# Patient Record
Sex: FEMALE | Race: WHITE | Employment: OTHER | URBAN - METROPOLITAN AREA
[De-identification: names, ages, dates, MRNs, and addresses within clinical notes are randomized per-mention and may not be internally consistent; named-entity substitution may affect disease eponyms.]

---

## 2017-10-02 ENCOUNTER — HOSPITAL ENCOUNTER (INPATIENT)
Age: 82
LOS: 4 days | Discharge: HOME | DRG: 194 | End: 2017-10-06
Attending: FAMILY MEDICINE | Admitting: INTERNAL MEDICINE
Payer: MEDICARE

## 2017-10-02 ENCOUNTER — APPOINTMENT (OUTPATIENT)
Dept: GENERAL RADIOLOGY | Age: 82
DRG: 194 | End: 2017-10-02
Attending: INTERNAL MEDICINE
Payer: MEDICARE

## 2017-10-02 PROBLEM — J44.1 COPD EXACERBATION (HCC): Status: ACTIVE | Noted: 2017-10-02

## 2017-10-02 PROBLEM — A41.9 SEPSIS (HCC): Status: ACTIVE | Noted: 2017-10-02

## 2017-10-02 LAB
ALBUMIN SERPL-MCNC: 2.6 G/DL (ref 3.5–5)
ALBUMIN/GLOB SERPL: 0.6 {RATIO} (ref 1.1–2.2)
ALP SERPL-CCNC: 54 U/L (ref 45–117)
ALT SERPL-CCNC: 25 U/L (ref 12–78)
ANION GAP SERPL CALC-SCNC: 8 MMOL/L (ref 5–15)
ARTERIAL PATENCY WRIST A: YES
AST SERPL-CCNC: 27 U/L (ref 15–37)
BASE DEFICIT BLDA-SCNC: 1.2 MMOL/L
BASOPHILS # BLD: 0 K/UL
BASOPHILS NFR BLD: 0 %
BDY SITE: ABNORMAL
BILIRUB SERPL-MCNC: 0.6 MG/DL (ref 0.2–1)
BREATHS.SPONTANEOUS ON VENT: 24
BUN SERPL-MCNC: 18 MG/DL (ref 6–20)
BUN/CREAT SERPL: 18 (ref 12–20)
CALCIUM SERPL-MCNC: 8 MG/DL (ref 8.5–10.1)
CHLORIDE SERPL-SCNC: 106 MMOL/L (ref 97–108)
CO2 SERPL-SCNC: 23 MMOL/L (ref 21–32)
CREAT SERPL-MCNC: 1.01 MG/DL (ref 0.55–1.02)
DIFFERENTIAL METHOD BLD: ABNORMAL
EOSINOPHIL # BLD: 0 K/UL
EOSINOPHIL NFR BLD: 0 %
ERYTHROCYTE [DISTWIDTH] IN BLOOD BY AUTOMATED COUNT: 13.1 % (ref 11.5–14.5)
GAS FLOW.O2 O2 DELIVERY SYS: 6 L/MIN
GLOBULIN SER CALC-MCNC: 4.4 G/DL (ref 2–4)
GLUCOSE SERPL-MCNC: 213 MG/DL (ref 65–100)
GRAM STN SPEC: NORMAL
HCO3 BLDA-SCNC: 24 MMOL/L (ref 22–26)
HCT VFR BLD AUTO: 35.5 % (ref 35–47)
HGB BLD-MCNC: 11.7 G/DL (ref 11.5–16)
LYMPHOCYTES # BLD: 1.2 K/UL
LYMPHOCYTES NFR BLD: 5 %
MCH RBC QN AUTO: 29.8 PG (ref 26–34)
MCHC RBC AUTO-ENTMCNC: 33 G/DL (ref 30–36.5)
MCV RBC AUTO: 90.3 FL (ref 80–99)
MONOCYTES # BLD: 0.5 K/UL
MONOCYTES NFR BLD: 2 %
NEUTS BAND NFR BLD MANUAL: 7 %
NEUTS SEG # BLD: 23.2 K/UL
NEUTS SEG NFR BLD: 86 %
PCO2 BLDA: 40 MMHG (ref 35–45)
PH BLDA: 7.39 [PH] (ref 7.35–7.45)
PLATELET # BLD AUTO: 232 K/UL (ref 150–400)
PO2 BLDA: 68 MMHG (ref 80–100)
POTASSIUM SERPL-SCNC: 3.9 MMOL/L (ref 3.5–5.1)
PROT SERPL-MCNC: 7 G/DL (ref 6.4–8.2)
RBC # BLD AUTO: 3.93 M/UL (ref 3.8–5.2)
RBC MORPH BLD: ABNORMAL
SAO2 % BLD: 94 % (ref 92–97)
SAO2% DEVICE SAO2% SENSOR NAME: ABNORMAL
SERVICE CMNT-IMP: NORMAL
SODIUM SERPL-SCNC: 137 MMOL/L (ref 136–145)
SPECIMEN SITE: ABNORMAL
WBC # BLD AUTO: 24.9 K/UL (ref 3.6–11)

## 2017-10-02 PROCEDURE — 97116 GAIT TRAINING THERAPY: CPT | Performed by: PHYSICAL THERAPIST

## 2017-10-02 PROCEDURE — 87205 SMEAR GRAM STAIN: CPT | Performed by: GENERAL ACUTE CARE HOSPITAL

## 2017-10-02 PROCEDURE — 97530 THERAPEUTIC ACTIVITIES: CPT | Performed by: PHYSICAL THERAPIST

## 2017-10-02 PROCEDURE — 97165 OT EVAL LOW COMPLEX 30 MIN: CPT | Performed by: OCCUPATIONAL THERAPIST

## 2017-10-02 PROCEDURE — 36415 COLL VENOUS BLD VENIPUNCTURE: CPT | Performed by: INTERNAL MEDICINE

## 2017-10-02 PROCEDURE — 65660000000 HC RM CCU STEPDOWN

## 2017-10-02 PROCEDURE — 74011250636 HC RX REV CODE- 250/636: Performed by: INTERNAL MEDICINE

## 2017-10-02 PROCEDURE — 94640 AIRWAY INHALATION TREATMENT: CPT

## 2017-10-02 PROCEDURE — 93005 ELECTROCARDIOGRAM TRACING: CPT

## 2017-10-02 PROCEDURE — 74011000250 HC RX REV CODE- 250: Performed by: INTERNAL MEDICINE

## 2017-10-02 PROCEDURE — 77030029684 HC NEB SM VOL KT MONA -A

## 2017-10-02 PROCEDURE — G8988 SELF CARE GOAL STATUS: HCPCS | Performed by: OCCUPATIONAL THERAPIST

## 2017-10-02 PROCEDURE — G8987 SELF CARE CURRENT STATUS: HCPCS | Performed by: OCCUPATIONAL THERAPIST

## 2017-10-02 PROCEDURE — 74011000258 HC RX REV CODE- 258: Performed by: GENERAL ACUTE CARE HOSPITAL

## 2017-10-02 PROCEDURE — 74011250637 HC RX REV CODE- 250/637: Performed by: GENERAL ACUTE CARE HOSPITAL

## 2017-10-02 PROCEDURE — 36600 WITHDRAWAL OF ARTERIAL BLOOD: CPT | Performed by: INTERNAL MEDICINE

## 2017-10-02 PROCEDURE — 71010 XR CHEST PORT: CPT

## 2017-10-02 PROCEDURE — 85025 COMPLETE CBC W/AUTO DIFF WBC: CPT | Performed by: INTERNAL MEDICINE

## 2017-10-02 PROCEDURE — 94664 DEMO&/EVAL PT USE INHALER: CPT

## 2017-10-02 PROCEDURE — 74011250636 HC RX REV CODE- 250/636: Performed by: GENERAL ACUTE CARE HOSPITAL

## 2017-10-02 PROCEDURE — 80053 COMPREHEN METABOLIC PANEL: CPT | Performed by: INTERNAL MEDICINE

## 2017-10-02 PROCEDURE — 74011250637 HC RX REV CODE- 250/637: Performed by: INTERNAL MEDICINE

## 2017-10-02 PROCEDURE — G8989 SELF CARE D/C STATUS: HCPCS | Performed by: OCCUPATIONAL THERAPIST

## 2017-10-02 PROCEDURE — 97161 PT EVAL LOW COMPLEX 20 MIN: CPT | Performed by: PHYSICAL THERAPIST

## 2017-10-02 PROCEDURE — 82803 BLOOD GASES ANY COMBINATION: CPT | Performed by: INTERNAL MEDICINE

## 2017-10-02 RX ORDER — ONDANSETRON 2 MG/ML
4 INJECTION INTRAMUSCULAR; INTRAVENOUS
Status: DISCONTINUED | OUTPATIENT
Start: 2017-10-02 | End: 2017-10-06 | Stop reason: HOSPADM

## 2017-10-02 RX ORDER — MAGNESIUM SULFATE 1 G/100ML
1 INJECTION INTRAVENOUS ONCE
Status: COMPLETED | OUTPATIENT
Start: 2017-10-02 | End: 2017-10-02

## 2017-10-02 RX ORDER — DILTIAZEM HYDROCHLORIDE 5 MG/ML
10 INJECTION INTRAVENOUS ONCE
Status: COMPLETED | OUTPATIENT
Start: 2017-10-02 | End: 2017-10-02

## 2017-10-02 RX ORDER — IPRATROPIUM BROMIDE AND ALBUTEROL SULFATE 2.5; .5 MG/3ML; MG/3ML
3 SOLUTION RESPIRATORY (INHALATION)
Status: DISCONTINUED | OUTPATIENT
Start: 2017-10-02 | End: 2017-10-06

## 2017-10-02 RX ORDER — SODIUM CHLORIDE 0.9 % (FLUSH) 0.9 %
5-10 SYRINGE (ML) INJECTION AS NEEDED
Status: DISCONTINUED | OUTPATIENT
Start: 2017-10-02 | End: 2017-10-06 | Stop reason: HOSPADM

## 2017-10-02 RX ORDER — ASPIRIN 325 MG
325 TABLET ORAL DAILY
Status: DISCONTINUED | OUTPATIENT
Start: 2017-10-02 | End: 2017-10-06 | Stop reason: HOSPADM

## 2017-10-02 RX ORDER — BUDESONIDE AND FORMOTEROL FUMARATE DIHYDRATE 160; 4.5 UG/1; UG/1
2 AEROSOL RESPIRATORY (INHALATION) 2 TIMES DAILY
Status: DISCONTINUED | OUTPATIENT
Start: 2017-10-02 | End: 2017-10-06 | Stop reason: HOSPADM

## 2017-10-02 RX ORDER — FLUTICASONE PROPIONATE 50 MCG
2 SPRAY, SUSPENSION (ML) NASAL DAILY
Status: DISCONTINUED | OUTPATIENT
Start: 2017-10-02 | End: 2017-10-06 | Stop reason: HOSPADM

## 2017-10-02 RX ORDER — LEVOFLOXACIN 5 MG/ML
750 INJECTION, SOLUTION INTRAVENOUS
Status: DISCONTINUED | OUTPATIENT
Start: 2017-10-03 | End: 2017-10-05

## 2017-10-02 RX ORDER — FLUTICASONE FUROATE AND VILANTEROL 200; 25 UG/1; UG/1
1 POWDER RESPIRATORY (INHALATION) DAILY
Status: DISCONTINUED | OUTPATIENT
Start: 2017-10-02 | End: 2017-10-02

## 2017-10-02 RX ORDER — ENOXAPARIN SODIUM 100 MG/ML
40 INJECTION SUBCUTANEOUS DAILY
Status: DISCONTINUED | OUTPATIENT
Start: 2017-10-02 | End: 2017-10-06 | Stop reason: HOSPADM

## 2017-10-02 RX ORDER — SODIUM CHLORIDE 0.9 % (FLUSH) 0.9 %
5-10 SYRINGE (ML) INJECTION EVERY 8 HOURS
Status: DISCONTINUED | OUTPATIENT
Start: 2017-10-02 | End: 2017-10-06 | Stop reason: HOSPADM

## 2017-10-02 RX ORDER — BENZONATATE 100 MG/1
200 CAPSULE ORAL
Status: DISCONTINUED | OUTPATIENT
Start: 2017-10-02 | End: 2017-10-06 | Stop reason: HOSPADM

## 2017-10-02 RX ORDER — BUDESONIDE AND FORMOTEROL FUMARATE DIHYDRATE 160; 4.5 UG/1; UG/1
2 AEROSOL RESPIRATORY (INHALATION) 2 TIMES DAILY
COMMUNITY
End: 2018-12-17

## 2017-10-02 RX ORDER — FUROSEMIDE 40 MG/1
40 TABLET ORAL DAILY
Status: DISCONTINUED | OUTPATIENT
Start: 2017-10-03 | End: 2017-10-06 | Stop reason: HOSPADM

## 2017-10-02 RX ORDER — GUAIFENESIN 600 MG/1
600 TABLET, EXTENDED RELEASE ORAL EVERY 12 HOURS
Status: DISCONTINUED | OUTPATIENT
Start: 2017-10-02 | End: 2017-10-06 | Stop reason: HOSPADM

## 2017-10-02 RX ORDER — ACETAMINOPHEN 325 MG/1
650 TABLET ORAL
Status: DISCONTINUED | OUTPATIENT
Start: 2017-10-02 | End: 2017-10-06 | Stop reason: HOSPADM

## 2017-10-02 RX ORDER — DIPHENHYDRAMINE HCL 25 MG
25 CAPSULE ORAL
Status: DISCONTINUED | OUTPATIENT
Start: 2017-10-02 | End: 2017-10-06 | Stop reason: HOSPADM

## 2017-10-02 RX ORDER — LEVOFLOXACIN 5 MG/ML
750 INJECTION, SOLUTION INTRAVENOUS EVERY 24 HOURS
Status: DISCONTINUED | OUTPATIENT
Start: 2017-10-02 | End: 2017-10-02

## 2017-10-02 RX ADMIN — IPRATROPIUM BROMIDE AND ALBUTEROL SULFATE 3 ML: .5; 3 SOLUTION RESPIRATORY (INHALATION) at 15:46

## 2017-10-02 RX ADMIN — UMECLIDINIUM 1 PUFF: 62.5 AEROSOL, POWDER ORAL at 12:01

## 2017-10-02 RX ADMIN — ENOXAPARIN SODIUM 40 MG: 40 INJECTION SUBCUTANEOUS at 09:56

## 2017-10-02 RX ADMIN — BUDESONIDE AND FORMOTEROL FUMARATE DIHYDRATE 2 PUFF: 160; 4.5 AEROSOL RESPIRATORY (INHALATION) at 20:36

## 2017-10-02 RX ADMIN — Medication 10 ML: at 05:53

## 2017-10-02 RX ADMIN — IPRATROPIUM BROMIDE AND ALBUTEROL SULFATE 3 ML: .5; 3 SOLUTION RESPIRATORY (INHALATION) at 19:42

## 2017-10-02 RX ADMIN — GUAIFENESIN 600 MG: 600 TABLET, EXTENDED RELEASE ORAL at 09:57

## 2017-10-02 RX ADMIN — Medication 10 ML: at 16:23

## 2017-10-02 RX ADMIN — Medication 10 ML: at 20:35

## 2017-10-02 RX ADMIN — IPRATROPIUM BROMIDE AND ALBUTEROL SULFATE 3 ML: .5; 3 SOLUTION RESPIRATORY (INHALATION) at 02:40

## 2017-10-02 RX ADMIN — IPRATROPIUM BROMIDE AND ALBUTEROL SULFATE 3 ML: .5; 3 SOLUTION RESPIRATORY (INHALATION) at 07:46

## 2017-10-02 RX ADMIN — GUAIFENESIN 600 MG: 600 TABLET, EXTENDED RELEASE ORAL at 20:35

## 2017-10-02 RX ADMIN — FLUTICASONE PROPIONATE 2 SPRAY: 50 SPRAY, METERED NASAL at 12:00

## 2017-10-02 RX ADMIN — METHYLPREDNISOLONE SODIUM SUCCINATE 60 MG: 125 INJECTION, POWDER, FOR SOLUTION INTRAMUSCULAR; INTRAVENOUS at 12:04

## 2017-10-02 RX ADMIN — METHYLPREDNISOLONE SODIUM SUCCINATE 60 MG: 125 INJECTION, POWDER, FOR SOLUTION INTRAMUSCULAR; INTRAVENOUS at 05:52

## 2017-10-02 RX ADMIN — DILTIAZEM HYDROCHLORIDE 10 MG: 5 INJECTION INTRAVENOUS at 23:48

## 2017-10-02 RX ADMIN — MAGNESIUM SULFATE HEPTAHYDRATE 1 G: 1 INJECTION, SOLUTION INTRAVENOUS at 22:07

## 2017-10-02 RX ADMIN — IPRATROPIUM BROMIDE AND ALBUTEROL SULFATE 3 ML: .5; 3 SOLUTION RESPIRATORY (INHALATION) at 11:06

## 2017-10-02 RX ADMIN — METHYLPREDNISOLONE SODIUM SUCCINATE 60 MG: 125 INJECTION, POWDER, FOR SOLUTION INTRAMUSCULAR; INTRAVENOUS at 17:57

## 2017-10-02 RX ADMIN — ASPIRIN 325 MG ORAL TABLET 325 MG: 325 PILL ORAL at 09:56

## 2017-10-02 RX ADMIN — CEFTRIAXONE 1 G: 1 INJECTION, POWDER, FOR SOLUTION INTRAMUSCULAR; INTRAVENOUS at 16:22

## 2017-10-02 NOTE — PROGRESS NOTES
Cm acknowledged consult for discharge planning. Patient admitted 10/2/2107 for Acute on chronic respiratory failure, COPD exacerbation, chronic diastolic heart failure. Patient is sitting  Up in chair on 4LNCO2.  is present. Pt confirmed name and  as pt idenitifers. Cm proceeded to review demographics with patient -   \"You may as well ask my  because he does everything for me\". Demographics confirmed. 's cell number [phone] Tato Hassan  ADL's - patient is independent but limited by increased shortness of breath. Patient informed CM \"even taking a shower is becoming harder\". DME - O2 - 3.5 - 4L NC baseline. Provided by Mary Garcia. Shower chair (doesn't use), nebulizer (doesn't use)  Cane available (doesn't use). Preferred [facility]    HH/SNF - no previous experience \"and I won't go to a rehab facility\". CM discussed possibility of HH being recommended at time of discharge. Patient refused. Care Management Interventions  PCP Verified by CM: Yes  Mode of Transport at Discharge:  Other (see comment) (family)  Transition of Care Consult (CM Consult): Discharge Planning  Physical Therapy Consult: Yes  Occupational Therapy Consult: Yes  Current Support Network: Lives with Spouse  Confirm Follow Up Transport: Family  Plan discussed with Pt/Family/Caregiver: Yes  Discharge Location  Discharge Placement: Home with family assistance    Petrona Wallace RN CM  Ext [phone]

## 2017-10-02 NOTE — CDMP QUERY
Patient is noted to have a BMI of 35. 12. Please clarify if this patient is: The [address] [place] [address] has issued a statement indicating that, \"Individuals who are overweight, obese, or morbidly obese are at an increased risk for certain medical conditions when compared to persons of normal weight. Therefore, these conditions are always clinically significant and reportable when documented by the provider. \"    =>Obesity (BMI of 30-39.9)  => Morbid Obesity  (BMI 40 or greater)  =>Overweight (BMI 25-29.9)  => Other weight status (specify  status)  => Unable to determine        Presentation: ht 5' 2\" Wt 192lbs BMI 35.12          Please clarify and document your clinical opinion in the progress notes and discharge summary, including the definitive and or presumptive diagnosis, (suspected or probable), related to the above clinical findings. Please include clinical findings supporting your diagnosis.    Thank [address] [place] [address], [address] [place] [address] Rd     [phone]

## 2017-10-02 NOTE — PROGRESS NOTES
Patient arrived to unit via stretcher and transport by EMS; assisted to bathroom upon arrival; patient is dyspneic at rest and this is further exacerbated with minimal activity or exertion; patient is currently in bed on 6L NC, sats 100%, dyspneic with use of abdominal and accessory muscles, tachypneic; Dr. Seb Barr is aware that patient is on unit    Primary Nurse Aimee Gill RN and Umu Gilliland RN performed a dual skin assessment on this patient No impairment noted  Niraj score is 7700 Robert Blake RN  10/02/17  1:59 AM

## 2017-10-02 NOTE — PROGRESS NOTES
Respiratory notified of STAT blood gas ordered by Dr. Flor Patrick.      Satish Borjas RN  10/02/17  2:21 AM

## 2017-10-02 NOTE — PROGRESS NOTES
Occupational Therapy EVALUATION/discharge  Patient: Tiffnai Jimenez (89 y.o. female)  Date: 10/2/2017  Primary Diagnosis: SEPSIS AND PNEUMONIA  Sepsis ([facility] [address]        Precautions:  Fall, Other (comment) (uses 4 LO2 baseline)    ASSESSMENT:   Based on the objective data described below, the patient presents at her baseline limited activity tolerance for ADLs and functional mobility, becoming easily SOB and requiring 4 L NC to maintain oxygen saturation in the 90s. Patient would benefit from OT for energy conservation teaching as well as from out patient Cardio-pulmonary rehab after discharge to maximize her activity tolerance. Patient unfortunately was unreceptive to energy conservation teaching and adamantly refused the recommendation for Out patient cardiopulmonary rehab. At this point urther skilled acute occupational therapy is not indicated at this time 2/2 the above. [facility] Discharge Recommendations:Patient would benefit from outpatient cardio-pulmonary rehab, but she strongly verbalized not being interested. Further Equipment Recommendations for Discharge: continue home and portable oxygen      SUBJECTIVE:   Patient stated No I don't want any kind of rehab.     OBJECTIVE DATA SUMMARY:   HISTORY:   Past Medical History:   Diagnosis Date    COPD     Hypertension      Past Surgical History:   Procedure Laterality Date    HX GYN      overy removed/ 2 c-sections       Prior Level of Function/Home Situation: Patient reports being independent at baseline with ADLs and functional mobility. Per patient she becomes easily SOB with any activity and is on 3.5 to 4 L NC at all times.    Expanded or extensive additional review of patient history:     Home Situation  Home Environment: Private residence  # Steps to Enter: 5  Rails to Enter: Yes  One/Two Story Residence: One story  Living Alone: No  Support Systems: Family member(s), Spouse/Significant Other/Partner  Patient Expects to be Discharged to[employer] residence  Current DME Used/Available at Home: Nebulizer, Oxygen, portable  [x]  Right hand dominant   []  Left hand dominant    EXAMINATION OF PERFORMANCE DEFICITS:  Cognitive/Behavioral Status:  Neurologic State: Alert  Orientation Level: Oriented X4  Cognition: Follows commands; Impaired decision making  Safety/Judgement: Decreased insight into deficits  Hearing: Auditory  Auditory Impairment: None    Vision/Perceptual:    Acuity: Within Defined Limits         Range of Motion:  AROM: Generally decreased, functional        Strength:  Strength:  Within functional limits     Coordination:   WFL          Tone & Sensation:  Intact           Balance:  Sitting: Intact  Standing: Intact  Standing - Static: Constant support;Good  Standing - Dynamic : Fair    Functional Mobility and Transfers for ADLs:  Bed Mobility:  Supine to Sit: Supervision  Sit to Supine: Supervision  Scooting: Independent    Transfers:  Sit to Stand: Independent  Stand to Sit: Independent  Bed to Chair: Independent  Toilet Transfer : Independent    ADL Assessment:  Feeding: Independent    Oral Facial Hygiene/Grooming: Independent    Bathing: Independent    Upper Body Dressing: Independent    Lower Body Dressing: Independent    Toileting: Independent                ADL Intervention and task modifications:  Provide some energy conservation/pacing technique training, as it relates to the performance of ADLs, but patient declined further training as well as declined energy conservation handouts offered by this OT   Functional Measure:  Barthel Index:    Bathin  Bladder: 10  Bowels: 10  Groomin  Dressing: 10  Feeding: 10  Mobility: 0  Stairs: 0  Toilet Use: 10  Transfer (Bed to Chair and Back): 15  Total: 75       Barthel and G-code impairment scale:  Percentage of impairment CH  0% CI  1-19% CJ  20-39% CK  40-59% CL  60-79% CM  80-99% CN  100%   Barthel Score 0-100 100 99-80 79-60 59-40 20-39 1-19   0   Barthel Score 0-20 20 17-19 13-16 9-12 5-8 1-4 0      The Barthel ADL Index: Guidelines  1. The index should be used as a record of what a patient does, not as a record of what a patient could do. 2. The main aim is to establish degree of independence from any help, physical or verbal, however minor and for whatever reason. 3. The need for supervision renders the patient not independent. 4. A patient's performance should be established using the best available evidence. Asking the patient, friends/relatives and nurses are the usual sources, but direct observation and common sense are also important. However direct testing is not needed. 5. Usually the patient's performance over the preceding 24-48 hours is important, but occasionally longer periods will be relevant. 6. Middle categories imply that the patient supplies over 50 per cent of the effort. 7. Use of aids to be independent is allowed. Darryle Chant., Barthel, [place]. (3522). Functional evaluation: the Barthel Index. [facility] (14)2. Isaura Ovalles minda SHOBHA Coyle, Aditya Drew., Javier Gamez., [place], [address] [place] [address] Ave (1999). Measuring the change indisability after inpatient rehabilitation; comparison of the responsiveness of the Barthel Index and Functional [place] Measure. Journal of Neurology, Neurosurgery, and Psychiatry, 66(4), 884-549. Shay Salas, N.J.A, JOSUE Dawkins, & Mira Rosen MForestA. (2004.) Assessment of post-stroke quality of life in cost-effectiveness studies: The usefulness of the Barthel Index and the EuroQoL-5D. Quality of Life Research, 13, 271-46       G codes: In compliance with CMSs Claims Based Outcome Reporting, the following G-code set was chosen for this patient based on their primary functional limitation being treated: The outcome measure chosen to determine the severity of the functional limitation was the Barthel index with a score of 75/100 which was correlated with the impairment scale. ?  Self Care:     - CURRENT STATUS: CJ - 20%-39% impaired, limited or restricted    - GOAL STATUS: CJ - 20%-39% impaired, limited or restricted    - D/C STATUS:  CJ - 20%-39% impaired, limited or restricted       Pain:Pain Scale 1: Numeric (0 - 10)  Pain Intensity 1: 0              Activity Tolerance:   VSS on 4L NC with activity. .  After treatment:   [x]  Patient left in no apparent distress sitting up in chair  []  Patient left in no apparent distress in bed  [x]  Call bell left within reach  [x]  Nursing notified  []  Caregiver present  []  Bed alarm activated    COMMUNICATION/EDUCATION:   Communication/Collaboration:  [x]      Home safety education was provided and the patient/caregiver indicated understanding. [x]      Patient/family have participated as able and agree with findings and recommendations. []      Patient is unable to participate in plan of care at this time.   Findings and recommendations were discussed with: Physical Therapist and Registered Nurse    Kal Townsend OTR/L  Time Calculation: 18 mins

## 2017-10-02 NOTE — PROGRESS NOTES
Bedside and Verbal shift change report given to 1601 Candy Hoang RN (oncoming nurse) by Hola Call RN (offgoing nurse). Report included the following information SBAR, Kardex, Intake/Output, MAR, Accordion and Recent Results.     Hola Call RN  10/02/17  7:29 AM

## 2017-10-02 NOTE — PROGRESS NOTES
Pharmacy Automatic Renal Dosing Protocol - Antimicrobials    Indication for Antimicrobials: CAP  Current Regimen of Each Antimicrobial (Start Day & Day of Therapy):  Levaquin 750 mg iv q24h  Significant cultures: Urine culture 10/1,  Blood culture 10/1    CAPD, Intermittent Hemodialysis or Renal Replacement Therapy: no  Paralysis, amputations, malnutrition: no  Recent Labs      10/01/17   1734   CREA  1.09*   BUN  20*   WBC  22.2*     Temp (24hrs), Av.5 °F (36.9 °C), Min:98.5 °F (36.9 °C), Max:98.5 °F (36.9 °C)    Creatinine Clearance (ml/min): 33      Impression/Plan: Levaquin dosing adjusted to 750 mg iv q48h as per protocol. ANDRE Perez            Pharmacy will follow daily and adjust doses of monitored medications as appropriate for renal function and/or serum levels. Thank you,  Som Huitron PHARMD           Loading dose entered? Yes   Daily BMP ordered? Yes   Maintenance dose entered? Yes   Previous order discontinued? Yes   Progress note entered? Yes   Serum Level(s) ordered?  Not applicable   Vancomycin note as daily (not prn) as a reminder for the RN to give the Vancomycin after HD        Renal Dosing Tables on the Pharmacy Web Site

## 2017-10-02 NOTE — CDMP QUERY
Please give the clinical significance of the following lab data. urine culture-  Special Requests: NO SPECIAL REQUESTS     Preliminary     [place] Count >100,000     Preliminary    Culture result: GRAM NEGATIVE RODS (A)    Preliminary     Tx: iv levaquin, iv rocephin    Please clarify and document your clinical opinion in the progress notes and discharge summary including the definitive and/or presumptive diagnosis, (suspected or probable), related to the above clinical findings. Please include clinical findings supporting your diagnosis.     Thank [address] [place] [address], [address] [place] [address] Rd     [phone]

## 2017-10-02 NOTE — H&P
Hospitalist Admission Note    NAME: Cecy Jaffe   :  1935   MRN:  [id number]     Date/Time:  10/2/2017 2:21 AM    Patient PCP: Jayden Fortune MD  ________________________________________________________________________    My assessment of this patient's clinical condition and my plan of care is as follows. Assessment / Plan:  Sepsis POA  Acute on chronic respiratory failure POA with baseline 2-4L O2  COPD exacerbation  Baseline chronic diastolic heart failure due to severe Pulmonary HTN  Pt transferred from [facility] ED for admission  She has received multiple fluid boluses at [facility] as per sepsis protocol  She may need diuresis  Will repeat CXR, if pulmonary edema then given IV lasix but if negative then will start lasix from tomorrow (she claims to be on HCTZ at home and off lasix)  Check ABgs  O2 support, PRN BiPAP  Check Sputum cultures  IV steroids, Duonebs, Mucolytics, IV levaquin, Antitussives  She has received 1st dose of levaquin at [facility]  F/u blood cultures  CTA chest with LLL PNA at [facility]    Code Status: Full but doesn't want to be on prolong life support  Surrogate Decision Maker:     DVT Prophylaxis: Lovenox    Baseline: functional        Subjective:   CHIEF COMPLAINT: transferred from [facility]:     Cecy Jaffe is a 80 y.o.  female who presents from [facility] ED because of respiratory distress and higher level of care. As per patient, she has chronic cough with hernandez to brownish sputum. She has severe COPD at baseline with 2-4L O2. Pt claims she started to feel more short of breath since yesterday and her  increased her O2 to 6L. Pt doesn't like to come to the hospital and didn't want to come but her  brought her to hospital. Pt denies any fever, chills, nausea, vomiting, diarrhea, chest pain, problems urination. Pt noted to have Sepsis and CTA chest showed LL PNA.      We were asked to admit for work up and evaluation of the above problems. Past Medical History:   Diagnosis Date    COPD     Hypertension         Past Surgical History:   Procedure Laterality Date    HX GYN      overy removed/ 2 c-sections       Social History   Substance Use Topics    Smoking status: Current Every Day Smoker     Packs/day: 0.25    Smokeless tobacco: Never Used    Alcohol use No        Family History   Problem Relation Age of Onset    Cancer Brother      lung     Cancer Brother      lung     Heart Disease Mother      Allergies   Allergen Reactions    Keflex [Cephalexin] Itching        Prior to Admission medications    Medication Sig Start Date End Date Taking? Authorizing Provider   budesonide-formoterol (SYMBICORT) 160-4.5 mcg/actuation HFAA Take 2 Puffs by inhalation two (2) times a day. Yes Historical Provider   hydroCHLOROthiazide (HYDRODIURIL) 25 mg tablet Take 25 mg by mouth daily. Yes Mckayla Thomas MD   umeclidinium (INCRUSE ELLIPTA) 62.5 mcg/actuation inhaler Take 1 Puff by inhalation daily. Yes Mckayla Thomas MD   Oxygen 3 Each continuous. Yes Mckayla Thomas MD   amLODIPine (NORVASC) 5 mg tablet Take 1 Tab by mouth daily. 4/9/13  Yes Lexi Green MD   cholecalciferol (VITAMIN D3) 1,000 unit tablet Take 1 Tab by mouth daily. 4/9/13  Yes Lexi Green MD   furosemide (LASIX) 40 mg tablet Take 40 mg by mouth nightly. Yes Historical Provider   albuterol (PROVENTIL, VENTOLIN) 90 mcg/Actuation inhaler Take 2 Puffs by inhalation every six (6) hours as needed. Yes Mckayla Thomas MD   dextromethorphan-guaiFENesin (MUCINEX DM)  mg per tablet Take 1 Tab by mouth two (2) times a day. Yes Mckayla Thomas MD   oxyCODONE-acetaminophen (PERCOCET) 5-325 mg per tablet Take 1 Tab by mouth every four (4) hours as needed for Pain. 10/13/13   TEOFILO Bravo   calcium carbonate (TUMS) 200 mg calcium (500 mg) Chew Take 1 Tab by mouth daily.  4/9/13   Lexi Green MD   predniSONE (DELTASONE) 20 mg tablet 2 tabs po q daily *2 days, then 1 tab po q daily *3 days, then 1/2 tab po daily * 3 days 4/9/13   Claire Castellanos MD   acetaminophen (TYLENOL) 325 mg tablet Take 2 Tabs by mouth every four (4) hours as needed. 11/21/11   Neptali Black III, DO   fluticasone (FLONASE) 50 mcg/Actuation nasal spray 2 Sprays by Both Nostrils route. Use daily 11/21/11   Wallace Pimentel III, DO   tiotropium (SPIRIVA WITH HANDIHALER) 18 mcg inhalation capsule Take 1 Cap by inhalation daily. Mckayla Thomas MD   albuterol (PROVENTIL VENTOLIN) 2.5 mg /3 mL (0.083 %) nebulizer solution by Nebulization route every six (6) hours as needed. Mckayla Thomas MD   aspirin (ASPIRIN) 325 mg tablet Take 325 mg by mouth daily. Mckayla Thomas MD       REVIEW OF SYSTEMS:     I am not able to complete the review of systems because:    The patient is intubated and sedated    The patient has altered mental status due to his acute medical problems    The patient has baseline aphasia from prior stroke(s)    The patient has baseline dementia and is not reliable historian    The patient is in acute medical distress and unable to provide information           Total of 12 systems reviewed as follows:       POSITIVE= underlined text  Negative = text not underlined  General:  fever, chills, sweats, generalized weakness, weight loss/gain,      loss of appetite   Eyes:    blurred vision, eye pain, loss of vision, double vision  ENT:    rhinorrhea, pharyngitis   Respiratory:   cough, sputum production, SOB, LUDWIG, wheezing, pleuritic pain   Cardiology:   chest pain, palpitations, orthopnea, PND, edema, syncope   Gastrointestinal:  abdominal pain , N/V, diarrhea, dysphagia, constipation, bleeding   Genitourinary:  frequency, urgency, dysuria, hematuria, incontinence   Muskuloskeletal :  arthralgia, myalgia, back pain  Hematology:  easy bruising, nose or gum bleeding, lymphadenopathy   Dermatological: rash, ulceration, pruritis, color change / jaundice  Endocrine:   hot flashes or polydipsia Neurological:  headache, dizziness, confusion, focal weakness, paresthesia,     Speech difficulties, memory loss, gait difficulty  Psychological: Feelings of anxiety, depression, agitation    Objective:   VITALS:    Visit Vitals    /86    Pulse 81    Temp 98.5 °F (36.9 °C)    Resp (!) 32    SpO2 100%       PHYSICAL EXAM:    General:    Alert, cooperative, no distress, appears stated age. HEENT: Atraumatic, anicteric sclerae, pink conjunctivae     No oral ulcers, mucosa moist, throat clear, dentition fair  Neck:  Supple, symmetrical,  thyroid: non tender  Lungs:   Decreased breath sounds  Chest wall:  No tenderness  Accessory muscle use. Heart:   Regular  rhythm,  No  murmur   + edema  Abdomen:   Soft, non-tender. Not distended. Bowel sounds normal  Extremities: No cyanosis. No clubbing,      Skin turgor normal, Capillary refill normal, Radial dial pulse 2+  Skin:     Not pale. Not Jaundiced  No rashes   Psych:  Fair insight. Not depressed. Not anxious or agitated. Neurologic: EOMs intact. No facial asymmetry. No aphasia or slurred speech. Symmetrical strength, Sensation grossly intact.  Alert and oriented X 4.     _______________________________________________________________________  Care Plan discussed with:    Comments   Patient y    Family      RN y    Care Manager                    Consultant:      _______________________________________________________________________  Expected  Disposition:   Home with Family    HH/PT/OT/RN    SNF/LTC    MAR    ________________________________________________________________________  TOTAL TIME:  61 Minutes    Critical Care Provided     Minutes non procedure based      Comments    y Reviewed previous records   >50% of visit spent in counseling and coordination of care y Discussion with patient and questions answered       ________________________________________________________________________  Signed: Cecilia Montoya MD    Procedures: see electronic medical records for all procedures/Xrays and details which were not copied into this note but were reviewed prior to creation of Plan. LAB DATA REVIEWED:    Recent Results (from the past 24 hour(s))   EKG, 12 LEAD, INITIAL    Collection Time: 10/01/17  5:00 PM   Result Value Ref Range    Ventricular Rate 67 BPM    Atrial Rate 67 BPM    P-R Interval 124 ms    QRS Duration 140 ms    Q-T Interval 506 ms    QTC Calculation (Bezet) 534 ms    Calculated P Axis 87 degrees    Calculated R Axis -173 degrees    Calculated T Axis 99 degrees    Diagnosis       Sinus rhythm with sinus arrhythmia with occasional premature ventricular   complexes  Right bundle branch block  Abnormal ECG  No previous ECGs available     POC G3 - PUL    Collection Time: 10/01/17  5:09 PM   Result Value Ref Range    FIO2 (POC) 36 %    pH (POC) 7.449 7.35 - 7.45      pCO2 (POC) 38.2 35.0 - 45.0 MMHG    pO2 (POC) 53 (L) 80 - 100 MMHG    HCO3 (POC) 26.5 (H) 22 - 26 MMOL/L    sO2 (POC) 89 (L) 92 - 97 %    Base excess (POC) 2 mmol/L    Site LEFT RADIAL      Device: NASAL CANNULA      Flow rate (POC) 4 L/M    Allens test (POC) YES      Specimen type (POC) ARTERIAL     CBC WITH AUTOMATED DIFF    Collection Time: 10/01/17  5:34 PM   Result Value Ref Range    WBC 22.2 (H) 3.6 - 11.0 K/uL    RBC 4.33 3.80 - 5.20 M/uL    HGB 12.9 11.5 - 16.0 g/dL    HCT 40.0 35.0 - 47.0 %    MCV 92.4 80.0 - 99.0 FL    MCH 29.8 26.0 - 34.0 PG    MCHC 32.3 30.0 - 36.5 g/dL    RDW 13.0 11.5 - 14.5 %    PLATELET 659 242 - 746 K/uL    NEUTROPHILS 90 (H) 32 - 75 %    LYMPHOCYTES 4 (L) 12 - 49 %    MONOCYTES 6 5 - 13 %    EOSINOPHILS 0 0 - 7 %    BASOPHILS 0 0 - 1 %    ABS. NEUTROPHILS 20.0 (H) 1.8 - 8.0 K/UL    ABS. LYMPHOCYTES 0.9 0.8 - 3.5 K/UL    ABS. MONOCYTES 1.3 (H) 0.0 - 1.0 K/UL    ABS. EOSINOPHILS 0.0 0.0 - 0.4 K/UL    ABS.  BASOPHILS 0.0 0.0 - 0.1 K/UL    DF MANUAL      PLATELET COMMENTS ADEQUATE PLATELETS      RBC COMMENTS NORMOCYTIC, NORMOCHROMIC     CK W/ CKMB & INDEX Collection Time: 10/01/17  5:34 PM   Result Value Ref Range     26 - 192 U/L    CK - MB 1.6 <3.6 NG/ML    CK-MB Index 1.3 0 - 2.5     METABOLIC PANEL, COMPREHENSIVE    Collection Time: 10/01/17  5:34 PM   Result Value Ref Range    Sodium 141 136 - 145 mmol/L    Potassium 3.6 3.5 - 5.1 mmol/L    Chloride 103 97 - 108 mmol/L    CO2 29 21 - 32 mmol/L    Anion gap 9 5 - 15 mmol/L    Glucose 172 (H) 65 - 100 mg/dL    BUN 20 (H) 7.0 - 18.0 MG/DL    Creatinine 1.09 (H) 0.55 - 1.02 MG/DL    BUN/Creatinine ratio 18 12 - 20      GFR est AA 59 (L) >60 ml/min/1.73m2    GFR est non-AA 48 (L) >60 ml/min/1.73m2    Calcium 9.5 8.5 - 10.1 MG/DL    Bilirubin, total 0.6 0.2 - 1.0 MG/DL    ALT (SGPT) 23 14 - 63 U/L    AST (SGOT) 20 15 - 37 U/L    Alk.  phosphatase 70 45 - 117 U/L    Protein, total 7.6 6.4 - 8.2 g/dL    Albumin 3.2 (L) 3.5 - 5.0 g/dL    Globulin 4.4 (H) 2.0 - 4.0 g/dL    A-G Ratio 0.7 (L) 1.1 - 2.2     TROPONIN I    Collection Time: 10/01/17  5:34 PM   Result Value Ref Range    Troponin-I, Qt. 0.04 <0.08 ng/mL   LACTIC ACID    Collection Time: 10/01/17  5:34 PM   Result Value Ref Range    Lactic acid 1.6 0.4 - 2.0 MMOL/L   D DIMER    Collection Time: 10/01/17  5:34 PM   Result Value Ref Range    D-dimer 1330 (H) 0 - 400 ng/mL (D-DU)   URINALYSIS W/MICROSCOPIC    Collection Time: 10/01/17  6:01 PM   Result Value Ref Range    Color YELLOW      Appearance HAZY (A) CLEAR      Specific gravity 1.025 1.003 - 1.030      pH (UA) 5.5 5.0 - 8.0      Protein TRACE (A) NEG mg/dL    Glucose NEGATIVE  NEG mg/dL    Ketone TRACE (A) NEG mg/dL    Blood TRACE (A) NEG      Urobilinogen 1.0 0.2 - 1.0 EU/dL    Nitrites POSITIVE (A) NEG      Leukocyte Esterase NEGATIVE  NEG      WBC 0-4 0 - 4 /hpf    RBC 0-5 0 - 5 /hpf    Epithelial cells MANY (A) FEW /lpf    Bacteria NEGATIVE  NEG /hpf    UA:UC IF INDICATED CULTURE NOT INDICATED BY UA RESULT     BILIRUBIN, CONFIRM    Collection Time: 10/01/17  6:01 PM   Result Value Ref Range Bilirubin UA, confirm NEGATIVE  NEG     PROTHROMBIN TIME + INR    Collection Time: 10/01/17  6:47 PM   Result Value Ref Range    INR 1.2 (H) 0.9 - 1.1      Prothrombin time 13.9 (H) 10.0 - 13.0 sec   PTT    Collection Time: 10/01/17  6:47 PM   Result Value Ref Range    aPTT 50.3 (H) 23.0 - 33.5 sec    aPTT, therapeutic range     51.4 - 76.9 SECS

## 2017-10-02 NOTE — IP AVS SNAPSHOT
[address] [place] [address] 
[phone] Patient: Michele Abarca MRN: [id number] :1935 You are allergic to the following Allergen Reactions Keflex (Cephalexin) Itching Immunizations Administered for This Admission Name Date Influenza Vaccine (Quad) PF  Deferred () Recent Documentation OB Status Smoking Status Postmenopausal Current Every Day Smoker Unresulted Labs Order Current Status CULTURE, BLOOD Preliminary result Emergency Contacts Name Discharge Info Relation Home Work Mobile Tato Hassan DISCHARGE CAREGIVER [3] Spouse [3] [phone] About your hospitalization You were admitted on:  2017 You last received care in the:  [facility] 3 NEUROSCIENCE TELEMETRY You were discharged on:  2017 Unit phone number:  [phone] Why you were hospitalized Your primary diagnosis was:  Not on File Your diagnoses also included:  Sepsis (Hcc), Copd Exacerbation (Hcc), Acute On Chronic Respiratory Failure (Hcc), Pulmonary Hypertension, Moderate To Severe Providers Seen During Your Hospitalizations Provider Role Specialty Primary office phone Chelle Sanchez MD Attending Provider [facility] [phone] Niranjan De La Cruz MD Attending Provider Internal Medicine [phone] Blanco Meneses MD Attending Provider Internal Medicine [phone] Your Primary Care Physician (PCP) Primary Care Physician Office Phone Office Fax Frankie Blevins [facility] [phone] Follow-up Information Follow up With Details Comments Contact Info Keisha Guerrero MD Go on 10/9/2017 PCP - follow up at 2:20 PM 40 [facility] [address] [place] [address] 
[phone] Renita Ferrari MD Go on 10/9/2017 Pulmonary -follow up at 9:00 AM  1808 [facility] [address] 
[phone] Mike Dan MD Go on 11/1/2017 Cardiology follow up at 9:30 AM  [address] [id number] Lexi Lawton 83. 
[phone] Current Discharge Medication List  
  
START taking these medications Dose & Instructions Dispensing Information Comments Morning Noon Evening Bedtime  
 dilTIAZem  mg ER capsule Commonly known as:  CARDIZEM CD Your last dose was: Your next dose is:    
   
   
 Dose:  180 mg Take 1 Cap by mouth daily. Quantity:  30 Cap Refills:  0  
     
   
   
   
  
 levoFLOXacin 750 mg tablet Commonly known as:  Trevor Todd Your last dose was: Your next dose is:    
   
   
 Dose:  750 mg Take 1 Tab by mouth every fourty-eight (48) hours for 3 doses. Take on 10/7,9,11 Quantity:  3 Tab Refills:  0  
     
   
   
   
  
 predniSONE 10 mg tablet Commonly known as:  Cinda Shepherd Your last dose was: Your next dose is: Take 2 Tab daily x 3 days then 1 Tab daily x 3 days Quantity:  9 Tab Refills:  0 CONTINUE these medications which have NOT CHANGED Dose & Instructions Dispensing Information Comments Morning Noon Evening Bedtime  
 acetaminophen 325 mg tablet Commonly known as:  TYLENOL Your last dose was: Your next dose is:    
   
   
 Dose:  650 mg Take 2 Tabs by mouth every four (4) hours as needed. Quantity:  30 Tab Refills:  0  
     
   
   
   
  
 albuterol 2.5 mg /3 mL (0.083 %) nebulizer solution Commonly known as:  PROVENTIL VENTOLIN Your last dose was: Your next dose is:    
   
   
 by Nebulization route every six (6) hours as needed. Refills:  0  
     
   
   
   
  
 albuterol 90 mcg/actuation inhaler Commonly known as:  Tita Roca Your last dose was: Your next dose is:    
   
   
 Dose:  2 Puff Take 2 Puffs by inhalation every six (6) hours as needed. Refills:  0  
     
   
   
   
  
 aspirin 325 mg tablet Commonly known as:  ASPIRIN Your last dose was: Your next dose is:    
   
   
 Dose:  325 mg Take 325 mg by mouth daily. Refills:  0  
     
   
   
   
  
 calcium carbonate 200 mg calcium (500 mg) Chew Commonly known as:  TUMS Your last dose was: Your next dose is:    
   
   
 Dose:  200 mg Take 1 Tab by mouth daily. Quantity:  30 Tab Refills:  0  
     
   
   
   
  
 cholecalciferol 1,000 unit tablet Commonly known as:  VITAMIN D3 Your last dose was: Your next dose is:    
   
   
 Dose:  1000 Units Take 1 Tab by mouth daily. Quantity:  30 Tab Refills:  1  
     
   
   
   
  
 fluticasone 50 mcg/actuation nasal spray Commonly known as:  [place] Cholo Your last dose was: Your next dose is:    
   
   
 Dose:  2 Spray 2 Sprays by Both Nostrils route. Use daily Quantity:  1 Bottle Refills:  3  
     
   
   
   
  
 hydroCHLOROthiazide 25 mg tablet Commonly known as:  HYDRODIURIL Your last dose was: Your next dose is:    
   
   
 Dose:  25 mg Take 25 mg by mouth daily. Refills:  0 INCRUSE ELLIPTA 62.5 mcg/actuation inhaler Generic drug:  umeclidinium Your last dose was: Your next dose is:    
   
   
 Dose:  1 Puff Take 1 Puff by inhalation daily. Refills:  0 MUCINEX -30 mg per tablet Generic drug:  guaiFENesin-dextromethorphan SR Your last dose was: Your next dose is:    
   
   
 Dose:  1 Tab Take 1 Tab by mouth two (2) times a day. Refills:  0 Oxygen Your last dose was: Your next dose is:    
   
   
 Dose:  3 Each  
3 Each continuous. Refills:  0 SPIRIVA WITH HANDIHALER 18 mcg inhalation capsule Generic drug:  tiotropium Your last dose was: Your next dose is:    
   
   
 Dose:  1 Cap Take 1 Cap by inhalation daily. Refills:  0  
     
   
   
   
  
 SYMBICORT 160-4.5 mcg/actuation Hfaa Generic drug:  budesonide-formoterol Your last dose was: Your next dose is:    
   
   
 Dose:  2 Puff Take 2 Puffs by inhalation two (2) times a day. Refills:  0 STOP taking these medications LASIX 40 mg tablet Generic drug:  furosemide Where to Get Your Medications Information on where to get these meds will be given to you by the nurse or doctor. ! Ask your nurse or doctor about these medications  
  dilTIAZem  mg ER capsule  
 levoFLOXacin 750 mg tablet  
 predniSONE 10 mg tablet Discharge Instructions Patient Discharge Instructions Anne Judy / [id number] : 1935 Admitted 10/2/2017 Discharged: 10/6/2017 DISCHARGE DIAGNOSIS:  
 
Pneumonia COPD exacerbation Atrial fibrillation Take Home Medications You are being discharge on antibiotic Levaquin for treatment of pneumonia What you should know about antibiotics: Antibiotics are medicines that help people fight infections caused by bacteria. They work by killing bacteria that are in the body. Antibiotics can cause side effects, such as nausea, vomiting. Nausea is a common side effect of many antibiotics. It is not an allergic reaction. If you are a woman and you get a yeast infection after taking an antibiotic, that does not mean you are allergic to it. Yeast infections are a common side effect of antibiotics. One of the most important side effect to watch while taking antibiotic or after you just finished taking it is diarrhea. This type of diarrhea may be caused by an infection with bacteria called C. difficile. C. difficile normally lives in the intestines.  When people are on antibiotics, the C. difficile in their intestines can overgrow and cause infection. If you develop any side effect especially diarrhea while taking antibiotics it is very important to contact your doctor. We recommend taking probiotics while taking antibiotics. Probiotics can be bought over the counter. Allergies to antibiotics are common. You can develop an allergy to an antibiotic, even if you have not had a problem with it before. Symptoms of antibiotic allergy can be mild and include a flat rash and itching. They can also be more serious and include:  
   -----  Hives - are raised, red patches of skin that are usually very itchy  
   -----  Lip or tongue swelling 
   ------ Trouble swallowing or breathing Serious allergy symptoms can start right after you start taking an antibiotic if you are very sensitive. Less serious symptoms, on the other hand, often start a day or more later. If you think you are allergic to antibiotics tell your doctor. General drug facts If you have a very bad allergy, wear an allergy ID at all times. It is important that you take the medication exactly as they are prescribed. Keep your medication in the bottles provided by the pharmacist. 
Keep a list of all your drugs (prescription, natural products, vitamins, OTC) with you. Give this list to your doctor. Do not take other medications without consulting your doctor. Do not share your drugs with others and do not take anyone else's drugs. Keep all drugs out of the reach of children and pets. Most drugs may be thrown away in household trash after mixing with coffee grounds or melina litter and sealing in a plastic bag. Keep a list Call your doctor for help with any side effects. If in the [place] you may also call the FDA at 5-934-FDA-8218 Talk with the doctor before starting any new drug, including OTC, natural products, or vitamins. What to do at [facility] 1. Recommended diet:cardiac 2. Recommended activity: Activity as tolerated 3. If you experience any of the following symptoms then please call your primary care physician or return to the emergency room if you cannot get hold of your doctor:fever/chills/diarrhea 4. Lab work: follow with PCP to repeat chemistry - follow potassium 5. Continue to wear oxygen as previously 6. Bring these papers with you to your follow up appointments. The papers will help your doctors be sure to continue the care plan from the hospital. 
 
 
Follow-up with:  
PCP: Nilda Foster MD 
Follow-up Information Follow up With Details Comments Contact Info Nilda Foster MD Go on 10/9/2017 PCP - follow up at 2:20 PM 40 [facility] [address] 
[phone] Annie Rangel MD  Pulmonary - please follow up with your physician as directed. 1808 [facility] [address] 
[phone] Luz Currie MD In 1 month cardiology  [address] [facility] 
[phone] Please call for your own appointment Information obtained by : 
I understand that if any problems occur once I am at home I am to contact my physician. I understand and acknowledge receipt of the instructions indicated above. Physician's or R.N.'s Signature                                                                  Date/Time Patient or Representative Signature                                                          Date/Time Discharge Instructions Attachments/References COPD: BREATHING TECHNIQUES (ENGLISH) Discharge Orders None [facility] Announcement We are excited to announce that we are making your provider's discharge notes available to you in abusix. You will see these notes when they are completed and signed by the physician that discharged you from your recent hospital stay. If you have any questions or concerns about any information you see in abusix, please call the Health Information Department where you were seen or reach out to your Primary Care Provider for more information about your plan of care. Introducing [facility]! [employer] introduces abusix patient portal. Now you can access parts of your medical record, email your doctor's office, and request medication refills online. 1. In your internet browser, go to [email or web address] 2. Click on the First Time User? Click Here link in the Sign In box. You will see the New Member Sign Up page. 3. Enter your abusix Access Code exactly as it appears below. You will not need to use this code after youve completed the sign-up process. If you do not sign up before the expiration date, you must request a new code. · abusix Access Code: [email or web address] Expires: 12/30/2017  7:11 PM 
 
4. Enter the last four digits of your Social Security Number (xxxx) and Date of Birth (mm/dd/yyyy) as indicated and click Submit. You will be taken to the next sign-up page. 5. Create a abusix ID. This will be your abusix login ID and cannot be changed, so think of one that is secure and easy to remember. 6. Create a abusix password. You can change your password at any time. 7. Enter your Password Reset Question and Answer. This can be used at a later time if you forget your password. 8. Enter your e-mail address. You will receive e-mail notification when new information is available in [address] 9. Click Sign Up. You can now view and download portions of your medical record.  
10. Click the Download Summary menu link to download a portable copy of your medical information. If you have questions, please visit the Frequently Asked Questions section of the [employer] website. Remember, [employer] is NOT to be used for urgent needs. For medical emergencies, dial 911. Now available from your iPhone and Android! General Information Please provide this summary of care documentation to your next provider. Patient Signature:  ____________________________________________________________ Date:  ____________________________________________________________  
  
Martín Almonte Provider Signature:  ____________________________________________________________ Date:  ____________________________________________________________ More Information Breathing Techniques for COPD: Care Instructions Your Care Instructions Breathing is hard when you have chronic obstructive pulmonary disease (COPD). You may take quick, short breaths. Breathing this way makes it harder to get air into your lungs. Learning new ways to control your breathing may help. You may feel better and be able to do more. You can try three basic ways to control your breathing. They are pursed-lip breathing, diaphragmatic breathing, and breathing while bending. Use these methods when you are more short of breath than normal. Practice them often so you can do them well. Follow-up care is a key part of your treatment and safety. Be sure to make and go to all appointments, and call your doctor if you are having problems. It's also a good idea to know your test results and keep a list of the medicines you take. How can you care for yourself at home? · Pursed-lip breathing helps you breathe more air out so that your next breath can be deeper. For this type of breathing, you breathe in through your nose and out through your mouth while almost closing your lips. Breathe in for about 2 seconds, and breathe out for 4 to 6 seconds. Pursed-lip breathing decreases shortness of breath and improves your ability to exercise. · Diaphragmatic breathing helps your lungs expand so that they take in more air. ¨ Lie on your back, or prop yourself up on several pillows. ¨ Put one hand on your belly and the other on your chest. When you breathe in, push your belly out as far as possible. You should feel the hand on your belly move out, while the hand on your chest does not move. ¨ When you breathe out, you should feel the hand on your belly move in. When you can do this type of breathing well while lying down, learn to do it while sitting or standing. Many people with COPD find this breathing method helpful. ¨ Practice diaphragmatic breathing for 20 minutes, 2 or 3 times a day. · Breathing while bending forward at the waist may make breathing easier. It can reduce shortness of breath while you exercise or rest. It helps the diaphragm move more easily. The diaphragm is a large muscle that separates your lungs from your belly. It helps draw air into your lungs as you breathe. · Do not smoke. Smoking makes COPD worse. If you need help quitting, talk to your doctor about stop-smoking programs and medicines. These can increase your chances of quitting for good. When should you call for help? Call your doctor now or seek immediate medical care if: 
· Your breathing methods do not help. · Your shortness of breath gets worse. · You cough up blood. · You have swelling in your belly and legs. · You have severe chest pain. Watch closely for changes in your health, and be sure to contact your doctor if you have any problems. Where can you learn more? Go to http://kath-liberty.info/. Enter D528 in the search box to learn more about \"Breathing Techniques for COPD: Care Instructions. \" Current as of: March 25, 2017 Content Version: 11.3 © 6345-2163 [employer].  Care instructions adapted under license by [address] (which disclaims liability or warranty for this information). If you have questions about a medical condition or this instruction, always ask your healthcare professional. [address] any warranty or liability for your use of this information.

## 2017-10-02 NOTE — PROGRESS NOTES
TRANSFER - IN REPORT:    Verbal report received from Aparna Costello RN (name) on Evan Paci  being received from [facility] ER(unit) for routine progression of care      Report consisted of patients Situation, Background, Assessment and   Recommendations(SBAR). Information from the following report(s) SBAR, Kardex, ED Summary, Intake/Output, MAR and Accordion was reviewed with the receiving nurse. Opportunity for questions and clarification was provided. Assessment completed upon patients arrival to unit and care assumed.      Gideon Nino RN  10/01/17  11:55 PM

## 2017-10-02 NOTE — PROGRESS NOTES
Problem: Mobility Impaired (Adult and Pediatric)  Goal: *Acute Goals and Plan of Care (Insert Text)  Physical Therapy Goals  Initiated 10/2/2017  1. Patient will move from supine to sit and sit to supine in bed with modified independence within 7 day(s). 2. Patient will transfer from bed to chair and chair to bed with modified independence using the least restrictive device within 7 day(s). 3. Patient will perform sit to stand with modified indpendence within 7 day(s). 4. Patient will ambulate with modified independence for 100 feet with the least restrictive device within 7 day(s). 5. Patient will ascend/descend 5 stairs with 1 handrail(s) with modified independence within 7 day(s). PHYSICAL THERAPY EVALUATION  Patient: Tran Astudillo (89 y.o. female)  Date: 10/2/2017  Primary Diagnosis: SEPSIS AND PNEUMONIA  Sepsis ([facility] [address]        Precautions:   Fall, Other (comment) (uses 4 LO2 baseline)      ASSESSMENT :  Based on the objective data described below, the patient presents with decreased functional mobility from baseline level of functon. Prior to admit patient was living with  in a 1 level home with approx 5 SPENCER. Uses 4 LO2 at baseline and reports she was independent with mobility but limited by SOB. Patient currently reluctant to participate with PT but is agreeable with encouragement. Supine to sit with Linda and additional time to complete task. Sit to stand with CGA and amb approx 10 feet into restroom with CGA on 4 L O2. Patient HR elevated to 130's after amb into restroom and needs cues for PLB. Patient left up in chair with needs in reach with HR 95 and SPO2 ~98% on 4 L O2. Patient is generally unstable but reluctant to use any assistive device for mobility. States numerous times that she \"loves PT but certainly doesn't need any\". Will continue to follow for mobility progression. Recommend HH PT vs no services based on progress/patient wishes.   Patient will benefit from skilled intervention to address the above impairments. Patients rehabilitation potential is considered to be Good  Factors which may influence rehabilitation potential include:   [ ]         None noted  [ ]         Mental ability/status  [X]         Medical condition  [ ]         Home/family situation and support systems  [ ]         Safety awareness  [ ]         Pain tolerance/management  [ ]         Other:        PLAN :  Recommendations and Planned Interventions:  [X]           Bed Mobility Training             [ ]    Neuromuscular Re-Education  [X]           Transfer Training                   [ ]    Orthotic/Prosthetic Training  [X]           Gait Training                         [ ]    Modalities  [X]           Therapeutic Exercises           [ ]    Edema Management/Control  [X]           Therapeutic Activities            [X]    Patient and Family Training/Education  [ ]           Other (comment):     Frequency/Duration: Patient will be followed by physical therapy  3 times a week to address goals. Discharge Recommendations: Home Health VS None  Further Equipment Recommendations for Discharge: TBD       SUBJECTIVE:   Patient stated I think PT is great but I don't need any.       OBJECTIVE DATA SUMMARY:   HISTORY:    Past Medical History:   Diagnosis Date    COPD      Hypertension       Past Surgical History:   Procedure Laterality Date    HX GYN         overy removed/ 2 c-sections     Prior Level of Function/Home Situation: Independent with mobility at baseline per patient  Personal factors and/or comorbidities impacting plan of care:      Home Situation  Home Environment: Private residence  # Steps to Enter: 5  Rails to Enter: Yes  One/Two Story Residence: One story  Living Alone: No  Support Systems: Family member(s), Spouse/Significant Other/Partner  Patient Expects to be Discharged to[de-identified] Private residence  Current DME Used/Available at Home: Nebulizer, Oxygen, portable     EXAMINATION/PRESENTATION/DECISION MAKING:   Critical Behavior:  Neurologic State: Alert  Orientation Level: Oriented X4        Hearing: Auditory  Auditory Impairment: None     Range Of Motion:  AROM: Generally decreased, functional                       Strength:    Strength: Generally decreased, functional        Functional Mobility:  Bed Mobility:     Supine to Sit: Supervision  Sit to Supine: Supervision  Scooting: Supervision  Transfers:  Sit to Stand: Contact guard assistance  Stand to Sit: Contact guard assistance                       Balance:   Sitting: Intact  Standing: Impaired  Standing - Static: Constant support;Good  Standing - Dynamic : Fair  Ambulation/Gait Training:  Distance (ft): 12 Feet (ft)  Assistive Device: Gait belt  Ambulation - Level of Assistance: Contact guard assistance        Gait Abnormalities: Decreased step clearance;Shuffling gait        Base of Support: Widened     Speed/Kimberlyn: Pace decreased (<100 feet/min); Shuffled; Slow  Step Length: Left shortened;Right shortened        Functional Measure:  Barthel Index:      Bathin  Bladder: 5  Bowels: 5  Groomin  Dressin  Feeding: 10  Mobility: 0  Stairs: 0  Toilet Use: 5  Transfer (Bed to Chair and Back): 10  Total: 45         Barthel and G-code impairment scale:  Percentage of impairment CH  0% CI  1-19% CJ  20-39% CK  40-59% CL  60-79% CM  80-99% CN  100%   Barthel Score 0-100 100 99-80 79-60 59-40 20-39 1-19    0   Barthel Score 0-20 20 17-19 13-16 9-12 5-8 1-4 0      The Barthel ADL Index: Guidelines  1. The index should be used as a record of what a patient does, not as a record of what a patient could do. 2. The main aim is to establish degree of independence from any help, physical or verbal, however minor and for whatever reason. 3. The need for supervision renders the patient not independent. 4. A patient's performance should be established using the best available evidence.  Asking the patient, friends/relatives and nurses are the usual sources, but direct observation and common sense are also important. However direct testing is not needed. 5. Usually the patient's performance over the preceding 24-48 hours is important, but occasionally longer periods will be relevant. 6. Middle categories imply that the patient supplies over 50 per cent of the effort. 7. Use of aids to be independent is allowed. Daisy Fernandez., Barthel, D.W. (1015). Functional evaluation: the Barthel Index. [facility] (14)2. Maryjo Chacko minda SHOBHA Coyle, Conner Mason., Miladis Yip., [facility] Nascimento, [address] (1999). Measuring the change indisability after inpatient rehabilitation; comparison of the responsiveness of the Barthel Index and Functional [place] Measure. Journal of Neurology, Neurosurgery, and Psychiatry, 66(4), 275-311. PHUC Obando, JOSUE Dawkins, & Iveth Banks MForestA. (2004.) Assessment of post-stroke quality of life in cost-effectiveness studies: The usefulness of the Barthel Index and the EuroQoL-5D. Quality of Life Research, 13, 238-17            G codes: In compliance with CMSs Claims Based Outcome Reporting, the following G-code set was chosen for this patient based on their primary functional limitation being treated: The outcome measure chosen to determine the severity of the functional limitation was the Barthel with a score of 45/100 which was correlated with the impairment scale. · Mobility - Walking and Moving Around:               - CURRENT STATUS:    CK - 40%-59% impaired, limited or restricted               - GOAL STATUS:           CJ - 20%-39% impaired, limited or restricted               - D/C STATUS:                       ---------------To be determined---------------         Pain:  Pain Scale 1: Numeric (0 - 10)  Pain Intensity 1: 0              Activity Tolerance:   VSS  Please refer to the flowsheet for vital signs taken during this treatment.   After treatment:   [X]         Patient left in no apparent distress sitting up in chair  [ ]         Patient left in no apparent distress in bed  [X]         Call bell left within reach  [X]         Nursing notified  [X]         Caregiver present  [ ]         Bed alarm activated      COMMUNICATION/EDUCATION:   The patients plan of care was discussed with: Physical Therapist, Occupational Therapist and Registered Nurse.  [X]         Fall prevention education was provided and the patient/caregiver indicated understanding. [X]         Patient/family have participated as able in goal setting and plan of care. [X]         Patient/family agree to work toward stated goals and plan of care. [ ]         Patient understands intent and goals of therapy, but is neutral about his/her participation. [ ]         Patient is unable to participate in goal setting and plan of care.      Thank you for this referral.  Farzaneh Shane, PT, DPT   Time Calculation: 23 mins

## 2017-10-03 LAB
ALBUMIN SERPL-MCNC: 2.8 G/DL (ref 3.5–5)
ALBUMIN/GLOB SERPL: 0.6 {RATIO} (ref 1.1–2.2)
ALP SERPL-CCNC: 58 U/L (ref 45–117)
ALT SERPL-CCNC: 30 U/L (ref 12–78)
ANION GAP SERPL CALC-SCNC: 7 MMOL/L (ref 5–15)
AST SERPL-CCNC: 34 U/L (ref 15–37)
BASOPHILS # BLD: 0 K/UL
BASOPHILS NFR BLD: 0 %
BILIRUB SERPL-MCNC: 0.3 MG/DL (ref 0.2–1)
BUN SERPL-MCNC: 31 MG/DL (ref 6–20)
BUN/CREAT SERPL: 31 (ref 12–20)
CALCIUM SERPL-MCNC: 8.6 MG/DL (ref 8.5–10.1)
CHLORIDE SERPL-SCNC: 105 MMOL/L (ref 97–108)
CO2 SERPL-SCNC: 26 MMOL/L (ref 21–32)
CREAT SERPL-MCNC: 1.01 MG/DL (ref 0.55–1.02)
DIFFERENTIAL METHOD BLD: ABNORMAL
EOSINOPHIL # BLD: 0 K/UL
EOSINOPHIL NFR BLD: 0 %
ERYTHROCYTE [DISTWIDTH] IN BLOOD BY AUTOMATED COUNT: 13.5 % (ref 11.5–14.5)
GLOBULIN SER CALC-MCNC: 4.4 G/DL (ref 2–4)
GLUCOSE SERPL-MCNC: 207 MG/DL (ref 65–100)
HCT VFR BLD AUTO: 34.4 % (ref 35–47)
HGB BLD-MCNC: 11.3 G/DL (ref 11.5–16)
LYMPHOCYTES # BLD: 0.9 K/UL
LYMPHOCYTES NFR BLD: 5 %
MCH RBC QN AUTO: 29.4 PG (ref 26–34)
MCHC RBC AUTO-ENTMCNC: 32.8 G/DL (ref 30–36.5)
MCV RBC AUTO: 89.4 FL (ref 80–99)
MONOCYTES # BLD: 1.6 K/UL
MONOCYTES NFR BLD: 9 %
NEUTS SEG # BLD: 15.6 K/UL
NEUTS SEG NFR BLD: 86 %
PLATELET # BLD AUTO: 261 K/UL (ref 150–400)
POTASSIUM SERPL-SCNC: 3.7 MMOL/L (ref 3.5–5.1)
PROT SERPL-MCNC: 7.2 G/DL (ref 6.4–8.2)
RBC # BLD AUTO: 3.85 M/UL (ref 3.8–5.2)
RBC MORPH BLD: ABNORMAL
SODIUM SERPL-SCNC: 138 MMOL/L (ref 136–145)
WBC # BLD AUTO: 18.1 K/UL (ref 3.6–11)

## 2017-10-03 PROCEDURE — 74011250637 HC RX REV CODE- 250/637: Performed by: INTERNAL MEDICINE

## 2017-10-03 PROCEDURE — 74011250636 HC RX REV CODE- 250/636: Performed by: INTERNAL MEDICINE

## 2017-10-03 PROCEDURE — 97116 GAIT TRAINING THERAPY: CPT | Performed by: PHYSICAL THERAPIST

## 2017-10-03 PROCEDURE — 65660000000 HC RM CCU STEPDOWN

## 2017-10-03 PROCEDURE — 74011250636 HC RX REV CODE- 250/636: Performed by: GENERAL ACUTE CARE HOSPITAL

## 2017-10-03 PROCEDURE — 94640 AIRWAY INHALATION TREATMENT: CPT

## 2017-10-03 PROCEDURE — 74011000258 HC RX REV CODE- 258: Performed by: GENERAL ACUTE CARE HOSPITAL

## 2017-10-03 PROCEDURE — 74011000258 HC RX REV CODE- 258: Performed by: INTERNAL MEDICINE

## 2017-10-03 PROCEDURE — 80053 COMPREHEN METABOLIC PANEL: CPT | Performed by: INTERNAL MEDICINE

## 2017-10-03 PROCEDURE — 85025 COMPLETE CBC W/AUTO DIFF WBC: CPT | Performed by: INTERNAL MEDICINE

## 2017-10-03 PROCEDURE — 36415 COLL VENOUS BLD VENIPUNCTURE: CPT | Performed by: INTERNAL MEDICINE

## 2017-10-03 PROCEDURE — 74011000250 HC RX REV CODE- 250: Performed by: INTERNAL MEDICINE

## 2017-10-03 PROCEDURE — 93306 TTE W/DOPPLER COMPLETE: CPT

## 2017-10-03 RX ORDER — HYDRALAZINE HYDROCHLORIDE 20 MG/ML
10 INJECTION INTRAMUSCULAR; INTRAVENOUS
Status: DISCONTINUED | OUTPATIENT
Start: 2017-10-03 | End: 2017-10-06 | Stop reason: HOSPADM

## 2017-10-03 RX ORDER — DILTIAZEM HYDROCHLORIDE 180 MG/1
180 CAPSULE, COATED, EXTENDED RELEASE ORAL DAILY
Status: DISCONTINUED | OUTPATIENT
Start: 2017-10-03 | End: 2017-10-06 | Stop reason: HOSPADM

## 2017-10-03 RX ADMIN — SODIUM CHLORIDE 5 MG/HR: 900 INJECTION, SOLUTION INTRAVENOUS at 00:00

## 2017-10-03 RX ADMIN — IPRATROPIUM BROMIDE AND ALBUTEROL SULFATE 3 ML: .5; 3 SOLUTION RESPIRATORY (INHALATION) at 04:07

## 2017-10-03 RX ADMIN — IPRATROPIUM BROMIDE AND ALBUTEROL SULFATE 3 ML: .5; 3 SOLUTION RESPIRATORY (INHALATION) at 19:39

## 2017-10-03 RX ADMIN — METHYLPREDNISOLONE SODIUM SUCCINATE 60 MG: 125 INJECTION, POWDER, FOR SOLUTION INTRAMUSCULAR; INTRAVENOUS at 05:58

## 2017-10-03 RX ADMIN — FUROSEMIDE 40 MG: 40 TABLET ORAL at 08:41

## 2017-10-03 RX ADMIN — METHYLPREDNISOLONE SODIUM SUCCINATE 60 MG: 125 INJECTION, POWDER, FOR SOLUTION INTRAMUSCULAR; INTRAVENOUS at 18:29

## 2017-10-03 RX ADMIN — BUDESONIDE AND FORMOTEROL FUMARATE DIHYDRATE 2 PUFF: 160; 4.5 AEROSOL RESPIRATORY (INHALATION) at 21:58

## 2017-10-03 RX ADMIN — UMECLIDINIUM 1 PUFF: 62.5 AEROSOL, POWDER ORAL at 08:42

## 2017-10-03 RX ADMIN — FLUTICASONE PROPIONATE 2 SPRAY: 50 SPRAY, METERED NASAL at 08:42

## 2017-10-03 RX ADMIN — Medication 10 ML: at 16:16

## 2017-10-03 RX ADMIN — IPRATROPIUM BROMIDE AND ALBUTEROL SULFATE 3 ML: .5; 3 SOLUTION RESPIRATORY (INHALATION) at 15:37

## 2017-10-03 RX ADMIN — GUAIFENESIN 600 MG: 600 TABLET, EXTENDED RELEASE ORAL at 08:41

## 2017-10-03 RX ADMIN — Medication 10 ML: at 21:58

## 2017-10-03 RX ADMIN — ENOXAPARIN SODIUM 40 MG: 40 INJECTION SUBCUTANEOUS at 08:41

## 2017-10-03 RX ADMIN — IPRATROPIUM BROMIDE AND ALBUTEROL SULFATE 3 ML: .5; 3 SOLUTION RESPIRATORY (INHALATION) at 23:00

## 2017-10-03 RX ADMIN — IPRATROPIUM BROMIDE AND ALBUTEROL SULFATE 3 ML: .5; 3 SOLUTION RESPIRATORY (INHALATION) at 07:48

## 2017-10-03 RX ADMIN — METHYLPREDNISOLONE SODIUM SUCCINATE 60 MG: 125 INJECTION, POWDER, FOR SOLUTION INTRAMUSCULAR; INTRAVENOUS at 00:04

## 2017-10-03 RX ADMIN — METHYLPREDNISOLONE SODIUM SUCCINATE 60 MG: 125 INJECTION, POWDER, FOR SOLUTION INTRAMUSCULAR; INTRAVENOUS at 11:33

## 2017-10-03 RX ADMIN — DILTIAZEM HYDROCHLORIDE 180 MG: 180 CAPSULE, EXTENDED RELEASE ORAL at 11:33

## 2017-10-03 RX ADMIN — Medication 10 ML: at 05:58

## 2017-10-03 RX ADMIN — BUDESONIDE AND FORMOTEROL FUMARATE DIHYDRATE 2 PUFF: 160; 4.5 AEROSOL RESPIRATORY (INHALATION) at 08:43

## 2017-10-03 RX ADMIN — HYDRALAZINE HYDROCHLORIDE 10 MG: 20 INJECTION INTRAMUSCULAR; INTRAVENOUS at 20:56

## 2017-10-03 RX ADMIN — CEFTRIAXONE 1 G: 1 INJECTION, POWDER, FOR SOLUTION INTRAMUSCULAR; INTRAVENOUS at 16:09

## 2017-10-03 RX ADMIN — GUAIFENESIN 600 MG: 600 TABLET, EXTENDED RELEASE ORAL at 21:57

## 2017-10-03 RX ADMIN — ASPIRIN 325 MG ORAL TABLET 325 MG: 325 PILL ORAL at 08:41

## 2017-10-03 RX ADMIN — LEVOFLOXACIN 750 MG: 5 INJECTION, SOLUTION INTRAVENOUS at 18:28

## 2017-10-03 NOTE — PROGRESS NOTES
Problem: Mobility Impaired (Adult and Pediatric)  Goal: *Acute Goals and Plan of Care (Insert Text)  Physical Therapy Goals  Initiated 10/2/2017  1. Patient will move from supine to sit and sit to supine in bed with modified independence within 7 day(s). 2. Patient will transfer from bed to chair and chair to bed with modified independence using the least restrictive device within 7 day(s). 3. Patient will perform sit to stand with modified indpendence within 7 day(s). 4. Patient will ambulate with modified independence for 100 feet with the least restrictive device within 7 day(s). 5. Patient will ascend/descend 5 stairs with 1 handrail(s) with modified independence within 7 day(s). PHYSICAL THERAPY TREATMENT  Patient: Cecy Jaffe (83 y.o. female)  Date: 10/3/2017  Diagnosis: SEPSIS AND PNEUMONIA  Sepsis ([facility] [address] <principal problem not specified>       Precautions: Fall, Other (comment) (uses 4 LO2 baseline)  Chart, physical therapy assessment, plan of care and goals were reviewed. ASSESSMENT:  Patient has absolutely no interest in PT and tries to talk her way out of it. She is getting up to the bathroom. I was able to get her to ambulate 20 feet with no AD in the room in 24 seconds and then she was ready to sit down. She is resistant to all equipment and outpatient rehab. Will continue to follow to encourage gait. Progression toward goals:  [ ]      Improving appropriately and progressing toward goals  [X]      Improving slowly and progressing toward goals  [ ]      Not making progress toward goals and plan of care will be adjusted       PLAN:  Patient continues to benefit from skilled intervention to address the above impairments. Continue treatment per established plan of care. Discharge Recommendations:  None  Further Equipment Recommendations for Discharge:  none       SUBJECTIVE:   Patient stated I just went to the bathroom.       OBJECTIVE DATA SUMMARY:   Critical Behavior:  Neurologic State: Alert  Orientation Level: Oriented X4  Cognition: Appropriate decision making, Appropriate for age attention/concentration, Appropriate safety awareness, Follows commands  Safety/Judgement: Decreased insight into deficits  Functional Mobility Training:  Bed Mobility:         Patient is up in the bedside chair and returned to the bedside chair. Transfers:  Sit to Stand: Independent                                Balance:  Sitting: Intact  Standing: Intact  Standing - Static: Good  Standing - Dynamic : Fair  Ambulation/Gait Training:  Distance (ft): 20 Feet (ft)  Assistive Device: Other (comment) (none)  Ambulation - Level of Assistance: Supervision        Gait Abnormalities: Decreased step clearance        Base of Support: Widened     Speed/Kimberlyn: Pace decreased (<100 feet/min)  Step Length: Left shortened;Right shortened                                      Pain:  Pain Scale 1: Numeric (0 - 10)  Pain Intensity 1: 0              Activity Tolerance:   Low endurance. Please refer to the flowsheet for vital signs taken during this treatment.   After treatment:   [X] Patient left in no apparent distress sitting up in chair  [ ] Patient left in no apparent distress in bed  [X] Call bell left within reach  [X] Nursing notified  [X] Caregiver present  [ ] Bed alarm activated      COMMUNICATION/COLLABORATION:   The patients plan of care was discussed with: Registered Nurse     Gustavo Farooq, PT   Time Calculation: 13 mins

## 2017-10-03 NOTE — PROGRESS NOTES
Visited by Liam Escobar Partner 10/3/17.     Floresita Cobos, MPS, [address] [facility], [address] [place] [address] Paging Service  [phone]

## 2017-10-03 NOTE — PROGRESS NOTES
PCU SHIFT NURSING NOTE      Bedside and Verbal shift change report given to Sanjana Ann RN (oncoming nurse) by Michele Alan (offgoing nurse). Report included the following information SBAR, Kardex, Intake/Output, MAR, Recent Results, Med Rec Status, Cardiac Rhythm NSR and Alarm Parameters . Shift Summary: 2965 Bedside report received at this time. Patient denies pain or discomfort at this time. Patient observed sitting up in bed. Spouse at bedside. 20g IV in right wrist and right arm. Patient shows no s/sx of resp distress at this time. Patient voiced concern about not taking BP pill (HTCZ 25mg) at night. 2100 patient converted into A Fib with RVR at this time. Stat EKG ran. EKG showed right bundle branch block. Hospitalist called. N.O for Magnesium 1gm IV X 1 and cardiologist consult. Magnesium started and consult called in    Dr Army Collier called back with order for    Cardizem 10mg bolus then Cardizem drip starting at  5mg/hr titraite as needed. Maintain SBP >100 and HR <100. New IV started at 2358 in left arm. Cardizem pushed and drip started per MD orders. Patient tolerated meds without difficulty. Old IV sites removed from right arm and right wrist, due to infiltration. Patient converted to Sinus tach then normal sinus rhythm after about 2hrs. Cardizem at 5mg/hr. Patient tolerated blood draw. Admission Date 10/2/2017   Admission Diagnosis SEPSIS AND PNEUMONIA  Sepsis ([facility] [address]   Consults IP CONSULT TO CARDIOLOGY        Consults   []PT   []OT   []Speech   []Case Management      [] Palliative      Cardiac Monitoring Order   []Yes   []No     IV drips   []Yes    Drip:                            Dose:  Drip:                            Dose:  Drip:                            Dose:   []No     GI Prophylaxis   []Yes   []No         DVT Prophylaxis   SCDs:             David stockings:         [] Medication   []Contraindicated   []None      Activity Level Activity Level: Other (comment)     Activity Assistance: Partial (two people)   Purposeful Rounding every 1-2 hour? []Yes   Gonzalez Score  Total Score: 3   Bed Alarm (If score 3 or >)   []Yes   [] Refused (See signed refusal form in chart)   Niraj Score  Niraj Score: 19   Niraj Score (if score 14 or less)   []PMT consult   []Wound Care consult      []Specialty bed   [] Nutrition consult          Needs prior to discharge:   Home O2 required:    []Yes   []No    If yes, how much O2 required? Other:    Last Bowel Movement: Last Bowel Movement Date: 10/02/17      Influenza Vaccine Received Flu Vaccine for Current Season (usually Sept-March): No    Patient/Guardian Refused (Notify MD): No   Pneumonia Vaccine           Diet Active Orders   Diet    DIET CARDIAC Mechanical Soft      LDAs               Peripheral IV 10/02/17 Left Forearm (Active)   Site Assessment Clean, dry, & intact 10/3/2017  3:00 AM   Phlebitis Assessment 0 10/3/2017  3:00 AM   Infiltration Assessment 0 10/3/2017  3:00 AM   Dressing Status Clean, dry, & intact 10/3/2017  3:00 AM   Dressing Type Tape;Transparent 10/3/2017  3:00 AM   Hub Color/Line Status Pink; Infusing 10/3/2017  3:00 AM   Alcohol Cap Used Yes 10/3/2017  3:00 AM                      Urinary Catheter      Intake & Output   Date 10/02/17 0700 - 10/03/17 0659 10/03/17 0700 - 10/04/17 2504   Shift 5345-3968 4523-0003 24 Hour Total 1644-3147 6335-6901 24 Hour Total   I  N  T  A  K  E   Shift Total         O  U  T  P  U  T   Urine            Urine Occurrence(s) 1 x 3 x 4 x       Shift Total         NET         Weight (kg)               Readmission Risk Assessment Tool Score Low Risk            11       Total Score        2 . Living with Significant Other. Assisted Living. LTAC. SNF. or   Rehab    5 Pt.  Coverage (Medicare=5 , Medicaid, or Self-Pay=4)    4 Charlson Comorbidity Score (Age + Comorbid Conditions)        Criteria that do not apply:    Has Seen PCP in Last 6 Months (Yes=3, No=0)    Patient Length of Stay (>5 days = 3)    IP Visits Last 12 Months (1-3=4, 4=9, >4=11)       Expected Length of Stay 4d 21h   Actual Length of Stay 1

## 2017-10-03 NOTE — CONSULTS
Cardiology Consult Note    CC: COPD; dyspnea    Yeimi Collins MD  Reason for consult:  Pafib; ASx. Requesting MD:  Dr. Wagner Arvizu. Admit Date: 10/2/2017   Today's Date: 10/3/2017   Pulm: Dr Nice Homer. Cardiac Assessment/Plan:   Afib: Paroxysmal; Brief duration (few hrs): started on dilt gtt: NSR now; Pt reports no Sx even with RVR. CHADS-Vasc: 4 (should be on anticoag if recurrent/persistent afib). Rec: d/c dilt gtt; change home norvasc (5) to dilt ; If recurrent afib, likely change dilt to metoprolol and add multaq; will see. HTN: med change as above. Cor pulmonale/pulm HTN: significant when last checked in 2011; Recheck echo while here. Rec: Consider chronic anticoag for pulm HTN. Volume status: looks euvolemic; on home dose PO lasix (40). Renal function: stable    RBBB: chronic. PPM not indicated. ID (PNA?), COPD/cor pulmonale, Resp failure, Dispo: all per primary team.     Hospital Problem List:  Active Problems:    Acute on chronic respiratory failure ([address] (11/17/2011)      Pulmonary hypertension, moderate to severe (11/19/2011)      Sepsis ([address] (10/2/2017)      COPD exacerbation ([address] (10/2/2017)       ____________________________________________________________________  Casimiro Mays is a 80 y.o. female presents with SEPSIS AND PNEUMONIA  Sepsis ([address] As noted in H&P: \"81 y.o.  female who presents from [facility] ED because of respiratory distress and higher level of care. As per patient, she has chronic cough with hernandez to brownish sputum. She has severe COPD at baseline with 2-4L O2. Pt claims she started to feel more short of breath since yesterday and her  increased her O2 to 6L. Pt doesn't like to come to the hospital and didn't want to come but her  brought her to hospital. Pt denies any fever, chills, nausea, vomiting, diarrhea, chest pain, problems urination.  Pt noted to have Sepsis and CTA chest showed LL PNA.\"    ______________________________________________________________________    The patient reports no chest pain/pressure; + increased HR with exertion, not at rest.  No PND, orthopnea, pre-syncope, syncope, peripheral edema, or decrease in exercise tolerance. No current complaints. ASx with afib last night: dilt gtt started; Now NSR.    ECG: yesterday: afib; ; NS STT wave changes. Tele: PAfib; now NSR. CXR: \"CM; no PNA/CHF\"  Chest CT: \"The right thyroid lobe is enlarged. There is no evidence for pulmonary emboli. There is no pericardial pleural effusion the heart size is normal. There is no  shift or pneumothorax. Calcification and mild ectasia of the thoracic aorta. There is a left lower lobe infiltrate. Stable nodules\"    Notable labs: WBC 22 to 18; Nl CMP; Neg CK/trop x1;   _____________________________________________  Notable prior cardiac history:  Pulmonary HTN   Echo 11/2011: EF 60% (TDS); RVE/decreased RV Fxn; DERRELL. Mild MR; Moderate TR; PAp 78.     CTA then neg for PEs.  ______________________________________________________________________  Patient Active Problem List    Diagnosis Date Noted    Sepsis ([facility] 10/02/2017    COPD exacerbation ([facility] 10/02/2017    Hyperglycemia 11/19/2011    Tobacco abuse 11/19/2011    Pulmonary hypertension, moderate to severe 11/19/2011    Acute on chronic respiratory failure ([facility] 11/17/2011    COPD (chronic obstructive pulmonary disease) with acute bronchitis ([address] 11/17/2011    Lung nodule 11/17/2011    Abnormal ECG 11/17/2011        Past Medical History:   Diagnosis Date    COPD     Hypertension       Past Surgical History:   Procedure Laterality Date    HX GYN      overy removed/ 2 c-sections     Allergies   Allergen Reactions    Keflex [Cephalexin] Itching      Family History   Problem Relation Age of Onset    Cancer Brother      lung     Cancer Brother      lung     Heart Disease Mother       Social History     Social History    Marital status:      Spouse name: N/A    Number of children: N/A    Years of education: N/A     Occupational History    Not on file.      Social History Main Topics    Smoking status: Current Every Day Smoker     Packs/day: 0.25    Smokeless tobacco: Never Used    Alcohol use No    Drug use: No    Sexual activity: Not on file     Other Topics Concern    Not on file     Social History Narrative     Current Facility-Administered Medications   Medication Dose Route Frequency    methylPREDNISolone (PF) (SOLU-MEDROL) injection 60 mg  60 mg IntraVENous Q6H    albuterol-ipratropium (DUO-NEB) 2.5 MG-0.5 MG/3 ML  3 mL Nebulization Q4H RT    guaiFENesin ER (MUCINEX) tablet 600 mg  600 mg Oral Q12H    benzonatate (TESSALON) capsule 200 mg  200 mg Oral TID PRN    umeclidinium (INCRUSE ELLIPTA) 62.5 mcg/actuation  1 Puff Inhalation DAILY    aspirin (ASPIRIN) tablet 325 mg  325 mg Oral DAILY    fluticasone (FLONASE) 50 mcg/actuation nasal spray 2 Spray  2 Spray Both Nostrils DAILY    furosemide (LASIX) tablet 40 mg  40 mg Oral DAILY    levoFLOXacin (LEVAQUIN) 750 mg in D5W IVPB  750 mg IntraVENous Q48H    sodium chloride (NS) flush 5-10 mL  5-10 mL IntraVENous Q8H    sodium chloride (NS) flush 5-10 mL  5-10 mL IntraVENous PRN    acetaminophen (TYLENOL) tablet 650 mg  650 mg Oral Q6H PRN    ondansetron (ZOFRAN) injection 4 mg  4 mg IntraVENous Q4H PRN    enoxaparin (LOVENOX) injection 40 mg  40 mg SubCUTAneous DAILY    influenza vaccine 2017-18 (3 yrs+)(PF) (FLUZONE QUAD/FLUARIX QUAD) injection 0.5 mL  0.5 mL IntraMUSCular PRIOR TO DISCHARGE    budesonide-formoterol (SYMBICORT) 160-4.5 mcg/actuation HFA inhaler 2 Puff  2 Puff Inhalation BID    cefTRIAXone (ROCEPHIN) 1 g in 0.9% sodium chloride (MBP/ADV) 50 mL  1 g IntraVENous Q24H    diphenhydrAMINE (BENADRYL) capsule 25 mg  25 mg Oral BID PRN    dilTIAZem (CARDIZEM) 100 mg in 0.9% sodium chloride (MBP/ADV) 100 mL infusion  0-15 mg/hr IntraVENous TITRATE        Prior to Admission Medications:  Prior to Admission medications    Medication Sig Start Date End Date Taking? Authorizing Provider   budesonide-formoterol (SYMBICORT) 160-4.5 mcg/actuation HFAA Take 2 Puffs by inhalation two (2) times a day. Yes Historical Provider   hydroCHLOROthiazide (HYDRODIURIL) 25 mg tablet Take 25 mg by mouth daily. Yes Mckayla Thomas MD   umeclidinium (INCRUSE ELLIPTA) 62.5 mcg/actuation inhaler Take 1 Puff by inhalation daily. Yes Mckayla Thomas MD   Oxygen 3 Each continuous. Yes Mckayla Thomas MD   amLODIPine (NORVASC) 5 mg tablet Take 1 Tab by mouth daily. 4/9/13  Yes Scotty Zaidi MD   cholecalciferol (VITAMIN D3) 1,000 unit tablet Take 1 Tab by mouth daily. 4/9/13  Yes Scotty Zaidi MD   furosemide (LASIX) 40 mg tablet Take 40 mg by mouth nightly. Yes Historical Provider   albuterol (PROVENTIL, VENTOLIN) 90 mcg/Actuation inhaler Take 2 Puffs by inhalation every six (6) hours as needed. Yes Mckayla Thomas MD   dextromethorphan-guaiFENesin (MUCINEX DM)  mg per tablet Take 1 Tab by mouth two (2) times a day. Yes Mckayla Thomas MD   oxyCODONE-acetaminophen (PERCOCET) 5-325 mg per tablet Take 1 Tab by mouth every four (4) hours as needed for Pain. 10/13/13   TEOFILO Dawkins   calcium carbonate (TUMS) 200 mg calcium (500 mg) Chew Take 1 Tab by mouth daily. 4/9/13   Scotty Zaidi MD   predniSONE (DELTASONE) 20 mg tablet 2 tabs po q daily *2 days, then 1 tab po q daily *3 days, then 1/2 tab po daily * 3 days 4/9/13   Scotty Zaidi MD   acetaminophen (TYLENOL) 325 mg tablet Take 2 Tabs by mouth every four (4) hours as needed. 11/21/11   Neptali Black III, DO   fluticasone (FLONASE) 50 mcg/Actuation nasal spray 2 Sprays by Both Nostrils route. Use daily 11/21/11   Andrae Dumont III, DO   tiotropium (SPIRIVA WITH HANDIHALER) 18 mcg inhalation capsule Take 1 Cap by inhalation daily.     Phys Other, MD   albuterol (PROVENTIL VENTOLIN) 2.5 mg /3 mL (0.083 %) nebulizer solution by Nebulization route every six (6) hours as needed. Mckayla Thomas MD   aspirin (ASPIRIN) 325 mg tablet Take 325 mg by mouth daily. Mckayla Thomas MD        Review of Symptoms: As noted in H&P:  \"Total of 12 systems reviewed as follows:                                                                      POSITIVE= underlined text  Negative = text not underlined  General:                                         fever, chills, sweats, generalized weakness, weight loss/gain,                                                                     loss of appetite   Eyes:                                                         blurred vision, eye pain, loss of vision, double vision  ENT:                                                          rhinorrhea, pharyngitis   Respiratory:                                   cough, sputum production, SOB, LUDWIG, wheezing, pleuritic pain   Cardiology:                                    chest pain, palpitations, orthopnea, PND, edema, syncope   Gastrointestinal:                 abdominal pain , N/V, diarrhea, dysphagia, constipation, bleeding   Genitourinary:                     frequency, urgency, dysuria, hematuria, incontinence   Muskuloskeletal :                arthralgia, myalgia, back pain  Hematology:                                  easy bruising, nose or gum bleeding, lymphadenopathy   Dermatological:                   rash, ulceration, pruritis, color change / jaundice  Endocrine:                                     hot flashes or polydipsia   Neurological:                       headache, dizziness, confusion, focal weakness, paresthesia,                                                                    Speech difficulties, memory loss, gait difficulty  Psychological:                    Feelings of anxiety, depression, agitation\".      24 hr VS reviewed, overall VSSAF  Temp (24hrs), Av.1 °F (36.7 °C), Min:97.6 °F (36.4 °C), Max:98.6 °F (37 °C)    Patient Vitals for the past 8 hrs:   Pulse   10/03/17 0345 76   10/03/17 0300 75   10/03/17 0245 85   10/03/17 0230 79   10/03/17 0145 86    Patient Vitals for the past 8 hrs:   Resp   10/03/17 0300 22    Patient Vitals for the past 8 hrs:   BP   10/03/17 0345 132/68   10/03/17 0300 136/54   10/03/17 0245 140/72   10/03/17 0230 136/60   10/03/17 0145 142/67        No intake or output data in the 24 hours ending 10/03/17 0910      Physical Exam (complete single organ system exam)    Cons: The patient is no distress. Appears stated age. HEENT: Normal conjunctivae and palate. No xanthelasma. Neck: Flat JVP without appreciable HJR. Resp: Normal respiratory effort with distant BS bilaterally but clear. CV: Regular rate and rhythm. PMI not palpated. Normal S1,S2  No gallop or rubs appreciated. 1/6 holosystolic murmur appreciated. Intact carotid upstroke bilaterally without appreciated bruits. Abdominal aorta not palpated; no abdominal bruit noted. Intact pedal pulses. No peripheral edema. GI: No abd mass noted, soft; no organomegaly noted. Bowel sounds present. Muscular:  No significant kyphosis. Strength WNL for age. Ext: No cyanosis, clubbing, or stigmata of peripheral embolization. Derm: No ulcers or stasis dermatitis of lower extremities. Neuro: Alert and oriented x 3;  Grossly non-focal. Normal mood and affect. Labs:   Recent Results (from the past 24 hour(s))   GRAM STAIN    Collection Time: 10/02/17 11:43 AM   Result Value Ref Range    Special Requests: NO SPECIAL REQUESTS      GRAM STAIN 2+ EPITHELIAL CELLS SEEN      GRAM STAIN 1+ WBCS SEEN      GRAM STAIN 2+ GRAM POSITIVE COCCI      GRAM STAIN 1+ GRAM POSITIVE RODS (CORYNEFORM)      GRAM STAIN 1+ GRAM VARIABLE RODS      GRAM STAIN        ACCORDING TO THE GRAM STAIN RESULTS, THIS SPUTUM WAS FOUND TO BE UNACCEPTABLE (>10 SQUAMOUS EPITHELIALS PER LOW POWERED FIELD (40X)). PLEASE RECOLLECT.    EKG, 12 LEAD, SUBSEQUENT    Collection Time: 10/02/17 9:41 PM   Result Value Ref Range    Ventricular Rate 132 BPM    Atrial Rate 108 BPM    QRS Duration 132 ms    Q-T Interval 298 ms    QTC Calculation (Bezet) 441 ms    Calculated R Axis 45 degrees    Calculated T Axis -13 degrees    Diagnosis       Atrial fibrillation with rapid ventricular response with premature   ventricular or aberrantly conducted complexes  Right bundle branch block  When compared with ECG of 04-APR-2013 15:22,  Atrial fibrillation has replaced Sinus rhythm  Vent. rate has increased BY  48 BPM  Left posterior fascicular block is no longer present  T wave inversion less evident in Inferior leads  Nonspecific T wave abnormality, improved in Lateral leads     METABOLIC PANEL, COMPREHENSIVE    Collection Time: 10/03/17  4:03 AM   Result Value Ref Range    Sodium 138 136 - 145 mmol/L    Potassium 3.7 3.5 - 5.1 mmol/L    Chloride 105 97 - 108 mmol/L    CO2 26 21 - 32 mmol/L    Anion gap 7 5 - 15 mmol/L    Glucose 207 (H) 65 - 100 mg/dL    BUN 31 (H) 6 - 20 MG/DL    Creatinine 1.01 0.55 - 1.02 MG/DL    BUN/Creatinine ratio 31 (H) 12 - 20      GFR est AA >60 >60 ml/min/1.73m2    GFR est non-AA 53 (L) >60 ml/min/1.73m2    Calcium 8.6 8.5 - 10.1 MG/DL    Bilirubin, total 0.3 0.2 - 1.0 MG/DL    ALT (SGPT) 30 12 - 78 U/L    AST (SGOT) 34 15 - 37 U/L    Alk. phosphatase 58 45 - 117 U/L    Protein, total 7.2 6.4 - 8.2 g/dL    Albumin 2.8 (L) 3.5 - 5.0 g/dL    Globulin 4.4 (H) 2.0 - 4.0 g/dL    A-G Ratio 0.6 (L) 1.1 - 2.2     CBC WITH AUTOMATED DIFF    Collection Time: 10/03/17  4:03 AM   Result Value Ref Range    WBC 18.1 (H) 3.6 - 11.0 K/uL    RBC 3.85 3.80 - 5.20 M/uL    HGB 11.3 (L) 11.5 - 16.0 g/dL    HCT 34.4 (L) 35.0 - 47.0 %    MCV 89.4 80.0 - 99.0 FL    MCH 29.4 26.0 - 34.0 PG    MCHC 32.8 30.0 - 36.5 g/dL    RDW 13.5 11.5 - 14.5 %    PLATELET 162 417 - 168 K/uL    NEUTROPHILS 86 %    LYMPHOCYTES 5 %    MONOCYTES 9 %    EOSINOPHILS 0 %    BASOPHILS 0 %    ABS. NEUTROPHILS 15.6 K/UL    ABS.  LYMPHOCYTES 0.9 K/UL    ABS. MONOCYTES 1.6 K/UL    ABS. EOSINOPHILS 0.0 K/UL    ABS.  BASOPHILS 0.0 K/UL    DF MANUAL      RBC COMMENTS NORMOCYTIC, NORMOCHROMIC       Recent Labs      10/01/17   1734   CPK  125   CKMB  1.6   CKNDX  1.3   TROIQ  0.04       Joni Merritt MD

## 2017-10-03 NOTE — PROGRESS NOTES
Bedside shift change report given to FLACA Lou(oncoming nurse) by Josiephine Kayser (offgoing nurse). Report included the following information SBAR, Kardex, Intake/Output, MAR, Recent Results, Med Rec Status and Alarm Parameters . All lines and tubes intact. Full care relinquished.

## 2017-10-03 NOTE — PROGRESS NOTES
Hospitalist Progress Note    NAME: Gwendolyn Lund   :  1935   MRN:  [id number]       Assessment / Plan:  Sepsis POA  Acute on chronic respiratory failure POA with baseline 2-4L O2  COPD exacerbation  Baseline chronic diastolic heart failure due to severe Pulmonary HTN  Feeling better   O2 to keep steve >90%, wean as tolerated, assess for home O2 on DC  Cardiology help appreciated   IV steroids, Duonebs, Mucolytics, IV levaquin, Antitussives  F/u blood cultures  [facility] chest with [facility] P[facility] at [facility]    PAfib / HTN   S/p dilt gtt --> NSR now  CHADS-Vasc 4 --> need to be on a/coag   Cardiology help appreciated: start dilt PO   D/w pt/family in details need for a/coagulation: coumadin vs eliquis vs asa --> she will think about it        Code Status: Full but doesn't want to be on prolong life support  Surrogate Decision Maker:   DVT Prophylaxis: Lovenox     Baseline: functional, ; home O2 3.5-4 L   Recommended Disposition: Home w/Family     Subjective:     Chief Complaint / Reason for Physician Visit: following COPD/sepsis   \" I am much better\"  LUDWIG at baseline per pt     Discussed with RN events overnight. Review of Systems:  Symptom Y/N Comments  Symptom Y/N Comments   Fever/Chills n   Chest Pain n    Poor Appetite    Edema     Cough    Abdominal Pain     Sputum    Joint Pain     SOB/LUDWIG y Back to baseline   Pruritis/Rash     Nausea/vomit    Tolerating PT/OT y    Diarrhea    Tolerating Diet y    Constipation    Other       Could NOT obtain due to:      Objective:     VITALS:   Last 24hrs VS reviewed since prior progress note.  Most recent are:  Patient Vitals for the past 24 hrs:   Temp Pulse Resp BP SpO2   10/03/17 1537 - - - - 95 %   10/03/17 1015 - 81 - 135/43 99 %   10/03/17 0945 - 86 - 145/62 100 %   10/03/17 0915 - 87 - 149/52 99 %   10/03/17 0748 - - - - 100 %   10/03/17 0700 97.8 °F (36.6 °C) 88 25 149/61 98 %   10/03/17 0407 - - - - 96 %   10/03/17 0345 - 76 - 132/68 100 %   10/03/17 0300 97.6 °F (36.4 °C) 75 22 136/54 100 %   10/03/17 0245 - 85 - 140/72 97 %   10/03/17 0230 - 79 - 136/60 100 %   10/03/17 0145 - 86 - 142/67 100 %   10/03/17 0100 - 96 - 139/62 99 %   10/03/17 0045 - 98 - 132/56 99 %   10/03/17 0030 - (!) 105 - 133/60 100 %   10/03/17 0016 - (!) 128 - 99/53 99 %   10/02/17 2351 - (!) 118 - 123/67 99 %   10/02/17 2348 - (!) 131 - 123/67 -   10/02/17 2216 - (!) 123 - 134/63 96 %   10/02/17 2149 - (!) 131 - 136/53 97 %   10/02/17 1942 98 °F (36.7 °C) 98 24 177/70 100 %   10/02/17 1940 - - - - 99 %     No intake or output data in the 24 hours ending 10/03/17 1852     PHYSICAL EXAM:  General: WD, WN. Alert, cooperative, no acute distress    EENT:  EOMI. Anicteric sclerae. MMM  Resp:  diminished BS bilaterally, no wheezing or rales. No accessory muscle use  CV:  Regular  rhythm,  No edema  GI:  Soft, Non distended, Non tender.  +Bowel sounds  Neurologic:  Alert and oriented X 3, normal speech,   Psych:   Good insight. Not anxious nor agitated  Skin:  No rashes. No jaundice    Reviewed most current lab test results and cultures  YES  Reviewed most current radiology test results   YES  Review and summation of old records today    NO  Reviewed patient's current orders and MAR    YES  PMH/SH reviewed - no change compared to H&P  ________________________________________________________________________  Care Plan discussed with:    Comments   Patient y    Family  y Family at bedside    RN y    Care Manager     Consultant                        Multidiciplinary team rounds were held today with , nursing, pharmacist and clinical coordinator. Patient's plan of care was discussed; medications were reviewed and discharge planning was addressed.      ________________________________________________________________________  Total NON critical care TIME:  35   Minutes    Total CRITICAL CARE TIME Spent:   Minutes non procedure based      Comments   >50% of visit spent in counseling and coordination of care     ________________________________________________________________________  Joycelyn Dias MD     Procedures: see electronic medical records for all procedures/Xrays and details which were not copied into this note but were reviewed prior to creation of Plan. LABS:  I reviewed today's most current labs and imaging studies.   Pertinent labs include:  Recent Labs      10/03/17   0403  10/02/17   0230  10/01/17   1734   WBC  18.1*  24.9*  22.2*   HGB  11.3*  11.7  12.9   HCT  34.4*  35.5  40.0   PLT  261  232  267     Recent Labs      10/03/17   0403  10/02/17   0230  10/01/17   1847  10/01/17   1734   NA  138  137   --   141   K  3.7  3.9   --   3.6   CL  105  106   --   103   CO2  26  23   --   29   GLU  207*  213*   --   172*   BUN  31*  18   --   20*   CREA  1.01  1.01   --   1.09*   CA  8.6  8.0*   --   9.5   ALB  2.8*  2.6*   --   3.2*   TBILI  0.3  0.6   --   0.6   SGOT  34  27   --   20   ALT  30  25   --   23   INR   --    --   1.2*   --        Signed: Joycelyn Dias MD

## 2017-10-04 LAB
ANION GAP SERPL CALC-SCNC: 8 MMOL/L (ref 5–15)
ATRIAL RATE: 108 BPM
BUN SERPL-MCNC: 37 MG/DL (ref 6–20)
BUN/CREAT SERPL: 37 (ref 12–20)
CALCIUM SERPL-MCNC: 8.8 MG/DL (ref 8.5–10.1)
CALCULATED R AXIS, ECG10: 45 DEGREES
CALCULATED T AXIS, ECG11: -13 DEGREES
CHLORIDE SERPL-SCNC: 102 MMOL/L (ref 97–108)
CO2 SERPL-SCNC: 27 MMOL/L (ref 21–32)
CREAT SERPL-MCNC: 1 MG/DL (ref 0.55–1.02)
DIAGNOSIS, 93000: NORMAL
ERYTHROCYTE [DISTWIDTH] IN BLOOD BY AUTOMATED COUNT: 13.4 % (ref 11.5–14.5)
GLUCOSE SERPL-MCNC: 201 MG/DL (ref 65–100)
HCT VFR BLD AUTO: 33.6 % (ref 35–47)
HGB BLD-MCNC: 11.4 G/DL (ref 11.5–16)
MCH RBC QN AUTO: 30.1 PG (ref 26–34)
MCHC RBC AUTO-ENTMCNC: 33.9 G/DL (ref 30–36.5)
MCV RBC AUTO: 88.7 FL (ref 80–99)
PLATELET # BLD AUTO: 241 K/UL (ref 150–400)
POTASSIUM SERPL-SCNC: 3 MMOL/L (ref 3.5–5.1)
Q-T INTERVAL, ECG07: 298 MS
QRS DURATION, ECG06: 132 MS
QTC CALCULATION (BEZET), ECG08: 441 MS
RBC # BLD AUTO: 3.79 M/UL (ref 3.8–5.2)
SODIUM SERPL-SCNC: 137 MMOL/L (ref 136–145)
VENTRICULAR RATE, ECG03: 132 BPM
WBC # BLD AUTO: 13.5 K/UL (ref 3.6–11)

## 2017-10-04 PROCEDURE — 74011250636 HC RX REV CODE- 250/636: Performed by: GENERAL ACUTE CARE HOSPITAL

## 2017-10-04 PROCEDURE — 87040 BLOOD CULTURE FOR BACTERIA: CPT | Performed by: HOSPITALIST

## 2017-10-04 PROCEDURE — 36415 COLL VENOUS BLD VENIPUNCTURE: CPT | Performed by: HOSPITALIST

## 2017-10-04 PROCEDURE — 65660000000 HC RM CCU STEPDOWN

## 2017-10-04 PROCEDURE — 74011250637 HC RX REV CODE- 250/637: Performed by: INTERNAL MEDICINE

## 2017-10-04 PROCEDURE — 77010033678 HC OXYGEN DAILY

## 2017-10-04 PROCEDURE — 74011250636 HC RX REV CODE- 250/636: Performed by: INTERNAL MEDICINE

## 2017-10-04 PROCEDURE — 74011000258 HC RX REV CODE- 258: Performed by: GENERAL ACUTE CARE HOSPITAL

## 2017-10-04 PROCEDURE — 74011000250 HC RX REV CODE- 250: Performed by: INTERNAL MEDICINE

## 2017-10-04 PROCEDURE — 85027 COMPLETE CBC AUTOMATED: CPT | Performed by: HOSPITALIST

## 2017-10-04 PROCEDURE — 74011250637 HC RX REV CODE- 250/637: Performed by: HOSPITALIST

## 2017-10-04 PROCEDURE — 80048 BASIC METABOLIC PNL TOTAL CA: CPT | Performed by: HOSPITALIST

## 2017-10-04 PROCEDURE — 94640 AIRWAY INHALATION TREATMENT: CPT

## 2017-10-04 RX ORDER — POTASSIUM CHLORIDE 750 MG/1
40 TABLET, FILM COATED, EXTENDED RELEASE ORAL
Status: COMPLETED | OUTPATIENT
Start: 2017-10-04 | End: 2017-10-04

## 2017-10-04 RX ADMIN — IPRATROPIUM BROMIDE AND ALBUTEROL SULFATE 3 ML: .5; 3 SOLUTION RESPIRATORY (INHALATION) at 19:24

## 2017-10-04 RX ADMIN — GUAIFENESIN 600 MG: 600 TABLET, EXTENDED RELEASE ORAL at 10:03

## 2017-10-04 RX ADMIN — FUROSEMIDE 40 MG: 40 TABLET ORAL at 10:03

## 2017-10-04 RX ADMIN — POTASSIUM CHLORIDE 40 MEQ: 750 TABLET, FILM COATED, EXTENDED RELEASE ORAL at 17:12

## 2017-10-04 RX ADMIN — Medication 10 ML: at 06:27

## 2017-10-04 RX ADMIN — Medication 10 ML: at 17:16

## 2017-10-04 RX ADMIN — DILTIAZEM HYDROCHLORIDE 180 MG: 180 CAPSULE, EXTENDED RELEASE ORAL at 10:02

## 2017-10-04 RX ADMIN — Medication 10 ML: at 13:13

## 2017-10-04 RX ADMIN — BUDESONIDE AND FORMOTEROL FUMARATE DIHYDRATE 2 PUFF: 160; 4.5 AEROSOL RESPIRATORY (INHALATION) at 21:50

## 2017-10-04 RX ADMIN — CEFTRIAXONE 1 G: 1 INJECTION, POWDER, FOR SOLUTION INTRAMUSCULAR; INTRAVENOUS at 17:15

## 2017-10-04 RX ADMIN — METHYLPREDNISOLONE SODIUM SUCCINATE 60 MG: 125 INJECTION, POWDER, FOR SOLUTION INTRAMUSCULAR; INTRAVENOUS at 01:42

## 2017-10-04 RX ADMIN — UMECLIDINIUM 1 PUFF: 62.5 AEROSOL, POWDER ORAL at 08:00

## 2017-10-04 RX ADMIN — METHYLPREDNISOLONE SODIUM SUCCINATE 60 MG: 125 INJECTION, POWDER, FOR SOLUTION INTRAMUSCULAR; INTRAVENOUS at 06:27

## 2017-10-04 RX ADMIN — IPRATROPIUM BROMIDE AND ALBUTEROL SULFATE 3 ML: .5; 3 SOLUTION RESPIRATORY (INHALATION) at 03:46

## 2017-10-04 RX ADMIN — IPRATROPIUM BROMIDE AND ALBUTEROL SULFATE 3 ML: .5; 3 SOLUTION RESPIRATORY (INHALATION) at 12:11

## 2017-10-04 RX ADMIN — FLUTICASONE PROPIONATE 2 SPRAY: 50 SPRAY, METERED NASAL at 08:00

## 2017-10-04 RX ADMIN — IPRATROPIUM BROMIDE AND ALBUTEROL SULFATE 3 ML: .5; 3 SOLUTION RESPIRATORY (INHALATION) at 23:11

## 2017-10-04 RX ADMIN — IPRATROPIUM BROMIDE AND ALBUTEROL SULFATE 3 ML: .5; 3 SOLUTION RESPIRATORY (INHALATION) at 07:31

## 2017-10-04 RX ADMIN — BUDESONIDE AND FORMOTEROL FUMARATE DIHYDRATE 2 PUFF: 160; 4.5 AEROSOL RESPIRATORY (INHALATION) at 07:30

## 2017-10-04 RX ADMIN — METHYLPREDNISOLONE SODIUM SUCCINATE 60 MG: 125 INJECTION, POWDER, FOR SOLUTION INTRAMUSCULAR; INTRAVENOUS at 13:09

## 2017-10-04 RX ADMIN — GUAIFENESIN 600 MG: 600 TABLET, EXTENDED RELEASE ORAL at 21:47

## 2017-10-04 RX ADMIN — METHYLPREDNISOLONE SODIUM SUCCINATE 60 MG: 125 INJECTION, POWDER, FOR SOLUTION INTRAMUSCULAR; INTRAVENOUS at 17:59

## 2017-10-04 RX ADMIN — IPRATROPIUM BROMIDE AND ALBUTEROL SULFATE 3 ML: .5; 3 SOLUTION RESPIRATORY (INHALATION) at 16:11

## 2017-10-04 RX ADMIN — Medication 10 ML: at 21:48

## 2017-10-04 RX ADMIN — ENOXAPARIN SODIUM 40 MG: 40 INJECTION SUBCUTANEOUS at 10:02

## 2017-10-04 RX ADMIN — ASPIRIN 325 MG ORAL TABLET 325 MG: 325 PILL ORAL at 10:03

## 2017-10-04 NOTE — PROGRESS NOTES
PCU SHIFT NURSING NOTE      Bedside and Verbal shift change report given to Jonna (oncoming nurse) by Rubio Barnes (offgoing nurse). Report included the following information SBAR, Kardex, Intake/Output, MAR, Recent Results, Med Rec Status, Cardiac Rhythm NSR and Alarm Parameters . Shift Summary: 9976 Bedside report received. Patient denies pain and/or discomfort. Patient in bed resting with HOB elevated. Patient continues on 4lpm O2 via nasal cannula. /84 on call MD called. New order received for Hydralazine IV q hrs PRN from Dr Lavell Huffman. Admission Date 10/2/2017   Admission Diagnosis SEPSIS AND PNEUMONIA  Sepsis ([facility]   Consults IP CONSULT TO CARDIOLOGY        Consults   []PT   []OT   []Speech   []Case Management      [] Palliative      Cardiac Monitoring Order   []Yes   []No     IV drips   []Yes    Drip:                            Dose:  Drip:                            Dose:  Drip:                            Dose:   []No     GI Prophylaxis   []Yes   []No         DVT Prophylaxis   SCDs:             David stockings:         [] Medication   []Contraindicated   []None      Activity Level Activity Level: Up with Assistance     Activity Assistance: Partial (one person)   Purposeful Rounding every 1-2 hour? []Yes   Gonzalez Score  Total Score: 3   Bed Alarm (If score 3 or >)   []Yes   [] Refused (See signed refusal form in chart)   Niraj Score  Niraj Score: 19   Niraj Score (if score 14 or less)   []PMT consult   []Wound Care consult      []Specialty bed   [] Nutrition consult          Needs prior to discharge:   Home O2 required:    []Yes   []No    If yes, how much O2 required?     Other:    Last Bowel Movement: Last Bowel Movement Date: 10/03/17      Influenza Vaccine Received Flu Vaccine for Current Season (usually Sept-March): No    Patient/Guardian Refused (Notify MD): No   Pneumonia Vaccine           Diet Active Orders   Diet    DIET CARDIAC      LDAs               Peripheral IV 10/02/17 Left Forearm (Active)   Site Assessment Clean, dry, & intact 10/3/2017  7:10 AM   Phlebitis Assessment 0 10/3/2017  7:10 AM   Infiltration Assessment 0 10/3/2017  7:10 AM   Dressing Status Clean, dry, & intact 10/3/2017  7:10 AM   Dressing Type Tape;Transparent 10/3/2017  7:10 AM   Hub Color/Line Status Pink; Infusing 10/3/2017  7:10 AM   Alcohol Cap Used Yes 10/3/2017  7:10 AM                      Urinary Catheter      Intake & Output   Date 10/03/17 0700 - 10/04/17 0659 10/04/17 0700 - 10/05/17 0659   Shift 9921-8207 5445-8390 24 Hour Total 3656-8193 2303-7264 24 Hour Total   I  N  T  A  K  E   Shift Total         O  U  T  P  U  T   Urine            Urine Occurrence(s) 1 x 1 x 2 x       Shift Total         NET         Weight (kg)               Readmission Risk Assessment Tool Score Low Risk            11       Total Score        2 . Living with Significant Other. Assisted Living. LTAC. SNF. or   Rehab    5 Pt.  Coverage (Medicare=5 , Medicaid, or Self-Pay=4)    4 Charlson Comorbidity Score (Age + Comorbid Conditions)        Criteria that do not apply:    Has Seen PCP in Last 6 Months (Yes=3, No=0)    Patient Length of Stay (>5 days = 3)    IP Visits Last 12 Months (1-3=4, 4=9, >4=11)       Expected Length of Stay 4d 21h   Actual Length of Stay 2

## 2017-10-04 NOTE — PROGRESS NOTES
Hospitalist Progress Note    NAME: Anne Kim   :  1935   MRN:  [id number]       Assessment / Plan:  Sepsis POA  Acute on chronic respiratory failure POA with baseline 2-4L O2  COPD exacerbation  Baseline chronic diastolic heart failure due to severe Pulmonary HTN  Leukocytosis improving  BC + is likely contamination, will repeat   O2 to keep steve >90%, wean as tolerated, assess for home O2 on DC  Cardiology help appreciated   IV steroids, Duonebs, Mucolytics, IV levaquin, Antitussives  [facility] chest with [facility] P[facility] at [facility]    PAfib / HTN   S/p dilt gtt --> NSR now  CHADS-Vasc 4 --> need to be on a/coag ( pt declined), start ASA   Cardiology help appreciated: start dilt PO   D/w pt/family in details need for a/coagulation: coumadin vs eliquis vs asa --> she will think about it        Code Status: Full but doesn't want to be on prolong life support  Surrogate Decision Maker:   DVT Prophylaxis: Lovenox     Baseline: functional, ; home O2 3.5-4 L   Recommended Disposition: Home w/Family     Subjective:     Chief Complaint / Reason for Physician Visit: following COPD/sepsis   Severe LUDWIG, desaturated on 4 L with exertion and now on 6L     Discussed with RN events overnight. Review of Systems:  Symptom Y/N Comments  Symptom Y/N Comments   Fever/Chills n   Chest Pain n    Poor Appetite    Edema     Cough    Abdominal Pain     Sputum    Joint Pain     SOB/LUDWIG y Back to baseline   Pruritis/Rash     Nausea/vomit    Tolerating PT/OT y    Diarrhea    Tolerating Diet y    Constipation    Other       Could NOT obtain due to:      Objective:     VITALS:   Last 24hrs VS reviewed since prior progress note.  Most recent are:  Patient Vitals for the past 24 hrs:   Temp Pulse Resp BP SpO2   10/04/17 1611 - - - - 97 %   10/04/17 1318 - - - 144/61 -   10/04/17 1211 - - - - 97 %   10/04/17 1156 - 83 - - 95 %   10/04/17 1154 97.7 °F (36.5 °C) 79 24 174/61 95 %   10/04/17 0745 97.6 °F (36.4 °C) 81 24 160/68 96 % 10/04/17 0732 - - - - 97 %   10/04/17 0300 98 °F (36.7 °C) 81 24 167/67 98 %   10/03/17 2316 97.9 °F (36.6 °C) - - - -   10/03/17 2300 - 80 - 146/63 98 %   10/03/17 2100 - 80 - 161/56 98 %   10/03/17 2051 - 85 - 173/56 97 %   10/03/17 1943 98.2 °F (36.8 °C) 84 23 (!) 192/92 99 %   10/03/17 1742 97.8 °F (36.6 °C) 78 - 147/76 94 %       Intake/Output Summary (Last 24 hours) at 10/04/17 1631  Last data filed at 10/04/17 1320   Gross per 24 hour   Intake              100 ml   Output                0 ml   Net              100 ml        PHYSICAL EXAM:  General: WD, WN. Alert, cooperative, no acute distress    EENT:  EOMI. Anicteric sclerae. MMM  Resp:  diminished BS bilaterally, no wheezing or rales. No accessory muscle use  CV:  Regular  rhythm,  No edema  GI:  Soft, Non distended, Non tender.  +Bowel sounds  Neurologic:  Alert and oriented X 3, normal speech,   Psych:   Good insight. Not anxious nor agitated  Skin:  No rashes. No jaundice    Reviewed most current lab test results and cultures  YES  Reviewed most current radiology test results   YES  Review and summation of old records today    NO  Reviewed patient's current orders and MAR    YES  PMH/SH reviewed - no change compared to H&P  ________________________________________________________________________  Care Plan discussed with:    Comments   Patient y    Family  y Family at bedside    RN y    Care Manager y    Consultant  y Cardiology                      Multidiciplinary team rounds were held today with , nursing, pharmacist and clinical coordinator. Patient's plan of care was discussed; medications were reviewed and discharge planning was addressed.      ________________________________________________________________________  Total NON critical care TIME:  35   Minutes    Total CRITICAL CARE TIME Spent:   Minutes non procedure based      Comments   >50% of visit spent in counseling and coordination of care ________________________________________________________________________  Ariel Womack MD     Procedures: see electronic medical records for all procedures/Xrays and details which were not copied into this note but were reviewed prior to creation of Plan. LABS:  I reviewed today's most current labs and imaging studies.   Pertinent labs include:  Recent Labs      10/04/17   0153  10/03/17   0403  10/02/17   0230   WBC  13.5*  18.1*  24.9*   HGB  11.4*  11.3*  11.7   HCT  33.6*  34.4*  35.5   PLT  241  261  232     Recent Labs      10/04/17   0153  10/03/17   0403  10/02/17   0230  10/01/17   1847  10/01/17   1734   NA  137  138  137   --   141   K  3.0*  3.7  3.9   --   3.6   CL  102  105  106   --   103   CO2  27  26  23   --   29   GLU  201*  207*  213*   --   172*   BUN  37*  31*  18   --   20*   CREA  1.00  1.01  1.01   --   1.09*   CA  8.8  8.6  8.0*   --   9.5   ALB   --   2.8*  2.6*   --   3.2*   TBILI   --   0.3  0.6   --   0.6   SGOT   --   34  27   --   20   ALT   --   30  25   --   23   INR   --    --    --   1.2*   --        Signed: Ariel Womack MD

## 2017-10-04 NOTE — PROGRESS NOTES
PCU SHIFT NURSING NOTE      Bedside and Verbal shift change report given to Jonna (oncoming nurse) by Rigo Angel (offgoing nurse). Report included the following information SBAR, Kardex, Intake/Output, MAR, Recent Results, Med Rec Status, Cardiac Rhythm NSR and Alarm Parameters . Shift Summary:   3355 Bedside report received. Patient voiced no c/o pain and/or discomfort. Patient tolerated medications without difficulty. BP- 180/84 on call MD paged. Dr Minor Hashimoto gave new order for Hydralazine IV PRN q 6hrs. IV medication given. Patient tolerated medication. Patient observed resting in bed at this time HOB elevated. Patient slept on & off throughout shift. Patient in NSR on monitor all shift. Patient voiced concern of taking HTCZ 25mg at bedtime prior to admission. Report given to [facility]       Admission Date 10/2/2017   Admission Diagnosis SEPSIS AND PNEUMONIA  Sepsis ([facility] [address]   Consults IP CONSULT TO CARDIOLOGY        Consults   []PT   []OT   []Speech   []Case Management      [] Palliative      Cardiac Monitoring Order   []Yes   []No     IV drips   []Yes    Drip:                            Dose:  Drip:                            Dose:  Drip:                            Dose:   []No     GI Prophylaxis   []Yes   []No         DVT Prophylaxis   SCDs:             David stockings:         [] Medication   []Contraindicated   []None      Activity Level Activity Level: Up with Assistance     Activity Assistance: Partial (one person)   Purposeful Rounding every 1-2 hour? []Yes   Gonzalez Score  Total Score: 3   Bed Alarm (If score 3 or >)   []Yes   [] Refused (See signed refusal form in chart)   Niraj Score  Niraj Score: 19   Niraj Score (if score 14 or less)   []PMT consult   []Wound Care consult      []Specialty bed   [] Nutrition consult          Needs prior to discharge:   Home O2 required:    []Yes   []No    If yes, how much O2 required?     Other:    Last Bowel Movement: Last Bowel Movement Date: 10/03/17 Influenza Vaccine Received Flu Vaccine for Current Season (usually Sept-March): No    Patient/Guardian Refused (Notify MD): No   Pneumonia Vaccine           Diet Active Orders   Diet    DIET CARDIAC      LDAs               Peripheral IV 10/02/17 Left Forearm (Active)   Site Assessment Clean, dry, & intact 10/3/2017  7:10 AM   Phlebitis Assessment 0 10/3/2017  7:10 AM   Infiltration Assessment 0 10/3/2017  7:10 AM   Dressing Status Clean, dry, & intact 10/3/2017  7:10 AM   Dressing Type Tape;Transparent 10/3/2017  7:10 AM   Hub Color/Line Status Pink; Infusing 10/3/2017  7:10 AM   Alcohol Cap Used Yes 10/3/2017  7:10 AM                      Urinary Catheter      Intake & Output   Date 10/03/17 0700 - 10/04/17 0659 10/04/17 0700 - 10/05/17 0659   Shift 9256-4455 5947-9918 24 Hour Total 9670-8795 0561-0355 24 Hour Total   I  N  T  A  K  E   Shift Total         O  U  T  P  U  T   Urine            Urine Occurrence(s) 1 x 1 x 2 x       Shift Total         NET         Weight (kg)               Readmission Risk Assessment Tool Score Low Risk            11       Total Score        2 . Living with Significant Other. Assisted Living. LTAC. SNF. or   Rehab    5 Pt.  Coverage (Medicare=5 , Medicaid, or Self-Pay=4)    4 Charlson Comorbidity Score (Age + Comorbid Conditions)        Criteria that do not apply:    Has Seen PCP in Last 6 Months (Yes=3, No=0)    Patient Length of Stay (>5 days = 3)    IP Visits Last 12 Months (1-3=4, 4=9, >4=11)       Expected Length of Stay 4d 21h   Actual Length of Stay 2

## 2017-10-04 NOTE — PROGRESS NOTES
Deferred visit: Patient has absolutely no interest in therapy, gets up and does what she needs to do and is thrilled that I suggested we discontinue PT. Will complete the order.

## 2017-10-04 NOTE — PROGRESS NOTES
2nd IM letter - copy to patient and on chart. Patient sitting in chair on 4LNC. Son is visiting. CM informed patient and family at the time of discharge family will need to provide portable O2 for transport home. Patient and Son verbalized understanding.     Melyssa Dorman RN CM  Ext [phone]

## 2017-10-04 NOTE — PROGRESS NOTES
Cardiology Progress Note  10/4/2017     Admit Date: 10/2/2017  Admit Diagnosis: SEPSIS AND PNEUMONIA  Sepsis ([facility] [address]  CC: none currently  Pulm: Dr Radhika Falk. Cardiac Assessment/Plan:   Afib: Paroxysmal; Brief duration (few hrs): started on dilt gtt: NSR now; Pt reports no Sx even with RVR. CHADS-Vasc: 4 (should be on anticoag if recurrent/persistent afib).     Rec: off dilt gtt; changed home norvasc (5) to dilt ; If recurrent afib, likely change dilt to metoprolol and add multaq; neg so far.     HTN: med change as above.     Cor pulmonale/pulm HTN: significant when last checked in 2011;   Echo 10/3: EF55%. RVE. Mild AS (mean 8). Severe TR. PAp 78.     Rec: Consider chronic anticoag for pulm HTN.    Volume status: looks euvolemic; on home dose PO lasix (40). Renal function: stable     RBBB: chronic. PPM not indicated.     ID (PNA?), COPD/cor pulmonale, HypoKalemia, Resp failure, Dispo: all per primary team.      10/4: No new recs. NSR. Hospital Problem List:  Active Problems:    Acute on chronic respiratory failure ([facility] [address] (11/17/2011)       Pulmonary hypertension, moderate to severe (11/19/2011)       Sepsis ([facility] [address] (10/2/2017)       COPD exacerbation ([facility] [address] (10/2/2017)       ____________________________________________________________________  Morgan Holt is a 80 y.o. female presents with SEPSIS AND PNEUMONIA  Sepsis ([facility] [address]    As noted in H&P: \"81 y.o.   female who presents from [facility] ED because of respiratory distress and higher level of care. As per patient, she has chronic cough with hernandez to brownish sputum. She has severe COPD at baseline with 2-4L O2. Pt claims she started to feel more short of breath since yesterday and her  increased her O2 to 6L. Pt doesn't like to come to the hospital and didn't want to come but her  brought her to hospital. Pt denies any fever, chills, nausea, vomiting, diarrhea, chest pain, problems urination.  Pt noted to have Sepsis and CTA chest showed LL PNA. \"     ______________________________________________________________________     The patient reports no chest pain/pressure; + increased HR with exertion, not at rest.  No PND, orthopnea, pre-syncope, syncope, peripheral edema, or decrease in exercise tolerance. No current complaints.     ASx with afib last night: dilt gtt started; Now NSR.     ECG: yesterday: afib; ; NS STT wave changes. Tele: PAfib; now NSR. CXR: \"CM; no PNA/CHF\"  Chest CT: \"The right thyroid lobe is enlarged. There is no evidence for pulmonary emboli. There is no pericardial pleural effusion the heart size is normal. There is no  shift or pneumothorax. Calcification and mild ectasia of the thoracic aorta. There is a left lower lobe infiltrate. Stable nodules\"     Notable labs: WBC 22 to 18; Nl CMP; Neg CK/trop x1;   _____________________________________________  Notable prior cardiac history:  Pulmonary HTN   Echo 11/2011: EF 60% (TDS); RVE/decreased RV Fxn; DERRELL. Mild MR; Moderate TR; PAp 78.     CTA then neg for PEs. For other plans, see orders.   Hospital problem list   Active Hospital Problems    Diagnosis Date Noted    Sepsis ([facility] [address] 10/02/2017    COPD exacerbation ([facility] [address] 10/02/2017    Pulmonary hypertension, moderate to severe 11/19/2011    Acute on chronic respiratory failure ([facility] [address] 11/17/2011        Subjective: Manjinder Glassing Sonya reports   Chest pain X none  consistent with:  Non-cardiac CP         Atypical CP     None now  On going  Anginal CP     Dyspnea X none  at rest  with exertion         improved  unchanged  worse              PND X none  overnight       Orthopnea X none  improved  unchanged  worse   Presyncope X none  improved  unchanged  worse     Ambulated in hallway without symptoms   Yes   Ambulated in room without symptoms  Yes   Objective:    Physical Exam:  Overall VSSAF;    Visit Vitals    /67 (BP 1 Location: Right arm, BP Patient Position: At rest)    Pulse 81    Temp 98 °F (36.7 °C)    Resp 24    SpO2 97%     Temp (24hrs), Av °F (36.7 °C), Min:97.8 °F (36.6 °C), Max:98.2 °F (36.8 °C)    Patient Vitals for the past 8 hrs:   Pulse   10/04/17 0300 81    Patient Vitals for the past 8 hrs:   Resp   10/04/17 0300 24    Patient Vitals for the past 8 hrs:   BP   10/04/17 0300 167/67      No intake or output data in the 24 hours ending 10/04/17 08  General Appearance: Well developed, well nourished, no acute distress. Ears/Nose/Mouth/Throat:   Normal MM; anicteric. JVP: WNL   Resp:   clear to auscultation bilaterally. lncreased resp effort. Cardiovascular:  RRR, S1, S2 normal, no new murmur. No gallop or rub. Abdomen:   Soft, non-tender, bowel sounds are present. Extremities: No edema bilaterally. Skin:  Neuro: Warm and dry. A/O x3, grossly nonfocal   cath site intact w/o hematoma or new bruit; distal pulse unchanged  Yes   Data Review:     Telemetry independently reviewed x sinus  chronic afib  parox afib  NSVT     ECG independently reviewed  NSR  afib  no significant changes  NSST-Tw chgs   x no new ECG provided for review   Lab results reviewed as noted below. Current medications reviewed as noted below.     Recent Labs      10/02/17   0230   PH  7.39   PCO2  40   PO2  68*     Recent Labs      10/01/17   1734   CPK  125   CKMB  1.6   CKNDX  1.3   TROIQ  0.04     Recent Labs      10/04/17   0153  10/03/17   0403  10/02/17   0230  10/01/17   1734   NA  137  138  137  141   K  3.0*  3.7  3.9  3.6   CL  102  105  106  103   CO2  27  26  23  29   BUN  37*  31*  18  20*   CREA  1.00  1.01  1.01  1.09*   GLU  201*  207*  213*  172*   CA  8.8  8.6  8.0*  9.5   ALB   --   2.8*  2.6*  3.2*   WBC  13.5*  18.1*  24.9*  22.2*   HGB  11.4*  11.3*  11.7  12.9   HCT  33.6*  34.4*  35.5  40.0   PLT  241  261  232  267     No results found for: CHOL, CHOLX, CHLST, CHOLV, HDL, LDL, LDLC, DLDLP, Kim Wade, HD, [address]  [facility]  Recent Labs      10/03/17   0403  10/02/17   9323 10/01/17   1734   SGOT  34  27  20   AP  58  54  70   TP  7.2  7.0  7.6   ALB  2.8*  2.6*  3.2*   GLOB  4.4*  4.4*  4.4*     Recent Labs      10/01/17   1847   INR  1.2*   PTP  13.9*   APTT  50.3*      No components found for: GLPOC    Current Facility-Administered Medications   Medication Dose Route Frequency    dilTIAZem CD (CARDIZEM CD) capsule 180 mg  180 mg Oral DAILY    influenza vaccine 2017-18 (3 yrs+)(PF) (FLUZONE QUAD/FLUARIX QUAD) injection 0.5 mL  0.5 mL IntraMUSCular PRIOR TO DISCHARGE    hydrALAZINE (APRESOLINE) 20 mg/mL injection 10 mg  10 mg IntraVENous Q6H PRN    methylPREDNISolone (PF) (SOLU-MEDROL) injection 60 mg  60 mg IntraVENous Q6H    albuterol-ipratropium (DUO-NEB) 2.5 MG-0.5 MG/3 ML  3 mL Nebulization Q4H RT    guaiFENesin ER (MUCINEX) tablet 600 mg  600 mg Oral Q12H    benzonatate (TESSALON) capsule 200 mg  200 mg Oral TID PRN    umeclidinium (INCRUSE ELLIPTA) 62.5 mcg/actuation  1 Puff Inhalation DAILY    aspirin (ASPIRIN) tablet 325 mg  325 mg Oral DAILY    fluticasone (FLONASE) 50 mcg/actuation nasal spray 2 Spray  2 Spray Both Nostrils DAILY    furosemide (LASIX) tablet 40 mg  40 mg Oral DAILY    levoFLOXacin (LEVAQUIN) 750 mg in D5W IVPB  750 mg IntraVENous Q48H    sodium chloride (NS) flush 5-10 mL  5-10 mL IntraVENous Q8H    sodium chloride (NS) flush 5-10 mL  5-10 mL IntraVENous PRN    acetaminophen (TYLENOL) tablet 650 mg  650 mg Oral Q6H PRN    ondansetron (ZOFRAN) injection 4 mg  4 mg IntraVENous Q4H PRN    enoxaparin (LOVENOX) injection 40 mg  40 mg SubCUTAneous DAILY    budesonide-formoterol (SYMBICORT) 160-4.5 mcg/actuation HFA inhaler 2 Puff  2 Puff Inhalation BID    cefTRIAXone (ROCEPHIN) 1 g in 0.9% sodium chloride (MBP/ADV) 50 mL  1 g IntraVENous Q24H    diphenhydrAMINE (BENADRYL) capsule 25 mg  25 mg Oral BID PRN        Mike Dan MD

## 2017-10-05 PROCEDURE — 74011000250 HC RX REV CODE- 250: Performed by: INTERNAL MEDICINE

## 2017-10-05 PROCEDURE — 94640 AIRWAY INHALATION TREATMENT: CPT

## 2017-10-05 PROCEDURE — 77010033678 HC OXYGEN DAILY

## 2017-10-05 PROCEDURE — 65660000000 HC RM CCU STEPDOWN

## 2017-10-05 PROCEDURE — 74011250636 HC RX REV CODE- 250/636: Performed by: INTERNAL MEDICINE

## 2017-10-05 PROCEDURE — 74011250637 HC RX REV CODE- 250/637: Performed by: HOSPITALIST

## 2017-10-05 PROCEDURE — 74011250637 HC RX REV CODE- 250/637: Performed by: INTERNAL MEDICINE

## 2017-10-05 RX ORDER — LEVOFLOXACIN 750 MG/1
750 TABLET ORAL
Status: DISCONTINUED | OUTPATIENT
Start: 2017-10-05 | End: 2017-10-06 | Stop reason: HOSPADM

## 2017-10-05 RX ADMIN — METHYLPREDNISOLONE SODIUM SUCCINATE 60 MG: 125 INJECTION, POWDER, FOR SOLUTION INTRAMUSCULAR; INTRAVENOUS at 00:14

## 2017-10-05 RX ADMIN — IPRATROPIUM BROMIDE AND ALBUTEROL SULFATE 3 ML: .5; 3 SOLUTION RESPIRATORY (INHALATION) at 04:13

## 2017-10-05 RX ADMIN — BUDESONIDE AND FORMOTEROL FUMARATE DIHYDRATE 2 PUFF: 160; 4.5 AEROSOL RESPIRATORY (INHALATION) at 20:48

## 2017-10-05 RX ADMIN — Medication 10 ML: at 12:12

## 2017-10-05 RX ADMIN — LEVOFLOXACIN 750 MG: 750 TABLET, FILM COATED ORAL at 17:28

## 2017-10-05 RX ADMIN — GUAIFENESIN 600 MG: 600 TABLET, EXTENDED RELEASE ORAL at 20:48

## 2017-10-05 RX ADMIN — HYDRALAZINE HYDROCHLORIDE 10 MG: 20 INJECTION INTRAMUSCULAR; INTRAVENOUS at 17:28

## 2017-10-05 RX ADMIN — FUROSEMIDE 40 MG: 40 TABLET ORAL at 09:18

## 2017-10-05 RX ADMIN — FLUTICASONE PROPIONATE 2 SPRAY: 50 SPRAY, METERED NASAL at 09:22

## 2017-10-05 RX ADMIN — DILTIAZEM HYDROCHLORIDE 180 MG: 180 CAPSULE, EXTENDED RELEASE ORAL at 09:18

## 2017-10-05 RX ADMIN — Medication 10 ML: at 06:51

## 2017-10-05 RX ADMIN — METHYLPREDNISOLONE SODIUM SUCCINATE 60 MG: 125 INJECTION, POWDER, FOR SOLUTION INTRAMUSCULAR; INTRAVENOUS at 06:50

## 2017-10-05 RX ADMIN — Medication 10 ML: at 16:10

## 2017-10-05 RX ADMIN — IPRATROPIUM BROMIDE AND ALBUTEROL SULFATE 3 ML: .5; 3 SOLUTION RESPIRATORY (INHALATION) at 19:43

## 2017-10-05 RX ADMIN — IPRATROPIUM BROMIDE AND ALBUTEROL SULFATE 3 ML: .5; 3 SOLUTION RESPIRATORY (INHALATION) at 11:20

## 2017-10-05 RX ADMIN — ASPIRIN 325 MG ORAL TABLET 325 MG: 325 PILL ORAL at 09:18

## 2017-10-05 RX ADMIN — GUAIFENESIN 600 MG: 600 TABLET, EXTENDED RELEASE ORAL at 09:18

## 2017-10-05 RX ADMIN — Medication 10 ML: at 20:50

## 2017-10-05 RX ADMIN — UMECLIDINIUM 1 PUFF: 62.5 AEROSOL, POWDER ORAL at 09:22

## 2017-10-05 RX ADMIN — ENOXAPARIN SODIUM 40 MG: 40 INJECTION SUBCUTANEOUS at 09:17

## 2017-10-05 RX ADMIN — METHYLPREDNISOLONE SODIUM SUCCINATE 60 MG: 125 INJECTION, POWDER, FOR SOLUTION INTRAMUSCULAR; INTRAVENOUS at 12:10

## 2017-10-05 RX ADMIN — BUDESONIDE AND FORMOTEROL FUMARATE DIHYDRATE 2 PUFF: 160; 4.5 AEROSOL RESPIRATORY (INHALATION) at 09:22

## 2017-10-05 RX ADMIN — IPRATROPIUM BROMIDE AND ALBUTEROL SULFATE 3 ML: .5; 3 SOLUTION RESPIRATORY (INHALATION) at 15:55

## 2017-10-05 RX ADMIN — IPRATROPIUM BROMIDE AND ALBUTEROL SULFATE 3 ML: .5; 3 SOLUTION RESPIRATORY (INHALATION) at 07:28

## 2017-10-05 NOTE — PROGRESS NOTES
Primary Nurse Marian Crow, RN and Sofiya Beltrán , RN performed a dual skin assessment on this patient No impairment noted  Niraj score is 21    Patient has scattered bruising

## 2017-10-05 NOTE — PROGRESS NOTES
* No surgery found *  * No surgery found *  Bedside and Verbal shift change report given to [address] Taylor Andrade (oncoming nurse) by Edmundo Carmen  (offgoing nurse). Report included the following information SBAR, Kardex, Intake/Output and MAR. Zone Phone:   [phone]      Significant changes during shift:  New admit. hydralazine given        Patient Information    Tiffani Jimenez  80 y.o.  10/2/2017  1:51 AM by Phong Patton MD. Tiffani Jimenez was admitted from Home    Problem List    Patient Active Problem List    Diagnosis Date Noted    Sepsis ([facility] [address] 10/02/2017    COPD exacerbation ([address] 10/02/2017    Hyperglycemia 11/19/2011    Tobacco abuse 11/19/2011    Pulmonary hypertension, moderate to severe 11/19/2011    Acute on chronic respiratory failure ([facility] [address] 11/17/2011    COPD (chronic obstructive pulmonary disease) with acute bronchitis ([facility] [address] 11/17/2011    Lung nodule 11/17/2011    Abnormal ECG 11/17/2011     Past Medical History:   Diagnosis Date    COPD     Hypertension          Core Measures:    CVA: No No  CHF:No No  PNA:No No      Activity Status:    OOB to Chair Yes  Ambulated this shift Yes   Bed Rest No    Supplemental O2: (If Applicable)    NC Yes  NRB No  Venti-mask No  On 4 Liters/min      LINES AND DRAINS:    PIV only    DVT prophylaxis:    DVT prophylaxis Med- Yes, lovenox  DVT prophylaxis SCD or MAGALI- No     Wounds: (If Applicable)    Wounds- No    Location scattered brusing    Patient Safety:    Falls Score Total Score: 2  Safety Level_______  Bed Alarm On? No  Sitter? No    Plan for upcoming shift: monitor blood cultures.         Discharge Plan: Yes possibly tomorrow      Active Consults:  IP CONSULT TO CARDIOLOGY

## 2017-10-05 NOTE — PROGRESS NOTES
TRANSFER - IN REPORT:    Verbal report received from Feliciano Cuevas (name) on Alejos Mohs  being received from [address] (unit) for routine progression of care      Report consisted of patients Situation, Background, Assessment and   Recommendations(SBAR). Information from the following report(s) SBAR, Kardex, Intake/Output and MAR was reviewed with the receiving nurse. Opportunity for questions and clarification was provided. Assessment completed upon patients arrival to unit and care assumed.

## 2017-10-05 NOTE — PROGRESS NOTES
Cardiology Progress Note  10/5/2017     Admit Date: 10/2/2017  Admit Diagnosis: SEPSIS AND PNEUMONIA  Sepsis ([facility] [address]  CC: none currently  Pulm: Dr Elva Contreras. Cardiac Assessment/Plan:   Afib: Paroxysmal; Brief duration (few hrs): started on dilt gtt: NSR now; Pt reports no Sx even with RVR. CHADS-Vasc: 4 (should be on anticoag if recurrent/persistent afib).     Rec: off dilt gtt; changed home norvasc (5) to dilt ; If recurrent afib, likely change dilt to metoprolol and add multaq; neg so far.     HTN: med change as above.     Cor pulmonale/pulm HTN: significant when last checked in 2011;   Echo 10/3: EF55%. RVE. Mild AS (mean 8). Severe TR. PAp 78.     Rec: Consider chronic anticoag for pulm HTN.    Volume status: looks euvolemic; on home dose PO lasix (40). Renal function: stable     RBBB: chronic. PPM not indicated.     ID (PNA?), COPD/cor pulmonale, HypoKalemia, Resp failure, Dispo: all per primary team.      10/4: No new recs. NSR.    10/5: NSR; Improved pulm status. NSR. No active cardiac issues; will sign-off; Please call if questions/issues. F/U with PCP after d/c. Thanks. Hospital Problem List:  Active Problems:    Acute on chronic respiratory failure ([facility] [address] (11/17/2011)       Pulmonary hypertension, moderate to severe (11/19/2011)       Sepsis ([facility] [address] (10/2/2017)       COPD exacerbation ([facility] [address] (10/2/2017)       ____________________________________________________________________  Anne Kim is a 80 y.o. female presents with SEPSIS AND PNEUMONIA  Sepsis ([facility] [address]    As noted in H&P: \"81 y.o.   female who presents from [facility] ED because of respiratory distress and higher level of care. As per patient, she has chronic cough with hernandez to brownish sputum. She has severe COPD at baseline with 2-4L O2. Pt claims she started to feel more short of breath since yesterday and her  increased her O2 to 6L.  Pt doesn't like to come to the hospital and didn't want to come but her  brought her to hospital. Pt denies any fever, chills, nausea, vomiting, diarrhea, chest pain, problems urination. Pt noted to have Sepsis and CTA chest showed LL PNA. \"     ______________________________________________________________________     The patient reports no chest pain/pressure; + increased HR with exertion, not at rest.  No PND, orthopnea, pre-syncope, syncope, peripheral edema, or decrease in exercise tolerance. No current complaints.     ASx with afib last night: dilt gtt started; Now NSR.     ECG: yesterday: afib; ; NS STT wave changes. Tele: PAfib; now NSR. CXR: \"CM; no PNA/CHF\"  Chest CT: \"The right thyroid lobe is enlarged. There is no evidence for pulmonary emboli. There is no pericardial pleural effusion the heart size is normal. There is no  shift or pneumothorax. Calcification and mild ectasia of the thoracic aorta. There is a left lower lobe infiltrate. Stable nodules\"     Notable labs: WBC 22 to 18; Nl CMP; Neg CK/trop x1;   _____________________________________________  Notable prior cardiac history:  Pulmonary HTN   Echo 11/2011: EF 60% (TDS); RVE/decreased RV Fxn; DERRELL. Mild MR; Moderate TR; PAp 78.     CTA then neg for PEs. For other plans, see orders.   Hospital problem list   Active Hospital Problems    Diagnosis Date Noted    Sepsis ([facility] [address] 10/02/2017    COPD exacerbation ([facility] [address] 10/02/2017    Pulmonary hypertension, moderate to severe 11/19/2011    Acute on chronic respiratory failure ([facility] [address] 11/17/2011        Subjective: Ileana Hassan reports   Chest pain X none  consistent with:  Non-cardiac CP         Atypical CP     None now  On going  Anginal CP     Dyspnea X none  at rest  with exertion         improved  unchanged  worse              PND X none  overnight       Orthopnea X none  improved  unchanged  worse   Presyncope X none  improved  unchanged  worse     Ambulated in hallway without symptoms   Yes   Ambulated in room without symptoms  Yes   Objective: Physical Exam:  Overall VSSAF;    Visit Vitals    /66    Pulse 76    Temp 98.4 °F (36.9 °C)    Resp 22    SpO2 95%     Temp (24hrs), Av.8 °F (36.6 °C), Min:97.6 °F (36.4 °C), Max:98.4 °F (36.9 °C)    Patient Vitals for the past 8 hrs:   Pulse   10/05/17 0729 76   10/05/17 0726 84   10/05/17 0310 85    Patient Vitals for the past 8 hrs:   Resp   10/05/17 0726 22   10/05/17 0310 22    Patient Vitals for the past 8 hrs:   BP   10/05/17 0726 160/66   10/05/17 0310 173/70          Intake/Output Summary (Last 24 hours) at 10/05/17 1001  Last data filed at 10/04/17 1830   Gross per 24 hour   Intake              450 ml   Output                0 ml   Net              450 ml     General Appearance: Well developed, well nourished, no acute distress. Ears/Nose/Mouth/Throat:   Normal MM; anicteric. JVP: WNL   Resp:   clear to auscultation bilaterally. Nl resp effort. Cardiovascular:  RRR, S1, S2 normal, no new murmur. No gallop or rub. Abdomen:   Soft, non-tender, bowel sounds are present. Extremities: No edema bilaterally. Skin:  Neuro: Warm and dry. A/O x3, grossly nonfocal   cath site intact w/o hematoma or new bruit; distal pulse unchanged  Yes   Data Review:     Telemetry independently reviewed x sinus  chronic afib  parox afib  NSVT     ECG independently reviewed  NSR  afib  no significant changes  NSST-Tw chgs   x no new ECG provided for review   Lab results reviewed as noted below. Current medications reviewed as noted below. No results for input(s): PH, PCO2, PO2 in the last 72 hours. No results for input(s): CPK, CKMB, CKNDX, TROIQ in the last 72 hours.   Recent Labs      10/04/17   0153  10/03/17   0403   NA  137  138   K  3.0*  3.7   CL  102  105   CO2  27  26   BUN  37*  31*   CREA  1.00  1.01   GLU  201*  207*   CA  8.8  8.6   ALB   --   2.8*   WBC  13.5*  18.1*   HGB  11.4*  11.3*   HCT  33.6*  34.4*   PLT  241  261     No results found for: CHOL, CHOLX, CHLST, CHOLV, HDL, LDL, LDLC, DLDLP, TGLX, TRIGL, TRIGP, CHHD, CHHDX  Recent Labs      10/03/17   0403   SGOT  34   AP  58   TP  7.2   ALB  2.8*   GLOB  4.4*     No results for input(s): INR, PTP, APTT in the last 72 hours.     No lab exists for component: INREXT, INREXT   No components found for: [place]    Current Facility-Administered Medications   Medication Dose Route Frequency    dilTIAZem CD (CARDIZEM CD) capsule 180 mg  180 mg Oral DAILY    influenza vaccine 2017-18 (3 yrs+)(PF) (FLUZONE QUAD/FLUARIX QUAD) injection 0.5 mL  0.5 mL IntraMUSCular PRIOR TO DISCHARGE    hydrALAZINE (APRESOLINE) 20 mg/mL injection 10 mg  10 mg IntraVENous Q6H PRN    methylPREDNISolone (PF) (SOLU-MEDROL) injection 60 mg  60 mg IntraVENous Q6H    albuterol-ipratropium (DUO-NEB) 2.5 MG-0.5 MG/3 ML  3 mL Nebulization Q4H RT    guaiFENesin ER (MUCINEX) tablet 600 mg  600 mg Oral Q12H    benzonatate (TESSALON) capsule 200 mg  200 mg Oral TID PRN    umeclidinium (INCRUSE ELLIPTA) 62.5 mcg/actuation  1 Puff Inhalation DAILY    aspirin (ASPIRIN) tablet 325 mg  325 mg Oral DAILY    fluticasone (FLONASE) 50 mcg/actuation nasal spray 2 Spray  2 Spray Both Nostrils DAILY    furosemide (LASIX) tablet 40 mg  40 mg Oral DAILY    levoFLOXacin (LEVAQUIN) 750 mg in D5W IVPB  750 mg IntraVENous Q48H    sodium chloride (NS) flush 5-10 mL  5-10 mL IntraVENous Q8H    sodium chloride (NS) flush 5-10 mL  5-10 mL IntraVENous PRN    acetaminophen (TYLENOL) tablet 650 mg  650 mg Oral Q6H PRN    ondansetron (ZOFRAN) injection 4 mg  4 mg IntraVENous Q4H PRN    enoxaparin (LOVENOX) injection 40 mg  40 mg SubCUTAneous DAILY    budesonide-formoterol (SYMBICORT) 160-4.5 mcg/actuation HFA inhaler 2 Puff  2 Puff Inhalation BID    cefTRIAXone (ROCEPHIN) 1 g in 0.9% sodium chloride (MBP/ADV) 50 mL  1 g IntraVENous Q24H    diphenhydrAMINE (BENADRYL) capsule 25 mg  25 mg Oral BID PRN        Bernice Perez MD

## 2017-10-05 NOTE — ROUTINE PROCESS
follow up appointments scheduled and placed on AVS. Gigi Ramos, [address] [place] [address].  [phone]

## 2017-10-05 NOTE — PROGRESS NOTES
PCU SHIFT NURSING NOTE      Bedside and Verbal shift change report given to FLACA Muniz (oncoming nurse) by Darian Miranda (offgoing nurse). Report included the following information SBAR, Kardex, Intake/Output, MAR, Med Rec Status, Cardiac Rhythm NSR and Alarm Parameters . Shift Summary:   1928 Bedside report received. Patient voiced no c/o pain or discomfort. Patient tolerated medications without difficulty. No s/sx of respiratory distress noted. Family at bedside. Patient slept throughout shift. IV in right upper arm flushing properly. Patient observed in bed HOB elevated. Patient turns self.     0715 Bedside report given to [facility], RN. Admission Date 10/2/2017   Admission Diagnosis SEPSIS AND PNEUMONIA  Sepsis ([facility] [address]   Consults IP CONSULT TO CARDIOLOGY        Consults   []PT   []OT   []Speech   []Case Management      [] Palliative      Cardiac Monitoring Order   []Yes   []No     IV drips   []Yes    Drip:                            Dose:  Drip:                            Dose:  Drip:                            Dose:   []No     GI Prophylaxis   []Yes   []No         DVT Prophylaxis   SCDs:             David stockings:         [] Medication   []Contraindicated   []None      Activity Level Activity Level: Up with Assistance     Activity Assistance: Partial (one person)   Purposeful Rounding every 1-2 hour? []Yes   Gonzalez Score  Total Score: 2   Bed Alarm (If score 3 or >)   []Yes   [] Refused (See signed refusal form in chart)   Niraj Score  Niraj Score: 20   Niraj Score (if score 14 or less)   []PMT consult   []Wound Care consult      []Specialty bed   [] Nutrition consult          Needs prior to discharge:   Home O2 required:    []Yes   []No    If yes, how much O2 required?     Other:    Last Bowel Movement: Last Bowel Movement Date: 10/03/17      Influenza Vaccine Received Flu Vaccine for Current Season (usually Sept-March): No    Patient/Guardian Refused (Notify MD): No   Pneumonia Vaccine           Diet Active Orders   Diet    DIET CARDIAC      LDAs               Peripheral IV 10/04/17 Right Arm (Active)   Site Assessment Clean, dry, & intact 10/5/2017  3:10 AM   Phlebitis Assessment 0 10/5/2017  3:10 AM   Infiltration Assessment 0 10/5/2017  3:10 AM   Dressing Status Clean, dry, & intact 10/5/2017  3:10 AM   Dressing Type Tape;Transparent 10/5/2017  3:10 AM   Hub Color/Line Status Pink;Capped 10/5/2017  3:10 AM                      Urinary Catheter      Intake & Output   Date 10/04/17 0700 - 10/05/17 0659 10/05/17 0700 - 10/06/17 0659   Shift 5026-7873 6487-9051 24 Hour Total 7540-0565 4801-1241 24 Hour Total   I  N  T  A  K  E   P.O. 300  300         P. O. 300  300       I.V. 150  150         Volume (cefTRIAXone (ROCEPHIN) 1 g in 0.9% sodium chloride (MBP/ADV) 50 mL) 150  150       Shift Total 450  450      O  U  T  P  U  T   Urine            Urine Occurrence(s) 4 x  4 x       Shift Total           450      Weight (kg)               Readmission Risk Assessment Tool Score Low Risk            11       Total Score        2 . Living with Significant Other. Assisted Living. LTAC. SNF. or   Rehab    5 Pt.  Coverage (Medicare=5 , Medicaid, or Self-Pay=4)    4 Charlson Comorbidity Score (Age + Comorbid Conditions)        Criteria that do not apply:    Has Seen PCP in Last 6 Months (Yes=3, No=0)    Patient Length of Stay (>5 days = 3)    IP Visits Last 12 Months (1-3=4, 4=9, >4=11)       Expected Length of Stay 4d 21h   Actual Length of Stay 3

## 2017-10-05 NOTE — PROGRESS NOTES
Hospitalist Progress Note    NAME: Patricia Lowery   :  1935   MRN:  [id number]       Assessment / Plan:  Sepsis POA resolved   Acute on chronic respiratory failure POA with baseline 2-4L O2  CAP   COPD exacerbation  Baseline chronic diastolic heart failure due to severe Pulmonary HTN  Clinically improving   Leukocytosis improving   O2: stable on 4 L at rest ( her baseline), desaturates at 80s with exertion   IV steroids --> changing to PO  Duonebs, Mucolytics  IV levaquin--> PO   CTA chest with [facility] P[facility] at [facility]    Bacteremia  / bottles + for bacillus - is likely contamination  Follow repeated BC     UTI POA  UC: E.coli - pan sensitive   LVQ will cover     PAfib / HTN   S/p dilt gtt --> NSR now  CHADS-Vasc 4 --> need to be on a/coag ( pt declined), --> cont ASA   Cardiology help appreciated:dilt PO   D/w pt/family in details need for a/coagulation: coumadin vs eliquis vs asa --> pt declined coumadin/noac, she wants to be on ASA alone. Obesity. BMI 35.12         Code Status: Full but doesn't want to be on prolong life support  Surrogate Decision Maker:   DVT Prophylaxis: Lovenox     Baseline: functional, ; home O2 3.5-4 L   Recommended Disposition: Home w/Family in am      Subjective:     Chief Complaint / Reason for Physician Visit: following COPD/sepsis  Pt reports feeling well. Pt reports that her LUDWIG at baseline.  at bedside confirmed that pt is always significantly SOB with exertion. She just cam back from the bathroom and was dyspneic with O2 down to 80s. Per  \" this is how she always is\"       Discussed with RN events overnight.      Review of Systems:  Symptom Y/N Comments  Symptom Y/N Comments   Fever/Chills n   Chest Pain n    Poor Appetite    Edema     Cough    Abdominal Pain     Sputum    Joint Pain     SOB/LUDWIG y   Pruritis/Rash     Nausea/vomit    Tolerating PT/OT y    Diarrhea    Tolerating Diet y    Constipation    Other       Could NOT obtain due to: Objective:     VITALS:   Last 24hrs VS reviewed since prior progress note. Most recent are:  Patient Vitals for the past 24 hrs:   Temp Pulse Resp BP SpO2   10/05/17 1141 97.7 °F (36.5 °C) 81 22 154/53 93 %   10/05/17 1120 - - - - 93 %   10/05/17 0800 - - - - 95 %   10/05/17 0729 - 76 - - 95 %   10/05/17 0728 - - - - 93 %   10/05/17 0726 98.4 °F (36.9 °C) 84 22 160/66 93 %   10/05/17 0310 97.7 °F (36.5 °C) 85 22 173/70 96 %   10/04/17 2253 97.7 °F (36.5 °C) 82 20 158/69 93 %   10/04/17 1924 97.8 °F (36.6 °C) 89 22 159/74 97 %   10/04/17 1709 97.6 °F (36.4 °C) 79 - 144/60 96 %   10/04/17 1611 - - - - 97 %       Intake/Output Summary (Last 24 hours) at 10/05/17 1429  Last data filed at 10/05/17 1141   Gross per 24 hour   Intake              350 ml   Output              300 ml   Net               50 ml        PHYSICAL EXAM:  General: WD, WN. Alert, cooperative, no acute distress    EENT:  EOMI. Anicteric sclerae. MMM  Resp:  diminished BS bilaterally, no wheezing or rales. No accessory muscle use  CV:  Regular  rhythm,  No edema  GI:  Soft, Non distended, Non tender.  +Bowel sounds  Neurologic:  Alert and oriented X 3, normal speech,   Psych:   Good insight. Not anxious nor agitated  Skin:  No rashes. No jaundice    Reviewed most current lab test results and cultures  YES  Reviewed most current radiology test results   YES  Review and summation of old records today    NO  Reviewed patient's current orders and MAR    YES  PMH/SH reviewed - no change compared to H&P  ________________________________________________________________________  Care Plan discussed with:    Comments   Patient y    Family  y Family at bedside    RN y    Care Manager     Consultant                        Multidiciplinary team rounds were held today with , nursing, pharmacist and clinical coordinator. Patient's plan of care was discussed; medications were reviewed and discharge planning was addressed. ________________________________________________________________________  Total NON critical care TIME:  25 Minutes    Total CRITICAL CARE TIME Spent:   Minutes non procedure based      Comments   >50% of visit spent in counseling and coordination of care     ________________________________________________________________________  Joycelyn Dias MD     Procedures: see electronic medical records for all procedures/Xrays and details which were not copied into this note but were reviewed prior to creation of Plan. LABS:  I reviewed today's most current labs and imaging studies.   Pertinent labs include:  Recent Labs      10/04/17   0153  10/03/17   0403   WBC  13.5*  18.1*   HGB  11.4*  11.3*   HCT  33.6*  34.4*   PLT  241  261     Recent Labs      10/04/17   0153  10/03/17   0403   NA  137  138   K  3.0*  3.7   CL  102  105   CO2  27  26   GLU  201*  207*   BUN  37*  31*   CREA  1.00  1.01   CA  8.8  8.6   ALB   --   2.8*   TBILI   --   0.3   SGOT   --   34   ALT   --   30       Signed: Joycelyn Dias MD

## 2017-10-06 VITALS
SYSTOLIC BLOOD PRESSURE: 158 MMHG | RESPIRATION RATE: 22 BRPM | HEART RATE: 75 BPM | OXYGEN SATURATION: 93 % | DIASTOLIC BLOOD PRESSURE: 54 MMHG | TEMPERATURE: 97.5 F

## 2017-10-06 LAB
ANION GAP SERPL CALC-SCNC: 10 MMOL/L (ref 5–15)
BUN SERPL-MCNC: 34 MG/DL (ref 6–20)
BUN/CREAT SERPL: 35 (ref 12–20)
CALCIUM SERPL-MCNC: 8.9 MG/DL (ref 8.5–10.1)
CHLORIDE SERPL-SCNC: 100 MMOL/L (ref 97–108)
CO2 SERPL-SCNC: 30 MMOL/L (ref 21–32)
CREAT SERPL-MCNC: 0.98 MG/DL (ref 0.55–1.02)
ERYTHROCYTE [DISTWIDTH] IN BLOOD BY AUTOMATED COUNT: 13.4 % (ref 11.5–14.5)
GLUCOSE SERPL-MCNC: 213 MG/DL (ref 65–100)
HCT VFR BLD AUTO: 36.7 % (ref 35–47)
HGB BLD-MCNC: 12.2 G/DL (ref 11.5–16)
MCH RBC QN AUTO: 30.2 PG (ref 26–34)
MCHC RBC AUTO-ENTMCNC: 33.2 G/DL (ref 30–36.5)
MCV RBC AUTO: 90.8 FL (ref 80–99)
PLATELET # BLD AUTO: 246 K/UL (ref 150–400)
POTASSIUM SERPL-SCNC: 3 MMOL/L (ref 3.5–5.1)
RBC # BLD AUTO: 4.04 M/UL (ref 3.8–5.2)
SODIUM SERPL-SCNC: 140 MMOL/L (ref 136–145)
WBC # BLD AUTO: 10.7 K/UL (ref 3.6–11)

## 2017-10-06 PROCEDURE — 74011250636 HC RX REV CODE- 250/636: Performed by: INTERNAL MEDICINE

## 2017-10-06 PROCEDURE — 74011000250 HC RX REV CODE- 250: Performed by: INTERNAL MEDICINE

## 2017-10-06 PROCEDURE — 85027 COMPLETE CBC AUTOMATED: CPT | Performed by: HOSPITALIST

## 2017-10-06 PROCEDURE — 74011250637 HC RX REV CODE- 250/637: Performed by: INTERNAL MEDICINE

## 2017-10-06 PROCEDURE — 94640 AIRWAY INHALATION TREATMENT: CPT

## 2017-10-06 PROCEDURE — 74011636637 HC RX REV CODE- 636/637: Performed by: HOSPITALIST

## 2017-10-06 PROCEDURE — 80048 BASIC METABOLIC PNL TOTAL CA: CPT | Performed by: HOSPITALIST

## 2017-10-06 PROCEDURE — 74011000250 HC RX REV CODE- 250: Performed by: HOSPITALIST

## 2017-10-06 PROCEDURE — 74011250637 HC RX REV CODE- 250/637: Performed by: HOSPITALIST

## 2017-10-06 PROCEDURE — 77010033678 HC OXYGEN DAILY

## 2017-10-06 PROCEDURE — 36415 COLL VENOUS BLD VENIPUNCTURE: CPT | Performed by: HOSPITALIST

## 2017-10-06 RX ORDER — DILTIAZEM HYDROCHLORIDE 180 MG/1
180 CAPSULE, COATED, EXTENDED RELEASE ORAL DAILY
Qty: 30 CAP | Refills: 0 | Status: SHIPPED | OUTPATIENT
Start: 2017-10-06 | End: 2018-01-08

## 2017-10-06 RX ORDER — POTASSIUM CHLORIDE 750 MG/1
40 TABLET, FILM COATED, EXTENDED RELEASE ORAL
Status: COMPLETED | OUTPATIENT
Start: 2017-10-06 | End: 2017-10-06

## 2017-10-06 RX ORDER — IPRATROPIUM BROMIDE AND ALBUTEROL SULFATE 2.5; .5 MG/3ML; MG/3ML
3 SOLUTION RESPIRATORY (INHALATION)
Status: DISCONTINUED | OUTPATIENT
Start: 2017-10-06 | End: 2017-10-06 | Stop reason: HOSPADM

## 2017-10-06 RX ORDER — LEVOFLOXACIN 750 MG/1
750 TABLET ORAL
Qty: 3 TAB | Refills: 0 | Status: SHIPPED | OUTPATIENT
Start: 2017-10-06 | End: 2017-10-11

## 2017-10-06 RX ORDER — FUROSEMIDE 40 MG/1
TABLET ORAL
Qty: 30 TAB | Refills: 0 | Status: SHIPPED
Start: 2017-10-06 | End: 2017-10-06

## 2017-10-06 RX ORDER — PREDNISONE 10 MG/1
TABLET ORAL
Qty: 9 TAB | Refills: 0 | Status: SHIPPED | OUTPATIENT
Start: 2017-10-06 | End: 2018-01-08

## 2017-10-06 RX ADMIN — BUDESONIDE AND FORMOTEROL FUMARATE DIHYDRATE 2 PUFF: 160; 4.5 AEROSOL RESPIRATORY (INHALATION) at 09:19

## 2017-10-06 RX ADMIN — DILTIAZEM HYDROCHLORIDE 180 MG: 180 CAPSULE, EXTENDED RELEASE ORAL at 09:12

## 2017-10-06 RX ADMIN — IPRATROPIUM BROMIDE AND ALBUTEROL SULFATE 3 ML: .5; 3 SOLUTION RESPIRATORY (INHALATION) at 07:52

## 2017-10-06 RX ADMIN — ENOXAPARIN SODIUM 40 MG: 40 INJECTION SUBCUTANEOUS at 09:11

## 2017-10-06 RX ADMIN — Medication 10 ML: at 05:03

## 2017-10-06 RX ADMIN — IPRATROPIUM BROMIDE AND ALBUTEROL SULFATE 3 ML: .5; 3 SOLUTION RESPIRATORY (INHALATION) at 00:44

## 2017-10-06 RX ADMIN — UMECLIDINIUM 1 PUFF: 62.5 AEROSOL, POWDER ORAL at 09:19

## 2017-10-06 RX ADMIN — POTASSIUM CHLORIDE 40 MEQ: 750 TABLET, FILM COATED, EXTENDED RELEASE ORAL at 13:27

## 2017-10-06 RX ADMIN — PREDNISONE 30 MG: 20 TABLET ORAL at 09:12

## 2017-10-06 RX ADMIN — GUAIFENESIN 600 MG: 600 TABLET, EXTENDED RELEASE ORAL at 09:12

## 2017-10-06 RX ADMIN — FLUTICASONE PROPIONATE 2 SPRAY: 50 SPRAY, METERED NASAL at 09:19

## 2017-10-06 RX ADMIN — ASPIRIN 325 MG ORAL TABLET 325 MG: 325 PILL ORAL at 09:12

## 2017-10-06 RX ADMIN — FUROSEMIDE 40 MG: 40 TABLET ORAL at 09:12

## 2017-10-06 NOTE — PROGRESS NOTES
ADULT PROTOCOL: JET AEROSOL ASSESSMENT    Patient  Tran Astudillo     80 y.o.   female     10/6/2017  12:54AM    Breath Sounds Pre Procedure: Right Breath Sounds: Diminished                               Left Breath Sounds: Diminished    Breath Sounds Post Procedure: Right Breath Sounds: Diminished                                 Left Breath Sounds: Diminished    Breathing pattern: Pre procedure Breathing Pattern: Regular          Post procedure Breathing Pattern: Regular    Heart Rate: Pre procedure Pulse: 64           Post procedure Pulse: 66    Resp Rate: Pre procedure Respirations: 18           Post procedure Respirations: 18    Peak Flow: Pre bronchodilator    no        Post bronchodilator   no    FVC/FEV1: n/a    Incentive Spirometry:   n/a          Cough: Pre procedure Cough: Non-productive               Post procedure Cough: Non-productive    Suctioned: NO    Sputum: Pre procedure  none                 Post procedure  none    Oxygen: O2 Device: Nasal cannula   Flow rate (L/min) 4     Changed: NO    SpO2: Pre procedure SpO2: 94 %   with oxygen              Post procedure SpO2: 94 %  with oxygen    Nebulizer Therapy: Current medications Aerosolized Medications: DuoNeb      Changed: YES    Smoking History: tobacco abuse    Problem List:   Patient Active Problem List   Diagnosis Code    Acute on chronic respiratory failure (HCC) J96.20    COPD (chronic obstructive pulmonary disease) with acute bronchitis (HCC) J44.0, J20.9    Lung nodule R91.1    Abnormal ECG R94.31    Hyperglycemia R73.9    Tobacco abuse Z72.0    Pulmonary hypertension, moderate to severe I27.20    Sepsis ([address] A41.9    COPD exacerbation ([address] J44.1       Respiratory Therapist: Masha Harris RT

## 2017-10-06 NOTE — DISCHARGE SUMMARY
Hospitalist Discharge Summary     Patient ID:  Reji Varela  [id number]  28 y.o.  1935    PCP on record: Vinicio Painter MD    Admit date: 10/2/2017  Discharge date and time: 10/6/2017      DISCHARGE DIAGNOSIS:    Sepsis POA resolved   Acute on chronic respiratory failure POA with baseline 2-4L O2  CAP   COPD exacerbation  Baseline chronic diastolic heart failure due to severe Pulmonary HTN  Hypokalemia  Bacteremia - likely contamination   UTI POA  PAfib   HTN   Obesity. BMI 35.12   Code Status: Full       CONSULTATIONS:  IP CONSULT TO CARDIOLOGY    Excerpted HPI from H&P of Alexa Salguero MD:    HISTORY OF PRESENT ILLNESS:     Reji Varela is a 80 y.o.  female who presents from [facility] because of respiratory distress and higher level of care. As per patient, she has chronic cough with hernandez to brownish sputum. She has severe COPD at baseline with 2-4L O2. Pt claims she started to feel more short of breath since yesterday and her  increased her O2 to 6L. Pt doesn't like to come to the hospital and didn't want to come but her  brought her to hospital. Pt denies any fever, chills, nausea, vomiting, diarrhea, chest pain, problems urination. Pt noted to have Sepsis and CTA chest showed LL PNA.      We were asked to admit for work up and evaluation of the above problems. ______________________________________________________________________  DISCHARGE SUMMARY/HOSPITAL COURSE:  for full details see H&P, daily progress notes, labs, consult notes. Sepsis POA resolved   Acute on chronic respiratory failure POA with baseline 2-4L O2  CAP   COPD exacerbation  Baseline chronic diastolic heart failure due to severe Pulmonary HTN  Clinically back to baseline.  Leukocytosis resolved   O2: stable on home 4 L   Dc on tapering PO prednisone   Duonebs, Mucolytics  IV levaquin--> PO ( total 10 days )   [facility] chest with L[facility] PNA at [facility]     Hypokalemia  Supplemented   Follow BMP OP with PCP to decide on daily supplement      Bacteremia  1/4 bottles + for bacillus - is likely contamination  Repeated BC NTD      UTI POA  UC: E.coli - pan sensitive   LVQ will cover      PAfib / HTN   S/p dilt gtt --> remain in NSR   CHADS-Vasc 4 --> need to be on a/coag ( pt declined), --> cont ASA   Cardiology help appreciated:dilt PO   Cont home hctz ( pt prefer hctz to lasix )   ECHO: EF 55%, mild AS, severe pulmonary HTN    D/w pt/family in details need for a/coagulation: coumadin vs eliquis vs asa --> pt declined coumadin/noac, she wants to be on ASA alone.       Obesity. BMI 35.12            Code Status: Full but doesn't want to be on prolong life support  Surrogate Decision Maker:   DVT Prophylaxis: Lovenox      Baseline: functional, ; home O2 3.5-4 L   Recommended Disposition: Home w/Family today   Seen by PT: no services         _______________________________________________________________________  Patient seen and examined by me on discharge day. PHYSICAL EXAM:  General:                    WD, WN. Alert, cooperative, no acute distress    EENT:                                  EOMI. Anicteric sclerae. MMM  Resp:                                   CTA bilaterally, no wheezing or rales. No accessory muscle use  CV:                                      Regular  rhythm,  No edema  GI:                                       Soft, Non distended, Non tender.  +Bowel sounds  Neurologic:                Alert and oriented X 3, normal speech,   Psych:                       Good insight. Not anxious nor agitated  Skin:                                    No rashes. No jaundice  _______________________________________________________________________  DISCHARGE MEDICATIONS:   Current Discharge Medication List      START taking these medications    Details   dilTIAZem CD (CARDIZEM CD) 180 mg ER capsule Take 1 Cap by mouth daily.   Qty: 30 Cap, Refills: 0      levoFLOXacin (LEVAQUIN) 750 mg tablet Take 1 Tab by mouth every fourty-eight (48) hours for 3 doses. Take on 10/7,9,11  Qty: 3 Tab, Refills: 0      predniSONE (DELTASONE) 10 mg tablet Take 2 Tab daily x 3 days then 1 Tab daily x 3 days  Qty: 9 Tab, Refills: 0         CONTINUE these medications which have NOT CHANGED    Details   budesonide-formoterol (SYMBICORT) 160-4.5 mcg/actuation HFAA Take 2 Puffs by inhalation two (2) times a day. hydroCHLOROthiazide (HYDRODIURIL) 25 mg tablet Take 25 mg by mouth daily. umeclidinium (INCRUSE ELLIPTA) 62.5 mcg/actuation inhaler Take 1 Puff by inhalation daily. Oxygen 3 Each continuous. cholecalciferol (VITAMIN D3) 1,000 unit tablet Take 1 Tab by mouth daily. Qty: 30 Tab, Refills: 1      albuterol (PROVENTIL, VENTOLIN) 90 mcg/Actuation inhaler Take 2 Puffs by inhalation every six (6) hours as needed. dextromethorphan-guaiFENesin (MUCINEX DM)  mg per tablet Take 1 Tab by mouth two (2) times a day. calcium carbonate (TUMS) 200 mg calcium (500 mg) Chew Take 1 Tab by mouth daily. Qty: 30 Tab, Refills: 0      acetaminophen (TYLENOL) 325 mg tablet Take 2 Tabs by mouth every four (4) hours as needed. Qty: 30 Tab, Refills: 0      fluticasone (FLONASE) 50 mcg/Actuation nasal spray 2 Sprays by Both Nostrils route. Use daily  Qty: 1 Bottle, Refills: 3      tiotropium (SPIRIVA WITH HANDIHALER) 18 mcg inhalation capsule Take 1 Cap by inhalation daily. albuterol (PROVENTIL VENTOLIN) 2.5 mg /3 mL (0.083 %) nebulizer solution by Nebulization route every six (6) hours as needed. aspirin (ASPIRIN) 325 mg tablet Take 325 mg by mouth daily.          STOP taking these medications       furosemide (LASIX) 40 mg tablet Comments:   Reason for Stopping:               My Recommended Diet, Activity, Wound Care, and follow-up labs are listed in the patient's Discharge Insturctions which I have personally completed and reviewed. _______________________________________________________________________  DISPOSITION:    Home with Family: y   Home with HH/PT/OT/RN:    SNF/LTC:    MAR:    OTHER:        Condition at Discharge:  Stable  _______________________________________________________________________  Follow up with:   PCP : Frankie Mota MD  Follow-up Information     Follow up With Details [id number] BATISTA MD Go on 10/9/2017 PCP - follow up at 2:20 PM 40 [address]      Coleridge Sicard, MD  Pulmonary - please follow up with your physician as directed.   [address]      Sebastián Monzon MD In 1 month cardiology  [address]  [id number]  [address] [phone]                Total time in minutes spent coordinating this discharge (includes going over instructions, follow-up, prescriptions, and preparing report for sign off to her PCP) :  > 30 minutes    Signed:  June Begum MD

## 2017-10-06 NOTE — PROGRESS NOTES
Pt to discharge home today via private vehicle by family. PT/OT notes indicated no needs. All follow up appts on pt AVS.    Care Management Interventions  PCP Verified by CM: Yes  Mode of Transport at Discharge:  Other (see comment) (family)  Transition of Care Consult (CM Consult): Discharge Planning  Physical Therapy Consult: Yes  Occupational Therapy Consult: Yes  Current Support Network: Lives with Spouse  Confirm Follow Up Transport: Family  Plan discussed with Pt/Family/Caregiver: Yes  Discharge Location  Discharge Placement: Home with family assistance    YVES Mistry  Ext [phone]

## 2017-10-06 NOTE — DISCHARGE INSTRUCTIONS
Patient Discharge Instructions    Cecy Jaffe / [id number] : 1935    Admitted 10/2/2017 Discharged: 10/6/2017         DISCHARGE DIAGNOSIS:     Pneumonia   COPD exacerbation   Atrial fibrillation            Take Home Medications     You are being discharge on antibiotic Levaquin for treatment of pneumonia      What you should know about antibiotics:     Antibiotics are medicines that help people fight infections caused by bacteria. They work by killing bacteria that are in the body. Antibiotics can cause side effects, such as nausea, vomiting. Nausea is a common side effect of many antibiotics. It is not an allergic reaction. If you are a woman and you get a yeast infection after taking an antibiotic, that does not mean you are allergic to it. Yeast infections are a common side effect of antibiotics. One of the most important side effect to watch while taking antibiotic or after you just finished taking it is diarrhea. This type of diarrhea may be caused by an infection with bacteria called C. difficile. C. difficile normally lives in the intestines. When people are on antibiotics, the C. difficile in their intestines can overgrow and cause infection. If you develop any side effect especially diarrhea while taking antibiotics it is very important to contact your doctor. We recommend taking probiotics while taking antibiotics. Probiotics can be bought over the counter. Allergies to antibiotics are common. You can develop an allergy to an antibiotic, even if you have not had a problem with it before. Symptoms of antibiotic allergy can be mild and include a flat rash and itching. They can also be more serious and include:      -----  Hives - are raised, red patches of skin that are usually very itchy      -----  Lip or tongue swelling     ------ Trouble swallowing or breathing  Serious allergy symptoms can start right after you start taking an antibiotic if you are very sensitive.  Less serious symptoms, on the other hand, often start a day or more later. If you think you are allergic to antibiotics tell your doctor. General drug facts     If you have a very bad allergy, wear an allergy ID at all times. It is important that you take the medication exactly as they are prescribed. Keep your medication in the bottles provided by the pharmacist.  Keep a list of all your drugs (prescription, natural products, vitamins, OTC) with you. Give this list to your doctor. Do not take other medications without consulting your doctor. Do not share your drugs with others and do not take anyone else's drugs. Keep all drugs out of the reach of children and pets. Most drugs may be thrown away in household trash after mixing with coffee grounds or melina litter and sealing in a plastic bag. Keep a list Call your doctor for help with any side effects. If in the [place] you may also call the FDA at 1-768-FDA-6692    Talk with the doctor before starting any new drug, including OTC, natural products, or vitamins. What to do at Home    1. Recommended diet:cardiac     2. Recommended activity: Activity as tolerated    3. If you experience any of the following symptoms then please call your primary care physician or return to the emergency room if you cannot get hold of your doctor:fever/chills/diarrhea     4. Lab work: follow with PCP to repeat chemistry - follow potassium     5. Continue to wear oxygen as previously     6. Bring these papers with you to your follow up appointments.  The papers will help your doctors be sure to continue the care plan from the hospital.      Follow-up with:   PCP: Sophie Santana MD  Follow-up Information     Follow up With Details Comments [id number] Vincent MD Go on 10/9/2017 PCP - follow up at 2:20 PM 40 [facility]  [facility]      Edvin Wing MD  Pulmonary - please follow up with your physician as directed. [facility] [place]      Tala Khan MD In 1 month cardiology  [address]  [id number]  [place] [phone]             Please call for your own appointment        Information obtained by :  I understand that if any problems occur once I am at home I am to contact my physician. I understand and acknowledge receipt of the instructions indicated above.                                                                                                                                            Physician's or R.N.'s Signature                                                                  Date/Time                                                                                                                                              Patient or Representative Signature                                                          Date/Time

## 2017-10-06 NOTE — PROGRESS NOTES
* No surgery found *  * No surgery found *  Bedside and Verbal shift change report given to Derrick James RN (oncoming nurse) by Christina Real RN  (offgoing nurse). Report included the following information SBAR, Kardex, Intake/Output and MAR. Zone Phone:   [phone]      Significant changes during shift:  none        Patient Information    Katelynn Garcia  80 y.o.  10/2/2017  1:51 AM by Lee Everett MD. Katelynn Garcia was admitted from Home    Problem List    Patient Active Problem List    Diagnosis Date Noted    Sepsis ([facility] 10/02/2017    COPD exacerbation ([facility] 10/02/2017    Hyperglycemia 11/19/2011    Tobacco abuse 11/19/2011    Pulmonary hypertension, moderate to severe 11/19/2011    Acute on chronic respiratory failure ([facility] 11/17/2011    COPD (chronic obstructive pulmonary disease) with acute bronchitis ([facility] 11/17/2011    Lung nodule 11/17/2011    Abnormal ECG 11/17/2011     Past Medical History:   Diagnosis Date    COPD     Hypertension          Core Measures:    CVA: No No  CHF:No No  PNA:No No      Activity Status:    OOB to Chair Yes  Ambulated this shift Yes   Bed Rest No    Supplemental O2: (If Applicable)    NC Yes  NRB No  Venti-mask No  On 4 Liters/min      LINES AND DRAINS:    PIV only    DVT prophylaxis:    DVT prophylaxis Med- Yes, lovenox  DVT prophylaxis SCD or MAGALI- No     Wounds: (If Applicable)    Wounds- No    Location scattered brusing    Patient Safety:    Falls Score Total Score: 2  Safety Level_______  Bed Alarm On? No  Sitter?  No    Plan for upcoming shift: monitor blood cultures        Discharge Plan: Yes possibly today       Active Consults:  IP CONSULT TO CARDIOLOGY

## 2017-10-06 NOTE — PROGRESS NOTES
Patient discharged to home with family. DC instructions reviewed including medication and follow up instructions. All lines removed. Patient indicated and understanding.

## 2017-10-06 NOTE — ROUTINE PROCESS
follow up appointments scheduled and placed on AVS.   [facility], [address] [place] [address].  [phone]

## 2017-10-06 NOTE — PROGRESS NOTES
Hospitalist Progress Note    NAME: Michele Abarca   :  1935   MRN:  [id number]       Assessment / Plan:  Sepsis POA resolved   Acute on chronic respiratory failure POA with baseline 2-4L O2  CAP   COPD exacerbation  Baseline chronic diastolic heart failure due to severe Pulmonary HTN  Clinically back to baseline. Leukocytosis resolved   O2: stable on home 4 L   Dc on tapering PO prednisone   Duonebs, Mucolytics  IV levaquin--> PO ( total 10 days )   CTA chest with LLL PNA at [facility]    Hypokalemia  Supplemented     Bacteremia  / bottles + for bacillus - is likely contamination  Repeated BC NTD     UTI POA  UC: E.coli - pan sensitive   LVQ will cover     PAfib / HTN   S/p dilt gtt --> remain in NSR   CHADS-Vasc 4 --> need to be on a/coag ( pt declined), --> cont ASA   Cardiology help appreciated:dilt PO   ECHO: EF 55%, mild AS, severe pulmonary HTN    D/w pt/family in details need for a/coagulation: coumadin vs eliquis vs asa --> pt declined coumadin/noac, she wants to be on ASA alone. Obesity. BMI 35.12         Code Status: Full but doesn't want to be on prolong life support  Surrogate Decision Maker:   DVT Prophylaxis: Lovenox     Baseline: functional, ; home O2 3.5-4 L   Recommended Disposition: Home w/Family today   Seen by PT: no services      Subjective:     Chief Complaint / Reason for Physician Visit: following COPD/sepsis  Dyspnea is back to baseline         Discussed with RN events overnight. Review of Systems:  Symptom Y/N Comments  Symptom Y/N Comments   Fever/Chills n   Chest Pain n    Poor Appetite    Edema     Cough    Abdominal Pain     Sputum    Joint Pain     SOB/LUDWIG y Baseline   Pruritis/Rash     Nausea/vomit    Tolerating PT/OT y    Diarrhea    Tolerating Diet y    Constipation    Other       Could NOT obtain due to:      Objective:     VITALS:   Last 24hrs VS reviewed since prior progress note.  Most recent are:  Patient Vitals for the past 24 hrs:   Temp Pulse Resp BP SpO2   10/06/17 1152 97.5 °F (36.4 °C) 75 22 158/54 93 %   10/06/17 1011 - - - 164/53 -   10/06/17 0752 - - - - 93 %   10/06/17 0745 97.4 °F (36.3 °C) 86 22 174/80 95 %   10/06/17 0420 97.5 °F (36.4 °C) 84 22 156/71 96 %   10/06/17 0046 - - - - 94 %   10/05/17 2308 97.7 °F (36.5 °C) 86 20 158/67 96 %   10/05/17 1943 - - - - 93 %   10/05/17 1923 97.7 °F (36.5 °C) 89 24 163/57 96 %   10/05/17 1655 97.8 °F (36.6 °C) 80 - 168/61 94 %   10/05/17 1555 - - - - 94 %   10/05/17 1459 97.7 °F (36.5 °C) 75 20 156/66 97 %       Intake/Output Summary (Last 24 hours) at 10/06/17 1329  Last data filed at 10/05/17 1508   Gross per 24 hour   Intake              180 ml   Output              420 ml   Net             -240 ml        PHYSICAL EXAM:  General: WD, WN. Alert, cooperative, no acute distress    EENT:  EOMI. Anicteric sclerae. MMM  Resp:  CTA bilaterally, no wheezing or rales. No accessory muscle use  CV:  Regular  rhythm,  No edema  GI:  Soft, Non distended, Non tender.  +Bowel sounds  Neurologic:  Alert and oriented X 3, normal speech,   Psych:   Good insight. Not anxious nor agitated  Skin:  No rashes. No jaundice    Reviewed most current lab test results and cultures  YES  Reviewed most current radiology test results   YES  Review and summation of old records today    NO  Reviewed patient's current orders and MAR    YES  PMH/SH reviewed - no change compared to H&P  ________________________________________________________________________  Care Plan discussed with:    Comments   Patient y    Family  y  at bedside    RN y    Care Manager     Consultant                        Multidiciplinary team rounds were held today with , nursing, pharmacist and clinical coordinator. Patient's plan of care was discussed; medications were reviewed and discharge planning was addressed.      ________________________________________________________________________  Total NON critical care TIME:  35 Minutes    Total CRITICAL CARE TIME Spent:   Minutes non procedure based      Comments   >50% of visit spent in counseling and coordination of care     ________________________________________________________________________  Irene Mandel MD     Procedures: see electronic medical records for all procedures/Xrays and details which were not copied into this note but were reviewed prior to creation of Plan. LABS:  I reviewed today's most current labs and imaging studies.   Pertinent labs include:  Recent Labs      10/06/17   0427  10/04/17   0153   WBC  10.7  13.5*   HGB  12.2  11.4*   HCT  36.7  33.6*   PLT  246  241     Recent Labs      10/06/17   0427  10/04/17   0153   NA  140  137   K  3.0*  3.0*   CL  100  102   CO2  30  27   GLU  213*  201*   BUN  34*  37*   CREA  0.98  1.00   CA  8.9  8.8       Signed: Irene Mandel MD

## 2017-10-10 LAB
BACTERIA SPEC CULT: NORMAL
SERVICE CMNT-IMP: NORMAL

## 2018-01-02 ENCOUNTER — HOSPITAL ENCOUNTER (INPATIENT)
Age: 83
LOS: 6 days | Discharge: HOME | DRG: 871 | End: 2018-01-08
Attending: EMERGENCY MEDICINE | Admitting: INTERNAL MEDICINE
Payer: MEDICARE

## 2018-01-02 ENCOUNTER — APPOINTMENT (OUTPATIENT)
Dept: GENERAL RADIOLOGY | Age: 83
DRG: 871 | End: 2018-01-02
Attending: EMERGENCY MEDICINE
Payer: MEDICARE

## 2018-01-02 DIAGNOSIS — J96.01 ACUTE RESPIRATORY FAILURE WITH HYPOXIA (HCC): Primary | ICD-10-CM

## 2018-01-02 DIAGNOSIS — J18.9 HCAP (HEALTHCARE-ASSOCIATED PNEUMONIA): ICD-10-CM

## 2018-01-02 DIAGNOSIS — A41.9 SEPSIS, DUE TO UNSPECIFIED ORGANISM: ICD-10-CM

## 2018-01-02 PROBLEM — J96.90 RESPIRATORY FAILURE (HCC): Status: ACTIVE | Noted: 2018-01-02

## 2018-01-02 LAB
ALBUMIN SERPL-MCNC: 3 G/DL (ref 3.5–5)
ALBUMIN/GLOB SERPL: 0.6 {RATIO} (ref 1.1–2.2)
ALP SERPL-CCNC: 73 U/L (ref 45–117)
ALT SERPL-CCNC: 19 U/L (ref 12–78)
ANION GAP SERPL CALC-SCNC: 8 MMOL/L (ref 5–15)
APPEARANCE UR: CLEAR
APTT PPP: 42.5 SEC (ref 22.1–32.5)
ARTERIAL PATENCY WRIST A: YES
AST SERPL-CCNC: 19 U/L (ref 15–37)
BACTERIA URNS QL MICRO: NEGATIVE /HPF
BASE EXCESS BLDA CALC-SCNC: 2.1 MMOL/L
BASOPHILS # BLD: 0 K/UL (ref 0–0.1)
BASOPHILS NFR BLD: 0 % (ref 0–1)
BDY SITE: ABNORMAL
BILIRUB SERPL-MCNC: 1.1 MG/DL (ref 0.2–1)
BILIRUB UR QL CFM: NEGATIVE
BNP SERPL-MCNC: 520 PG/ML (ref 0–450)
BUN SERPL-MCNC: 29 MG/DL (ref 6–20)
BUN/CREAT SERPL: 27 (ref 12–20)
CALCIUM SERPL-MCNC: 9.6 MG/DL (ref 8.5–10.1)
CHLORIDE SERPL-SCNC: 102 MMOL/L (ref 97–108)
CK SERPL-CCNC: 187 U/L (ref 26–192)
CO2 SERPL-SCNC: 27 MMOL/L (ref 21–32)
COLOR UR: ABNORMAL
CREAT SERPL-MCNC: 1.07 MG/DL (ref 0.55–1.02)
DIFFERENTIAL METHOD BLD: ABNORMAL
EOSINOPHIL # BLD: 0 K/UL (ref 0–0.4)
EOSINOPHIL NFR BLD: 0 % (ref 0–7)
EPAP/CPAP/PEEP, PAPEEP: 6
EPITH CASTS URNS QL MICRO: ABNORMAL /LPF
ERYTHROCYTE [DISTWIDTH] IN BLOOD BY AUTOMATED COUNT: 14.5 % (ref 11.5–14.5)
FIO2 ON VENT: 40 %
FLUAV AG NPH QL IA: NEGATIVE
FLUBV AG NOSE QL IA: NEGATIVE
GAS FLOW.O2 SETTING OXYMISER: 4 L/MIN
GLOBULIN SER CALC-MCNC: 4.7 G/DL (ref 2–4)
GLUCOSE SERPL-MCNC: 171 MG/DL (ref 65–100)
GLUCOSE UR STRIP.AUTO-MCNC: NEGATIVE MG/DL
HCO3 BLDA-SCNC: 25 MMOL/L (ref 22–26)
HCT VFR BLD AUTO: 36.8 % (ref 35–47)
HGB BLD-MCNC: 12.1 G/DL (ref 11.5–16)
HGB UR QL STRIP: NEGATIVE
INR PPP: 1.3 (ref 0.9–1.1)
IPAP/PIP, IPAPIP: 14
KETONES UR QL STRIP.AUTO: NEGATIVE MG/DL
LACTATE SERPL-SCNC: 1.7 MMOL/L (ref 0.4–2)
LEUKOCYTE ESTERASE UR QL STRIP.AUTO: ABNORMAL
LIPASE SERPL-CCNC: 63 U/L (ref 73–393)
LYMPHOCYTES # BLD: 0.5 K/UL (ref 0.8–3.5)
LYMPHOCYTES NFR BLD: 2 % (ref 12–49)
MAGNESIUM SERPL-MCNC: 1.7 MG/DL (ref 1.6–2.4)
MCH RBC QN AUTO: 28.3 PG (ref 26–34)
MCHC RBC AUTO-ENTMCNC: 32.9 G/DL (ref 30–36.5)
MCV RBC AUTO: 86.2 FL (ref 80–99)
MONOCYTES # BLD: 1.8 K/UL (ref 0–1)
MONOCYTES NFR BLD: 7 % (ref 5–13)
MUCOUS THREADS URNS QL MICRO: ABNORMAL /LPF
NEUTS SEG # BLD: 23.9 K/UL (ref 1.8–8)
NEUTS SEG NFR BLD: 91 % (ref 32–75)
NITRITE UR QL STRIP.AUTO: NEGATIVE
PCO2 BLDA: 35 MMHG (ref 35–45)
PH BLDA: 7.48 [PH] (ref 7.35–7.45)
PH UR STRIP: 5 [PH] (ref 5–8)
PLATELET # BLD AUTO: 299 K/UL (ref 150–400)
PO2 BLDA: 76 MMHG (ref 80–100)
POTASSIUM SERPL-SCNC: 3.8 MMOL/L (ref 3.5–5.1)
PROT SERPL-MCNC: 7.7 G/DL (ref 6.4–8.2)
PROT UR STRIP-MCNC: ABNORMAL MG/DL
PROTHROMBIN TIME: 13.1 SEC (ref 9–11.1)
RBC # BLD AUTO: 4.27 M/UL (ref 3.8–5.2)
RBC #/AREA URNS HPF: ABNORMAL /HPF (ref 0–5)
RBC MORPH BLD: ABNORMAL
SAO2 % BLD: 96 % (ref 92–97)
SAO2% DEVICE SAO2% SENSOR NAME: ABNORMAL
SODIUM SERPL-SCNC: 137 MMOL/L (ref 136–145)
SP GR UR REFRACTOMETRY: 1.02 (ref 1–1.03)
SPECIMEN SITE: ABNORMAL
THERAPEUTIC RANGE,PTTT: ABNORMAL SECS (ref 58–77)
TROPONIN I SERPL-MCNC: <0.04 NG/ML
UA: UC IF INDICATED,UAUC: ABNORMAL
UROBILINOGEN UR QL STRIP.AUTO: 1 EU/DL (ref 0.2–1)
VENTILATION MODE VENT: ABNORMAL
WBC # BLD AUTO: 26.2 K/UL (ref 3.6–11)
WBC URNS QL MICRO: ABNORMAL /HPF (ref 0–4)

## 2018-01-02 PROCEDURE — 71045 X-RAY EXAM CHEST 1 VIEW: CPT

## 2018-01-02 PROCEDURE — 74011000250 HC RX REV CODE- 250: Performed by: EMERGENCY MEDICINE

## 2018-01-02 PROCEDURE — 83735 ASSAY OF MAGNESIUM: CPT | Performed by: EMERGENCY MEDICINE

## 2018-01-02 PROCEDURE — 83690 ASSAY OF LIPASE: CPT | Performed by: EMERGENCY MEDICINE

## 2018-01-02 PROCEDURE — 96365 THER/PROPH/DIAG IV INF INIT: CPT

## 2018-01-02 PROCEDURE — 77030013033 HC MSK BPAP/CPAP MMKA -B

## 2018-01-02 PROCEDURE — 82550 ASSAY OF CK (CPK): CPT | Performed by: EMERGENCY MEDICINE

## 2018-01-02 PROCEDURE — 87040 BLOOD CULTURE FOR BACTERIA: CPT | Performed by: EMERGENCY MEDICINE

## 2018-01-02 PROCEDURE — 94640 AIRWAY INHALATION TREATMENT: CPT

## 2018-01-02 PROCEDURE — 77030029684 HC NEB SM VOL KT MONA -A

## 2018-01-02 PROCEDURE — 80053 COMPREHEN METABOLIC PANEL: CPT | Performed by: EMERGENCY MEDICINE

## 2018-01-02 PROCEDURE — 85025 COMPLETE CBC W/AUTO DIFF WBC: CPT | Performed by: EMERGENCY MEDICINE

## 2018-01-02 PROCEDURE — 93005 ELECTROCARDIOGRAM TRACING: CPT

## 2018-01-02 PROCEDURE — 83605 ASSAY OF LACTIC ACID: CPT | Performed by: EMERGENCY MEDICINE

## 2018-01-02 PROCEDURE — 94660 CPAP INITIATION&MGMT: CPT

## 2018-01-02 PROCEDURE — 96375 TX/PRO/DX INJ NEW DRUG ADDON: CPT

## 2018-01-02 PROCEDURE — 87804 INFLUENZA ASSAY W/OPTIC: CPT | Performed by: EMERGENCY MEDICINE

## 2018-01-02 PROCEDURE — 99285 EMERGENCY DEPT VISIT HI MDM: CPT

## 2018-01-02 PROCEDURE — 36415 COLL VENOUS BLD VENIPUNCTURE: CPT | Performed by: EMERGENCY MEDICINE

## 2018-01-02 PROCEDURE — 82803 BLOOD GASES ANY COMBINATION: CPT | Performed by: EMERGENCY MEDICINE

## 2018-01-02 PROCEDURE — 85730 THROMBOPLASTIN TIME PARTIAL: CPT | Performed by: EMERGENCY MEDICINE

## 2018-01-02 PROCEDURE — 36600 WITHDRAWAL OF ARTERIAL BLOOD: CPT | Performed by: EMERGENCY MEDICINE

## 2018-01-02 PROCEDURE — 74011000258 HC RX REV CODE- 258: Performed by: EMERGENCY MEDICINE

## 2018-01-02 PROCEDURE — 85610 PROTHROMBIN TIME: CPT | Performed by: EMERGENCY MEDICINE

## 2018-01-02 PROCEDURE — 74011000250 HC RX REV CODE- 250: Performed by: INTERNAL MEDICINE

## 2018-01-02 PROCEDURE — 83880 ASSAY OF NATRIURETIC PEPTIDE: CPT | Performed by: EMERGENCY MEDICINE

## 2018-01-02 PROCEDURE — 65660000000 HC RM CCU STEPDOWN

## 2018-01-02 PROCEDURE — 96361 HYDRATE IV INFUSION ADD-ON: CPT

## 2018-01-02 PROCEDURE — 84484 ASSAY OF TROPONIN QUANT: CPT | Performed by: EMERGENCY MEDICINE

## 2018-01-02 PROCEDURE — 81001 URINALYSIS AUTO W/SCOPE: CPT | Performed by: EMERGENCY MEDICINE

## 2018-01-02 PROCEDURE — 74011250636 HC RX REV CODE- 250/636: Performed by: EMERGENCY MEDICINE

## 2018-01-02 RX ORDER — AZTREONAM 1 G/1
1 INJECTION, POWDER, LYOPHILIZED, FOR SOLUTION INTRAMUSCULAR; INTRAVENOUS EVERY 8 HOURS
Status: DISCONTINUED | OUTPATIENT
Start: 2018-01-02 | End: 2018-01-02

## 2018-01-02 RX ORDER — LEVOFLOXACIN 5 MG/ML
750 INJECTION, SOLUTION INTRAVENOUS EVERY 24 HOURS
Status: DISCONTINUED | OUTPATIENT
Start: 2018-01-04 | End: 2018-01-03

## 2018-01-02 RX ORDER — HYDROCHLOROTHIAZIDE 25 MG/1
25 TABLET ORAL DAILY
Status: DISCONTINUED | OUTPATIENT
Start: 2018-01-03 | End: 2018-01-08 | Stop reason: HOSPADM

## 2018-01-02 RX ORDER — LEVOFLOXACIN 5 MG/ML
750 INJECTION, SOLUTION INTRAVENOUS
Status: COMPLETED | OUTPATIENT
Start: 2018-01-02 | End: 2018-01-02

## 2018-01-02 RX ORDER — FLUTICASONE FUROATE AND VILANTEROL 100; 25 UG/1; UG/1
1 POWDER RESPIRATORY (INHALATION) DAILY
Status: DISCONTINUED | OUTPATIENT
Start: 2018-01-03 | End: 2018-01-08 | Stop reason: HOSPADM

## 2018-01-02 RX ORDER — ACETAMINOPHEN 325 MG/1
650 TABLET ORAL
Status: DISCONTINUED | OUTPATIENT
Start: 2018-01-02 | End: 2018-01-08 | Stop reason: HOSPADM

## 2018-01-02 RX ORDER — ONDANSETRON 2 MG/ML
4 INJECTION INTRAMUSCULAR; INTRAVENOUS
Status: DISCONTINUED | OUTPATIENT
Start: 2018-01-02 | End: 2018-01-05

## 2018-01-02 RX ORDER — HYDRALAZINE HYDROCHLORIDE 20 MG/ML
10 INJECTION INTRAMUSCULAR; INTRAVENOUS
Status: DISCONTINUED | OUTPATIENT
Start: 2018-01-02 | End: 2018-01-08 | Stop reason: HOSPADM

## 2018-01-02 RX ORDER — SODIUM CHLORIDE 0.9 % (FLUSH) 0.9 %
5-10 SYRINGE (ML) INJECTION AS NEEDED
Status: DISCONTINUED | OUTPATIENT
Start: 2018-01-02 | End: 2018-01-08 | Stop reason: HOSPADM

## 2018-01-02 RX ORDER — ASPIRIN 325 MG
325 TABLET ORAL DAILY
Status: DISCONTINUED | OUTPATIENT
Start: 2018-01-03 | End: 2018-01-08 | Stop reason: HOSPADM

## 2018-01-02 RX ORDER — SODIUM CHLORIDE 0.9 % (FLUSH) 0.9 %
5-10 SYRINGE (ML) INJECTION EVERY 8 HOURS
Status: DISCONTINUED | OUTPATIENT
Start: 2018-01-02 | End: 2018-01-08 | Stop reason: HOSPADM

## 2018-01-02 RX ORDER — ENOXAPARIN SODIUM 100 MG/ML
40 INJECTION SUBCUTANEOUS EVERY 24 HOURS
Status: DISCONTINUED | OUTPATIENT
Start: 2018-01-03 | End: 2018-01-08 | Stop reason: HOSPADM

## 2018-01-02 RX ORDER — IPRATROPIUM BROMIDE AND ALBUTEROL SULFATE 2.5; .5 MG/3ML; MG/3ML
3 SOLUTION RESPIRATORY (INHALATION) ONCE
Status: COMPLETED | OUTPATIENT
Start: 2018-01-02 | End: 2018-01-02

## 2018-01-02 RX ORDER — IPRATROPIUM BROMIDE AND ALBUTEROL SULFATE 2.5; .5 MG/3ML; MG/3ML
3 SOLUTION RESPIRATORY (INHALATION)
Status: DISCONTINUED | OUTPATIENT
Start: 2018-01-02 | End: 2018-01-08 | Stop reason: HOSPADM

## 2018-01-02 RX ORDER — DILTIAZEM HYDROCHLORIDE 180 MG/1
180 CAPSULE, COATED, EXTENDED RELEASE ORAL DAILY
Status: DISCONTINUED | OUTPATIENT
Start: 2018-01-03 | End: 2018-01-05

## 2018-01-02 RX ORDER — FACIAL-BODY WIPES
10 EACH TOPICAL DAILY PRN
Status: DISCONTINUED | OUTPATIENT
Start: 2018-01-02 | End: 2018-01-08 | Stop reason: HOSPADM

## 2018-01-02 RX ORDER — IPRATROPIUM BROMIDE AND ALBUTEROL SULFATE 2.5; .5 MG/3ML; MG/3ML
3 SOLUTION RESPIRATORY (INHALATION)
Status: DISCONTINUED | OUTPATIENT
Start: 2018-01-03 | End: 2018-01-04

## 2018-01-02 RX ADMIN — IPRATROPIUM BROMIDE AND ALBUTEROL SULFATE 3 ML: .5; 3 SOLUTION RESPIRATORY (INHALATION) at 20:06

## 2018-01-02 RX ADMIN — SODIUM CHLORIDE 500 ML: 900 INJECTION, SOLUTION INTRAVENOUS at 21:00

## 2018-01-02 RX ADMIN — METHYLPREDNISOLONE SODIUM SUCCINATE 125 MG: 125 INJECTION, POWDER, FOR SOLUTION INTRAMUSCULAR; INTRAVENOUS at 20:06

## 2018-01-02 RX ADMIN — LEVOFLOXACIN 750 MG: 5 INJECTION, SOLUTION INTRAVENOUS at 20:45

## 2018-01-02 RX ADMIN — AZTREONAM: 1 INJECTION, POWDER, LYOPHILIZED, FOR SOLUTION INTRAMUSCULAR; INTRAVENOUS at 23:10

## 2018-01-02 RX ADMIN — IPRATROPIUM BROMIDE AND ALBUTEROL SULFATE 3 ML: .5; 3 SOLUTION RESPIRATORY (INHALATION) at 23:17

## 2018-01-02 NOTE — IP AVS SNAPSHOT
[address] [facility] 
[phone] Patient: [place] Tristan MRN: [id number] :1935 About your hospitalization You were admitted on:  2018 You last received care in the:  hospitals 2 PROGRESSIVE CARE You were discharged on:  2018 Why you were hospitalized Your primary diagnosis was:  Not on File Your diagnoses also included:  Respiratory Failure (Hcc) Follow-up Information Follow up With Details Comments Contact Info Adrienne Bullard MD On 2018 2;40pm  hospital follow-up   Please note that this appointment is 43 [facility] 102 One [facility] 
[phone] Gabby Dopp On 2018 10;15am  Pulmonary follow-up 1808 [facility] [address] [place] Maren Young MD  Cardiology - follow up this week   The office will call the patient with a hospital follow-up appointment Sue SCHWARTZ [address] [id number] [phone] Discharge Orders None A check fede indicates which time of day the medication should be taken. My Medications START taking these medications Instructions Each Dose to Equal  
 Morning Noon Evening Bedtime  
 amLODIPine 5 mg tablet Commonly known as:  Alma Antonio Start taking on:  2018 Your last dose was: Your next dose is: Take 1 Tab by mouth daily. 5 mg  
    
   
   
   
  
 dronedarone Tab tablet Commonly known as:  Ree Yu Your last dose was: Your next dose is: Take 1 Tab by mouth two (2) times daily (with meals). 400 mg  
    
   
   
   
  
 metoprolol succinate 50 mg XL tablet Commonly known as:  TOPROL-XL Start taking on:  2018 Your last dose was: Your next dose is: Take 1 Tab by mouth daily. 50 mg CHANGE how you take these medications Instructions Each Dose to Equal  
 Morning Noon Evening Bedtime  
 predniSONE 20 mg tablet Commonly known as:  Laura Sr What changed:   
- medication strength 
- how much to take 
- how to take this - when to take this 
- additional instructions Your last dose was: Your next dose is: Take 1 Tab by mouth two (2) times daily (with meals) for 5 days. 20 mg CONTINUE taking these medications Instructions Each Dose to Equal  
 Morning Noon Evening Bedtime  
 acetaminophen 325 mg tablet Commonly known as:  TYLENOL Your last dose was: Your next dose is: Take 2 Tabs by mouth every four (4) hours as needed. 650 mg  
    
   
   
   
  
 albuterol 2.5 mg /3 mL (0.083 %) nebulizer solution Commonly known as:  PROVENTIL VENTOLIN Your last dose was: Your next dose is:    
   
   
 by Nebulization route every six (6) hours as needed. albuterol 90 mcg/actuation inhaler Commonly known as:  Juárez Lard Your last dose was: Your next dose is: Take 2 Puffs by inhalation every six (6) hours as needed. 2 Puff  
    
   
   
   
  
 aspirin 325 mg tablet Commonly known as:  ASPIRIN Your last dose was: Your next dose is: Take 325 mg by mouth daily. 325 mg  
    
   
   
   
  
 calcium carbonate 200 mg calcium (500 mg) Chew Commonly known as:  TUMS Your last dose was: Your next dose is: Take 1 Tab by mouth daily. 200 mg  
    
   
   
   
  
 cholecalciferol 1,000 unit tablet Commonly known as:  VITAMIN D3 Your last dose was: Your next dose is: Take 1 Tab by mouth daily. 1000 Units  
    
   
   
   
  
 fluticasone 50 mcg/actuation nasal spray Commonly known as:  Nancy Nichols Your last dose was: Your next dose is: 2 Sprays by Both Nostrils route. Use daily 2 Spray  
    
   
   
   
  
 hydroCHLOROthiazide 25 mg tablet Commonly known as:  HYDRODIURIL Your last dose was: Your next dose is: Take 25 mg by mouth daily. 25 mg INCRUSE ELLIPTA 62.5 mcg/actuation inhaler Generic drug:  umeclidinium Your last dose was: Your next dose is: Take 1 Puff by inhalation daily. 1 Puff MUCINEX -30 mg per tablet Generic drug:  guaiFENesin-dextromethorphan SR Your last dose was: Your next dose is: Take 1 Tab by mouth two (2) times a day. 1 Tab Oxygen Your last dose was: Your next dose is:    
   
   
 3 Each continuous. 3 Each SPIRIVA WITH HANDIHALER 18 mcg inhalation capsule Generic drug:  tiotropium Your last dose was: Your next dose is: Take 1 Cap by inhalation daily. 1 Cap SYMBICORT 160-4.5 mcg/actuation Hfaa Generic drug:  budesonide-formoterol Your last dose was: Your next dose is: Take 2 Puffs by inhalation two (2) times a day. 2 Puff STOP taking these medications   
 dilTIAZem  mg ER capsule Commonly known as:  CARDIZEM CD Where to Get Your Medications Information on where to get these meds will be given to you by the nurse or doctor. ! Ask your nurse or doctor about these medications  
  amLODIPine 5 mg tablet  
 dronedarone Tab tablet  
 metoprolol succinate 50 mg XL tablet  
 predniSONE 20 mg tablet Discharge Instructions None [facility] Announcement We are excited to announce that we are making your provider's discharge notes available to you in [facility].   You will see these notes when they are completed and signed by the physician that discharged you from your recent hospital stay. If you have any questions or concerns about any information you see in "[employer] please call the Health Information Department where you were seen or reach out to your Primary Care Provider for more information about your plan of care. Introducing [facility]! [facility] introduces "[employer] patient portal. Now you can access parts of your medical record, email your doctor's office, and request medication refills online. 1. In your internet browser, go to [email or web address] 2. Click on the First Time User? Click Here link in the Sign In box. You will see the New Member Sign Up page. 3. Enter your "[employer] Access Code exactly as it appears below. You will not need to use this code after youve completed the sign-up process. If you do not sign up before the expiration date, you must request a new code. · "[employer] Access Code: [email or web address] Expires: 4/8/2018  9:59 AM 
 
4. Enter the last four digits of your Social Security Number (xxxx) and Date of Birth (mm/dd/yyyy) as indicated and click Submit. You will be taken to the next sign-up page. 5. Create a "[employer] ID. This will be your "[employer] login ID and cannot be changed, so think of one that is secure and easy to remember. 6. Create a "[employer] password. You can change your password at any time. 7. Enter your Password Reset Question and Answer. This can be used at a later time if you forget your password. 8. Enter your e-mail address. You will receive e-mail notification when new information is available in [address] 9. Click Sign Up. You can now view and download portions of your medical record. 10. Click the Download Summary menu link to download a portable copy of your medical information.  
 
If you have questions, please visit the Frequently Asked Questions section of the [employer]. Remember, MyChart is NOT to be used for urgent needs. For medical emergencies, dial 911. Now available from your iPhone and Android! Providers Seen During Your Hospitalization Provider Specialty Primary office phone Choco Chen MD Emergency Medicine [phone] Paco Blanco MD Hospitalist [phone] Tk Shah MD Internal Medicine [phone] Your Primary Care Physician (PCP) Primary Care Physician Office Phone Office Fax Garcia Strickland [phone] You are allergic to the following Allergen Reactions Keflex (Cephalexin) Itching Recent Documentation Height Weight BMI OB Status Smoking Status 1.6 m 83.5 kg 32.59 kg/m2 Postmenopausal Current Every Day Smoker Emergency Contacts Name Discharge Info Relation Home Work Mobile Tato Hassan DISCHARGE CAREGIVER [3] Spouse [3] [phone] Patient Belongings The following personal items are in your possession at time of discharge: 
  Dental Appliances: None  Visual Aid: Glasses, With patient      Home Medications: Kept at bedside   Jewelry: None  Clothing: None    Other Valuables: None Please provide this summary of care documentation to your next provider. Signatures-by signing, you are acknowledging that this After Visit Summary has been reviewed with you and you have received a copy. Patient Signature:  ____________________________________________________________ Date:  ____________________________________________________________  
  
[place] Provider Signature:  ____________________________________________________________ Date:  ____________________________________________________________

## 2018-01-02 NOTE — IP AVS SNAPSHOT
Summary of Care Report The Summary of Care report has been created to help improve care coordination. Users with access to [employer] or [employer] (Web-based application) may access additional patient information including the Discharge Summary. If you are not currently a [facility] user and need more information, please call the number listed below in the [address] section and ask to be connected with Medical Records. Facility Information Name Address Phone [address] [address] [phone] Patient Information Patient Name Sex  Pricilla Nichols ([id number]) Female 1935 Discharge Information Admitting Provider Service Area Unit Fortino Reese MD / [facility] / [phone] Discharge Provider Discharge Date/Time Discharge Disposition Destination (none) 2018 (Pending) AHR (none) Patient Language Language ENGLISH [13] Hospital Problems as of 2018  Reviewed: 2013  3:58 PM by Karri Gunter Noted - Resolved Last Modified POA Active Problems Respiratory failure ([address]  2018 - Present 2018 by Fortino Reese MD Unknown Entered by Fortino Reese MD  
  
Non-Hospital Problems as of 2018  Reviewed: 2013  3:58 PM by Karri Gunter Noted - Resolved Last Modified Active Problems Acute on chronic respiratory failure ([address]  2011 - Present 10/2/2017 by Aisha Guardado MD  
  Entered by Maxine Choi MD  
  COPD (chronic obstructive pulmonary disease) with acute bronchitis ([address]  2011 - Present 2013 Entered by Maxine Choi MD  
  Lung nodule  2011 - Present 2013 Entered by Maxine Choi MD  
  Abnormal ECG  2011 - Present 2011 Entered by Fidel De La Torre MD  
  Hyperglycemia  11/19/2011 - Present 11/21/2011 Entered by Saul Bello DO Tobacco abuse (Chronic)  11/19/2011 - Present 4/9/2013 Entered by Teresa Lr III, DO  
  Pulmonary hypertension, moderate to severe (Chronic)  11/19/2011 - Present 10/2/2017 by Lucy Gates MD  
  Entered by Saul Bello DO Sepsis ([facility]  10/2/2017 - Present 10/2/2017 by Lucy Gates MD  
  Entered by Lucy Gates MD  
  COPD exacerbation ([facility]  10/2/2017 - Present 10/2/2017 by Lucy Gates MD  
  Entered by Lucy Gates MD  
  
You are allergic to the following Allergen Reactions Keflex (Cephalexin) Itching Current Discharge Medication List  
  
START taking these medications Dose & Instructions Dispensing Information Comments  
 amLODIPine 5 mg tablet Commonly known as:  Senora Pan Start taking on:  1/9/2018 Dose:  5 mg Take 1 Tab by mouth daily. Quantity:  10 Tab Refills:  0  
   
 dronedarone Tab tablet Commonly known as:  Danyell Kelp Dose:  400 mg Take 1 Tab by mouth two (2) times daily (with meals). Quantity:  30 Tab Refills:  0  
   
 metoprolol succinate 50 mg XL tablet Commonly known as:  TOPROL-XL Start taking on:  1/9/2018 Dose:  50 mg Take 1 Tab by mouth daily. Quantity:  14 Tab Refills:  0 CONTINUE these medications which have CHANGED Dose & Instructions Dispensing Information Comments  
 predniSONE 20 mg tablet Commonly known as:  Ranjit Darling What changed:   
- medication strength 
- how much to take 
- how to take this - when to take this 
- additional instructions Dose:  20 mg Take 1 Tab by mouth two (2) times daily (with meals) for 5 days. Quantity:  5 Tab Refills:  0 CONTINUE these medications which have NOT CHANGED Dose & Instructions Dispensing Information Comments  
 acetaminophen 325 mg tablet Commonly known as:  TYLENOL  
 Dose:  650 mg Take 2 Tabs by mouth every four (4) hours as needed. Quantity:  30 Tab Refills:  0  
   
 albuterol 2.5 mg /3 mL (0.083 %) nebulizer solution Commonly known as:  PROVENTIL VENTOLIN  
 by Nebulization route every six (6) hours as needed. Refills:  0  
   
 albuterol 90 mcg/actuation inhaler Commonly known as:  Daphne Dial Dose:  2 Puff Take 2 Puffs by inhalation every six (6) hours as needed. Refills:  0  
   
 aspirin 325 mg tablet Commonly known as:  ASPIRIN Dose:  325 mg Take 325 mg by mouth daily. Refills:  0  
   
 calcium carbonate 200 mg calcium (500 mg) Chew Commonly known as:  TUMS Dose:  200 mg Take 1 Tab by mouth daily. Quantity:  30 Tab Refills:  0  
   
 cholecalciferol 1,000 unit tablet Commonly known as:  VITAMIN D3 Dose:  1000 Units Take 1 Tab by mouth daily. Quantity:  30 Tab Refills:  1  
   
 fluticasone 50 mcg/actuation nasal spray Commonly known as:  Chelsey Medin Dose:  2 Spray 2 Sprays by Both Nostrils route. Use daily Quantity:  1 Bottle Refills:  3  
   
 hydroCHLOROthiazide 25 mg tablet Commonly known as:  HYDRODIURIL Dose:  25 mg Take 25 mg by mouth daily. Refills:  0 INCRUSE ELLIPTA 62.5 mcg/actuation inhaler Generic drug:  umeclidinium Dose:  1 Puff Take 1 Puff by inhalation daily. Refills:  0 MUCINEX -30 mg per tablet Generic drug:  guaiFENesin-dextromethorphan SR Dose:  1 Tab Take 1 Tab by mouth two (2) times a day. Refills:  0 Oxygen Dose:  3 Each  
3 Each continuous. Refills:  0 SPIRIVA WITH HANDIHALER 18 mcg inhalation capsule Generic drug:  tiotropium Dose:  1 Cap Take 1 Cap by inhalation daily. Refills:  0  
   
 SYMBICORT 160-4.5 mcg/actuation Hfaa Generic drug:  budesonide-formoterol Dose:  2 Puff Take 2 Puffs by inhalation two (2) times a day. Refills:  0 STOP taking these medications Comments  
 dilTIAZem  mg ER capsule Commonly known as:  CARDIZEM CD Current Immunizations Name Date Influenza Vaccine Split 10/4/2011 ZZZ-RETIRED (DO NOT USE) Pneumococcal Vaccine (Unspecified Type) 1/1/2005 Follow-up Information Follow up With Details Comments Contact Info Jyoti Mcwilliams MD On 1/9/2018 2;40pm  hospital follow-up   Please note that this appointment is 43 [facility] [address] 
[phone] Larry Santana On 1/17/2018 10;15am  Pulmonary follow-up 1808 [facility] [address] [facility], [address] [facility] Dion Stock MD  Cardiology - follow up this week   The office will call the patient with a hospital follow-up appointment Bernardino SCHWARTZ [facility] [address] [phone] Discharge Instructions None Chart Review Routing History Recipient Method Report Sent By Devora Mcwilliams MD [[id number]] 2/1/2012  7:22 PM 02/01/2012 Jyoti Mcwilliams MD  
Fax: [phone] Phone: [phone] Fax Jair Houston MD NOTES AUTO ROUTING REPORT Kunal Caban MD [[id number]] 10/2/2017  2:34 AM 10/02/2017 Jyoti Mcwilliams MD  
Fax: [phone] Phone: [phone] Fax Jair Houston MD NOTES AUTO ROUTING REPORT Kunal Caban MD [[id number]] 10/2/2017  5:29 AM 10/02/2017 Jyoti Mcwilliams MD  
Fax: [phone] Phone: [phone] Fax [address] [place] [address] 11 IP MD NOTES AUTO ROUTING REPORT Mihir Deshpande MD [[id number]] 10/6/2017  1:56 PM 10/06/2017 Jyoti Mcwilliams MD  
Fax: [phone] Phone: [phone] Fax Jair Houston MD NOTES AUTO ROUTING REPORT Christopher Sandy MD [[id number]] 1/2/2018 10:48 PM 01/02/2018 Jyoti Mcwilliams MD  
Fax: [phone] Phone: [phone] Fax [facility] IP MD NOTES AUTO ROUTING REPORT Christopher Sandy MD [[id number]] 1/2/2018 11:09 PM 01/02/2018 Jyoti Mcwilliams MD  
Fax: [phone] Phone: [phone] Fax Bautista Winslow MD NOTES AUTO ROUTING REPORT Robb Burton MD [[id number]] 1/8/2018  1:12 PM 01/08/2018 Phyllis Aragon MD  
Fax: [phone] Phone: [phone] Fax [facility] MD NOTES AUTO ROUTING REPORT Robb Burton MD [[id number]] 1/8/2018  1:15 PM 01/08/2018

## 2018-01-02 NOTE — IP AVS SNAPSHOT
[address] [facility] 
[phone] Patient: Jacob Cox MRN: [id number] :1935 About your hospitalization You were admitted on:  2018 You last received care in the:  [facility] 2 PROGRESSIVE CARE You were discharged on:  2018 Why you were hospitalized Your primary diagnosis was:  Not on File Your diagnoses also included:  Respiratory Failure (Hcc) Follow-up Information Follow up With Details Comments Contact Info Ilsa Arias MD On 2018 2;40pm  hospital follow-up   Please note that this appointment is [address] [facility] [address] 
[phone] Neptali Sanchezas On 2018 10;15am  Pulmonary follow-up 1808 [facility] [address] [place], [place] Mita Carballo MD  Cardiology - follow up this week   The office will call the patient with a hospital follow-up appointment Royer SCHWARTZ [place] [address] [phone] Discharge Orders None A check fede indicates which time of day the medication should be taken. My Medications START taking these medications Instructions Each Dose to Equal  
 Morning Noon Evening Bedtime  
 amLODIPine 5 mg tablet Commonly known as:  Alexis Chilel Start taking on:  2018 Your last dose was: Your next dose is: Take 1 Tab by mouth daily. 5 mg  
    
   
   
   
  
 dronedarone Tab tablet Commonly known as:  Reford [place] Your last dose was: Your next dose is: Take 1 Tab by mouth two (2) times daily (with meals). 400 mg  
    
   
   
   
  
 metoprolol succinate 50 mg XL tablet Commonly known as:  TOPROL-XL Start taking on:  2018 Your last dose was: Your next dose is: Take 1 Tab by mouth daily. 50 mg CHANGE how you take these medications Instructions Each Dose to Equal  
 Morning Noon Evening Bedtime  
 predniSONE 20 mg tablet Commonly known as:  [place] What changed:   
- medication strength 
- how much to take 
- how to take this - when to take this 
- additional instructions Your last dose was: Your next dose is: Take 1 Tab by mouth two (2) times daily (with meals) for 5 days. 20 mg CONTINUE taking these medications Instructions Each Dose to Equal  
 Morning Noon Evening Bedtime  
 acetaminophen 325 mg tablet Commonly known as:  TYLENOL Your last dose was: Your next dose is: Take 2 Tabs by mouth every four (4) hours as needed. 650 mg  
    
   
   
   
  
 albuterol 2.5 mg /3 mL (0.083 %) nebulizer solution Commonly known as:  PROVENTIL VENTOLIN Your last dose was: Your next dose is:    
   
   
 by Nebulization route every six (6) hours as needed. albuterol 90 mcg/actuation inhaler Commonly known as:  Jeniffer [place] Your last dose was: Your next dose is: Take 2 Puffs by inhalation every six (6) hours as needed. 2 Puff  
    
   
   
   
  
 aspirin 325 mg tablet Commonly known as:  ASPIRIN Your last dose was: Your next dose is: Take 325 mg by mouth daily. 325 mg  
    
   
   
   
  
 calcium carbonate 200 mg calcium (500 mg) Chew Commonly known as:  TUMS Your last dose was: Your next dose is: Take 1 Tab by mouth daily. 200 mg  
    
   
   
   
  
 cholecalciferol 1,000 unit tablet Commonly known as:  VITAMIN D3 Your last dose was: Your next dose is: Take 1 Tab by mouth daily. 1000 Units  
    
   
   
   
  
 fluticasone 50 mcg/actuation nasal spray Commonly known as:  Quique Moralez Your last dose was: Your next dose is: 2 Sprays by Both Nostrils route. Use daily 2 Spray  
    
   
   
   
  
 hydroCHLOROthiazide 25 mg tablet Commonly known as:  HYDRODIURIL Your last dose was: Your next dose is: Take 25 mg by mouth daily. 25 mg INCRUSE ELLIPTA 62.5 mcg/actuation inhaler Generic drug:  umeclidinium Your last dose was: Your next dose is: Take 1 Puff by inhalation daily. 1 Puff MUCINEX -30 mg per tablet Generic drug:  guaiFENesin-dextromethorphan SR Your last dose was: Your next dose is: Take 1 Tab by mouth two (2) times a day. 1 Tab Oxygen Your last dose was: Your next dose is:    
   
   
 3 Each continuous. 3 Each SPIRIVA WITH HANDIHALER 18 mcg inhalation capsule Generic drug:  tiotropium Your last dose was: Your next dose is: Take 1 Cap by inhalation daily. 1 Cap SYMBICORT 160-4.5 mcg/actuation Hfaa Generic drug:  budesonide-formoterol Your last dose was: Your next dose is: Take 2 Puffs by inhalation two (2) times a day. 2 Puff STOP taking these medications   
 dilTIAZem  mg ER capsule Commonly known as:  CARDIZEM CD Where to Get Your Medications Information on where to get these meds will be given to you by the nurse or doctor. ! Ask your nurse or doctor about these medications  
  amLODIPine 5 mg tablet  
 dronedarone Tab tablet  
 metoprolol succinate 50 mg XL tablet  
 predniSONE 20 mg tablet Discharge Instructions None [facility] Announcement We are excited to announce that we are making your provider's discharge notes available to you in [facility].   You will see these notes when they are completed and signed by the physician that discharged you from your recent hospital stay. If you have any questions or concerns about any information you see in [facility], please call the Health Information Department where you were seen or reach out to your Primary Care Provider for more information about your plan of care. Introducing [facility]! Nathalia Rodriguez introduces [facility] patient portal. Now you can access parts of your medical record, email your doctor's office, and request medication refills online. 1. In your internet browser, go to [email or web address] 2. Click on the First Time User? Click Here link in the Sign In box. You will see the New Member Sign Up page. 3. Enter your [facility] Access Code exactly as it appears below. You will not need to use this code after youve completed the sign-up process. If you do not sign up before the expiration date, you must request a new code. · [facility] Access Code: [email or web address] Expires: 4/8/2018  9:59 AM 
 
4. Enter the last four digits of your Social Security Number (xxxx) and Date of Birth (mm/dd/yyyy) as indicated and click Submit. You will be taken to the next sign-up page. 5. Create a [facility] ID. This will be your [facility] login ID and cannot be changed, so think of one that is secure and easy to remember. 6. Create a [employer] password. You can change your password at any time. 7. Enter your Password Reset Question and Answer. This can be used at a later time if you forget your password. 8. Enter your e-mail address. You will receive e-mail notification when new information is available in [address] 9. Click Sign Up. You can now view and download portions of your medical record. 10. Click the Download Summary menu link to download a portable copy of your medical information.  
 
If you have questions, please visit the Frequently Asked Questions section of the [employer]. Remember, MyChart is NOT to be used for urgent needs. For medical emergencies, dial 911. Now available from your iPhone and Android! Providers Seen During Your Hospitalization Provider Specialty Primary office phone Shannan Aceves MD Emergency Medicine [phone] Kee Meng MD Hospitalist [phone] Ish Solorio MD Internal Medicine [phone] Your Primary Care Physician (PCP) Primary Care Physician Office Phone Office Fax Garcia Palacios [phone] You are allergic to the following Allergen Reactions Keflex (Cephalexin) Itching Recent Documentation Height Weight BMI OB Status Smoking Status 1.6 m 83.5 kg 32.59 kg/m2 Postmenopausal Current Every Day Smoker Emergency Contacts Name Discharge Info Relation Home Work Mobile Tato Hassan DISCHARGE CAREGIVER [3] Spouse [3] [phone] Patient Belongings The following personal items are in your possession at time of discharge: 
  Dental Appliances: None  Visual Aid: Glasses, With patient      Home Medications: Kept at bedside   Jewelry: None  Clothing: None    Other Valuables: None Please provide this summary of care documentation to your next provider. Signatures-by signing, you are acknowledging that this After Visit Summary has been reviewed with you and you have received a copy. Patient Signature:  ____________________________________________________________ Date:  ____________________________________________________________  
  
Zeus Alfonso Provider Signature:  ____________________________________________________________ Date:  ____________________________________________________________

## 2018-01-02 NOTE — ED PROVIDER NOTES
EMERGENCY DEPARTMENT HISTORY AND PHYSICAL EXAM      Date: 1/2/2018  Patient Name: Lee Alcantar    History of Presenting Illness     Chief Complaint   Patient presents with    Shortness of Breath     Progressively more respiratory distress and increased confusionover the last few days. History Provided By: Patient and EMS    HPI: Lee Alcantar, [de-identified] y.o. female with PMHx significant for COPD on home oxygen, HTN, presents via EMS to the ED with cc of a few days of intermittent dry cough and increasing confusion, rated moderate in severity. Patient reports little relief with increase in her home oxygen requirement. She also c/o increased fatigue and is febrile to 101.7 deg F. Pt denies any GI sx or CP. PCP: Wild Banks MD    There are no other complaints, changes, or physical findings at this time.     Current Facility-Administered Medications   Medication Dose Route Frequency Provider Last Rate Last Dose    sodium chloride (NS) flush 5-10 mL  5-10 mL IntraVENous PRN Darron Ivan MD        aztreonam 1 g in 0.9% sodium chloride 100 mL IVPB   IntraVENous Gus Falcon MD        [START ON 1/3/2018] levoFLOXacin (LEVAQUIN) 750 mg in D5W IVPB  750 mg IntraVENous Q24H Sergei Sandy MD        [START ON 1/3/2018] albuterol-ipratropium (DUO-NEB) 2.5 MG-0.5 MG/3 ML  3 mL Nebulization Q4H RT Sergei Sandy MD        albuterol-ipratropium (DUO-NEB) 2.5 MG-0.5 MG/3 ML  3 mL Nebulization Q4H PRN Sergei Sandy MD        methylPREDNISolone (PF) (SOLU-MEDROL) injection 40 mg  40 mg IntraVENous Q12H Sergei Sandy MD        sodium chloride (NS) flush 5-10 mL  5-10 mL IntraVENous Q8H Sergei Sandy MD        sodium chloride (NS) flush 5-10 mL  5-10 mL IntraVENous PRN Sergei Sandy MD        acetaminophen (TYLENOL) tablet 650 mg  650 mg Oral Q4H PRN Sergei Sandy MD        [facility] 4 mg  4 mg IntraVENous Q4H PRN Sergei Sandy MD        bisacodyl (DULCOLAX) suppository 10 mg  10 mg Rectal DAILY PRN Jaime Flowers MD        enoxaparin (LOVENOX) injection 40 mg  40 mg SubCUTAneous Q24H Jaime Flowers MD        [START ON 1/3/2018] aspirin (ASPIRIN) tablet 325 mg  325 mg Oral DAILY Jaime Flowers MD        [START ON 1/3/2018] fluticasone-vilanterol (BREO ELLIPTA) 100mcg-25mcg/puff  1 Puff Inhalation DAILY Jaime Flowers MD        [START ON 1/3/2018] dilTIAZem CD (CARDIZEM CD) capsule 180 mg  180 mg Oral DAILY Jaime Flowers MD        [START ON 1/3/2018] hydroCHLOROthiazide (HYDRODIURIL) tablet 25 mg  25 mg Oral DAILY Jaime Flowers MD        hydrALAZINE (APRESOLINE) 20 mg/mL injection 10 mg  10 mg IntraVENous Q6H PRN Jaime Flowers MD         Current Outpatient Prescriptions   Medication Sig Dispense Refill    dilTIAZem CD (CARDIZEM CD) 180 mg ER capsule Take 1 Cap by mouth daily. 30 Cap 0    predniSONE (DELTASONE) 10 mg tablet Take 2 Tab daily x 3 days then 1 Tab daily x 3 days 9 Tab 0    budesonide-formoterol (SYMBICORT) 160-4.5 mcg/actuation HFAA Take 2 Puffs by inhalation two (2) times a day.  hydroCHLOROthiazide (HYDRODIURIL) 25 mg tablet Take 25 mg by mouth daily.  umeclidinium (INCRUSE ELLIPTA) 62.5 mcg/actuation inhaler Take 1 Puff by inhalation daily.  Oxygen 3 Each continuous.  calcium carbonate (TUMS) 200 mg calcium (500 mg) Chew Take 1 Tab by mouth daily. 30 Tab 0    cholecalciferol (VITAMIN D3) 1,000 unit tablet Take 1 Tab by mouth daily. 30 Tab 1    acetaminophen (TYLENOL) 325 mg tablet Take 2 Tabs by mouth every four (4) hours as needed. 30 Tab 0    fluticasone (FLONASE) 50 mcg/Actuation nasal spray 2 Sprays by Both Nostrils route. Use daily 1 Bottle 3    tiotropium (SPIRIVA WITH HANDIHALER) 18 mcg inhalation capsule Take 1 Cap by inhalation daily.  albuterol (PROVENTIL, VENTOLIN) 90 mcg/Actuation inhaler Take 2 Puffs by inhalation every six (6) hours as needed.       albuterol (PROVENTIL VENTOLIN) 2.5 mg /3 mL (0.083 %) nebulizer solution by Nebulization route every six (6) hours as needed.  dextromethorphan-guaiFENesin (MUCINEX DM)  mg per tablet Take 1 Tab by mouth two (2) times a day.  aspirin (ASPIRIN) 325 mg tablet Take 325 mg by mouth daily. Past History     Past Medical History:  Past Medical History:   Diagnosis Date    COPD     Hypertension        Past Surgical History:  Past Surgical History:   Procedure Laterality Date    HX GYN      overy removed/ 2 c-sections       Family History:  Family History   Problem Relation Age of Onset    Cancer Brother      lung     Cancer Brother      lung     Heart Disease Mother        Social History:  Social History   Substance Use Topics    Smoking status: Current Every Day Smoker     Packs/day: 0.25    Smokeless tobacco: Never Used    Alcohol use No       Allergies: Allergies   Allergen Reactions    Keflex [Cephalexin] Itching         Review of Systems   Review of Systems   Constitutional: Positive for fatigue and fever. Negative for chills. HENT: Negative. Negative for congestion and rhinorrhea. Respiratory: Positive for cough and shortness of breath. Negative for chest tightness and wheezing. Cardiovascular: Negative. Negative for chest pain and palpitations. Gastrointestinal: Negative. Negative for abdominal pain, constipation, nausea and vomiting. Endocrine: Negative. Genitourinary: Negative. Negative for decreased urine volume, flank pain, hematuria and pelvic pain. Musculoskeletal: Negative. Negative for back pain and neck pain. Skin: Negative. Negative for color change, pallor and rash. Neurological: Negative. Negative for dizziness, seizures, weakness, numbness and headaches. Hematological: Negative. Negative for adenopathy. Psychiatric/Behavioral: Positive for confusion. All other systems reviewed and are negative. Physical Exam   Physical Exam   Constitutional: She appears well-developed and well-nourished. She appears distressed.    HENT:   Head: Normocephalic and atraumatic. Mouth/Throat: No oropharyngeal exudate. Eyes: Conjunctivae are normal. Pupils are equal, round, and reactive to light. Right eye exhibits no discharge. Left eye exhibits no discharge. No scleral icterus. Neck: Normal range of motion. Neck supple. No JVD present. Cardiovascular: Normal rate, regular rhythm, normal heart sounds and intact distal pulses. Exam reveals no gallop and no friction rub. No murmur heard. Pulmonary/Chest: Accessory muscle usage present. No stridor. Tachypnea noted. She is in respiratory distress. She has no wheezes. She has rhonchi in the left middle field and the left lower field. She has no rales. She exhibits no tenderness. Abdominal: Soft. Normal aorta and bowel sounds are normal. She exhibits no distension, no abdominal bruit, no pulsatile midline mass and no mass. There is no tenderness. There is no rebound and no guarding. Neurological: She is alert. She displays normal reflexes. No cranial nerve deficit. GCS eye subscore is 4. GCS verbal subscore is 4. GCS motor subscore is 6. A and O to person only    No focal weakness   Skin: Skin is warm. No ecchymosis, no petechiae, no purpura and no rash noted. Rash is not papular and not nodular. She is not diaphoretic. There is cyanosis. No pallor. Vitals reviewed.         Diagnostic Study Results     Labs -     Recent Results (from the past 12 hour(s))   LACTIC ACID    Collection Time: 01/02/18  6:48 PM   Result Value Ref Range    Lactic acid 1.7 0.4 - 2.0 MMOL/L   PROTHROMBIN TIME + INR    Collection Time: 01/02/18  6:48 PM   Result Value Ref Range    INR 1.3 (H) 0.9 - 1.1      Prothrombin time 13.1 (H) 9.0 - 11.1 sec   PTT    Collection Time: 01/02/18  6:48 PM   Result Value Ref Range    aPTT 42.5 (H) 22.1 - 32.5 sec    aPTT, therapeutic range     58.0 - 77.0 SECS   CBC WITH AUTOMATED DIFF    Collection Time: 01/02/18  6:54 PM   Result Value Ref Range    WBC 26.2 (H) 3.6 - 11.0 K/uL    RBC 4.27 3.80 - 5.20 M/uL HGB 12.1 11.5 - 16.0 g/dL    HCT 36.8 35.0 - 47.0 %    MCV 86.2 80.0 - 99.0 FL    MCH 28.3 26.0 - 34.0 PG    MCHC 32.9 30.0 - 36.5 g/dL    RDW 14.5 11.5 - 14.5 %    PLATELET 369 626 - 322 K/uL    NEUTROPHILS 91 (H) 32 - 75 %    LYMPHOCYTES 2 (L) 12 - 49 %    MONOCYTES 7 5 - 13 %    EOSINOPHILS 0 0 - 7 %    BASOPHILS 0 0 - 1 %    ABS. NEUTROPHILS 23.9 (H) 1.8 - 8.0 K/UL    ABS. LYMPHOCYTES 0.5 (L) 0.8 - 3.5 K/UL    ABS. MONOCYTES 1.8 (H) 0.0 - 1.0 K/UL    ABS. EOSINOPHILS 0.0 0.0 - 0.4 K/UL    ABS. BASOPHILS 0.0 0.0 - 0.1 K/UL    DF SMEAR SCANNED      RBC COMMENTS NORMOCYTIC, NORMOCHROMIC     METABOLIC PANEL, COMPREHENSIVE    Collection Time: 01/02/18  6:54 PM   Result Value Ref Range    Sodium 137 136 - 145 mmol/L    Potassium 3.8 3.5 - 5.1 mmol/L    Chloride 102 97 - 108 mmol/L    CO2 27 21 - 32 mmol/L    Anion gap 8 5 - 15 mmol/L    Glucose 171 (H) 65 - 100 mg/dL    BUN 29 (H) 6 - 20 MG/DL    Creatinine 1.07 (H) 0.55 - 1.02 MG/DL    BUN/Creatinine ratio 27 (H) 12 - 20      GFR est AA 60 (L) >60 ml/min/1.73m2    GFR est non-AA 49 (L) >60 ml/min/1.73m2    Calcium 9.6 8.5 - 10.1 MG/DL    Bilirubin, total 1.1 (H) 0.2 - 1.0 MG/DL    ALT (SGPT) 19 12 - 78 U/L    AST (SGOT) 19 15 - 37 U/L    Alk.  phosphatase 73 45 - 117 U/L    Protein, total 7.7 6.4 - 8.2 g/dL    Albumin 3.0 (L) 3.5 - 5.0 g/dL    Globulin 4.7 (H) 2.0 - 4.0 g/dL    A-G Ratio 0.6 (L) 1.1 - 2.2     LIPASE    Collection Time: 01/02/18  6:54 PM   Result Value Ref Range    Lipase 63 (L) 73 - 393 U/L   NT-PRO BNP    Collection Time: 01/02/18  6:54 PM   Result Value Ref Range    NT pro- (H) 0 - 450 PG/ML   MAGNESIUM    Collection Time: 01/02/18  6:54 PM   Result Value Ref Range    Magnesium 1.7 1.6 - 2.4 mg/dL   CK W/ REFLX CKMB    Collection Time: 01/02/18  6:54 PM   Result Value Ref Range     26 - 192 U/L   TROPONIN I    Collection Time: 01/02/18  6:54 PM   Result Value Ref Range    Troponin-I, Qt. <0.04 <0.05 ng/mL   EKG, 12 LEAD, INITIAL Collection Time: 01/02/18  6:59 PM   Result Value Ref Range    Ventricular Rate 55 BPM    Atrial Rate 55 BPM    P-R Interval 120 ms    QRS Duration 132 ms    Q-T Interval 538 ms    QTC Calculation (Bezet) 514 ms    Calculated P Axis 58 degrees    Calculated R Axis 50 degrees    Calculated T Axis 45 degrees    Diagnosis       ** Poor data quality, interpretation may be adversely affected  Sinus bradycardia  Right bundle branch block  When compared with ECG of 02-OCT-2017 21:41,  Sinus rhythm has replaced Atrial fibrillation  Vent.  rate has decreased BY  77 BPM  Nonspecific T wave abnormality, improved in Inferior leads  Nonspecific T wave abnormality no longer evident in Lateral leads     BLOOD GAS, ARTERIAL    Collection Time: 01/02/18  7:00 PM   Result Value Ref Range    pH 7.48 (H) 7.35 - 7.45      PCO2 35 35.0 - 45.0 mmHg    PO2 76 (L) 80 - 100 mmHg    O2 SAT 96 92 - 97 %    BICARBONATE 25 22 - 26 mmol/L    BASE EXCESS 2.1 mmol/L    O2 METHOD BIPAP      FIO2 40 %    MODE BIPAP      SET RATE 4      IPAP/PIP 14.0      EPAP/CPAP/PEEP 6.0      Sample source ARTERIAL      SITE RIGHT RADIAL      EDER'S TEST YES     URINALYSIS W/ REFLEX CULTURE    Collection Time: 01/02/18  7:15 PM   Result Value Ref Range    Color DARK YELLOW      Appearance CLEAR CLEAR      Specific gravity 1.025 1.003 - 1.030      pH (UA) 5.0 5.0 - 8.0      Protein TRACE (A) NEG mg/dL    Glucose NEGATIVE  NEG mg/dL    Ketone NEGATIVE  NEG mg/dL    Blood NEGATIVE  NEG      Urobilinogen 1.0 0.2 - 1.0 EU/dL    Nitrites NEGATIVE  NEG      Leukocyte Esterase TRACE (A) NEG      WBC 0-4 0 - 4 /hpf    RBC 0-5 0 - 5 /hpf    Epithelial cells FEW FEW /lpf    Bacteria NEGATIVE  NEG /hpf    UA:UC IF INDICATED CULTURE NOT INDICATED BY UA RESULT CNI      Mucus 1+ (A) NEG /lpf   BILIRUBIN, CONFIRM    Collection Time: 01/02/18  7:15 PM   Result Value Ref Range    Bilirubin UA, confirm NEGATIVE  NEG     INFLUENZA A & B AG (RAPID TEST)    Collection Time: 01/02/18 8:44 PM   Result Value Ref Range    Influenza A Antigen NEGATIVE  NEG      Influenza B Antigen NEGATIVE  NEG         Radiologic Studies -   XR CHEST PORT    (Results Pending)     CT Results  (Last 48 hours)    None        CXR Results  (Last 48 hours)    None            Medical Decision Making   I am the first provider for this patient. I reviewed the vital signs, available nursing notes, past medical history, past surgical history, family history and social history. Vital Signs-Reviewed the patient's vital signs. Patient Vitals for the past 12 hrs:   Temp Pulse Resp BP SpO2   01/02/18 2215 - (!) 53 14 (!) 134/105 97 %   01/02/18 2200 - (!) 53 22 - 95 %   01/02/18 2145 - (!) 49 24 119/52 96 %   01/02/18 2130 - (!) 50 22 - 97 %   01/02/18 2115 - (!) 53 29 133/41 95 %   01/02/18 2100 - (!) 52 27 109/71 94 %   01/02/18 2030 - 61 28 117/83 95 %   01/02/18 2015 - (!) 54 22 161/51 95 %   01/02/18 2000 - (!) 52 27 (!) 107/91 94 %   01/02/18 1945 - (!) 57 28 149/46 93 %   01/02/18 1930 - (!) 54 30 149/42 93 %   01/02/18 1903 - - - - 95 %   01/02/18 1845 - 71 24 179/70 91 %   01/02/18 1837 (!) 101.7 °F (38.7 °C) (!) 59 (!) 34 172/53 (!) 88 %   01/02/18 1835 - - - - (!) 88 %   01/02/18 1831 - 63 22 172/53 (!) 88 %       Pulse Oximetry Analysis - 88% on 6L O2 NC    Cardiac Monitor:   Rate: 60 bpm  Rhythm: Normal Sinus Rhythm      EKG interpretation: (Preliminary) 1859  Rhythm: SB; and regular . Rate (approx.): 55; Axis: normal; NJ interval: normal; QRS interval: normal ; ST/T wave: normal; Other findings: RBBB.  No change compared to prior ekg dated 4/2013  Written by OLIVIA Burks, as dictated by Nathan Verma MD.    Records Reviewed: Nursing Notes, Old Medical Records, Previous electrocardiograms and Ambulance Run Sheet    Provider Notes (Medical Decision Making):   DDx: CHF, PE, COPD exacerbation, PNA, UTI, ACS, sepsis  Imp/plan: h/o COPD oxygen dependent with recent admission for PNA and sepsis to the ER with confusion and worsening SOB. In moderate respiratory distress on arrival with hypoxia, placed on BiPAP. CXR concerning for PNA. Will initiate abx for HCAP and admit to hospitalist.    ED Course:   Initial assessment performed. The patients presenting problems have been discussed, and they are in agreement with the care plan formulated and outlined with them. I have encouraged them to ask questions as they arise throughout their visit. 8:30 PM  Discussed results and critical nature of pts illness with pts family.  and son who is POA both would not want her intubated, or resuscitated at end of life. They do not wish to have her placed on ventilator if she fails on BiPAP and they understand that she is critically ill. Written by OLIVIA Arango, as dictated by Ivette Wilson MD.       Consult Note:   8:30 PM  Ivette Wilson MD spoke with Julisa Acosta MD   Specialty: Hospitalist  Discussed pt's hx, disposition, and available diagnostic and imaging results. Reviewed care plans. Consultant will evaluate pt for admission. Written by OLIVIA Arango, as dictated by Ivette Wilson MD.     8:33 PM  Pt has no signs of hypoperfusion, will not give 30 cc/kg bolus of IVF  Written by OLIVIA Arango, as dictated by Ivette Wilson MD.       9:32 PM  Pt appears improved but still on BiPAP.   Written by OLIVIA Arango, as dictated by Ivette Wilson MD.       Critical Care Time:  CRITICAL CARE NOTE:  8:30 PM  IMPENDING DETERIORATION -Respiratory, Cardiovascular, CNS, Metabolic and Renal  ASSOCIATED RISK FACTORS - Hypotension, Hypoxia, Metabolic changes and Vascular Compromise  MANAGEMENT- Bedside Assessment and Supervision of Care  INTERPRETATION -  Xrays, Blood Gases and ECG  INTERVENTIONS - Metobolic interventions  CASE REVIEW - Hospitalist and Nursing  TREATMENT RESPONSE -Stable  PERFORMED BY - Self    NOTES:   I have spent 45 minutes of critical care time involved in lab review, consultations with specialist, family decision- making, bedside attention and documentation. During this entire length of time I was immediately available to the patient. Nathan Cheng MD     Disposition:    1. Admit to hospitalist.    Admit Note:  8:30 PM  Pt is being admitted by Dr. Lev Dang, hospitalist. The results of their tests and reason(s) for their admission have been discussed with pt and/or available family. They convey agreement and understanding for the need to be admitted and for admission diagnosis. PLAN:  1. Current Discharge Medication List        2. Follow-up Information     None        Return to ED if worse     Diagnosis     Clinical Impression:   1. Acute respiratory failure with hypoxia ([address]    2. HCAP (healthcare-associated pneumonia)    3. Sepsis, due to unspecified organism [facility])        Attestations: This note is prepared by Patience Day, acting as Scribe for Nathan Cheng MD.       The scribe's documentation has been prepared under my direction and personally reviewed by me in its entirety. I confirm that the note above accurately reflects all work, treatment, procedures, and medical decision making performed by me.

## 2018-01-02 NOTE — IP AVS SNAPSHOT
[address] [place] [address] 
[phone] Patient: Mallory Delacruz MRN: [id number] :1935 A check fede indicates which time of day the medication should be taken. My Medications START taking these medications Instructions Each Dose to Equal  
 Morning Noon Evening Bedtime  
 amLODIPine 5 mg tablet Commonly known as:  Teodoro Epstein Start taking on:  2018 Your last dose was: Your next dose is: Take 1 Tab by mouth daily. 5 mg  
    
   
   
   
  
 dronedarone Tab tablet Commonly known as:  Orie Favorite Your last dose was: Your next dose is: Take 1 Tab by mouth two (2) times daily (with meals). 400 mg  
    
   
   
   
  
 metoprolol succinate 50 mg XL tablet Commonly known as:  TOPROL-XL Start taking on:  2018 Your last dose was: Your next dose is: Take 1 Tab by mouth daily. 50 mg CHANGE how you take these medications Instructions Each Dose to Equal  
 Morning Noon Evening Bedtime  
 predniSONE 20 mg tablet Commonly known as:  Abby Trivedi What changed:   
- medication strength 
- how much to take 
- how to take this - when to take this 
- additional instructions Your last dose was: Your next dose is: Take 1 Tab by mouth two (2) times daily (with meals) for 5 days. 20 mg CONTINUE taking these medications Instructions Each Dose to Equal  
 Morning Noon Evening Bedtime  
 acetaminophen 325 mg tablet Commonly known as:  TYLENOL Your last dose was: Your next dose is: Take 2 Tabs by mouth every four (4) hours as needed. 650 mg  
    
   
   
   
  
 albuterol 2.5 mg /3 mL (0.083 %) nebulizer solution Commonly known as:  PROVENTIL VENTOLIN Your last dose was: Your next dose is: by Nebulization route every six (6) hours as needed. albuterol 90 mcg/actuation inhaler Commonly known as:  Christen Lucas Your last dose was: Your next dose is: Take 2 Puffs by inhalation every six (6) hours as needed. 2 Puff  
    
   
   
   
  
 aspirin 325 mg tablet Commonly known as:  ASPIRIN Your last dose was: Your next dose is: Take 325 mg by mouth daily. 325 mg  
    
   
   
   
  
 calcium carbonate 200 mg calcium (500 mg) Chew Commonly known as:  TUMS Your last dose was: Your next dose is: Take 1 Tab by mouth daily. 200 mg  
    
   
   
   
  
 cholecalciferol 1,000 unit tablet Commonly known as:  VITAMIN D3 Your last dose was: Your next dose is: Take 1 Tab by mouth daily. 1000 Units  
    
   
   
   
  
 fluticasone 50 mcg/actuation nasal spray Commonly known as:  Blaine Amaral Your last dose was: Your next dose is: 2 Sprays by Both Nostrils route. Use daily 2 Spray  
    
   
   
   
  
 hydroCHLOROthiazide 25 mg tablet Commonly known as:  HYDRODIURIL Your last dose was: Your next dose is: Take 25 mg by mouth daily. 25 mg INCRUSE ELLIPTA 62.5 mcg/actuation inhaler Generic drug:  umeclidinium Your last dose was: Your next dose is: Take 1 Puff by inhalation daily. 1 Puff MUCINEX -30 mg per tablet Generic drug:  guaiFENesin-dextromethorphan SR Your last dose was: Your next dose is: Take 1 Tab by mouth two (2) times a day. 1 Tab Oxygen Your last dose was: Your next dose is:    
   
   
 3 Each continuous. 3 Each SPIRIVA WITH HANDIHALER 18 mcg inhalation capsule Generic drug:  tiotropium Your last dose was: Your next dose is: Take 1 Cap by inhalation daily. 1 Cap SYMBICORT 160-4.5 mcg/actuation [facility] Generic drug:  budesonide-formoterol Your last dose was: Your next dose is: Take 2 Puffs by inhalation two (2) times a day. 2 Puff STOP taking these medications   
 dilTIAZem  mg ER capsule Commonly known as:  CARDIZEM CD Where to Get Your Medications Information on where to get these meds will be given to you by the nurse or doctor. ! Ask your nurse or doctor about these medications  
  amLODIPine 5 mg tablet  
 dronedarone Tab tablet  
 metoprolol succinate 50 mg XL tablet  
 predniSONE 20 mg tablet

## 2018-01-03 LAB
ANION GAP SERPL CALC-SCNC: 6 MMOL/L (ref 5–15)
ATRIAL RATE: 55 BPM
BASOPHILS # BLD: 0 K/UL
BASOPHILS NFR BLD: 0 %
BUN SERPL-MCNC: 33 MG/DL (ref 6–20)
BUN/CREAT SERPL: 31 (ref 12–20)
CALCIUM SERPL-MCNC: 9.3 MG/DL (ref 8.5–10.1)
CALCULATED P AXIS, ECG09: 58 DEGREES
CALCULATED R AXIS, ECG10: 50 DEGREES
CALCULATED T AXIS, ECG11: 45 DEGREES
CHLORIDE SERPL-SCNC: 101 MMOL/L (ref 97–108)
CO2 SERPL-SCNC: 28 MMOL/L (ref 21–32)
CREAT SERPL-MCNC: 1.08 MG/DL (ref 0.55–1.02)
DIAGNOSIS, 93000: NORMAL
DIFFERENTIAL METHOD BLD: ABNORMAL
EOSINOPHIL # BLD: 0 K/UL
EOSINOPHIL NFR BLD: 0 %
ERYTHROCYTE [DISTWIDTH] IN BLOOD BY AUTOMATED COUNT: 14.3 % (ref 11.5–14.5)
GLUCOSE SERPL-MCNC: 220 MG/DL (ref 65–100)
HCT VFR BLD AUTO: 33.6 % (ref 35–47)
HGB BLD-MCNC: 11 G/DL (ref 11.5–16)
LYMPHOCYTES # BLD: 0.3 K/UL
LYMPHOCYTES NFR BLD: 1 %
MCH RBC QN AUTO: 28.1 PG (ref 26–34)
MCHC RBC AUTO-ENTMCNC: 32.7 G/DL (ref 30–36.5)
MCV RBC AUTO: 85.9 FL (ref 80–99)
MONOCYTES # BLD: 0.5 K/UL
MONOCYTES NFR BLD: 2 %
NEUTS SEG # BLD: 24.1 K/UL
NEUTS SEG NFR BLD: 97 %
P-R INTERVAL, ECG05: 120 MS
PLATELET # BLD AUTO: 237 K/UL (ref 150–400)
POTASSIUM SERPL-SCNC: 3.8 MMOL/L (ref 3.5–5.1)
Q-T INTERVAL, ECG07: 538 MS
QRS DURATION, ECG06: 132 MS
QTC CALCULATION (BEZET), ECG08: 514 MS
RBC # BLD AUTO: 3.91 M/UL (ref 3.8–5.2)
RBC MORPH BLD: ABNORMAL
SODIUM SERPL-SCNC: 135 MMOL/L (ref 136–145)
VENTRICULAR RATE, ECG03: 55 BPM
WBC # BLD AUTO: 24.9 K/UL (ref 3.6–11)

## 2018-01-03 PROCEDURE — G8988 SELF CARE GOAL STATUS: HCPCS | Performed by: OCCUPATIONAL THERAPIST

## 2018-01-03 PROCEDURE — 85025 COMPLETE CBC W/AUTO DIFF WBC: CPT | Performed by: INTERNAL MEDICINE

## 2018-01-03 PROCEDURE — 97165 OT EVAL LOW COMPLEX 30 MIN: CPT | Performed by: OCCUPATIONAL THERAPIST

## 2018-01-03 PROCEDURE — 65660000000 HC RM CCU STEPDOWN

## 2018-01-03 PROCEDURE — 74011250637 HC RX REV CODE- 250/637: Performed by: INTERNAL MEDICINE

## 2018-01-03 PROCEDURE — 77010033678 HC OXYGEN DAILY

## 2018-01-03 PROCEDURE — 36415 COLL VENOUS BLD VENIPUNCTURE: CPT | Performed by: INTERNAL MEDICINE

## 2018-01-03 PROCEDURE — 97162 PT EVAL MOD COMPLEX 30 MIN: CPT

## 2018-01-03 PROCEDURE — 97116 GAIT TRAINING THERAPY: CPT

## 2018-01-03 PROCEDURE — 74011000258 HC RX REV CODE- 258: Performed by: EMERGENCY MEDICINE

## 2018-01-03 PROCEDURE — 94640 AIRWAY INHALATION TREATMENT: CPT

## 2018-01-03 PROCEDURE — G8979 MOBILITY GOAL STATUS: HCPCS

## 2018-01-03 PROCEDURE — G8987 SELF CARE CURRENT STATUS: HCPCS | Performed by: OCCUPATIONAL THERAPIST

## 2018-01-03 PROCEDURE — 80048 BASIC METABOLIC PNL TOTAL CA: CPT | Performed by: INTERNAL MEDICINE

## 2018-01-03 PROCEDURE — 94660 CPAP INITIATION&MGMT: CPT

## 2018-01-03 PROCEDURE — 97530 THERAPEUTIC ACTIVITIES: CPT

## 2018-01-03 PROCEDURE — 74011000250 HC RX REV CODE- 250: Performed by: INTERNAL MEDICINE

## 2018-01-03 PROCEDURE — 74011250636 HC RX REV CODE- 250/636: Performed by: INTERNAL MEDICINE

## 2018-01-03 PROCEDURE — G8978 MOBILITY CURRENT STATUS: HCPCS

## 2018-01-03 PROCEDURE — 74011000250 HC RX REV CODE- 250: Performed by: EMERGENCY MEDICINE

## 2018-01-03 PROCEDURE — 97530 THERAPEUTIC ACTIVITIES: CPT | Performed by: OCCUPATIONAL THERAPIST

## 2018-01-03 RX ORDER — LEVOFLOXACIN 5 MG/ML
750 INJECTION, SOLUTION INTRAVENOUS
Status: DISCONTINUED | OUTPATIENT
Start: 2018-01-04 | End: 2018-01-04

## 2018-01-03 RX ORDER — VANCOMYCIN 2 GRAM/500 ML IN 0.9 % SODIUM CHLORIDE INTRAVENOUS
2000 ONCE
Status: COMPLETED | OUTPATIENT
Start: 2018-01-03 | End: 2018-01-03

## 2018-01-03 RX ADMIN — IPRATROPIUM BROMIDE AND ALBUTEROL SULFATE 3 ML: .5; 3 SOLUTION RESPIRATORY (INHALATION) at 14:44

## 2018-01-03 RX ADMIN — Medication 10 ML: at 17:04

## 2018-01-03 RX ADMIN — IPRATROPIUM BROMIDE AND ALBUTEROL SULFATE 3 ML: .5; 3 SOLUTION RESPIRATORY (INHALATION) at 19:30

## 2018-01-03 RX ADMIN — DILTIAZEM HYDROCHLORIDE 180 MG: 180 CAPSULE, COATED, EXTENDED RELEASE ORAL at 17:03

## 2018-01-03 RX ADMIN — IPRATROPIUM BROMIDE AND ALBUTEROL SULFATE 3 ML: .5; 3 SOLUTION RESPIRATORY (INHALATION) at 23:27

## 2018-01-03 RX ADMIN — IPRATROPIUM BROMIDE AND ALBUTEROL SULFATE 3 ML: .5; 3 SOLUTION RESPIRATORY (INHALATION) at 15:40

## 2018-01-03 RX ADMIN — HYDROCHLOROTHIAZIDE 25 MG: 25 TABLET ORAL at 10:38

## 2018-01-03 RX ADMIN — AZTREONAM: 1 INJECTION, POWDER, LYOPHILIZED, FOR SOLUTION INTRAMUSCULAR; INTRAVENOUS at 16:09

## 2018-01-03 RX ADMIN — AZTREONAM: 1 INJECTION, POWDER, LYOPHILIZED, FOR SOLUTION INTRAMUSCULAR; INTRAVENOUS at 06:25

## 2018-01-03 RX ADMIN — METHYLPREDNISOLONE SODIUM SUCCINATE 40 MG: 40 INJECTION, POWDER, FOR SOLUTION INTRAMUSCULAR; INTRAVENOUS at 21:39

## 2018-01-03 RX ADMIN — IPRATROPIUM BROMIDE AND ALBUTEROL SULFATE 3 ML: .5; 3 SOLUTION RESPIRATORY (INHALATION) at 10:40

## 2018-01-03 RX ADMIN — VANCOMYCIN HYDROCHLORIDE 2000 MG: 10 INJECTION, POWDER, LYOPHILIZED, FOR SOLUTION INTRAVENOUS at 17:39

## 2018-01-03 RX ADMIN — METHYLPREDNISOLONE SODIUM SUCCINATE 40 MG: 40 INJECTION, POWDER, FOR SOLUTION INTRAMUSCULAR; INTRAVENOUS at 10:40

## 2018-01-03 RX ADMIN — ENOXAPARIN SODIUM 40 MG: 40 INJECTION SUBCUTANEOUS at 10:39

## 2018-01-03 RX ADMIN — IPRATROPIUM BROMIDE AND ALBUTEROL SULFATE 3 ML: .5; 3 SOLUTION RESPIRATORY (INHALATION) at 04:25

## 2018-01-03 RX ADMIN — ASPIRIN 325 MG: 325 TABLET ORAL at 10:38

## 2018-01-03 NOTE — PROGRESS NOTES
ADULT PROTOCOL: JET AEROSOL ASSESSMENT    Patient  Krishna Li     [de-identified] y.o.   female     1/3/2018  4:18 PM    Breath Sounds Pre Procedure: Diminished  Breath Sounds Post Procedure:  Diminished                                      Breathing pattern: Pre procedure Breathing Pattern: Tachypneic          Post procedure Breathing Pattern: Tachypneic    Heart Rate: Pre procedure Pulse: 94           Post procedure Pulse: 95    Resp Rate: Pre procedure Respirations: 28           Post procedure Respirations: 26              Cough: Pre procedure Cough: Non-productive               Post procedure        Oxygen: O2 Device: Nasal cannula   6L     Changed: NO    SpO2: Pre procedure SpO2: 94 %                 Post procedure SpO2: 94 %      Nebulizer Therapy: Current medications Aerosolized Medications: DuoNeb q4hrs      Changed: No    Smoking History: Current    Problem List:   Patient Active Problem List   Diagnosis Code    Acute on chronic respiratory failure ([facility]) J96.20    COPD (chronic obstructive pulmonary disease) with acute bronchitis ([facility]) J44.0, J20.9    Lung nodule R91.1    Abnormal ECG R94.31    Hyperglycemia R73.9    Tobacco abuse Z72.0    Pulmonary hypertension, moderate to severe I27.20    Sepsis ([facility]) A41.9    COPD exacerbation ([facility]) J44.1    Respiratory failure ([facility]) J96.90       Respiratory Therapist: RT Kingsley

## 2018-01-03 NOTE — PROGRESS NOTES
Orders received, chart reviewed and patient evaluated by physical therapy. Recommend patient to discharge to [facility] PT with assistance pending progress with skilled acute care physical therapy. Recommend with nursing patient to complete as able in order to maintain strength, endurance and independence: OOB to chair 3x/day with x 1 assist. Thank you for your assistance. Full evaluation to follow.      Denis Hernandez, PT, DPT

## 2018-01-03 NOTE — ED NOTES
Bedside shift change report given to Tai Rao (oncoming nurse) by Arnold Alcazar. (offgoing nurse). Report included the following information SBAR, ED Summary, MAR and Recent Results.

## 2018-01-03 NOTE — PROGRESS NOTES
Problem: Self Care Deficits Care Plan (Adult)  Goal: *Acute Goals and Plan of Care (Insert Text)  Occupational Therapy Goals:  Initiated 1/3/2018  1. Patient will perform grooming standing with rest breaks with modified independence within 7 days. 2. Patient will perform toileting with modified independence within 7 days. 3. Patient will perform upper body dressing and lower body dressing with modified independence within 7 days. 4. Patient will transfer from toilet with modified independence using the least restrictive device and appropriate durable medical equipment within 7 days. Occupational Therapy EVALUATION  Patient: Monica Tomas (5 y.o. female)  Date: 1/3/2018  Primary Diagnosis: Respiratory failure ([address]        Precautions:   DNR, Fall    ASSESSMENT :  Based on the objective data described below, the patient presents with overall general weakness and SOB. Pt was on 6 liters NC upon arrival and O2 sat as 90-93% with activity and at rest.  Pt is on 3 liters NC at baseline and reports needing frequent seated rest breaks or leaning against objects at baseline due to SOB. CGA overall for short distance mobility and ADLS at this time. Pt was encouraged to use her shower chair at home. Very limited assessment as pt was seen in ED and tethered with lines and leads. Issued spirometer and educated pt on how to correctly use and to perform 5 reps every hour. Pt was able to demonstrate correct procedure with device. Patient will benefit from skilled intervention to address the above impairments.   Patients rehabilitation potential is considered to be Fair  Factors which may influence rehabilitation potential include:   []             None noted  []             Mental ability/status  [x]             Medical condition  []             Home/family situation and support systems  []             Safety awareness  []             Pain tolerance/management  []             Other:      PLAN :  Recommendations and Planned Interventions:  [x]               Self Care Training                  [x]        Therapeutic Activities  [x]               Functional Mobility Training    []        Cognitive Retraining  [x]               Therapeutic Exercises           []        Endurance Activities  [x]               Balance Training                   []        Neuromuscular Re-Education  []               Visual/Perceptual Training     [x]   Home Safety Training  [x]               Patient Education                 [x]        Family Training/Education  []               Other (comment):    Frequency/Duration: Patient will be followed by occupational therapy 3 times a week to address goals. Discharge Recommendations: Home Health  Further Equipment Recommendations for Discharge: TBD     SUBJECTIVE:   Patient stated I lean on things a lot. I get short of breath.     OBJECTIVE DATA SUMMARY:   HISTORY:   Past Medical History:   Diagnosis Date    COPD     Hypertension      Past Surgical History:   Procedure Laterality Date    HX GYN      overy removed/ 2 c-sections       Prior Level of Function/Environment/Context: patient lives with her . She is independent with all aspects of functional mobility and ADLS although reports she has poor endurance so can only walk room to room. She wears 3L O2 at home.  Sits frequently due to decreased endurance; does not use shower chair    Expanded or extensive additional review of patient history:     Home Situation  Home Environment: Private residence  # Steps to Enter: 5  Rails to Enter: Yes  Hand Rails : Bilateral  One/Two Story Residence: One story  Living Alone: No  Support Systems: Spouse/Significant Other/Partner  Patient Expects to be Discharged to[de-identified] Unknown  Current DME Used/Available at Home:  (3 liters NC at baseline)  Tub or Shower Type: Shower  [x]  Right hand dominant   []  Left hand dominant    EXAMINATION OF PERFORMANCE DEFICITS:  Cognitive/Behavioral Status:  Neurologic State: Alert  Orientation Level: Oriented X4  Cognition: Appropriate decision making; Appropriate for age attention/concentration; Follows commands  Perception: Appears intact  Perseveration: No perseveration noted  Safety/Judgement: Awareness of environment; Fall prevention;Home safety        Hearing: Auditory  Auditory Impairment: None    Vision/Perceptual:                           Acuity: Within Defined Limits         Range of Motion:    AROM: Within functional limits  PROM: Within functional limits                      Strength:    Strength: Generally decreased, functional                Coordination:  Coordination: Within functional limits  Fine Motor Skills-Upper: Left Intact; Right Intact    Gross Motor Skills-Upper: Left Intact; Right Intact    Tone & Sensation:    Tone: Normal  Sensation: Intact                      Balance:  Sitting: Intact; Without support  Standing: Impaired; With support  Standing - Static: Good  Standing - Dynamic : Fair    Functional Mobility and Transfers for ADLs:  Bed Mobility:  Rolling: Supervision  Supine to Sit: Supervision  Sit to Supine: Supervision  Scooting: Supervision    Transfers:  Sit to Stand: Contact guard assistance  Stand to Sit: Contact guard assistance  Bed to Chair: Contact guard assistance  Toilet Transfer : Contact guard assistance    ADL Assessment:  Feeding: Independent    Oral Facial Hygiene/Grooming: Contact guard assistance    Bathing: Contact guard assistance    Upper Body Dressing: Contact guard assistance    Lower Body Dressing: Minimum assistance    Toileting: Minimum assistance                ADL Intervention and task modifications:     See assessment  Cognitive Retraining  Safety/Judgement: Awareness of environment; Fall prevention;Home safety      Functional Measure:  Barthel Index:    Bathin  Bladder: 10  Bowels: 10  Groomin  Dressin  Feeding: 10  Mobility: 0  Stairs: 0  Toilet Use: 5  Transfer (Bed to Chair and Back): 10  Total: 55       Barthel and G-code impairment scale:  Percentage of impairment CH  0% CI  1-19% CJ  20-39% CK  40-59% CL  60-79% CM  80-99% CN  100%   Barthel Score 0-100 100 99-80 79-60 59-40 20-39 1-19   0   Barthel Score 0-20 20 17-19 13-16 9-12 5-8 1-4 0      The Barthel ADL Index: Guidelines  1. The index should be used as a record of what a patient does, not as a record of what a patient could do. 2. The main aim is to establish degree of independence from any help, physical or verbal, however minor and for whatever reason. 3. The need for supervision renders the patient not independent. 4. A patient's performance should be established using the best available evidence. Asking the patient, friends/relatives and nurses are the usual sources, but direct observation and common sense are also important. However direct testing is not needed. 5. Usually the patient's performance over the preceding 24-48 hours is important, but occasionally longer periods will be relevant. 6. Middle categories imply that the patient supplies over 50 per cent of the effort. 7. Use of aids to be independent is allowed. Kevin Owusu., Barthel, D.W. (7922). Functional evaluation: the Barthel Index. [address] [facility] (14)2. SHOBHA Campbell, Sina Zurita., [facility]., [place], [address] [place] [address] (1999). Measuring the change indisability after inpatient rehabilitation; comparison of the responsiveness of the Barthel Index and Functional [place] Measure. Journal of Neurology, Neurosurgery, and Psychiatry, 66(4), 441-410. Eda Fajardo, [place].ARIANNA, JOSUE Dawkins, & Francine Koo M.A. (2004.) Assessment of post-stroke quality of life in cost-effectiveness studies: The usefulness of the Barthel Index and the EuroQoL-5D. Quality of Life Research, 13, 007-97         G codes: In compliance with CMSs Claims Based Outcome Reporting, the following G-code set was chosen for this patient based on their primary functional limitation being treated:     The outcome measure chosen to determine the severity of the functional limitation was the barthel with a score of 55/100 which was correlated with the impairment scale. ? Self Care:     - CURRENT STATUS: CK - 40%-59% impaired, limited or restricted    - GOAL STATUS: CJ - 20%-39% impaired, limited or restricted    - D/C STATUS:  ---------------To be determined---------------     Occupational Therapy Evaluation Charge Determination   History Examination Decision-Making   LOW Complexity : Brief history review  MEDIUM Complexity : 3-5 performance deficits relating to physical, cognitive , or psychosocial skils that result in activity limitations and / or participation restrictions MEDIUM Complexity : Patient may present with comorbidities that affect occupational performnce. Miniml to moderate modification of tasks or assistance (eg, physical or verbal ) with assesment(s) is necessary to enable patient to complete evaluation       Based on the above components, the patient evaluation is determined to be of the following complexity level: LOW   Pain:  Pain Scale 1: Numeric (0 - 10)  Pain Intensity 1: 0              Activity Tolerance:     Please refer to the flowsheet for vital signs taken during this treatment. After treatment:   [x] Patient left in no apparent distress sitting up in chair  [] Patient left in no apparent distress in bed  [x] Call bell left within reach  [x] Nursing notified  [] Caregiver present  [] Bed alarm activated    COMMUNICATION/EDUCATION:   The patients plan of care was discussed with: Physical Therapist, Registered Nurse and patient and her family. [x] Home safety education was provided and the patient/caregiver indicated understanding. [x] Patient/family have participated as able in goal setting and plan of care. [x] Patient/family agree to work toward stated goals and plan of care. [] Patient understands intent and goals of therapy, but is neutral about his/her participation.   [] Patient is unable to participate in goal setting and plan of care. This patients plan of care is appropriate for delegation to VIVIENNE.     Thank you for this referral.  Nupur Gutierrez, OTR/L  Time Calculation: 23 mins

## 2018-01-03 NOTE — PROGRESS NOTES
Pharmacy Automatic Renal Dosing Protocol - Antimicrobials/Vancomycin    Indication for Antimicrobials:  HAP(was admitted 10/2-10/6/17)    Current Regimen of Each Antimicrobial:  Aztreonam 1 gram Q8H (Start Date 2; Day # 2)  Levfloxacin 750 mg q 24 hours (Start Date ; Day # 2)  Vancomycin IV (Start Date 1/3; day #1)    Significant Cultures:   Blood cx (): in process    Labs:  Recent Labs      18   0439  18   1854   CREA  1.08*  1.07*   BUN  33*  29*   WBC  24.9*  26.2*     Temp (24hrs), Av.7 °F (37.1 °C), Min:97.6 °F (36.4 °C), Max:101.7 °F (38.7 °C)    Paralysis, amputations, malnutrition: No  Creatinine Clearance (mL/min) or Dialysis: ~34 mL/min    Impression/Plan:    Scr stable   Aztreonam 1 gram Q8H.appropriate for renal function.  Levofloxacin dose adjusted to 750 mg IV q 48 hours per protocol for current indication and renal function   Spoke to attending regarding MRSA coverage for HAP treatment, provider consulted pharmacy to dose vancomycin   Vancomycin initiated as a loading dose of 2000 mg IV x 1 (~25 mg/kg), followed by a maintenance regimen of 750 mg IV every 18 hours for an expected trough level of ~18 mcg/mL   Vancomycin trough level will need to be collected prior to 4th dose of regimen   Labs ordered per protocol     Pharmacy will follow daily and adjust medications as appropriate for renal function and/or serum levels.     Thank you,  Pau Felder, PharmD, [address] [facility] Specialist

## 2018-01-03 NOTE — ED NOTES
Bedside shift change report given to [address] [place] [address]. (oncoming nurse) by Soco Stack. (offgoing nurse). Report included the following information SBAR, ED Summary and Recent Results.

## 2018-01-03 NOTE — PROGRESS NOTES
Hospitalist Progress Note    NAME: Prem Prado   :  1935   MRN:  [id number]       Interim Hospital Summary: [de-identified] y.o. female whom presented on 2018 with      Assessment / Plan:    Sepsis  Acute on chronic hypoxic respiratory failure improving   HCAP  COPD, on 3LNC  -CXR concerning for PNA. Pt recently admitted in 10/2017  -flu neg  -BCx ordered  -bipap ordered, will cont', wean as tolerated  -abx to include: Levaquin and Aztreonam given allergies to keflex will deescalate as soon possible   -scheduled and prn nebs  -pulmo consult if shes does not respond or need bipap assistance    Chronic diastolic heart failure due to severe pulmonary HTN  PAfib  HTN  -cont' HCTZ, Diltiazem  -[place] needs [place] be [place] [facility] refused in the past, will cont' ASA  -hydralazine prn  Will ask pt to see if she wants to see cardiology           Code Status: DNR see h/p notes forms signed  Surrogate Decision Maker:   DVT Prophylaxis: lovenox  GI Prophylaxis: not indicated          Subjective:     Chief Complaint / Reason for Physician Visit wants Tuna sandwich   . Discussed with RN events overnight. Review of Systems:  Symptom Y/N Comments  Symptom Y/N Comments   Fever/Chills n   Chest Pain n    Poor Appetite    Edema     Cough y   Abdominal Pain n    Sputum    Joint Pain     SOB/LUDWIG y   Pruritis/Rash     Nausea/vomit n   Tolerating PT/OT     Diarrhea    Tolerating Diet y    Constipation    Other       Could NOT obtain due to:      Objective:     VITALS:   Last 24hrs VS reviewed since prior progress note.  Most recent are:  Patient Vitals for the past 24 hrs:   Temp Pulse Resp BP SpO2   18 0745 - 85 26 138/51 (!) 89 %   18 0704 - 86 22 - 96 %   18 0645 - 63 17 141/71 94 %   18 0630 - 68 18 146/64 92 %   18 0615 - 70 20 142/63 92 %   18 0600 - 71 18 145/61 93 %   18 0545 - 71 18 132/78 92 %   18 0530 - 69 18 124/54 94 %   18 0500 - 67 17 130/59 93 % 01/03/18 0445 - 77 17 130/61 93 %   01/03/18 0430 - 76 19 130/64 91 %   01/03/18 0425 - - - - 90 %   01/03/18 0400 - 71 21 (!) 118/98 92 %   01/03/18 0315 - 74 16 147/63 93 %   01/03/18 0300 - 72 21 140/63 93 %   01/03/18 0245 - 74 21 139/59 92 %   01/03/18 0230 - 75 21 - 93 %   01/03/18 0215 97.6 °F (36.4 °C) 81 20 139/81 95 %   01/03/18 0200 - 80 24 135/58 93 %   01/03/18 0145 - 85 26 143/69 97 %   01/03/18 0141 - - - - 98 %   01/03/18 0130 - 84 27 156/66 94 %   01/03/18 0115 - 81 26 156/69 93 %   01/03/18 0100 - 86 21 156/67 95 %   01/03/18 0045 - 79 23 119/49 95 %   01/03/18 0030 - 80 21 149/57 95 %   01/03/18 0015 - 79 22 159/56 93 %   01/02/18 2345 - 77 24 142/54 93 %   01/02/18 2330 - 75 28 128/60 97 %   01/02/18 2315 - 72 25 138/42 96 %   01/02/18 2300 - 83 30 130/79 96 %   01/02/18 2215 - (!) 53 14 (!) 134/105 97 %   01/02/18 2200 - (!) 53 22 - 95 %   01/02/18 2145 - (!) 49 24 119/52 96 %   01/02/18 2130 - (!) 50 22 - 97 %   01/02/18 2115 - (!) 53 29 133/41 95 %   01/02/18 2100 - (!) 52 27 109/71 94 %   01/02/18 2030 - 61 28 117/83 95 %   01/02/18 2015 - (!) 54 22 161/51 95 %   01/02/18 2000 - (!) 52 27 (!) 107/91 94 %   01/02/18 1945 - (!) 57 28 149/46 93 %   01/02/18 1930 - (!) 54 30 149/42 93 %   01/02/18 1903 - - - - 95 %   01/02/18 1845 - 71 24 179/70 91 %   01/02/18 1837 (!) 101.7 °F (38.7 °C) (!) 59 (!) 34 172/53 (!) 88 %   01/02/18 1835 - - - - (!) 88 %   01/02/18 1831 - 63 22 172/53 (!) 88 %     No intake or output data in the 24 hours ending 01/03/18 0813     PHYSICAL EXAM:  Constitutional: She appears well-developed and well-nourished. She appears   Not in distress  HENT:   Head: Normocephalic and atraumatic. Mouth/Throat: No oropharyngeal exudate. Eyes: Conjunctivae are normal. Pupils are equal, round, and reactive to light. Neck: Normal range of motion. Neck supple. No JVD present. Cardiovascular: Normal rate, regular rhythm, normal heart sounds and intact distal pulses.   Exam reveals no gallop and no friction rub. No murmur heard. Pulmonary/Chest: decrease bs chin w occ wheezing-speaking in full sentences   Abdominal: Soft. Normal aorta and bowel sounds are normal. She exhibits no distension, no abdominal bruit, no pulsatile midline mass and no mass. There is no tenderness. There is no rebound and no guarding. Neurological: She is alert. She displays normal reflexes. A/o x3 No focal weakness no deficits    Reviewed most current lab test results and cultures  YES  Reviewed most current radiology test results   YES  Review and summation of old records today    NO  Reviewed patient's current orders and MAR    YES  PMH/SH reviewed - no change compared to H&P  ________________________________________________________________________  Care Plan discussed with:    Comments   Patient x    Family      RN x    Care Manager     Consultant                        Multidiciplinary team rounds were held today with , nursing, pharmacist and clinical coordinator. Patient's plan of care was discussed; medications were reviewed and discharge planning was addressed. ________________________________________________________________________  Total NON critical care TIME:  30    Minutes    Total CRITICAL CARE TIME Spent:   Minutes non procedure based      Comments   >50% of visit spent in counseling and coordination of care x    ________________________________________________________________________  Rene Collier MD     Procedures: see electronic medical records for all procedures/Xrays and details which were not copied into this note but were reviewed prior to creation of Plan. LABS:  I reviewed today's most current labs and imaging studies.   Pertinent labs include:  Recent Labs      01/03/18 0439 01/02/18 1854   WBC  24.9*  26.2*   HGB  11.0*  12.1   HCT  33.6*  36.8   PLT  237  299     Recent Labs      01/03/18 0439 01/02/18 1854 01/02/18   1848   NA  135*  137   --    K 3.8  3.8   --    CL  101  102   --    CO2  28  27   --    GLU  220*  171*   --    BUN  33*  29*   --    CREA  1.08*  1.07*   --    CA  9.3  9.6   --    MG   --   1.7   --    ALB   --   3.0*   --    TBILI   --   1.1*   --    SGOT   --   19   --    ALT   --   19   --    INR   --    --   1.3*       Signed: Robin Botello MD

## 2018-01-03 NOTE — H&P
Hospitalist Admission Note    NAME: Juliana Maurice   :  1935   MRN:  [id number]     Date/Time:  2018 9:59 PM    Patient PCP: Umu Hutchinson MD  ________________________________________________________________________    My assessment of this patient's clinical condition and my plan of care is as follows. Assessment / Plan:  Sepsis  Acute on chronic hypoxic respiratory failure  HCAP  COPD, on 3LNC  -CXR concerning for PNA. Pt recently admitted in 10/2017  -flu neg  -BCx ordered  -bipap ordered, will cont', wean as tolerated  -ABG in the AM  -abx to include: Levaquin and Aztreonam given allergies to keflex  -scheduled and prn nebs  -monitor CBC, consider pulm consult if still remain on bipap in the AM    Chronic diastolic heart failure due to severe pulmonary HTN  PAfib  HTN  -cont' HCTZ, Diltiazem  -pt needs to be on [address] [place] [address], however refused in the past, will cont' ASA  -hydralazine prn        Code Status: Full  Surrogate Decision Maker:   DVT Prophylaxis: lovenox  GI Prophylaxis: not indicated        The reason for providing this level of medical care was due to a critical illness that impaired one or more vital organ systems, such that there was a high probability of imminent or life threatening deterioration in the patient's condition. This care involved high complexity decision making which includes reviewing the patient's past medical records, current laboratory results, and actual Xray films in order to assess, support vital system function, and to treat this degree of vital organ system failure, and to prevent further life threatening deterioration of the patients condition. I have provided 45 minutes of critical care time. During this entire length of time I was immediately available to the patient. Subjective:   CHIEF COMPLAINT: sob    HISTORY OF PRESENT ILLNESS:     Juliana Maurice is a [de-identified] y.o.    female with pmhx significant fo pulmonary htn, diastolic chf, COPD on home 3LNC, HTN, present to ED c/o progressively worsening of sob stated 2 days ago. Other associated symptoms including fever ~102. She also complains of associated productive cough. Per pt's , home O2 turned up \"all the way\", however, pt remains hypoxic, tachypneic. In the ER, vitals: T 10.7, P 63, /53, SpO2 88% on 4LNC. Pt denies any cp, palpitations, n/v/d. Pertinent labs: WBC 26.2, , hgb 12.1, Cr 1.07, trop neg, flu neg. CXR concerning for PNA  We were asked to admit for work up and evaluation of the above problems. Past Medical History:   Diagnosis Date    COPD     Hypertension         Past Surgical History:   Procedure Laterality Date    HX GYN      overy removed/ 2 c-sections       Social History   Substance Use Topics    Smoking status: Current Every Day Smoker     Packs/day: 0.25    Smokeless tobacco: Never Used    Alcohol use No        Family History   Problem Relation Age of Onset    Cancer Brother      lung     Cancer Brother      lung     Heart Disease Mother      Allergies   Allergen Reactions    Keflex [Cephalexin] Itching        Prior to Admission medications    Medication Sig Start Date End Date Taking? Authorizing Provider   dilTIAZem CD (CARDIZEM CD) 180 mg ER capsule Take 1 Cap by mouth daily. 10/6/17   Mele Kingston MD   predniSONE (DELTASONE) 10 mg tablet Take 2 Tab daily x 3 days then 1 Tab daily x 3 days 10/6/17   Mele Kingston MD   budesonide-formoterol [facility]) 160-4.5 mcg/actuation HFAA Take 2 Puffs by inhalation two (2) times a day. Historical Provider   hydroCHLOROthiazide (HYDRODIURIL) 25 mg tablet Take 25 mg by mouth daily. Mckayla Thomas MD   umeclidinium (INCRUSE ELLIPTA) 62.5 mcg/actuation inhaler Take 1 Puff by inhalation daily. Mckayla Thomas MD   Oxygen 3 Each continuous. Mckayla Thomas MD   calcium carbonate (TUMS) 200 mg calcium (500 mg) Chew Take 1 Tab by mouth daily.  4/9/13   Jarred Chávez MD cholecalciferol (VITAMIN D3) 1,000 unit tablet Take 1 Tab by mouth daily. 4/9/13   Karlynn Jeans, MD   acetaminophen (TYLENOL) 325 mg tablet Take 2 Tabs by mouth every four (4) hours as needed. 11/21/11   Neptali Black III, DO   fluticasone (FLONASE) 50 mcg/Actuation nasal spray 2 Sprays by Both Nostrils route. Use daily 11/21/11   Lavenia Roam III, DO   tiotropium (SPIRIVA WITH HANDIHALER) 18 mcg inhalation capsule Take 1 Cap by inhalation daily. Mckayla Thomas MD   albuterol (PROVENTIL, VENTOLIN) 90 mcg/Actuation inhaler Take 2 Puffs by inhalation every six (6) hours as needed. Mckayla Thomas MD   albuterol (PROVENTIL VENTOLIN) 2.5 mg /3 mL (0.083 %) nebulizer solution by Nebulization route every six (6) hours as needed. Mckayla Thomas MD   dextromethorphan-guaiFENesin [facility] [place] DM)  mg per tablet Take 1 Tab by mouth two (2) times a day. Mckayla Thomas MD   aspirin (ASPIRIN) 325 mg tablet Take 325 mg by mouth daily. Mckayla Thomas MD       REVIEW OF SYSTEMS:     I am not able to complete the review of systems because:    The patient is intubated and sedated    The patient has altered mental status due to his acute medical problems    The patient has baseline aphasia from prior stroke(s)    The patient has baseline dementia and is not reliable historian    The patient is in acute medical distress and unable to provide information           Total of 12 systems reviewed as follows:       POSITIVE= BOLD text  Negative = text not BOLD   General:  fever, chills, sweats, generalized weakness, weight loss/gain,      loss of appetite   Eyes:    blurred vision, eye pain, loss of vision, double vision  ENT:    rhinorrhea, pharyngitis   Respiratory:   cough, sputum production, SOB, LUDWIG, wheezing, pleuritic pain   Cardiology:   chest pain, palpitations, orthopnea, PND, edema, syncope   Gastrointestinal:  abdominal pain , N/V, diarrhea, dysphagia, constipation, bleeding   Genitourinary:  frequency, urgency, dysuria, hematuria, incontinence   Muskuloskeletal :  arthralgia, myalgia, back pain  Hematology:  easy bruising, nose or gum bleeding, lymphadenopathy   Dermatological: rash, ulceration, pruritis, color change / jaundice  Endocrine:   hot flashes or polydipsia   Neurological:  headache, dizziness, confusion, focal weakness, paresthesia,     Speech difficulties, memory loss, gait difficulty  Psychological: Feelings of anxiety, depression, agitation    Objective:   VITALS:    Visit Vitals    /71    Pulse (!) 52    Temp (!) 101.7 °F (38.7 °C)    Resp 27    SpO2 94%       PHYSICAL EXAM:    General:    Female on bipap, NAD  HEENT: Atraumatic, anicteric sclerae, pink conjunctivae     No oral ulcers, mucosa moist, throat clear, dentition fair  Neck:  Supple, symmetrical,  thyroid: non tender  Lungs:   Diminished bs b/l, no wheezing. No rales. Chest wall:  No tenderness  No Accessory muscle use. Heart:   tachyardic,  No  murmur   No edema  Abdomen:   Soft, non-tender. Not distended. Bowel sounds normal  Extremities: No cyanosis. No clubbing,      Skin turgor normal, Capillary refill normal, Radial dial pulse 2+  Skin:     Not pale. Not Jaundiced  No rashes   Psych:  Not depressed. Not anxious or agitated. Neurologic: No facial asymmetry. No aphasia or slurred speech. Symmetrical strength, Sensation grossly intact.  Alert and oriented X 4.     _______________________________________________________________________  Care Plan discussed with:    Comments   Patient x    Family  x Pt's    RN x    Care Manager                    Consultant:      _______________________________________________________________________  Expected  Disposition:   Home with Family    HH/PT/OT/RN x   SNF/LTC    MAR    ________________________________________________________________________  TOTAL TIME:   Minutes    Critical Care Provided 39    Minutes non procedure based      Comments    x Reviewed previous records   >50% of visit spent in counseling and coordination of care x Discussion with patient and/or family and questions answered       ________________________________________________________________________  Signed: Chula Solorzano MD    Procedures: see electronic medical records for all procedures/Xrays and details which were not copied into this note but were reviewed prior to creation of Plan. LAB DATA REVIEWED:    Recent Results (from the past 24 hour(s))   LACTIC ACID    Collection Time: 01/02/18  6:48 PM   Result Value Ref Range    Lactic acid 1.7 0.4 - 2.0 MMOL/L   PROTHROMBIN TIME + INR    Collection Time: 01/02/18  6:48 PM   Result Value Ref Range    INR 1.3 (H) 0.9 - 1.1      Prothrombin time 13.1 (H) 9.0 - 11.1 sec   PTT    Collection Time: 01/02/18  6:48 PM   Result Value Ref Range    aPTT 42.5 (H) 22.1 - 32.5 sec    aPTT, therapeutic range     58.0 - 77.0 SECS   CBC WITH AUTOMATED DIFF    Collection Time: 01/02/18  6:54 PM   Result Value Ref Range    WBC 26.2 (H) 3.6 - 11.0 K/uL    RBC 4.27 3.80 - 5.20 M/uL    HGB 12.1 11.5 - 16.0 g/dL    HCT 36.8 35.0 - 47.0 %    MCV 86.2 80.0 - 99.0 FL    MCH 28.3 26.0 - 34.0 PG    MCHC 32.9 30.0 - 36.5 g/dL    RDW 14.5 11.5 - 14.5 %    PLATELET 620 929 - 438 K/uL    NEUTROPHILS 91 (H) 32 - 75 %    LYMPHOCYTES 2 (L) 12 - 49 %    MONOCYTES 7 5 - 13 %    EOSINOPHILS 0 0 - 7 %    BASOPHILS 0 0 - 1 %    ABS. NEUTROPHILS 23.9 (H) 1.8 - 8.0 K/UL    ABS. LYMPHOCYTES 0.5 (L) 0.8 - 3.5 K/UL    ABS. MONOCYTES 1.8 (H) 0.0 - 1.0 K/UL    ABS. EOSINOPHILS 0.0 0.0 - 0.4 K/UL    ABS.  BASOPHILS 0.0 0.0 - 0.1 K/UL    DF SMEAR SCANNED      RBC COMMENTS NORMOCYTIC, NORMOCHROMIC     METABOLIC PANEL, COMPREHENSIVE    Collection Time: 01/02/18  6:54 PM   Result Value Ref Range    Sodium 137 136 - 145 mmol/L    Potassium 3.8 3.5 - 5.1 mmol/L    Chloride 102 97 - 108 mmol/L    CO2 27 21 - 32 mmol/L    Anion gap 8 5 - 15 mmol/L    Glucose 171 (H) 65 - 100 mg/dL    BUN 29 (H) 6 - 20 MG/DL    Creatinine 1.07 (H) 0.55 - 1.02 MG/DL    BUN/Creatinine ratio 27 (H) 12 - 20      GFR est AA 60 (L) >60 ml/min/1.73m2    GFR est non-AA 49 (L) >60 ml/min/1.73m2    Calcium 9.6 8.5 - 10.1 MG/DL    Bilirubin, total 1.1 (H) 0.2 - 1.0 MG/DL    ALT (SGPT) 19 12 - 78 U/L    AST (SGOT) 19 15 - 37 U/L    Alk. phosphatase 73 45 - 117 U/L    Protein, total 7.7 6.4 - 8.2 g/dL    Albumin 3.0 (L) 3.5 - 5.0 g/dL    Globulin 4.7 (H) 2.0 - 4.0 g/dL    A-G Ratio 0.6 (L) 1.1 - 2.2     LIPASE    Collection Time: 01/02/18  6:54 PM   Result Value Ref Range    Lipase 63 (L) 73 - 393 U/L   NT-PRO BNP    Collection Time: 01/02/18  6:54 PM   Result Value Ref Range    NT pro- (H) 0 - 450 PG/ML   MAGNESIUM    Collection Time: 01/02/18  6:54 PM   Result Value Ref Range    Magnesium 1.7 1.6 - 2.4 mg/dL   CK W/ REFLX CKMB    Collection Time: 01/02/18  6:54 PM   Result Value Ref Range     26 - 192 U/L   TROPONIN I    Collection Time: 01/02/18  6:54 PM   Result Value Ref Range    Troponin-I, Qt. <0.04 <0.05 ng/mL   EKG, 12 LEAD, INITIAL    Collection Time: 01/02/18  6:59 PM   Result Value Ref Range    Ventricular Rate 55 BPM    Atrial Rate 55 BPM    P-R Interval 120 ms    QRS Duration 132 ms    Q-T Interval 538 ms    QTC Calculation (Bezet) 514 ms    Calculated P Axis 58 degrees    Calculated R Axis 50 degrees    Calculated T Axis 45 degrees    Diagnosis       ** Poor data quality, interpretation may be adversely affected  Sinus bradycardia  Right bundle branch block  When compared with ECG of 02-OCT-2017 21:41,  Sinus rhythm has replaced Atrial fibrillation  Vent.  rate has decreased BY  77 BPM  Nonspecific T wave abnormality, improved in Inferior leads  Nonspecific T wave abnormality no longer evident in Lateral leads     BLOOD GAS, ARTERIAL    Collection Time: 01/02/18  7:00 PM   Result Value Ref Range    pH 7.48 (H) 7.35 - 7.45      PCO2 35 35.0 - 45.0 mmHg    PO2 76 (L) 80 - 100 mmHg    O2 SAT 96 92 - 97 %    BICARBONATE 25 22 - 26 mmol/L    BASE EXCESS 2.1 mmol/L    O2 METHOD BIPAP      FIO2 40 %    MODE BIPAP      SET RATE 4      IPAP/PIP 14.0      EPAP/CPAP/PEEP 6.0      Sample source ARTERIAL      SITE RIGHT RADIAL      EDER'S TEST YES     URINALYSIS W/ REFLEX CULTURE    Collection Time: 01/02/18  7:15 PM   Result Value Ref Range    Color DARK YELLOW      Appearance CLEAR CLEAR      Specific gravity 1.025 1.003 - 1.030      pH (UA) 5.0 5.0 - 8.0      Protein TRACE (A) NEG mg/dL    Glucose NEGATIVE  NEG mg/dL    Ketone NEGATIVE  NEG mg/dL    Blood NEGATIVE  NEG      Urobilinogen 1.0 0.2 - 1.0 EU/dL    Nitrites NEGATIVE  NEG      Leukocyte Esterase TRACE (A) NEG      WBC 0-4 0 - 4 /hpf    RBC 0-5 0 - 5 /hpf    Epithelial cells FEW FEW /lpf    Bacteria NEGATIVE  NEG /hpf    UA:UC IF INDICATED CULTURE NOT INDICATED BY UA RESULT CNI      Mucus 1+ (A) NEG /lpf   BILIRUBIN, CONFIRM    Collection Time: 01/02/18  7:15 PM   Result Value Ref Range    Bilirubin UA, confirm NEGATIVE  NEG     INFLUENZA A & B AG (RAPID TEST)    Collection Time: 01/02/18  8:44 PM   Result Value Ref Range    Influenza A Antigen NEGATIVE  NEG      Influenza B Antigen NEGATIVE  NEG

## 2018-01-03 NOTE — ROUTINE PROCESS
TRANSFER - OUT REPORT:    Verbal report given to Flip Gonzalez RN (name) on Zac Linares  being transferred to [facility] [phone] (unit) for routine progression of care       Report consisted of patients Situation, Background, Assessment and   Recommendations(SBAR). Information from the following report(s) SBAR, ED Summary and MAR was reviewed with the receiving nurse. Lines:   Peripheral IV 01/02/18 Left Antecubital (Active)       Peripheral IV 01/02/18 Right Antecubital (Active)        Opportunity for questions and clarification was provided.       Patient transported with:   Monitor  Registered Nurse

## 2018-01-03 NOTE — ED NOTES
BiPAP removed by respiratory therapist. Patient on NC.    8385- Patient having pursed lip breathing and oxygen saturations are in the mid 80's. Respiratory therapist called and requested to have patient's oxygen increased to 6 LPM via nasal cannula. 8897- Patient called out to nurses station to report that she had to urinate. Pur-wick placed on patient. Patient successfully voided with pur-wick. Patient reporting that she felt better post void. 4590- Patients daughter at bedside with patient. Patient breathing comfortably, less effort (shortness of breath noted).

## 2018-01-03 NOTE — PROGRESS NOTES
Problem: Mobility Impaired (Adult and Pediatric)  Goal: *Acute Goals and Plan of Care (Insert Text)  Physical Therapy Goals  Initiated 1/3/2018  1. Patient will move from supine to sit and sit to supine , scoot up and down and roll side to side in bed with independence within 7 day(s). 2.  Patient will transfer from bed to chair and chair to bed with independence using the least restrictive device within 7 day(s). 3.  Patient will perform sit to stand with independence within 7 day(s). 4.  Patient will ambulate with independence for 100 feet with the least restrictive device within 7 day(s). 5.  Patient will ascend/descend 3 stairs with bilateral handrail(s) with modified independence within 7 day(s). All while maintaining O2 sats >90% on supplemental O2.     physical Therapy EVALUATION  Patient: Senia Allen (15 y.o. female)  Date: 1/3/2018  Primary Diagnosis: Respiratory failure ([address]        Precautions:   DNR    ASSESSMENT :  Based on the objective data described below, the patient presents with decreased strength, decreased functional mobility, unsteady gait, and decreased endurance following admission for respiratory failure. PTA patient lives with her . She is independent with all aspects of functional mobility and ADLS although reports she has poor endurance so can only walk room to room. She wears 3L O2 at home. Currently, patient required SBA for bed mobility. Patient required additional time for all tasks and verbal cues to attend to task. O2 sats remained >90% on 6L O2 with mobility. Patient required CGA-min A for sit <> stand with HHA x 2. Patient with stool and required assist for cleaning, maintaining O2 sats while standing >3 minutes with CGA. Patient ambulated 3 feet with CGA and min A for HHA. Patient returned to supine with HOB elevated with SBA at the conclusion of PT evaluation. Patient will benefit from HHPT with 24/7 assist at discharge with home O2.      Patient will benefit from skilled intervention to address the above impairments. Patients rehabilitation potential is considered to be Good  Factors which may influence rehabilitation potential include:   []         None noted  []         Mental ability/status  [x]         Medical condition  []         Home/family situation and support systems  []         Safety awareness  []         Pain tolerance/management  []         Other:      PLAN :  Recommendations and Planned Interventions:  [x]           Bed Mobility Training             []    Neuromuscular Re-Education  [x]           Transfer Training                   []    Orthotic/Prosthetic Training  [x]           Gait Training                         []    Modalities  [x]           Therapeutic Exercises           []    Edema Management/Control  [x]           Therapeutic Activities            [x]    Patient and Family Training/Education  []           Other (comment):    Frequency/Duration: Patient will be followed by physical therapy  4 times a week to address goals. Discharge Recommendations: Home Health with 24/7 assist   Further Equipment Recommendations for Discharge: TBD     SUBJECTIVE:   Patient stated I need a bath.     OBJECTIVE DATA SUMMARY:   HISTORY:    Past Medical History:   Diagnosis Date    COPD     Hypertension      Past Surgical History:   Procedure Laterality Date    HX GYN      overy removed/ 2 c-sections     Prior Level of Function/Home Situation: patient lives with her . She is independent with all aspects of functional mobility and ADLS although reports she has poor endurance so can only walk room to room. She wears 3L O2 at home.    Personal factors and/or comorbidities impacting plan of care: COPD, O2 dependent    Home Situation  Home Environment: Private residence  # Steps to Enter: 5  Rails to Enter: Yes  Hand Rails : Bilateral  One/Two Story Residence: One story  Living Alone: No  Support Systems: Spouse/Significant Other/Partner  Patient Expects to be Discharged to[de-identified] Unknown  Current DME Used/Available at Home: Oxygen, portable, Shower chair  Tub or Shower Type: Shower    EXAMINATION/PRESENTATION/DECISION MAKING:   Critical Behavior:  Neurologic State: Alert  Orientation Level: Oriented to person, Oriented to place, Oriented to situation, Oriented to time  Cognition: Appropriate for age attention/concentration, Appropriate decision making, Appropriate safety awareness, Follows commands     Hearing: Auditory  Auditory Impairment: None  Skin:    Edema:   Range Of Motion:  AROM: Within functional limits           PROM: Within functional limits           Strength:    Strength: Generally decreased, functional                    Tone & Sensation:   Tone: Normal              Sensation: Intact               Coordination:  Coordination: Within functional limits  Vision:      Functional Mobility:  Bed Mobility:  Rolling: Supervision  Supine to Sit: Supervision  Sit to Supine: Supervision  Scooting: Supervision  Transfers:  Sit to Stand: Contact guard assistance  Stand to Sit: Contact guard assistance                       Balance:   Sitting: Intact; Without support  Standing: Impaired; With support  Standing - Static: Good  Standing - Dynamic : Fair  Ambulation/Gait Training:  Distance (ft): 3 Feet (ft)  Assistive Device: Gait belt (HHA x 2 )  Ambulation - Level of Assistance: Contact guard assistance;Minimal assistance (min A with HHA )     Gait Description (WDL): Exceptions to WDL  Gait Abnormalities: Decreased step clearance; Step to gait;Trunk sway increased        Base of Support: Widened     Speed/Kimberlyn: Pace decreased (<100 feet/min)  Step Length: Right shortened;Left shortened                     Stairs:               Therapeutic Exercises:       Functional Measure:  Barthel Index:    Bathin  Bladder: 10  Bowels: 10  Groomin  Dressin  Feeding: 10  Mobility: 0  Stairs: 0  Toilet Use: 5  Transfer (Bed to Chair and Back): 10  Total: 55       Barthel and G-code impairment scale:  Percentage of impairment CH  0% CI  1-19% CJ  20-39% CK  40-59% CL  60-79% CM  80-99% CN  100%   Barthel Score 0-100 100 99-80 79-60 59-40 20-39 1-19   0   Barthel Score 0-20 20 17-19 13-16 9-12 5-8 1-4 0      The Barthel ADL Index: Guidelines  1. The index should be used as a record of what a patient does, not as a record of what a patient could do. 2. The main aim is to establish degree of independence from any help, physical or verbal, however minor and for whatever reason. 3. The need for supervision renders the patient not independent. 4. A patient's performance should be established using the best available evidence. Asking the patient, friends/relatives and nurses are the usual sources, but direct observation and common sense are also important. However direct testing is not needed. 5. Usually the patient's performance over the preceding 24-48 hours is important, but occasionally longer periods will be relevant. 6. Middle categories imply that the patient supplies over 50 per cent of the effort. 7. Use of aids to be independent is allowed. Kevin Owusu., Barthel, D.W. (5473). Functional evaluation: the Barthel Index. [facility] (14)2. SHOBHA Campbell, Sina Zurita., [facility] [place], [address] [place] [address] (1999). Measuring the change indisability after inpatient rehabilitation; comparison of the responsiveness of the Barthel Index and Functional [place] Measure. Journal of Neurology, Neurosurgery, and Psychiatry, 66(4), 065-512. Eda Fajardo, N.J.A, JOSUE Dawkins, & Francine Koo M.A. (2004.) Assessment of post-stroke quality of life in cost-effectiveness studies: The usefulness of the Barthel Index and the EuroQoL-5D. Quality of Life Research, 13, 293-13         G codes: In compliance with CMSs Claims Based Outcome Reporting, the following G-code set was chosen for this patient based on their primary functional limitation being treated:     The outcome measure chosen to determine the severity of the functional limitation was the Barthel with a score of 55/100 which was correlated with the impairment scale. ? Mobility - Walking and Moving Around:     - CURRENT STATUS: CK - 40%-59% impaired, limited or restricted    - GOAL STATUS: CJ - 20%-39% impaired, limited or restricted    - D/C STATUS:  ---------------To be determined---------------      Physical Therapy Evaluation Charge Determination   History Examination Presentation Decision-Making   MEDIUM  Complexity : 1-2 comorbidities / personal factors will impact the outcome/ POC  MEDIUM Complexity : 3 Standardized tests and measures addressing body structure, function, activity limitation and / or participation in recreation  MEDIUM Complexity : Evolving with changing characteristics  Other outcome measures Barthel  MEDIUM      Based on the above components, the patient evaluation is determined to be of the following complexity level: MEDIUM    Pain:  Pain Scale 1: Numeric (0 - 10)                 Activity Tolerance:   Fair, O2 sats remained >90% on 6L O2 with mobility. Please refer to the flowsheet for vital signs taken during this treatment. After treatment:   []         Patient left in no apparent distress sitting up in chair  [x]         Patient left in no apparent distress in bed  [x]         Call bell left within reach  [x]         Nursing notified  [x]         Caregiver present  []         Bed alarm activated    COMMUNICATION/EDUCATION:   The patients plan of care was discussed with: Occupational Therapist, Registered Nurse and . [x]         Fall prevention education was provided and the patient/caregiver indicated understanding. [x]         Patient/family have participated as able in goal setting and plan of care. [x]         Patient/family agree to work toward stated goals and plan of care.   []         Patient understands intent and goals of therapy, but is neutral about his/her participation. []         Patient is unable to participate in goal setting and plan of care.     Thank you for this referral.  Rina Hill, PT, DPT   Time Calculation: 33 mins

## 2018-01-03 NOTE — PROGRESS NOTES
1515: TRANSFER - IN REPORT:    Verbal report received from [place], [place] (name) on [facility]  being received from ED (unit) for routine progression of care      Report consisted of patients Situation, Background, Assessment and   Recommendations(SBAR). Information from the following report(s) SBAR, Kardex, ED Summary, Intake/Output, MAR, Recent Results and Cardiac Rhythm NSR was reviewed with the receiving nurse. Opportunity for questions and clarification was provided. Assessment completed upon patients arrival to unit and care assumed. [phone]: Primary Nurse Jamie Ferrera RN and Anish Espinoza RN performed a dual skin assessment on this patient No impairment noted  Niraj score is 18    Patient has scattered bruising and redness. Patient skin is dry and flaky. 1915: Bedside shift change report given to Haris Luna RN (oncoming nurse) by Davis Chamberlain RN (offgoing nurse). Report included the following information SBAR, Kardex, ED Summary, Intake/Output, MAR, Recent Results and Cardiac Rhythm NSR.

## 2018-01-04 LAB
ANION GAP SERPL CALC-SCNC: 7 MMOL/L (ref 5–15)
BASOPHILS # BLD: 0 K/UL (ref 0–0.1)
BASOPHILS NFR BLD: 0 % (ref 0–1)
BUN SERPL-MCNC: 35 MG/DL (ref 6–20)
BUN/CREAT SERPL: 44 (ref 12–20)
CALCIUM SERPL-MCNC: 9.4 MG/DL (ref 8.5–10.1)
CHLORIDE SERPL-SCNC: 105 MMOL/L (ref 97–108)
CO2 SERPL-SCNC: 27 MMOL/L (ref 21–32)
CREAT SERPL-MCNC: 0.8 MG/DL (ref 0.55–1.02)
EOSINOPHIL # BLD: 0 K/UL (ref 0–0.4)
EOSINOPHIL NFR BLD: 0 % (ref 0–7)
ERYTHROCYTE [DISTWIDTH] IN BLOOD BY AUTOMATED COUNT: 14.4 % (ref 11.5–14.5)
GLUCOSE SERPL-MCNC: 192 MG/DL (ref 65–100)
HCT VFR BLD AUTO: 30.9 % (ref 35–47)
HGB BLD-MCNC: 9.8 G/DL (ref 11.5–16)
LYMPHOCYTES # BLD: 0.5 K/UL (ref 0.8–3.5)
LYMPHOCYTES NFR BLD: 3 % (ref 12–49)
MCH RBC QN AUTO: 27.5 PG (ref 26–34)
MCHC RBC AUTO-ENTMCNC: 31.7 G/DL (ref 30–36.5)
MCV RBC AUTO: 86.6 FL (ref 80–99)
MONOCYTES # BLD: 0.6 K/UL (ref 0–1)
MONOCYTES NFR BLD: 4 % (ref 5–13)
NEUTS SEG # BLD: 14.4 K/UL (ref 1.8–8)
NEUTS SEG NFR BLD: 93 % (ref 32–75)
NRBC # BLD: 0 K/UL (ref 0–0.01)
NRBC BLD-RTO: 0 PER 100 WBC
PLATELET # BLD AUTO: 256 K/UL (ref 150–400)
POTASSIUM SERPL-SCNC: 3.3 MMOL/L (ref 3.5–5.1)
RBC # BLD AUTO: 3.57 M/UL (ref 3.8–5.2)
SODIUM SERPL-SCNC: 139 MMOL/L (ref 136–145)
WBC # BLD AUTO: 16.1 K/UL (ref 3.6–11)

## 2018-01-04 PROCEDURE — 36415 COLL VENOUS BLD VENIPUNCTURE: CPT | Performed by: INTERNAL MEDICINE

## 2018-01-04 PROCEDURE — 74011250636 HC RX REV CODE- 250/636: Performed by: INTERNAL MEDICINE

## 2018-01-04 PROCEDURE — 74011000250 HC RX REV CODE- 250: Performed by: EMERGENCY MEDICINE

## 2018-01-04 PROCEDURE — 74011250637 HC RX REV CODE- 250/637: Performed by: INTERNAL MEDICINE

## 2018-01-04 PROCEDURE — 65660000000 HC RM CCU STEPDOWN

## 2018-01-04 PROCEDURE — 74011000258 HC RX REV CODE- 258: Performed by: EMERGENCY MEDICINE

## 2018-01-04 PROCEDURE — 77010033678 HC OXYGEN DAILY

## 2018-01-04 PROCEDURE — 80048 BASIC METABOLIC PNL TOTAL CA: CPT | Performed by: INTERNAL MEDICINE

## 2018-01-04 PROCEDURE — 77030038269 HC DRN EXT URIN PURWCK BARD -A

## 2018-01-04 PROCEDURE — 94640 AIRWAY INHALATION TREATMENT: CPT

## 2018-01-04 PROCEDURE — 85025 COMPLETE CBC W/AUTO DIFF WBC: CPT | Performed by: INTERNAL MEDICINE

## 2018-01-04 PROCEDURE — 74011000250 HC RX REV CODE- 250: Performed by: INTERNAL MEDICINE

## 2018-01-04 RX ORDER — LEVOFLOXACIN 5 MG/ML
750 INJECTION, SOLUTION INTRAVENOUS EVERY 24 HOURS
Status: DISCONTINUED | OUTPATIENT
Start: 2018-01-04 | End: 2018-01-05

## 2018-01-04 RX ORDER — POTASSIUM CHLORIDE 750 MG/1
20 TABLET, FILM COATED, EXTENDED RELEASE ORAL
Status: COMPLETED | OUTPATIENT
Start: 2018-01-04 | End: 2018-01-04

## 2018-01-04 RX ORDER — IPRATROPIUM BROMIDE AND ALBUTEROL SULFATE 2.5; .5 MG/3ML; MG/3ML
3 SOLUTION RESPIRATORY (INHALATION)
Status: DISCONTINUED | OUTPATIENT
Start: 2018-01-05 | End: 2018-01-07

## 2018-01-04 RX ADMIN — ONDANSETRON HYDROCHLORIDE 4 MG: 2 INJECTION, SOLUTION INTRAMUSCULAR; INTRAVENOUS at 22:52

## 2018-01-04 RX ADMIN — AZTREONAM: 1 INJECTION, POWDER, LYOPHILIZED, FOR SOLUTION INTRAMUSCULAR; INTRAVENOUS at 00:18

## 2018-01-04 RX ADMIN — ASPIRIN 325 MG: 325 TABLET ORAL at 09:05

## 2018-01-04 RX ADMIN — ENOXAPARIN SODIUM 40 MG: 40 INJECTION SUBCUTANEOUS at 09:05

## 2018-01-04 RX ADMIN — METHYLPREDNISOLONE SODIUM SUCCINATE 40 MG: 40 INJECTION, POWDER, FOR SOLUTION INTRAMUSCULAR; INTRAVENOUS at 21:08

## 2018-01-04 RX ADMIN — Medication 10 ML: at 21:16

## 2018-01-04 RX ADMIN — IPRATROPIUM BROMIDE AND ALBUTEROL SULFATE 3 ML: .5; 3 SOLUTION RESPIRATORY (INHALATION) at 07:31

## 2018-01-04 RX ADMIN — METHYLPREDNISOLONE SODIUM SUCCINATE 40 MG: 40 INJECTION, POWDER, FOR SOLUTION INTRAMUSCULAR; INTRAVENOUS at 09:05

## 2018-01-04 RX ADMIN — AZTREONAM: 1 INJECTION, POWDER, LYOPHILIZED, FOR SOLUTION INTRAMUSCULAR; INTRAVENOUS at 06:31

## 2018-01-04 RX ADMIN — LEVOFLOXACIN 750 MG: 5 INJECTION, SOLUTION INTRAVENOUS at 16:06

## 2018-01-04 RX ADMIN — IPRATROPIUM BROMIDE AND ALBUTEROL SULFATE 3 ML: .5; 3 SOLUTION RESPIRATORY (INHALATION) at 11:47

## 2018-01-04 RX ADMIN — IPRATROPIUM BROMIDE AND ALBUTEROL SULFATE 3 ML: .5; 3 SOLUTION RESPIRATORY (INHALATION) at 15:53

## 2018-01-04 RX ADMIN — VANCOMYCIN HYDROCHLORIDE 750 MG: 750 INJECTION, POWDER, LYOPHILIZED, FOR SOLUTION INTRAVENOUS at 12:51

## 2018-01-04 RX ADMIN — IPRATROPIUM BROMIDE AND ALBUTEROL SULFATE 3 ML: .5; 3 SOLUTION RESPIRATORY (INHALATION) at 19:35

## 2018-01-04 RX ADMIN — DILTIAZEM HYDROCHLORIDE 180 MG: 180 CAPSULE, COATED, EXTENDED RELEASE ORAL at 09:05

## 2018-01-04 RX ADMIN — AZTREONAM: 1 INJECTION, POWDER, LYOPHILIZED, FOR SOLUTION INTRAMUSCULAR; INTRAVENOUS at 22:53

## 2018-01-04 RX ADMIN — IPRATROPIUM BROMIDE AND ALBUTEROL SULFATE 3 ML: .5; 3 SOLUTION RESPIRATORY (INHALATION) at 04:52

## 2018-01-04 RX ADMIN — AZTREONAM: 1 INJECTION, POWDER, LYOPHILIZED, FOR SOLUTION INTRAMUSCULAR; INTRAVENOUS at 16:04

## 2018-01-04 RX ADMIN — POTASSIUM CHLORIDE 20 MEQ: 750 TABLET, FILM COATED, EXTENDED RELEASE ORAL at 21:15

## 2018-01-04 RX ADMIN — Medication 10 ML: at 16:05

## 2018-01-04 RX ADMIN — HYDROCHLOROTHIAZIDE 25 MG: 25 TABLET ORAL at 09:05

## 2018-01-04 NOTE — PROGRESS NOTES
Physical Therapy:    Attempted to see pt for PT session, however pt refused. Educated patient on the importance of mobility and risks of immobility, but pt continued to decline. Patient reported \"I just don't feel like it. \"  Will defer and continue to follow.      Ace Harrison, PT, DPT

## 2018-01-04 NOTE — PROGRESS NOTES
Hospitalist Progress Note    NAME: Jacob Cox   :  1935   MRN:  [id number]       Interim Hospital Summary: [de-identified] y.o. female whom presented on 2018 with      Assessment / Plan:            Sepsis  Acute on chronic hypoxic respiratory failure improving   HCAP  COPD, on 3LNC  continues to smoke  -CXR concerning for PNA.  Pt recently admitted in 10/2017  -flu neg  -BCx ordered  -bipap ordered, will cont', wean as tolerated  -abx to include: Levaquin\vanco/aztro  Given allergies to keflex will deescalate as soon possible   -scheduled and prn nebs  -pulmo consult if shes does not respond or need bipap assistance    Chronic diastolic heart failure due to severe pulmonary HTN  PAfib  HTN  -cont' HCTZ, Diltiazem  -pt needs to be on [address] [place] [address] Road, however refused in the past, will cont' ASA  -hydralazine prn  Will ask pt to see if she wants to see cardiology     Hypokalemia replace  prn     TOBB dep not ready to quits      Code Status: DNR see h/p notes forms signed  Surrogate Decision Maker:   DVT Prophylaxis: lovenox  GI Prophylaxis: not indicated       Subjective:     Chief Complaint / Reason for Physician Visit better today    Discussed with RN events overnight. Review of Systems:  Symptom Y/N Comments  Symptom Y/N Comments   Fever/Chills n   Chest Pain n    Poor Appetite n   Edema     Cough y   Abdominal Pain n    Sputum    Joint Pain     SOB/LUDWIG y chronic  Pruritis/Rash     Nausea/vomit n   Tolerating PT/OT n declined   Diarrhea    Tolerating Diet y    Constipation    Other       Could NOT obtain due to:      Objective:     VITALS:   Last 24hrs VS reviewed since prior progress note.  Most recent are:  Patient Vitals for the past 24 hrs:   Temp Pulse Resp BP SpO2   18 0731 - - - - 94 %   18 0429 97.6 °F (36.4 °C) 81 20 136/56 97 %   18 0018 97.7 °F (36.5 °C) 81 22 144/61 93 %   18 1916 98.1 °F (36.7 °C) 92 - 158/60 91 %   18 1540 - - - - 94 %   18 1519 98 °F (36.7 °C) 97 22 152/50 (!) 89 %   01/03/18 1444 - - - - 93 %   01/03/18 1141 - - - 139/56 -   01/03/18 1038 - 83 - 136/57 -   01/03/18 1023 97.6 °F (36.4 °C) 85 20 139/46 92 %   01/03/18 0945 - 83 26 135/45 93 %   01/03/18 0832 - - - - 91 %     No intake or output data in the 24 hours ending 01/04/18 0753     PHYSICAL EXAM:  Constitutional: She appears well-developed and well-nourished. She appears   Not in distress  HENT:   Head: Normocephalic and atraumatic. Mouth/Throat: No oropharyngeal exudate. Eyes: Conjunctivae are normal. Pupils are equal, round, and reactive to light. Neck: Normal range of motion. Neck supple. No JVD present. Cardiovascular: Normal rate, regular rhythm, normal heart sounds and intact distal pulses.  Exam reveals no gallop and no friction rub.    No murmur heard. Pulmonary/Chest: occ wheezes, decrease  bs chin   Abdominal: Soft. Normal aorta and bowel sounds are normal. She exhibits no distension, no abdominal bruit, no pulsatile midline mass and no mass. There is no tenderness. There is no rebound and no guarding. Neurological: She is alert. She displays normal reflexes. A/o x3 No focal weakness no deficits    Reviewed most current lab test results and cultures  YES  Reviewed most current radiology test results   YES  Review and summation of old records today    NO  Reviewed patient's current orders and MAR    YES  PMH/SH reviewed - no change compared to H&P  ________________________________________________________________________  Care Plan discussed with:    Comments   Patient x    Family      RN x    Care Manager     Consultant                        Multidiciplinary team rounds were held today with , nursing, pharmacist and clinical coordinator. Patient's plan of care was discussed; medications were reviewed and discharge planning was addressed.      ________________________________________________________________________  Total NON critical care TIME:  30 Minutes    Total CRITICAL CARE TIME Spent:   Minutes non procedure based      Comments   >50% of visit spent in counseling and coordination of care x    ________________________________________________________________________  Berna Pena MD     Procedures: see electronic medical records for all procedures/Xrays and details which were not copied into this note but were reviewed prior to creation of Plan. LABS:  I reviewed today's most current labs and imaging studies.   Pertinent labs include:  Recent Labs      01/04/18 0444 01/03/18 0439 01/02/18   1854   WBC  16.1*  24.9*  26.2*   HGB  9.8*  11.0*  12.1   HCT  30.9*  33.6*  36.8   PLT  256  237  299     Recent Labs      01/04/18 0444 01/03/18 0439 01/02/18   1854  01/02/18   1848   NA  139  135*  137   --    K  3.3*  3.8  3.8   --    CL  105  101  102   --    CO2  27  28  27   --    GLU  192*  220*  171*   --    BUN  35*  33*  29*   --    CREA  0.80  1.08*  1.07*   --    CA  9.4  9.3  9.6   --    MG   --    --   1.7   --    ALB   --    --   3.0*   --    TBILI   --    --   1.1*   --    SGOT   --    --   19   --    ALT   --    --   19   --    INR   --    --    --   1.3*       Signed: Berna Pena MD

## 2018-01-04 NOTE — CDMP QUERY
Dr. Mallory Perera A :  Please clarify if this patient is being treated/managed for:    =>pneumonia due to gram-negative bacteria in setting of HCAP noted given Vancomycin and Aztreoman  =>Other Explanation of clinical findings  =>Unable to Determine (no explanation of clinical findings)    The medical record reflects the following clinical findings, treatment, and risk factors:    Risk Factors: [de-identified] yo presnets with fever/AHRF/Sepsis. Clinical Indicators: fever, sepsis and HCAP noted with admission noted 10/2017  Treatment: Vancomycin, and Aztreoman. Please clarify and document your clinical opinion in the progress notes and discharge summary including the definitive and/or presumptive diagnosis, (suspected or probable), related to the above clinical findings. Please include clinical findings supporting your diagnosis. Thank Maddy Polanco  [facility] [address]; CUS;1981

## 2018-01-04 NOTE — PROGRESS NOTES
Pharmacy Automatic Renal Dosing Protocol - Antimicrobials/Vancomycin    Indication for Antimicrobials:  HAP(was admitted 10/2-10/6/17)    Current Regimen of Each Antimicrobial:  Aztreonam 1 gram Q8H (Start Date 1/2; Day # 3  Levfloxacin 750 mg q 24 hours (Start Date /2; Day # 3  Vancomycin IV (Start Date 3; day #2    Significant Cultures:   Blood cx (): in process    Goal Level: VANCOMYCIN TROUGH GOAL RANGE    Vancomycin Trough: 15 - 20 mcg/mL    Measured / Extrapolated Vancomycin Level: N/A    Labs:  Recent Labs      18   0444  18   0439  18   1854   CREA  0.80  1.08*  1.07*   BUN  35*  33*  29*   WBC  16.1*  24.9*  26.2*     Temp (24hrs), Av.9 ° F (36.6 ° C), Min:97.6 ° F (36.4 ° C), Max:98.1 ° F (36.7 ° C)    Paralysis, amputations, malnutrition: No  Creatinine Clearance (mL/min) or Dialysis: 56 mL/min    Impression/Plan:    Scr stable   Aztreonam 1 gram Q8H.appropriate for renal function.  Levofloxacin dose will be adjusted to 750 mg every day (renal function improving)   Vancomycin trough on  at 2300     Pharmacy will follow daily and adjust medications as appropriate for renal function and/or serum levels. Thank you,  Mariana Soto, PHARMD      Recommended duration of therapy  [email or web address]/[facility]/Pharmacy/Clinical%20Companion/Duration%20of%[email or web address]. docx    Renal Dosing  [email or web address]/[facility]/Pharmacy/Clinical%20Companion/Renal%[id number]. pdf

## 2018-01-04 NOTE — PROGRESS NOTES
Received report from [place]. No complaints of pain nausea or shortness of breath. 2000: Very nervous anxious, white knuckling holding on to the bed. When asked if she was alright she stated yes. She stated she was not short of breath. Just nervous about being here. Concerned about dying if she fell asleep. Patient coughed up bright red mucous. Per patient it was yellow at home.

## 2018-01-04 NOTE — PROGRESS NOTES
PCU SHIFT NURSING NOTE      1910: Bedside shift change report given to FLACA Ghohs (oncoming nurse) by Priti Edwards RN (offgoing nurse). Report included the following information SBAR, Kardex, ED Summary, Intake/Output, MAR, Recent Results and Cardiac Rhythm NSR. Shift Summary:   0710: Bedside shift change report given to Priti Edwards RN (oncoming nurse) by FLACA Ghosh (offgoing nurse). Report included the following information SBAR, Kardex, ED Summary, Intake/Output, MAR, Recent Results and Cardiac Rhythm nsr. 0830: Patient asked to sit in chair for breakfast. Patient Refusing stating she is wanting to rest and will sit in chair later today. 1100: Patient resting in bed. No complaints of pain. Patient is wanted to stay in bed a rest. Patient refusing to sit in chair. 1630: Patient has no complaints of pain. Patient wants to wait to walked to bathroom for bathing once family has left. Will continue to monitor patient. Admission Date 1/2/2018   Admission Diagnosis Respiratory failure ([facility] [address]   Consults None        Consults   [x]PT   [x]OT   []Speech   [x]Case Management      [] Palliative      Cardiac Monitoring Order   [x]Yes   []No     IV drips   []Yes    Drip:                            Dose:  Drip:                            Dose:  Drip:                            Dose:   [x]No     GI Prophylaxis   [x]Yes   []No         DVT Prophylaxis   SCDs:             David stockings:         [x] Medication   []Contraindicated   []None      Activity Level Activity Level: Up with Assistance     Activity Assistance: Partial (one person)   Purposeful Rounding every 1-2 hour?    [x]Yes   Gonzalez Score  Total Score: 3   Bed Alarm (If score 3 or >)   [x]Yes   [] Refused (See signed refusal form in chart)   Niraj Score  Niraj Score: 18   Niraj Score (if score 14 or less)   []PMT consult   []Wound Care consult      []Specialty bed   [] Nutrition consult          Needs prior to discharge:   Home O2 required:    [x]Yes   []No If yes, how much O2 required? 2L    Other:    Last Bowel Movement: Last Bowel Movement Date: 01/04/18      Influenza Vaccine Received Flu Vaccine for Current Season (usually Sept-March): Yes        Pneumonia Vaccine           Diet Active Orders   Diet    DIET REGULAR      LDAs               Peripheral IV 01/02/18 Right Antecubital (Active)   Site Assessment Clean, dry, & intact 1/3/2018  8:00 PM   Phlebitis Assessment 0 1/3/2018  8:00 PM   Infiltration Assessment 0 1/3/2018  8:00 PM   Dressing Status Clean, dry, & intact 1/3/2018  8:00 PM   Dressing Type Tape;Transparent 1/3/2018  8:00 PM   Hub Color/Line Status Pink;Flushed 1/3/2018  8:00 PM                      Urinary Catheter      Intake & Output        Readmission Risk Assessment Tool Score Medium Risk            19       Total Score        3 Has Seen PCP in Last 6 Months (Yes=3, No=0)    2 . Living with Significant Other. Assisted Living. LTAC. SNF. or   Rehab    4 IP Visits Last 12 Months (1-3=4, 4=9, >4=11)    5 Pt.  Coverage (Medicare=5 , Medicaid, or Self-Pay=4)    5 Charlson Comorbidity Score (Age + Comorbid Conditions)        Criteria that do not apply:    Patient Length of Stay (>5 days = 3)       Expected Length of Stay - - -   Actual Length of Stay 2

## 2018-01-05 ENCOUNTER — APPOINTMENT (OUTPATIENT)
Dept: GENERAL RADIOLOGY | Age: 83
DRG: 871 | End: 2018-01-05
Attending: INTERNAL MEDICINE
Payer: MEDICARE

## 2018-01-05 LAB
ANION GAP SERPL CALC-SCNC: 8 MMOL/L (ref 5–15)
ATRIAL RATE: 131 BPM
BASOPHILS # BLD: 0 K/UL (ref 0–0.1)
BASOPHILS NFR BLD: 0 % (ref 0–1)
BUN SERPL-MCNC: 39 MG/DL (ref 6–20)
BUN/CREAT SERPL: 44 (ref 12–20)
CALCIUM SERPL-MCNC: 9.6 MG/DL (ref 8.5–10.1)
CALCULATED R AXIS, ECG10: 52 DEGREES
CALCULATED T AXIS, ECG11: 24 DEGREES
CHLORIDE SERPL-SCNC: 104 MMOL/L (ref 97–108)
CO2 SERPL-SCNC: 25 MMOL/L (ref 21–32)
CREAT SERPL-MCNC: 0.88 MG/DL (ref 0.55–1.02)
DIAGNOSIS, 93000: NORMAL
EOSINOPHIL # BLD: 0 K/UL (ref 0–0.4)
EOSINOPHIL NFR BLD: 0 % (ref 0–7)
ERYTHROCYTE [DISTWIDTH] IN BLOOD BY AUTOMATED COUNT: 14.5 % (ref 11.5–14.5)
GLUCOSE SERPL-MCNC: 196 MG/DL (ref 65–100)
HCT VFR BLD AUTO: 32.6 % (ref 35–47)
HGB BLD-MCNC: 10.1 G/DL (ref 11.5–16)
LYMPHOCYTES # BLD: 0.5 K/UL (ref 0.8–3.5)
LYMPHOCYTES NFR BLD: 4 % (ref 12–49)
MCH RBC QN AUTO: 27.1 PG (ref 26–34)
MCHC RBC AUTO-ENTMCNC: 31 G/DL (ref 30–36.5)
MCV RBC AUTO: 87.4 FL (ref 80–99)
MONOCYTES # BLD: 0.4 K/UL (ref 0–1)
MONOCYTES NFR BLD: 4 % (ref 5–13)
NEUTS SEG # BLD: 10.4 K/UL (ref 1.8–8)
NEUTS SEG NFR BLD: 92 % (ref 32–75)
PLATELET # BLD AUTO: 270 K/UL (ref 150–400)
POTASSIUM SERPL-SCNC: 3.8 MMOL/L (ref 3.5–5.1)
Q-T INTERVAL, ECG07: 378 MS
QRS DURATION, ECG06: 142 MS
QTC CALCULATION (BEZET), ECG08: 527 MS
RBC # BLD AUTO: 3.73 M/UL (ref 3.8–5.2)
SODIUM SERPL-SCNC: 137 MMOL/L (ref 136–145)
VENTRICULAR RATE, ECG03: 117 BPM
WBC # BLD AUTO: 11.3 K/UL (ref 3.6–11)

## 2018-01-05 PROCEDURE — 94640 AIRWAY INHALATION TREATMENT: CPT

## 2018-01-05 PROCEDURE — 71045 X-RAY EXAM CHEST 1 VIEW: CPT

## 2018-01-05 PROCEDURE — 74011000250 HC RX REV CODE- 250: Performed by: INTERNAL MEDICINE

## 2018-01-05 PROCEDURE — 94761 N-INVAS EAR/PLS OXIMETRY MLT: CPT

## 2018-01-05 PROCEDURE — 85025 COMPLETE CBC W/AUTO DIFF WBC: CPT | Performed by: INTERNAL MEDICINE

## 2018-01-05 PROCEDURE — 77010033678 HC OXYGEN DAILY

## 2018-01-05 PROCEDURE — 93005 ELECTROCARDIOGRAM TRACING: CPT

## 2018-01-05 PROCEDURE — 74011000250 HC RX REV CODE- 250: Performed by: EMERGENCY MEDICINE

## 2018-01-05 PROCEDURE — 65660000000 HC RM CCU STEPDOWN

## 2018-01-05 PROCEDURE — 74011250637 HC RX REV CODE- 250/637: Performed by: INTERNAL MEDICINE

## 2018-01-05 PROCEDURE — 74011250636 HC RX REV CODE- 250/636: Performed by: INTERNAL MEDICINE

## 2018-01-05 PROCEDURE — 87070 CULTURE OTHR SPECIMN AEROBIC: CPT | Performed by: INTERNAL MEDICINE

## 2018-01-05 PROCEDURE — 74011000258 HC RX REV CODE- 258: Performed by: EMERGENCY MEDICINE

## 2018-01-05 PROCEDURE — 80048 BASIC METABOLIC PNL TOTAL CA: CPT | Performed by: INTERNAL MEDICINE

## 2018-01-05 PROCEDURE — 36415 COLL VENOUS BLD VENIPUNCTURE: CPT | Performed by: INTERNAL MEDICINE

## 2018-01-05 PROCEDURE — 97116 GAIT TRAINING THERAPY: CPT | Performed by: PHYSICAL THERAPIST

## 2018-01-05 PROCEDURE — 74011636637 HC RX REV CODE- 636/637: Performed by: INTERNAL MEDICINE

## 2018-01-05 PROCEDURE — 77030038269 HC DRN EXT URIN PURWCK BARD -A

## 2018-01-05 RX ORDER — DILTIAZEM HYDROCHLORIDE 5 MG/ML
10 INJECTION INTRAVENOUS ONCE
Status: COMPLETED | OUTPATIENT
Start: 2018-01-05 | End: 2018-01-05

## 2018-01-05 RX ORDER — PREDNISONE 20 MG/1
20 TABLET ORAL 2 TIMES DAILY WITH MEALS
Status: DISCONTINUED | OUTPATIENT
Start: 2018-01-05 | End: 2018-01-08 | Stop reason: HOSPADM

## 2018-01-05 RX ORDER — LEVOFLOXACIN 750 MG/1
750 TABLET ORAL EVERY 24 HOURS
Status: DISCONTINUED | OUTPATIENT
Start: 2018-01-05 | End: 2018-01-06

## 2018-01-05 RX ORDER — METOPROLOL SUCCINATE 50 MG/1
50 TABLET, EXTENDED RELEASE ORAL DAILY
Status: DISCONTINUED | OUTPATIENT
Start: 2018-01-06 | End: 2018-01-08 | Stop reason: HOSPADM

## 2018-01-05 RX ADMIN — IPRATROPIUM BROMIDE AND ALBUTEROL SULFATE 3 ML: .5; 3 SOLUTION RESPIRATORY (INHALATION) at 15:07

## 2018-01-05 RX ADMIN — VANCOMYCIN HYDROCHLORIDE 750 MG: 750 INJECTION, POWDER, LYOPHILIZED, FOR SOLUTION INTRAVENOUS at 07:06

## 2018-01-05 RX ADMIN — METHYLPREDNISOLONE SODIUM SUCCINATE 40 MG: 40 INJECTION, POWDER, FOR SOLUTION INTRAMUSCULAR; INTRAVENOUS at 09:35

## 2018-01-05 RX ADMIN — IPRATROPIUM BROMIDE AND ALBUTEROL SULFATE 3 ML: .5; 3 SOLUTION RESPIRATORY (INHALATION) at 01:31

## 2018-01-05 RX ADMIN — DILTIAZEM HYDROCHLORIDE 180 MG: 180 CAPSULE, COATED, EXTENDED RELEASE ORAL at 09:30

## 2018-01-05 RX ADMIN — ENOXAPARIN SODIUM 40 MG: 40 INJECTION SUBCUTANEOUS at 09:31

## 2018-01-05 RX ADMIN — DILTIAZEM HYDROCHLORIDE: 5 INJECTION INTRAVENOUS at 01:47

## 2018-01-05 RX ADMIN — AZTREONAM: 1 INJECTION, POWDER, LYOPHILIZED, FOR SOLUTION INTRAMUSCULAR; INTRAVENOUS at 22:38

## 2018-01-05 RX ADMIN — HYDROCHLOROTHIAZIDE 25 MG: 25 TABLET ORAL at 09:31

## 2018-01-05 RX ADMIN — DRONEDARONE 400 MG: 400 TABLET, FILM COATED ORAL at 22:38

## 2018-01-05 RX ADMIN — IPRATROPIUM BROMIDE AND ALBUTEROL SULFATE 3 ML: .5; 3 SOLUTION RESPIRATORY (INHALATION) at 20:32

## 2018-01-05 RX ADMIN — Medication 10 ML: at 16:06

## 2018-01-05 RX ADMIN — FLUTICASONE FUROATE AND VILANTEROL TRIFENATATE 1 PUFF: 100; 25 POWDER RESPIRATORY (INHALATION) at 09:34

## 2018-01-05 RX ADMIN — AZTREONAM: 1 INJECTION, POWDER, LYOPHILIZED, FOR SOLUTION INTRAMUSCULAR; INTRAVENOUS at 09:34

## 2018-01-05 RX ADMIN — IPRATROPIUM BROMIDE AND ALBUTEROL SULFATE 3 ML: .5; 3 SOLUTION RESPIRATORY (INHALATION) at 07:20

## 2018-01-05 RX ADMIN — ASPIRIN 325 MG: 325 TABLET ORAL at 09:30

## 2018-01-05 RX ADMIN — PREDNISONE 20 MG: 20 TABLET ORAL at 16:06

## 2018-01-05 RX ADMIN — AZTREONAM: 1 INJECTION, POWDER, LYOPHILIZED, FOR SOLUTION INTRAMUSCULAR; INTRAVENOUS at 16:05

## 2018-01-05 RX ADMIN — LEVOFLOXACIN 750 MG: 750 TABLET, FILM COATED ORAL at 16:06

## 2018-01-05 NOTE — PROGRESS NOTES
[facility]  Pulmonary, Critical Care, and Sleep Medicine    Initial Patient Consult    Name: Mallory Delacruz MRN: [id number]   : 1935 Hospital: [facility]   Date: 2018        Request per DR. Adore Chacko for respiratory failure, possible pneumonia. IMPRESSION:   · Acute on Chronic Respiratory Failure pt is on 3L NC continuously. · Bibasilar infiltrates, possible atelectasis. Possible HCAP, was last admitted on 10/2107 with pneumonia. ON CXR from today appears improved. · COPD with acute exacerbation, on home oxygen. · Presented with encephalopathy, now better. Appears to be near baseline. · Had fever of 101.7 on presentation. · Anemia  · Hyperglycemia  · Severe Pulmonary Hypertension  · Diastolic Heart Function, Normal LVEF, has some LVH. · Atrial Fib. · Systemic Hypertension  · Hypokalemia  · Smoker still about 1/2 ppd. · Pt desires to be DNR. · Anticipate pt will be ready for d/c in next 1-2 days. · Can follow up with Dr. Lisa Cantor upon discharge. Can Call [phone] for appt. RECOMMENDATIONS:   · Can check procalcitonin  · On Duonebs  · On solumedrol IV, will change to prednsione. · ON Breo, (has symbicort at baseline)  · ON Aztreonam, Levoflox, Vanc. Will de escalate when CX are back. Awaiting final Results. · Wean oxygen to keep Pox over 90%  · Pt and Ot  To eval.   · DVT prophylaxis  · Will see as needed over the weekend, Please call if any questions or issues. Subjective:   Last 24 hrs: Pt feels that her breathing is slowly improving. No chest pain, has mild to moderate dry coughing. Slept ok last pm. NO GERD. No sinus pain or pressure. NO leg pain. NO acute sinus pain or pressure. NO HA. Pt reports she is ready to take a shower. Cc: shortness of breath, confusion. (on arrival to [facility] ER)    This patient has been seen and evaluated at the request of Dr. Adore Chacko for above.  Patient is a [de-identified] y.o. female who presented to [facility] for evaluation of acute respiratory failure. Pt has increased chest congestion, productive cough of thick yellow secretion. Has been coughing up blood. She was in her normal state until Monday. She developed AMS and was not able to recognize her family. NO chest pain. Has some sinus congestion. She had tried chantyx but had anger issues per her . NO chest pain or pressure. Has easy bruising of her arm and legs. No issues with constipation or diarrhea. Past Medical History:   Diagnosis Date    COPD     Hypertension       Past Surgical History:   Procedure Laterality Date    HX GYN      overy removed/ 2 c-sections      Prior to Admission medications    Medication Sig Start Date End Date Taking? Authorizing Provider   dilTIAZem CD (CARDIZEM CD) 180 mg ER capsule Take 1 Cap by mouth daily. 10/6/17   Luther Tim MD   predniSONE (DELTASONE) 10 mg tablet Take 2 Tab daily x 3 days then 1 Tab daily x 3 days 10/6/17   Luther Tim MD   budesonide-formoterol [facility]) 160-4.5 mcg/actuation HFAA Take 2 Puffs by inhalation two (2) times a day. Historical Provider   hydroCHLOROthiazide (HYDRODIURIL) 25 mg tablet Take 25 mg by mouth daily. Mckayla Thomas MD   umeclidinium (INCRUSE ELLIPTA) 62.5 mcg/actuation inhaler Take 1 Puff by inhalation daily. Mckayla Thomas MD   Oxygen 3 Each continuous. Mckayla Thomas MD   calcium carbonate (TUMS) 200 mg calcium (500 mg) Chew Take 1 Tab by mouth daily. 4/9/13   Addison Pack MD   cholecalciferol (VITAMIN D3) 1,000 unit tablet Take 1 Tab by mouth daily. 4/9/13   Addison Pack MD   acetaminophen (TYLENOL) 325 mg tablet Take 2 Tabs by mouth every four (4) hours as needed. 11/21/11   Neptali Black III, DO   fluticasone (FLONASE) 50 mcg/Actuation nasal spray 2 Sprays by Both Nostrils route. Use daily 11/21/11   Kait Joyce III, DO   tiotropium (SPIRIVA WITH HANDIHALER) 18 mcg inhalation capsule Take 1 Cap by inhalation daily.     Mckayla Thomas MD   albuterol (PROVENTIL, VENTOLIN) 90 mcg/Actuation inhaler Take 2 Puffs by inhalation every six (6) hours as needed. Mckayla Thomas MD   albuterol (PROVENTIL VENTOLIN) 2.5 mg /3 mL (0.083 %) nebulizer solution by Nebulization route every six (6) hours as needed. Mckayla Thomas MD   dextromethorphan-guaiFENesin [facility] DM)  mg per tablet Take 1 Tab by mouth two (2) times a day. Mckayla Thomas MD   aspirin (ASPIRIN) 325 mg tablet Take 325 mg by mouth daily.     Mckayla Thomas MD     Allergies   Allergen Reactions    Keflex [Cephalexin] Itching      Social History   Substance Use Topics    Smoking status: Current Every Day Smoker     Packs/day: 0.25    Smokeless tobacco: Never Used    Alcohol use No      Family History   Problem Relation Age of Onset    Cancer Brother      lung     Cancer Brother      lung     Heart Disease Mother         Current Facility-Administered Medications   Medication Dose Route Frequency    levoFLOXacin (LEVAQUIN) 750 mg in D5W IVPB  750 mg IntraVENous Q24H    albuterol-ipratropium (DUO-NEB) 2.5 MG-0.5 MG/3 ML  3 mL Nebulization Q6H RT    vancomycin (VANCOCIN) 750 mg in 0.9% sodium chloride (MBP/ADV) 250 mL  750 mg IntraVENous Q18H    aztreonam 1 g in 0.9% sodium chloride 100 mL IVPB   IntraVENous Q8H    methylPREDNISolone (PF) (SOLU-MEDROL) injection 40 mg  40 mg IntraVENous Q12H    sodium chloride (NS) flush 5-10 mL  5-10 mL IntraVENous Q8H    enoxaparin (LOVENOX) injection 40 mg  40 mg SubCUTAneous Q24H    aspirin (ASPIRIN) tablet 325 mg  325 mg Oral DAILY    fluticasone-vilanterol (BREO ELLIPTA) 100mcg-25mcg/puff  1 Puff Inhalation DAILY    dilTIAZem CD (CARDIZEM CD) capsule 180 mg  180 mg Oral DAILY    hydroCHLOROthiazide (HYDRODIURIL) tablet 25 mg  25 mg Oral DAILY       Review of Systems:  Constitutional: positive for fevers, chills, fatigue and malaise  Eyes: negative  Ears, nose, mouth, throat, and face: positive for nasal congestion  Respiratory: positive for cough, sputum, hemoptysis, pneumonia, wheezing, dyspnea on exertion or chronic bronchitis  Cardiovascular: negative  Gastrointestinal: positive for dyspepsia  Genitourinary:negative  Integument/breast: negative  Hematologic/lymphatic: negative  Musculoskeletal:negative  Neurological: negative  Behavioral/Psych: negative  Endocrine: negative  Allergic/Immunologic: positive for bruising    Objective:   Vital Signs:    Visit Vitals    /75    Pulse 74    Temp 97.6 °F (36.4 °C)    Resp 20    Ht 5' 3\" (1.6 m)    Wt 80.7 kg (178 lb)    SpO2 98%    BMI 31.53 kg/m2       O2 Device: Nasal cannula   O2 Flow Rate (L/min): 3 l/min   Temp (24hrs), Av.9 °F (36.6 °C), Min:97.6 °F (36.4 °C), Max:98.1 °F (36.7 °C)       Intake/Output:   Last shift:      701 - 1900  In: 200 [P.O.:100; I.V.:100]  Out: -   Last 3 shifts: 1901 -  07  In: -   Out: 300 [Urine:300]    Intake/Output Summary (Last 24 hours) at 18 1139  Last data filed at 18 0934   Gross per 24 hour   Intake              200 ml   Output              300 ml   Net             -100 ml      Physical Exam:   General:  Alert, cooperative, no distress, appears stated age. ON NC oxygen. Head:  Normocephalic, without obvious abnormality, atraumatic. Eyes:  Conjunctivae/corneas clear. PERRL, EOMs intact. Nose: Nares normal. Septum midline. Mucosa normal. No drainage or sinus tenderness. Throat: Lips, mucosa, and tongue normal. Teeth and gums normal.   Neck: Supple, symmetrical, trachea midline, no adenopathy, thyroid: no enlargment/tenderness/nodules, no carotid bruit and no JVD. Back:   Symmetric, no curvature. ROM normal.   Lungs:   Has some bilateral scattered wheezing this am. Good air movement. Chest wall:  No tenderness or deformity. Heart:  Regular rate and rhythm, S1, S2 normal, no murmur, click, rub or gallop. Abdomen:   Soft, non-tender. Bowel sounds normal. No masses,  No organomegaly.  Obese Extremities: Extremities normal, atraumatic, no cyanosis or edema. Pulses: 2+ and symmetric all extremities. Skin: Skin color, texture, turgor normal. No rashes or lesions   Lymph nodes: Cervical, supraclavicular, and axillary nodes normal.   Neurologic: Grossly nonfocal, motor 5/5 BUE and BLE. Psych: appropriate. No anxiety or depression. Data review:     Recent Results (from the past 24 hour(s))   CULTURE, RESPIRATORY/SPUTUM/BRONCH W GRAM STAIN    Collection Time: 01/05/18 12:21 AM   Result Value Ref Range    Special Requests: NO SPECIAL REQUESTS      GRAM STAIN FEW WBCS SEEN      GRAM STAIN OCCASIONAL EPITHELIAL CELLS SEEN      GRAM STAIN FEW GRAM NEGATIVE RODS      GRAM STAIN FEW GRAM POSITIVE COCCI IN PAIRS      Culture result: PENDING    EKG, 12 LEAD, INITIAL    Collection Time: 01/05/18  1:49 AM   Result Value Ref Range    Ventricular Rate 117 BPM    Atrial Rate 131 BPM    QRS Duration 142 ms    Q-T Interval 378 ms    QTC Calculation (Bezet) 527 ms    Calculated R Axis 52 degrees    Calculated T Axis 24 degrees    Diagnosis       Atrial fibrillation with rapid ventricular response with premature   ventricular or aberrantly conducted complexes  Right bundle branch block  When compared with ECG of 02-JAN-2018 18:59,  Atrial fibrillation has replaced Sinus rhythm  Vent. rate has increased BY  62 BPM     CBC WITH AUTOMATED DIFF    Collection Time: 01/05/18  4:50 AM   Result Value Ref Range    WBC 11.3 (H) 3.6 - 11.0 K/uL    RBC 3.73 (L) 3.80 - 5.20 M/uL    HGB 10.1 (L) 11.5 - 16.0 g/dL    HCT 32.6 (L) 35.0 - 47.0 %    MCV 87.4 80.0 - 99.0 FL    MCH 27.1 26.0 - 34.0 PG    MCHC 31.0 30.0 - 36.5 g/dL    RDW 14.5 11.5 - 14.5 %    PLATELET 888 348 - 255 K/uL    NEUTROPHILS 92 (H) 32 - 75 %    LYMPHOCYTES 4 (L) 12 - 49 %    MONOCYTES 4 (L) 5 - 13 %    EOSINOPHILS 0 0 - 7 %    BASOPHILS 0 0 - 1 %    ABS. NEUTROPHILS 10.4 (H) 1.8 - 8.0 K/UL    ABS. LYMPHOCYTES 0.5 (L) 0.8 - 3.5 K/UL    ABS.  MONOCYTES 0.4 0.0 - 1.0 K/UL    ABS. EOSINOPHILS 0.0 0.0 - 0.4 K/UL    ABS. BASOPHILS 0.0 0.0 - 0.1 K/UL   METABOLIC PANEL, BASIC    Collection Time: 01/05/18  4:50 AM   Result Value Ref Range    Sodium 137 136 - 145 mmol/L    Potassium 3.8 3.5 - 5.1 mmol/L    Chloride 104 97 - 108 mmol/L    CO2 25 21 - 32 mmol/L    Anion gap 8 5 - 15 mmol/L    Glucose 196 (H) 65 - 100 mg/dL    BUN 39 (H) 6 - 20 MG/DL    Creatinine 0.88 0.55 - 1.02 MG/DL    BUN/Creatinine ratio 44 (H) 12 - 20      GFR est AA >60 >60 ml/min/1.73m2    GFR est non-AA >60 >60 ml/min/1.73m2    Calcium 9.6 8.5 - 10.1 MG/DL       Imaging:  I have personally reviewed the patients radiographs and have reviewed the reports:  1-2-18: IMPRESSION:  1. Mild bibasilar opacities left greater than right likely atelectasis. Follow-up to exclude pneumonia is suggested         1-5-17: CXR:  COMPARISON:  1/2/2018.     FINDINGS:    An AP portable view was obtained of the chest.    The heart is enlarged. The aorta is atherosclerotic. The lungs are hyperinflated with slight interstitial prominence. An old left rib deformity and slight lateral curvature of the spine are noted.       IMPRESSION:   Pulmonary hyperinflation and slight interstitial prominence.           Dimas Duran MD

## 2018-01-05 NOTE — PROGRESS NOTES
Problem: Mobility Impaired (Adult and Pediatric)  Goal: *Acute Goals and Plan of Care (Insert Text)  Physical Therapy Goals  Initiated 1/3/2018  1. Patient will move from supine to sit and sit to supine , scoot up and down and roll side to side in bed with independence within 7 day(s). 2.  Patient will transfer from bed to chair and chair to bed with independence using the least restrictive device within 7 day(s). 3.  Patient will perform sit to stand with independence within 7 day(s). 4.  Patient will ambulate with independence for 100 feet with the least restrictive device within 7 day(s). 5.  Patient will ascend/descend 3 stairs with bilateral handrail(s) with modified independence within 7 day(s). All while maintaining O2 sats >90% on supplemental O2.      physical Therapy TREATMENT  Patient: Victorino Agosto (13 y.o. female)  Date: 1/5/2018  Diagnosis: Respiratory failure ([address] <principal problem not specified>       Precautions: DNR, Fall    ASSESSMENT:  Patient making steady progress toward goals. Continues to require encouragement to participate with therapy. Focused on taking a shower initially and RN present and removed leads so patient could ambulate to shower. She then decides she does not want to shower but wants to bathe in front of the sink. Amb approx 12 feet with RW and CGA with stable gait but no overt LOB. Patient positioned in front of sink and RN aware. Recommend  PT with 24 hour supervision to progress patient to more independent level of function. Progression toward goals:  [x]    Improving appropriately and progressing toward goals  []    Improving slowly and progressing toward goals  []    Not making progress toward goals and plan of care will be adjusted     PLAN:  Patient continues to benefit from skilled intervention to address the above impairments. Continue treatment per established plan of care.   Discharge Recommendations:  Home Health with 24 hr supervision  Further Equipment Recommendations for Discharge:  TBD     SUBJECTIVE:   Patient stated I'm not going to do that right now.     OBJECTIVE DATA SUMMARY:   Critical Behavior:  Neurologic State: Alert  Orientation Level: Oriented X4  Cognition: Appropriate decision making, Appropriate for age attention/concentration, Appropriate safety awareness  Safety/Judgement: Awareness of environment, Fall prevention, Home safety  Functional Mobility Training:  Bed Mobility:  Rolling: Supervision  Supine to Sit: Supervision              Transfers:  Sit to Stand: Contact guard assistance  Stand to Sit: Contact guard assistance                             Balance:  Sitting: Intact  Standing: Impaired  Standing - Static: Good  Standing - Dynamic : Fair  Ambulation/Gait Training:  Distance (ft): 12 Feet (ft)  Assistive Device: Gait belt;Walker, rolling  Ambulation - Level of Assistance: Contact guard assistance        Gait Abnormalities: Decreased step clearance        Base of Support: Widened     Speed/Kimberlyn: Pace decreased (<100 feet/min); Shuffled; Slow  Step Length: Left shortened;Right shortened       Pain:  Pain Scale 1: Numeric (0 - 10)  Pain Intensity 1: 0              Activity Tolerance:   VSS  Please refer to the flowsheet for vital signs taken during this treatment.   After treatment:   [x]    Patient left in no apparent distress sitting up in chair  []    Patient left in no apparent distress in bed  []    Call bell left within reach  [x]    Nursing notified  []    Caregiver present  []    Bed alarm activated    COMMUNICATION/COLLABORATION:   The patients plan of care was discussed with: Physical Therapist and Registered Nurse    Fredy Cueva PT, DPT   Time Calculation: 19 mins

## 2018-01-05 NOTE — PROGRESS NOTES
PCP f/u is scheduled with Dr Ingrid Carty for Jan 9 at 2;40pm    A Pulmonary f/u is scheduled with Aliza Rollins NP for Jan 17 at 10;15am

## 2018-01-05 NOTE — PROGRESS NOTES
Received report from 10mg IV cardizem     Up with  to bathe. When placed back on tele she was in [address]: Patient now in afib Rate between 100-130's. Has history of paroxysmal afib. Asymptomatic /77, , Sats 92%. 1426: Received order for 10mg IV cardizem one time dose from Dr. Jamey Bazan. Heart rate controlled 80's to 110's    0600: Declined to get out of bed to weigh. Asked for bedpan to urinate. 0630: Patient back in sinus rhythm.

## 2018-01-05 NOTE — PROGRESS NOTES
Physical Therapy  Patient declines to participate with therapy at this time. Attempted to encourage patient and she continues to decline stating that she wanted to sleep. Will continue to follow.   Ronaldo Mccoy, PT, DPT

## 2018-01-05 NOTE — PROGRESS NOTES
Hospitalist Progress Note    NAME: Chauncey Cesar   :  1935   MRN:  [id number]       Interim Hospital Summary: [de-identified] y.o. female whom presented on 2018 with      Assessment / Plan:         Sepsis improving   Acute on chronic hypoxic respiratory failure improving   HCAP  COPD, on 3LNC  continues to smoke  -CXR concerning for PNA.  Pt recently admitted in 10/2017  -flu neg  -BCx ordered no growth to date   -bipap prn  -abx to include: Levaquin\vanco/aztro Will de escalate when CX are back. Awaiting final Results. Given allergies to keflex will deescalate as soon possible   -scheduled and prn nebs/po steroids   -pulmo following    Chronic diastolic heart failure due to severe pulmonary HTN  PAfib  HTN  -cont' HCTZ, Diltiazem  -pt needs to be on [facility], however refused in the past, will cont' ASA  -hydralazine prn  Will ask pt to see if she wants to see cardiology in morning      Hypokalemia replace  prn     TOBB dep not ready to quits    anemia- monitor likely chronic  Will need f/u as oupt-  Code Status: DNR see h/p notes forms signed  Surrogate Decision Maker:   DVT Prophylaxis: lovenox  GI Prophylaxis: not indicated           Subjective:     Chief Complaint / Reason for Physician Visit had  An episode of rapid afib responded well to Cardizem iv.feels better     Discussed with RN events overnight. Review of Systems:  Symptom Y/N Comments  Symptom Y/N Comments   Fever/Chills n   Chest Pain n    Poor Appetite n   Edema     Cough    Abdominal Pain n    Sputum y   Joint Pain     SOB/LUDWIG y   Pruritis/Rash     Nausea/vomit n   Tolerating PT/OT y    Diarrhea    Tolerating Diet y    Constipation    Other       Could NOT obtain due to:      Objective:     VITALS:   Last 24hrs VS reviewed since prior progress note.  Most recent are:  Patient Vitals for the past 24 hrs:   Temp Pulse Resp BP SpO2   18 0718 - - - - 96 %   18 0308 98 °F (36.7 °C) (!) 103 20 131/75 91 %   18 0045 97.8 °F (36.6 °C) (!) 114 20 121/77 93 %   01/04/18 2251 - - - 147/69 -   01/04/18 2021 98 °F (36.7 °C) 75 20 154/87 92 %   01/04/18 1558 97.7 °F (36.5 °C) 70 20 142/56 96 %   01/04/18 1553 - - - - 93 %   01/04/18 1147 - - - - 97 %   01/04/18 1141 98.1 °F (36.7 °C) 75 18 132/64 94 %   01/04/18 0731 - - - - 94 %       Intake/Output Summary (Last 24 hours) at 01/05/18 0720  Last data filed at 01/04/18 1606   Gross per 24 hour   Intake                0 ml   Output              300 ml   Net             -300 ml        PHYSICAL EXAM:  Constitutional: She appears well-developed and well-nourished. She appears  In  No distress  HENT:   Head: Normocephalic and atraumatic. Mouth/Throat: No oropharyngeal exudate. Eyes: Conjunctivae are normal. Pupils are equal, round, and reactive to light. Neck: Normal range of motion. Neck supple. No JVD present. Cardiovascular: Normal rate, regular rhythm, normal heart sounds and intact distal pulses.  Exam reveals no gallop and no friction rub.    No murmur heard. Pulmonary/Chest: occ wheezes, decrease  bs chin   Abdominal: Soft. Normal aorta and bowel sounds are normal. She exhibits no distension, no abdominal bruit, no pulsatile midline mass and no mass. There is no tenderness. There is no rebound and no guarding. Neurological: She is alert. She displays normal reflexes. A/o x3 No focal weakness no deficits  Reviewed most current lab test results and cultures  YES  Reviewed most current radiology test results   YES  Review and summation of old records today    NO  Reviewed patient's current orders and MAR    YES  PMH/SH reviewed - no change compared to H&P  ________________________________________________________________________  Care Plan discussed with:    Comments   Patient x    Family      RN x    Care Manager x    Consultant  x                     x Multidiciplinary team rounds were held today with , nursing, pharmacist and clinical coordinator.   Patient's plan of care was discussed; medications were reviewed and discharge planning was addressed. ________________________________________________________________________  Total NON critical care TIME:  30   Minutes    Total CRITICAL CARE TIME Spent:   Minutes non procedure based      Comments   >50% of visit spent in counseling and coordination of care x    ________________________________________________________________________  Candelaria Fernando MD     Procedures: see electronic medical records for all procedures/Xrays and details which were not copied into this note but were reviewed prior to creation of Plan. LABS:  I reviewed today's most current labs and imaging studies. Pertinent labs include:  Recent Labs      01/05/18   0450  01/04/18   0444  01/03/18   0439   WBC  11.3*  16.1*  24.9*   HGB  10.1*  9.8*  11.0*   HCT  32.6*  30.9*  33.6*   PLT  270  256  237     Recent Labs      01/05/18   0450  01/04/18   0444  01/03/18   0439  01/02/18   1854   01/02/18   1848   NA  137  139  135*  137   < >   --    K  3.8  3.3*  3.8  3.8   < >   --    CL  104  105  101  102   < >   --    CO2  25  27  28  27   < >   --    GLU  196*  192*  220*  171*   < >   --    BUN  39*  35*  33*  29*   < >   --    CREA  0.88  0.80  1.08*  1.07*   < >   --    CA  9.6  9.4  9.3  9.6   < >   --    MG   --    --    --   1.7   --    --    ALB   --    --    --   3.0*   --    --    TBILI   --    --    --   1.1*   --    --    SGOT   --    --    --   19   --    --    ALT   --    --    --   19   --    --    INR   --    --    --    --    --   1.3*    < > = values in this interval not displayed.        Signed: Candelaria Fernando MD

## 2018-01-05 NOTE — PROGRESS NOTES
ADULT PROTOCOL: JET AEROSOL ASSESSMENT    Patient  Wyatt Ludwig     [de-identified] y.o.   female     1/5/2018  10:30 AM    Breath Sounds Pre Procedure: Right Breath Sounds: Diminished                               Left Breath Sounds: Diminished    Breath Sounds Post Procedure: Right Breath Sounds: Diminished                                 Left Breath Sounds: Diminished    Breathing pattern: Pre procedure Breathing Pattern: Regular          Post procedure Breathing Pattern: Regular    Heart Rate: Pre procedure Pulse: 69           Post procedure Pulse: 70    Resp Rate: Pre procedure Respirations: 18           Post procedure Respirations: 18          Cough: Pre procedure Cough: Non-productive               Post procedure Cough: Non-productive    Suctioned: No    Sputum: Pre procedure  None                 Post procedure      Oxygen: O2 Device: Nasal cannula   3L NC     Changed: No    SpO2: Pre procedure SpO2: 96 %   3L NC              Post procedure SpO2: 96 %  3L NC    Nebulizer Therapy: Current medications Aerosolized Medications: DuoNeb 2.5mg Q4      Changed: No    Smoking History: Current Smoker     Problem List:   Patient Active Problem List   Diagnosis Code    Acute on chronic respiratory failure ([facility]) J96.20    COPD (chronic obstructive pulmonary disease) with acute bronchitis ([facility]) J44.0, J20.9    Lung nodule R91.1    Abnormal ECG R94.31    Hyperglycemia R73.9    Tobacco abuse Z72.0    Pulmonary hypertension, moderate to severe I27.20    Sepsis ([facility]) A41.9    COPD exacerbation ([facility]) J44.1    Respiratory failure ([address] J96.90       Respiratory Therapist: Jeremias Willoughby RT

## 2018-01-05 NOTE — PROGRESS NOTES
Attempted to see patient for OT treatment, but PT exiting room reported that patient declined participation in therapy. Per PT patient was strongly encouraged to participate, but patient continued to refuse stating that she just wanted to sleep. Will defer OT and follow back later today or Monday.

## 2018-01-05 NOTE — CONSULTS
[facility]  Pulmonary, Critical Care, and Sleep Medicine    Initial Patient Consult    Name: [place] Tristan MRN: [id number]   : 1935 Hospital: [facility]   Date: 2018        Request per DR. Aime Choi for respiratory failure, possible pneumonia. IMPRESSION:   · Acute on Chronic Respiratory Failure  · Bibasilar infiltrates, possible atelectasis. Possible HCAP, was last admitted on 10/2107 with pneumonia. · COPD with acute exacerbation, on home oxygen. · Presented with encephalopathy, now better. · Had fever of 101.7 on presentation. · Leukocytosis  · Anemia  · Hyperglycemia  · Severe Pulmonary Hypertension  · Diastolic Heart Function, Normal LVEF, has some LVH. · Atrial Fib. · Systemic Hypertension  · Hypokalemia  · Smoker  · Pt desire to be DNR. RECOMMENDATIONS:   · Can check procalcitonin  · If need could repeat CT of chest if not responding as expected. · On Duonebs  · On solumedrol IV  · ON Breo, (has symbicort at baseline)  · ON Aztreonam, Levoflox, Vanc. Will de escalate when CX are back. · Wean oxygen to keep Pox over 90%  · Pt and Ot  To eval.   · DVT prophylaxis  · Will get CXR in am.      Subjective:     Cc: shortness of breath, confusion. (on arrival to [facility] ER)    This patient has been seen and evaluated at the request of Dr. Aime Choi for above. Patient is a [de-identified] y.o. female who presented to [facility] for evaluation of acute respiratory failure. Pt has increased chest congestion, productive cough of thick yellow secretion. Has been coughing up blood. She was in her normal state until Monday. She developed AMS and was not able to recognize her family. NO chest pain. Has some sinus congestion. She had tried chantyx but had anger issues per her . NO chest pain or pressure. Has easy bruising of her arm and legs. No issues with constipation or diarrhea.          Past Medical History:   Diagnosis Date    COPD     Hypertension       Past Surgical History:   Procedure Laterality Date    HX GYN      overy removed/ 2 c-sections      Prior to Admission medications    Medication Sig Start Date End Date Taking? Authorizing Provider   dilTIAZem CD (CARDIZEM CD) 180 mg ER capsule Take 1 Cap by mouth daily. 10/6/17   Aly Sheets MD   predniSONE (DELTASONE) 10 mg tablet Take 2 Tab daily x 3 days then 1 Tab daily x 3 days 10/6/17   Aly Sheets MD   budesonide-formoterol [facility]) 160-4.5 mcg/actuation HFAA Take 2 Puffs by inhalation two (2) times a day. Historical Provider   hydroCHLOROthiazide (HYDRODIURIL) 25 mg tablet Take 25 mg by mouth daily. Mckayla Thomas MD   umeclidinium (INCRUSE ELLIPTA) 62.5 mcg/actuation inhaler Take 1 Puff by inhalation daily. Mckayla Thomas MD   Oxygen 3 Each continuous. Mckayla Thomas MD   calcium carbonate (TUMS) 200 mg calcium (500 mg) Chew Take 1 Tab by mouth daily. 4/9/13   Karlynn Jeans, MD   cholecalciferol (VITAMIN D3) 1,000 unit tablet Take 1 Tab by mouth daily. 4/9/13   Karlynn Jeans, MD   acetaminophen (TYLENOL) 325 mg tablet Take 2 Tabs by mouth every four (4) hours as needed. 11/21/11   Neptali Black III, DO   fluticasone (FLONASE) 50 mcg/Actuation nasal spray 2 Sprays by Both Nostrils route. Use daily 11/21/11   Lavgloria Rebolledo III, DO   tiotropium (SPIRIVA WITH HANDIHALER) 18 mcg inhalation capsule Take 1 Cap by inhalation daily. Mckayla Thomas MD   albuterol (PROVENTIL, VENTOLIN) 90 mcg/Actuation inhaler Take 2 Puffs by inhalation every six (6) hours as needed. Mckayla Thomas MD   albuterol (PROVENTIL VENTOLIN) 2.5 mg /3 mL (0.083 %) nebulizer solution by Nebulization route every six (6) hours as needed. Mckayla Thomas MD   dextromethorphan-guaiFENesin [facility] DM)  mg per tablet Take 1 Tab by mouth two (2) times a day. Mckayla Thomas MD   aspirin (ASPIRIN) 325 mg tablet Take 325 mg by mouth daily.     Phys Other, MD     Allergies   Allergen Reactions    Keflex [Cephalexin] Itching      Social History   Substance Use Topics    Smoking status: Current Every Day Smoker     Packs/day: 0.25    Smokeless tobacco: Never Used    Alcohol use No      Family History   Problem Relation Age of Onset    Cancer Brother      lung     Cancer Brother      lung     Heart Disease Mother         Current Facility-Administered Medications   Medication Dose Route Frequency    levoFLOXacin (LEVAQUIN) 750 mg in D5W IVPB  750 mg IntraVENous Q24H    potassium chloride SR (KLOR-CON 10) tablet 20 mEq  20 mEq Oral NOW    vancomycin (VANCOCIN) 750 mg in 0.9% sodium chloride (MBP/ADV) 250 mL  750 mg IntraVENous Q18H    aztreonam 1 g in 0.9% sodium chloride 100 mL IVPB   IntraVENous Q8H    albuterol-ipratropium (DUO-NEB) 2.5 MG-0.5 MG/3 ML  3 mL Nebulization Q4H RT    methylPREDNISolone (PF) (SOLU-MEDROL) injection 40 mg  40 mg IntraVENous Q12H    sodium chloride (NS) flush 5-10 mL  5-10 mL IntraVENous Q8H    enoxaparin (LOVENOX) injection 40 mg  40 mg SubCUTAneous Q24H    aspirin (ASPIRIN) tablet 325 mg  325 mg Oral DAILY    fluticasone-vilanterol (BREO ELLIPTA) 100mcg-25mcg/puff  1 Puff Inhalation DAILY    dilTIAZem CD (CARDIZEM CD) capsule 180 mg  180 mg Oral DAILY    hydroCHLOROthiazide (HYDRODIURIL) tablet 25 mg  25 mg Oral DAILY       Review of Systems:  Constitutional: positive for fevers, chills, fatigue and malaise  Eyes: negative  Ears, nose, mouth, throat, and face: positive for nasal congestion  Respiratory: positive for cough, sputum, hemoptysis, pneumonia, wheezing, dyspnea on exertion or chronic bronchitis  Cardiovascular: negative  Gastrointestinal: positive for dyspepsia  Genitourinary:negative  Integument/breast: negative  Hematologic/lymphatic: negative  Musculoskeletal:negative  Neurological: negative  Behavioral/Psych: negative  Endocrine: negative  Allergic/Immunologic: positive for bruising    Objective:   Vital Signs:    Visit Vitals    /56 (BP 1 Location: Left arm, BP Patient Position: At rest)    Pulse 70    Temp 97.7 °F (36.5 °C)    Resp 20    Ht 5' 3\" (1.6 m)    Wt 80.7 kg (178 lb)    SpO2 96%    BMI 31.53 kg/m2       O2 Device: Nasal cannula   O2 Flow Rate (L/min): 4 l/min   Temp (24hrs), Av.8 °F (36.6 °C), Min:97.6 °F (36.4 °C), Max:98.1 °F (36.7 °C)       Intake/Output:   Last shift:         Last 3 shifts:    No intake or output data in the 24 hours ending 18   Physical Exam:   General:  Alert, cooperative, no distress, appears stated age. ON NC oxygen. Head:  Normocephalic, without obvious abnormality, atraumatic. Eyes:  Conjunctivae/corneas clear. PERRL, EOMs intact. Nose: Nares normal. Septum midline. Mucosa normal. No drainage or sinus tenderness. Throat: Lips, mucosa, and tongue normal. Teeth and gums normal.   Neck: Supple, symmetrical, trachea midline, no adenopathy, thyroid: no enlargment/tenderness/nodules, no carotid bruit and no JVD. Back:   Symmetric, no curvature. ROM normal.   Lungs:   Clear to auscultation bilaterally. Pretty clear anteriorly. Chest wall:  No tenderness or deformity. Heart:  Regular rate and rhythm, S1, S2 normal, no murmur, click, rub or gallop. Abdomen:   Soft, non-tender. Bowel sounds normal. No masses,  No organomegaly. Obese   Extremities: Extremities normal, atraumatic, no cyanosis or edema. Pulses: 2+ and symmetric all extremities. Skin: Skin color, texture, turgor normal. No rashes or lesions   Lymph nodes: Cervical, supraclavicular, and axillary nodes normal.   Neurologic: Grossly nonfocal, motor 5/5 BUE and BLE. Psych: appropriate. No anxiety or depression.       Data review:     Recent Results (from the past 24 hour(s))   METABOLIC PANEL, BASIC    Collection Time: 18  4:44 AM   Result Value Ref Range    Sodium 139 136 - 145 mmol/L    Potassium 3.3 (L) 3.5 - 5.1 mmol/L    Chloride 105 97 - 108 mmol/L    CO2 27 21 - 32 mmol/L    Anion gap 7 5 - 15 mmol/L    Glucose 192 (H) 65 - 100 mg/dL    BUN 35 (H) 6 - 20 MG/DL    Creatinine 0.80 0.55 - 1.02 MG/DL    BUN/Creatinine ratio 44 (H) 12 - 20      GFR est AA >60 >60 ml/min/1.73m2    GFR est non-AA >60 >60 ml/min/1.73m2    Calcium 9.4 8.5 - 10.1 MG/DL   CBC WITH AUTOMATED DIFF    Collection Time: 01/04/18  4:44 AM   Result Value Ref Range    WBC 16.1 (H) 3.6 - 11.0 K/uL    RBC 3.57 (L) 3.80 - 5.20 M/uL    HGB 9.8 (L) 11.5 - 16.0 g/dL    HCT 30.9 (L) 35.0 - 47.0 %    MCV 86.6 80.0 - 99.0 FL    MCH 27.5 26.0 - 34.0 PG    MCHC 31.7 30.0 - 36.5 g/dL    RDW 14.4 11.5 - 14.5 %    PLATELET 375 180 - 749 K/uL    NEUTROPHILS 93 (H) 32 - 75 %    LYMPHOCYTES 3 (L) 12 - 49 %    MONOCYTES 4 (L) 5 - 13 %    EOSINOPHILS 0 0 - 7 %    BASOPHILS 0 0 - 1 %    ABS. NEUTROPHILS 14.4 (H) 1.8 - 8.0 K/UL    ABS. LYMPHOCYTES 0.5 (L) 0.8 - 3.5 K/UL    ABS. MONOCYTES 0.6 0.0 - 1.0 K/UL    ABS. EOSINOPHILS 0.0 0.0 - 0.4 K/UL    ABS. BASOPHILS 0.0 0.0 - 0.1 K/UL   NUCLEATED RBC    Collection Time: 01/04/18  4:44 AM   Result Value Ref Range    NRBC 0.0 0  WBC    ABSOLUTE NRBC 0.00 0.00 - 0.01 K/uL       Imaging:  I have personally reviewed the patients radiographs and have reviewed the reports:  1-2-18: IMPRESSION:  1. Mild bibasilar opacities left greater than right likely atelectasis.   Follow-up to exclude pneumonia is suggested                 Fer Jones MD

## 2018-01-05 NOTE — PROGRESS NOTES
CM acknowledged consult for dispo. CM met with patient. Pt name and  confirmed as pt identifiers. Demographics confirmed. Home O2  Provided by [facility] - 3L NC    DME - none    [facility]erred [facility]    /SNF - no previous experience    Cm reviewed OT recommendations for [place] and patient has refused. CM informed patient her  will need to provide portable O2 for ride home and patient verbalized understanding. Care Management Interventions  PCP Verified by CM: Yes  Mode of Transport at Discharge: Other (see comment) ()  Discharge Durable Medical Equipment: No  Physical Therapy Consult: Yes  Current Support Network: Lives with Spouse  Plan discussed with Pt/Family/Caregiver: Yes  Discharge Location  Discharge Placement: Home     Anticipate discharge home in 1-2 days with family.     Ruperto Degroot RN CM  Ext [phone]

## 2018-01-06 LAB
ANION GAP SERPL CALC-SCNC: 7 MMOL/L (ref 5–15)
BASOPHILS # BLD: 0 K/UL
BASOPHILS NFR BLD: 0 %
BUN SERPL-MCNC: 34 MG/DL (ref 6–20)
BUN/CREAT SERPL: 39 (ref 12–20)
CALCIUM SERPL-MCNC: 9.7 MG/DL (ref 8.5–10.1)
CHLORIDE SERPL-SCNC: 102 MMOL/L (ref 97–108)
CO2 SERPL-SCNC: 29 MMOL/L (ref 21–32)
CREAT SERPL-MCNC: 0.87 MG/DL (ref 0.55–1.02)
DATE LAST DOSE: NORMAL
DIFFERENTIAL METHOD BLD: ABNORMAL
EOSINOPHIL # BLD: 0 K/UL
EOSINOPHIL NFR BLD: 0 %
ERYTHROCYTE [DISTWIDTH] IN BLOOD BY AUTOMATED COUNT: 14.3 % (ref 11.5–14.5)
GLUCOSE SERPL-MCNC: 171 MG/DL (ref 65–100)
HCT VFR BLD AUTO: 31.4 % (ref 35–47)
HGB BLD-MCNC: 10.3 G/DL (ref 11.5–16)
LYMPHOCYTES # BLD: 0.2 K/UL
LYMPHOCYTES NFR BLD: 2 %
MCH RBC QN AUTO: 29.2 PG (ref 26–34)
MCHC RBC AUTO-ENTMCNC: 32.8 G/DL (ref 30–36.5)
MCV RBC AUTO: 89 FL (ref 80–99)
MONOCYTES # BLD: 0.3 K/UL
MONOCYTES NFR BLD: 4 %
NEUTS BAND NFR BLD MANUAL: 2 %
NEUTS SEG # BLD: 7.8 K/UL
NEUTS SEG NFR BLD: 92 %
PLATELET # BLD AUTO: 259 K/UL (ref 150–400)
POTASSIUM SERPL-SCNC: 3.8 MMOL/L (ref 3.5–5.1)
RBC # BLD AUTO: 3.53 M/UL (ref 3.8–5.2)
RBC MORPH BLD: ABNORMAL
REPORTED DOSE,DOSE: NORMAL UNITS
REPORTED DOSE/TIME,TMG: NORMAL
SODIUM SERPL-SCNC: 138 MMOL/L (ref 136–145)
VANCOMYCIN TROUGH SERPL-MCNC: 6.7 UG/ML (ref 5–10)
WBC # BLD AUTO: 8.3 K/UL (ref 3.6–11)

## 2018-01-06 PROCEDURE — 80048 BASIC METABOLIC PNL TOTAL CA: CPT | Performed by: INTERNAL MEDICINE

## 2018-01-06 PROCEDURE — 74011636637 HC RX REV CODE- 636/637: Performed by: INTERNAL MEDICINE

## 2018-01-06 PROCEDURE — 85025 COMPLETE CBC W/AUTO DIFF WBC: CPT | Performed by: INTERNAL MEDICINE

## 2018-01-06 PROCEDURE — 74011000258 HC RX REV CODE- 258: Performed by: EMERGENCY MEDICINE

## 2018-01-06 PROCEDURE — 80202 ASSAY OF VANCOMYCIN: CPT | Performed by: INTERNAL MEDICINE

## 2018-01-06 PROCEDURE — 74011250637 HC RX REV CODE- 250/637: Performed by: INTERNAL MEDICINE

## 2018-01-06 PROCEDURE — 74011250636 HC RX REV CODE- 250/636: Performed by: INTERNAL MEDICINE

## 2018-01-06 PROCEDURE — 74011000250 HC RX REV CODE- 250: Performed by: EMERGENCY MEDICINE

## 2018-01-06 PROCEDURE — 36415 COLL VENOUS BLD VENIPUNCTURE: CPT | Performed by: INTERNAL MEDICINE

## 2018-01-06 PROCEDURE — 77010033678 HC OXYGEN DAILY

## 2018-01-06 PROCEDURE — 65660000000 HC RM CCU STEPDOWN

## 2018-01-06 PROCEDURE — 74011000250 HC RX REV CODE- 250: Performed by: INTERNAL MEDICINE

## 2018-01-06 PROCEDURE — 94640 AIRWAY INHALATION TREATMENT: CPT

## 2018-01-06 RX ORDER — LEVOFLOXACIN 750 MG/1
750 TABLET ORAL
Status: DISCONTINUED | OUTPATIENT
Start: 2018-01-07 | End: 2018-01-06

## 2018-01-06 RX ADMIN — DRONEDARONE 400 MG: 400 TABLET, FILM COATED ORAL at 07:33

## 2018-01-06 RX ADMIN — AZTREONAM: 1 INJECTION, POWDER, LYOPHILIZED, FOR SOLUTION INTRAMUSCULAR; INTRAVENOUS at 15:10

## 2018-01-06 RX ADMIN — Medication 10 ML: at 13:53

## 2018-01-06 RX ADMIN — IPRATROPIUM BROMIDE AND ALBUTEROL SULFATE 3 ML: .5; 3 SOLUTION RESPIRATORY (INHALATION) at 21:04

## 2018-01-06 RX ADMIN — HYDROCHLOROTHIAZIDE 25 MG: 25 TABLET ORAL at 08:59

## 2018-01-06 RX ADMIN — VANCOMYCIN HYDROCHLORIDE 750 MG: 750 INJECTION, POWDER, LYOPHILIZED, FOR SOLUTION INTRAVENOUS at 01:25

## 2018-01-06 RX ADMIN — DRONEDARONE 400 MG: 400 TABLET, FILM COATED ORAL at 17:58

## 2018-01-06 RX ADMIN — ASPIRIN 325 MG: 325 TABLET ORAL at 08:59

## 2018-01-06 RX ADMIN — ENOXAPARIN SODIUM 40 MG: 40 INJECTION SUBCUTANEOUS at 07:33

## 2018-01-06 RX ADMIN — METOPROLOL SUCCINATE 50 MG: 50 TABLET, EXTENDED RELEASE ORAL at 08:59

## 2018-01-06 RX ADMIN — PREDNISONE 20 MG: 20 TABLET ORAL at 17:58

## 2018-01-06 RX ADMIN — IPRATROPIUM BROMIDE AND ALBUTEROL SULFATE 3 ML: .5; 3 SOLUTION RESPIRATORY (INHALATION) at 13:27

## 2018-01-06 RX ADMIN — IPRATROPIUM BROMIDE AND ALBUTEROL SULFATE 3 ML: .5; 3 SOLUTION RESPIRATORY (INHALATION) at 07:47

## 2018-01-06 RX ADMIN — VANCOMYCIN HYDROCHLORIDE 1000 MG: 1 INJECTION, POWDER, LYOPHILIZED, FOR SOLUTION INTRAVENOUS at 12:33

## 2018-01-06 RX ADMIN — FLUTICASONE FUROATE AND VILANTEROL TRIFENATATE 1 PUFF: 100; 25 POWDER RESPIRATORY (INHALATION) at 09:04

## 2018-01-06 RX ADMIN — PREDNISONE 20 MG: 20 TABLET ORAL at 07:33

## 2018-01-06 RX ADMIN — Medication 10 ML: at 07:30

## 2018-01-06 RX ADMIN — AZTREONAM: 1 INJECTION, POWDER, LYOPHILIZED, FOR SOLUTION INTRAMUSCULAR; INTRAVENOUS at 07:30

## 2018-01-06 NOTE — PROGRESS NOTES
Pharmacy Automatic Renal Dosing Protocol - Antimicrobials/Vancomycin    Indication for Antimicrobials:  HAP(was admitted 10/2-10/6/17)    Current Regimen of Each Antimicrobial:  Aztreonam 1 gram Q8H (Start Date ; Day # 5  Levfloxacin 750 mg q 24 hours (Start Date ; Day # 5  Vancomycin IV (Start Date 1/3; day #5    Significant Cultures:   Blood cx (): in process  : Sputum cx: pending    Goal Level: VANCOMYCIN TROUGH GOAL RANGE    Vancomycin Trough: 15 - 20 mcg/mL    Vancomycin Level:    @ 0120 =6.7 mcg/mL- subtx adjust vancomycin from 750 mg IV q 18 hours--> 1000 mg IV q 12 hours    Labs:  Recent Labs      18   0123  18   0450  18   0444   CREA  0.87  0.88  0.80   BUN  34*  39*  35*   WBC  8.3  11.3*  16.1*     Temp (24hrs), Av.8 °F (36.6 °C), Min:97.6 °F (36.4 °C), Max:98.3 °F (36.8 °C)    Paralysis, amputations, malnutrition: No  Creatinine Clearance (mL/min) or Dialysis: ~43 mL/min    Impression/Plan:    Scr stable   Aztreonam 1 gram Q8H.appropriate for renal function.  Vancomycin trough drawn appropriately and subtherapeutic (=6.7 mcg/mL), pharmacy increased maintenance regimen from 750 mg IV q 18 h-->1000 mg IV q 12 h   Vancomycin trough level will need to be collected prior to 4th dose of new regimen   Daily Scr ordered per protocol   Discussed abx de-escalation with hospitalist as pt is on day #4 of broad spectrum abx, not afebrile and leukocytosis has resolved. Levofloxacin d/c'd per discussion     Pharmacy will follow daily and adjust medications as appropriate for renal function and/or serum levels.     Thank you,    Marc Waldron, PharmD, [facility] [facility] Pharm[facility] Specialist

## 2018-01-06 NOTE — PROGRESS NOTES
SW consulted to check copayment status of blood thinner. SW contacted Pt pharmacy and pharmacy reported that Pt would have a deductible of $195 for Xarelto and $244 for Eliquis. Pt reported that she is contemplative regarding starting any new medications, however would prefer to choose the cheaper of the two medications.      Eli Napier, VALEW   Ext # [phone]

## 2018-01-06 NOTE — PROGRESS NOTES
Hospitalist Progress Note    NAME: Jacob Cox   :  1935   MRN:  [id number]       Interim Hospital Summary: [de-identified] y.o. female whom presented on 2018 with      Assessment / Plan:            Sepsis improving   Acute on chronic hypoxic respiratory failure improving   HCAP  COPD, on 3LNC  continues to smoke  -CXR concerning for PNA.  Pt recently admitted in 10/2017  -flu neg  -BCx ordered no growth to date   -bipap prn  -abx to include: dc Levaquin\  Cont vanco/aztro Will de escalate when CX are back. Awaiting final Results. Given allergies to keflex will deescalate as soon possible   -scheduled and prn nebs/po steroids   -pulmo following     Chronic diastolic heart failure due to severe pulmonary HTN  PAfib  HTN  -cont' HCTZ, change to b blocker per cardiology  -pt needs to be on [facility], however refused in the past, will cont' ASA  -hydralazine prn   cardiology following   Pt to think about today will get cm to work with re: costs     Hypokalemia replace  prn     TOBB dep not ready to quits  Melody Chino- monitor likely chronic  Will need f/u as oupt-  Code Status: DNR see h/p notes forms signed  Surrogate Decision Maker:   DVT Prophylaxis: lovenox  GI Prophylaxis: not indicated          Subjective:     Chief Complaint / Reason for Physician Visit no c/o breathing better   Discussed with RN events overnight. Review of Systems:  Symptom Y/N Comments  Symptom Y/N Comments   Fever/Chills n   Chest Pain n    Poor Appetite n   Edema     Cough y   Abdominal Pain n    Sputum n   Joint Pain     SOB/LUDWIG y chronic  Pruritis/Rash n    Nausea/vomit n   Tolerating PT/OT y    Diarrhea n   Tolerating Diet y    Constipation n   Other       Could NOT obtain due to:      Objective:     VITALS:   Last 24hrs VS reviewed since prior progress note.  Most recent are:  Patient Vitals for the past 24 hrs:   Temp Pulse Resp BP SpO2   18 0732 97.7 °F (36.5 °C) 74 20 156/72 92 %   18 2238 98.3 °F (36.8 °C) 82 20 150/77 97 %   01/05/18 2032 - - - - 95 %   01/05/18 1918 97.8 °F (36.6 °C) 76 20 (!) 145/94 95 %   01/05/18 1552 - 81 20 (!) 145/94 96 %   01/05/18 1549 97.6 °F (36.4 °C) 81 20 (!) 152/92 96 %   01/05/18 1528 97.6 °F (36.4 °C) - - - -   01/05/18 1506 - - - - 92 %   01/05/18 1305 97.9 °F (36.6 °C) 84 20 131/57 92 %       Intake/Output Summary (Last 24 hours) at 01/06/18 0745  Last data filed at 01/05/18 1936   Gross per 24 hour   Intake              500 ml   Output               20 ml   Net              480 ml        PHYSICAL EXAM:  Constitutional: She appears well-developed and well-nourished. She appears  In  No distress  HENT:   Head: Normocephalic and atraumatic. Mouth/Throat: No oropharyngeal exudate. Eyes: Conjunctivae are normal. Pupils are equal, round, and reactive to light. Neck: Normal range of motion. Neck supple. No JVD present. Cardiovascular: Normal rate, regular rhythm, normal heart sounds and intact distal pulses.  Exam reveals no gallop and no friction rub.    No murmur heard. Pulmonary/Chest: decrease bs chin   Abdominal: Soft. Normal aorta and bowel sounds are normal. She exhibits no distension, no abdominal bruit, no pulsatile midline mass and no mass. There is no tenderness. There is no rebound and no guarding. Neurological: She is alert. She displays normal reflexes. Reviewed most current lab test results and cultures  YES  Reviewed most current radiology test results   YES  Review and summation of old records today    NO  Reviewed patient's current orders and MAR    YES  PMH/SH reviewed - no change compared to H&P  ________________________________________________________________________  Care Plan discussed with:    Comments   Patient x    Family      RN x    Care Manager     Consultant                        Multidiciplinary team rounds were held today with , nursing, pharmacist and clinical coordinator.   Patient's plan of care was discussed; medications were reviewed and discharge planning was addressed. ________________________________________________________________________  Total NON critical care TIME:  30    Minutes    Total CRITICAL CARE TIME Spent:   Minutes non procedure based      Comments   >50% of visit spent in counseling and coordination of care x    ________________________________________________________________________  Jayda Randle MD     Procedures: see electronic medical records for all procedures/Xrays and details which were not copied into this note but were reviewed prior to creation of Plan. LABS:  I reviewed today's most current labs and imaging studies.   Pertinent labs include:  Recent Labs      01/06/18   0123  01/05/18   0450  01/04/18   0444   WBC  8.3  11.3*  16.1*   HGB  10.3*  10.1*  9.8*   HCT  31.4*  32.6*  30.9*   PLT  259  270  256     Recent Labs      01/06/18   0123  01/05/18   0450  01/04/18   0444   NA  138  137  139   K  3.8  3.8  3.3*   CL  102  104  105   CO2  29  25  27   GLU  171*  196*  192*   BUN  34*  39*  35*   CREA  0.87  0.88  0.80   CA  9.7  9.6  9.4       Signed: Jayda Randle MD

## 2018-01-06 NOTE — CONSULTS
Cardiology Consult Note    CC: dyspnea. Abi Walker MD  Reason for consult: Pafib  Requesting MD:  Dr. Aime Choi. Admit Date: 1/2/2018   Today's Date: 1/5/2018   Pulm: Dr Lisseth Hernandez.  Gala Guzman Assessment/Plan:   Afib: Paroxysmal; Brief duration (few hrs) during admit 10/2017: started on dilt gtt: NSR spont after dilt gtt; Pt reports no Sx even with RVR. Rec 10/2017: changed home norvasc (5) to dilt ; If recurrent afib, likely change dilt to metoprolol and add multaq. CHADS-Vasc: 4 (should be on anticoag if recurrent/persistent afib). Recurrent Afib evening of 1/4 (approx 10 hrs): No Sx. With recurrent ASx afib, I again rec anticoagulation (pt declined in 10/2017 for pulm HTN and is unsure if she wants anticoag now: CM to look into cost). Will change dilt to toprol XL and add multaq; watch QTc, neha with levoflox.      HTN: med change as above.      Cor pulmonale/pulm HTN: significant when checked in 2011;   Echo 10/3/17: EF55%. RVE. Mild AS (mean 8). Severe TR. PAp 78.      Volume status: looks euvolemic; on home dose of HCTZ; previously on PO lasix (40). Renal function: stable      RBBB: chronic. PPM not indicated.      ID (PNA?), COPD/cor pulmonale/Resp failure, Dispo: all per primary team.        Hospital Problem List:  Active Problems:    Respiratory failure ([address] (1/2/2018)       ____________________________________________________________________  [place] Tristan is a [de-identified] y.o. female presents with Respiratory failure ([address] As noted in H&P: [de-identified] y.o.  female with pmhx significant fo pulmonary htn, diastolic chf, COPD on home 3LNC, HTN, present to ED c/o progressively worsening of sob stated 2 days ago. Other associated symptoms including fever ~102. She also complains of associated productive cough. Per pt's , home O2 turned up \"all the way\", however, pt remains hypoxic, tachypneic. In the ER, vitals: T 10.7, P 63, /53, SpO2 88% on 4LNC.   Pt denies any cp, palpitations, n/v/d. Pertinent labs: WBC 26.2, , hgb 12.1, Cr 1.07, trop neg, flu neg. CXR concerning for PNA\"    ______________________________________________________________________    The patient reports no CP; LUDWIG as above. No PND, orthopnea, palpitations, pre-syncope, syncope, peripheral edema, or decrease in exercise tolerance. No current complaints. ECG: sinus tachy then afib. Tele: sinus then afib; now sinus. CXR: \"Pulmonary hyperinflation and slight interstitial prominence\"    Notable labs: WBC26 to 11; Hg 12 to 10.1; BUN 29 to 39; nl Cr. ProBNP only 520. Neg Ck/trop x1. Notable prior cardiac history:  Pafib 10/2017; Not Sx: reportedly declined anticoag.      ______________________________________________________________________  Patient Active Problem List    Diagnosis Date Noted    Respiratory failure ([address] 01/02/2018    Sepsis ([address] 10/02/2017    COPD exacerbation ([address] 10/02/2017    Hyperglycemia 11/19/2011    Tobacco abuse 11/19/2011    Pulmonary hypertension, moderate to severe 11/19/2011    Acute on chronic respiratory failure ([address] 11/17/2011    COPD (chronic obstructive pulmonary disease) with acute bronchitis ([address] 11/17/2011    Lung nodule 11/17/2011    Abnormal ECG 11/17/2011        Past Medical History:   Diagnosis Date    COPD     Hypertension       Past Surgical History:   Procedure Laterality Date    HX GYN      overy removed/ 2 c-sections     Allergies   Allergen Reactions    Keflex [Cephalexin] Itching      Family History   Problem Relation Age of Onset    Cancer Brother      lung     Cancer Brother      lung     Heart Disease Mother       Social History     Social History    Marital status:      Spouse name: N/A    Number of children: N/A    Years of education: N/A     Occupational History    Not on file.      Social History Main Topics    Smoking status: Current Every Day Smoker     Packs/day: 0.25    Smokeless tobacco: Never Used  Alcohol use No    Drug use: No    Sexual activity: Not on file     Other Topics Concern    Not on file     Social History Narrative     Current Facility-Administered Medications   Medication Dose Route Frequency    predniSONE (DELTASONE) tablet 20 mg  20 mg Oral BID WITH MEALS    levoFLOXacin (LEVAQUIN) tablet 750 mg  750 mg Oral Q24H    VANCOMYCIN INFORMATION NOTE   Other ONCE    albuterol-ipratropium (DUO-NEB) 2.5 MG-0.5 MG/3 ML  3 mL Nebulization Q6H RT    vancomycin (VANCOCIN) 750 mg in 0.9% sodium chloride (MBP/ADV) 250 mL  750 mg IntraVENous Q18H    sodium chloride (NS) flush 5-10 mL  5-10 mL IntraVENous PRN    aztreonam 1 g in 0.9% sodium chloride 100 mL IVPB   IntraVENous Q8H    albuterol-ipratropium (DUO-NEB) 2.5 MG-0.5 MG/3 ML  3 mL Nebulization Q4H PRN    sodium chloride (NS) flush 5-10 mL  5-10 mL IntraVENous Q8H    sodium chloride (NS) flush 5-10 mL  5-10 mL IntraVENous PRN    acetaminophen (TYLENOL) tablet 650 mg  650 mg Oral Q4H PRN    ondansetron (ZOFRAN) injection 4 mg  4 mg IntraVENous Q4H PRN    bisacodyl (DULCOLAX) suppository 10 mg  10 mg Rectal DAILY PRN    enoxaparin (LOVENOX) injection 40 mg  40 mg SubCUTAneous Q24H    aspirin (ASPIRIN) tablet 325 mg  325 mg Oral DAILY    fluticasone-vilanterol (BREO ELLIPTA) 100mcg-25mcg/puff  1 Puff Inhalation DAILY    dilTIAZem CD (CARDIZEM CD) capsule 180 mg  180 mg Oral DAILY    hydroCHLOROthiazide (HYDRODIURIL) tablet 25 mg  25 mg Oral DAILY    hydrALAZINE (APRESOLINE) 20 mg/mL injection 10 mg  10 mg IntraVENous Q6H PRN        No reported FHx of early CAD or SCD    Prior to Admission Medications:  Prior to Admission medications    Medication Sig Start Date End Date Taking? Authorizing Provider   dilTIAZem CD (CARDIZEM CD) 180 mg ER capsule Take 1 Cap by mouth daily.  10/6/17   Nury Masters MD   predniSONE (DELTASONE) 10 mg tablet Take 2 Tab daily x 3 days then 1 Tab daily x 3 days 10/6/17   Nury Masters MD budesonide-formoterol (SYMBICORT) 160-4.5 mcg/actuation HFAA Take 2 Puffs by inhalation two (2) times a day. Historical Provider   hydroCHLOROthiazide (HYDRODIURIL) 25 mg tablet Take 25 mg by mouth daily. Mckayla Thomas MD   umeclidinium (INCRUSE ELLIPTA) 62.5 mcg/actuation inhaler Take 1 Puff by inhalation daily. Mckayla Thomas MD   Oxygen 3 Each continuous. Mckayla Thomas MD   calcium carbonate (TUMS) 200 mg calcium (500 mg) Chew Take 1 Tab by mouth daily. 4/9/13   Brendalyn Koyanagi, MD   cholecalciferol (VITAMIN D3) 1,000 unit tablet Take 1 Tab by mouth daily. 4/9/13   Brendalyn Koyanagi, MD   acetaminophen (TYLENOL) 325 mg tablet Take 2 Tabs by mouth every four (4) hours as needed. 11/21/11   Neptali Alyaleighaowski III, DO   fluticasone (FLONASE) 50 mcg/Actuation nasal spray 2 Sprays by Both Nostrils route. Use daily 11/21/11   Cloria Going III, DO   tiotropium (SPIRIVA WITH HANDIHALER) 18 mcg inhalation capsule Take 1 Cap by inhalation daily. Mckayla Thomas MD   albuterol (PROVENTIL, VENTOLIN) 90 mcg/Actuation inhaler Take 2 Puffs by inhalation every six (6) hours as needed. Mckayla Thomas MD   albuterol (PROVENTIL VENTOLIN) 2.5 mg /3 mL (0.083 %) nebulizer solution by Nebulization route every six (6) hours as needed. Mckayla Thomas MD   dextromethorphan-guaiFENesin [facility] DM)  mg per tablet Take 1 Tab by mouth two (2) times a day. Mckayla Thomas MD   aspirin (ASPIRIN) 325 mg tablet Take 325 mg by mouth daily.     Mckayla Thomas MD        Review of Symptoms: As noted in H&P:  \"   Total of 12 systems reviewed as follows:                                                                      POSITIVE= BOLD text  Negative = text not BOLD   General:                                         fever, chills, sweats, generalized weakness, weight loss/gain,                                                                     loss of appetite   Eyes:                                                         blurred vision, eye pain, loss of vision, double vision  ENT:                                                          rhinorrhea, pharyngitis   Respiratory:                                   cough, sputum production, SOB, LUDWIG, wheezing, pleuritic pain   Cardiology:                                    chest pain, palpitations, orthopnea, PND, edema, syncope   Gastrointestinal:                 abdominal pain , N/V, diarrhea, dysphagia, constipation, bleeding   Genitourinary:                     frequency, urgency, dysuria, hematuria, incontinence   Muskuloskeletal :                arthralgia, myalgia, back pain  Hematology:                                  easy bruising, nose or gum bleeding, lymphadenopathy   Dermatological:                   rash, ulceration, pruritis, color change / jaundice  Endocrine:                                     hot flashes or polydipsia   Neurological:                       headache, dizziness, confusion, focal weakness, paresthesia,                                                                    Speech difficulties, memory loss, gait difficulty  Psychological:                    Feelings of anxiety, depression, agitation\". 24 hr VS reviewed, overall VSSAF  Temp (24hrs), Av.8 °F (36.6 °C), Min:97.6 °F (36.4 °C), Max:98 °F (36.7 °C)    Patient Vitals for the past 8 hrs:   Pulse   18 1552 81   18 1549 81   18 1305 84    Patient Vitals for the past 8 hrs:   Resp   18 1549 20   18 1305 20    Patient Vitals for the past 8 hrs:   BP   18 1549 (!) 152/92   18 1305 131/57          Intake/Output Summary (Last 24 hours) at 18 1907  Last data filed at 18 1606   Gross per 24 hour   Intake              300 ml   Output                0 ml   Net              300 ml         Physical Exam (complete single organ system exam)    Cons: The patient is no distress. Appears stated age. HEENT: Normal conjunctivae and palate. No xanthelasma.   Neck: Flat JVP without appreciable HJR. Resp: Normal respiratory effort with clear lungs bilaterally. CV: Regular rate and rhythm. PMI not palpated. Normal S1,S2  No gallop or rubs appreciated. No murmur appreciated. Intact carotid upstroke bilaterally without appreciated bruits. Abdominal aorta not palpated; no abdominal bruit noted. Normal femoral pulses without bruits. Intact pedal pulses. No peripheral edema. GI: No abd mass noted, soft; no organomegaly noted. Bowel sounds present. Muscular:  No significant kyphosis. Strength WNL for age. Ext: No cyanosis, clubbing, or stigmata of peripheral embolization. Derm: No ulcers or stasis dermatitis of lower extremities. Neuro: Alert and oriented x 3;  Grossly non-focal. Normal mood and affect. Labs:   Recent Results (from the past 24 hour(s))   CULTURE, RESPIRATORY/SPUTUM/BRONCH W GRAM STAIN    Collection Time: 01/05/18 12:21 AM   Result Value Ref Range    Special Requests: NO SPECIAL REQUESTS      GRAM STAIN FEW WBCS SEEN      GRAM STAIN OCCASIONAL EPITHELIAL CELLS SEEN      GRAM STAIN FEW GRAM NEGATIVE RODS      GRAM STAIN FEW GRAM POSITIVE COCCI IN PAIRS      Culture result: PENDING    EKG, 12 LEAD, INITIAL    Collection Time: 01/05/18  1:49 AM   Result Value Ref Range    Ventricular Rate 117 BPM    Atrial Rate 131 BPM    QRS Duration 142 ms    Q-T Interval 378 ms    QTC Calculation (Bezet) 527 ms    Calculated R Axis 52 degrees    Calculated T Axis 24 degrees    Diagnosis       Atrial fibrillation with rapid ventricular response with premature   ventricular or aberrantly conducted complexes  Right bundle branch block  When compared with ECG of 02-JAN-2018 18:59,  Atrial fibrillation has replaced Sinus rhythm  Vent.  rate has increased BY  62 BPM  Confirmed by Ed Rony ([id number]) on 1/5/2018 11:55:02 AM     CBC WITH AUTOMATED DIFF    Collection Time: 01/05/18  4:50 AM   Result Value Ref Range    WBC 11.3 (H) 3.6 - 11.0 K/uL    RBC 3.73 (L) 3.80 - 5.20 M/uL    HGB 10.1 (L) 11.5 - 16.0 g/dL    HCT 32.6 (L) 35.0 - 47.0 %    MCV 87.4 80.0 - 99.0 FL    MCH 27.1 26.0 - 34.0 PG    MCHC 31.0 30.0 - 36.5 g/dL    RDW 14.5 11.5 - 14.5 %    PLATELET 843 637 - 522 K/uL    NEUTROPHILS 92 (H) 32 - 75 %    LYMPHOCYTES 4 (L) 12 - 49 %    MONOCYTES 4 (L) 5 - 13 %    EOSINOPHILS 0 0 - 7 %    BASOPHILS 0 0 - 1 %    ABS. NEUTROPHILS 10.4 (H) 1.8 - 8.0 K/UL    ABS. LYMPHOCYTES 0.5 (L) 0.8 - 3.5 K/UL    ABS. MONOCYTES 0.4 0.0 - 1.0 K/UL    ABS. EOSINOPHILS 0.0 0.0 - 0.4 K/UL    ABS. BASOPHILS 0.0 0.0 - 0.1 K/UL   METABOLIC PANEL, BASIC    Collection Time: 01/05/18  4:50 AM   Result Value Ref Range    Sodium 137 136 - 145 mmol/L    Potassium 3.8 3.5 - 5.1 mmol/L    Chloride 104 97 - 108 mmol/L    CO2 25 21 - 32 mmol/L    Anion gap 8 5 - 15 mmol/L    Glucose 196 (H) 65 - 100 mg/dL    BUN 39 (H) 6 - 20 MG/DL    Creatinine 0.88 0.55 - 1.02 MG/DL    BUN/Creatinine ratio 44 (H) 12 - 20      GFR est AA >60 >60 ml/min/1.73m2    GFR est non-AA >60 >60 ml/min/1.73m2    Calcium 9.6 8.5 - 10.1 MG/DL     No results for input(s): CPK, CKMB, CKNDX, TROIQ in the last 72 hours.     Angy Garg MD

## 2018-01-06 NOTE — PROGRESS NOTES
Received report Colin Boothe RN. FLORY, BHARGAV, MAR, RECENT RESULTS. No complaints of pain, nausea or shortness of breath. Dr. Kasie Hernandez expressed to patient need for blood thinner due to Afib with RVR. Patient express cost is a factor in her not wanting to take the medication. Also concerned that she already bruises easily. Explained she has thin skin, on steroids for long periods, along with age. Advised blood thinner would watch for unexplained bleeding from urine, rectum, sputum, emesis. Still hesitant. Need case management help with cost information for these medications.

## 2018-01-06 NOTE — PROGRESS NOTES
Bedside and Verbal shift change report given to Toyin Hyde (oncoming nurse) by Haley Haley (offgoing nurse). Report included the following information SBAR, Kardex, MAR and Recent Results.

## 2018-01-07 LAB
ANION GAP SERPL CALC-SCNC: 5 MMOL/L (ref 5–15)
BACTERIA SPEC CULT: ABNORMAL
BACTERIA SPEC CULT: ABNORMAL
BUN SERPL-MCNC: 29 MG/DL (ref 6–20)
BUN/CREAT SERPL: 35 (ref 12–20)
CALCIUM SERPL-MCNC: 9 MG/DL (ref 8.5–10.1)
CHLORIDE SERPL-SCNC: 100 MMOL/L (ref 97–108)
CO2 SERPL-SCNC: 32 MMOL/L (ref 21–32)
CREAT SERPL-MCNC: 0.84 MG/DL (ref 0.55–1.02)
GLUCOSE SERPL-MCNC: 195 MG/DL (ref 65–100)
GRAM STN SPEC: ABNORMAL
POTASSIUM SERPL-SCNC: 3.7 MMOL/L (ref 3.5–5.1)
SERVICE CMNT-IMP: ABNORMAL
SODIUM SERPL-SCNC: 137 MMOL/L (ref 136–145)

## 2018-01-07 PROCEDURE — 74011636637 HC RX REV CODE- 636/637: Performed by: INTERNAL MEDICINE

## 2018-01-07 PROCEDURE — 77010033678 HC OXYGEN DAILY

## 2018-01-07 PROCEDURE — 74011000258 HC RX REV CODE- 258: Performed by: INTERNAL MEDICINE

## 2018-01-07 PROCEDURE — 74011250637 HC RX REV CODE- 250/637: Performed by: INTERNAL MEDICINE

## 2018-01-07 PROCEDURE — 74011000250 HC RX REV CODE- 250: Performed by: INTERNAL MEDICINE

## 2018-01-07 PROCEDURE — 74011000258 HC RX REV CODE- 258: Performed by: EMERGENCY MEDICINE

## 2018-01-07 PROCEDURE — 74011250636 HC RX REV CODE- 250/636: Performed by: INTERNAL MEDICINE

## 2018-01-07 PROCEDURE — 80048 BASIC METABOLIC PNL TOTAL CA: CPT | Performed by: INTERNAL MEDICINE

## 2018-01-07 PROCEDURE — 74011000250 HC RX REV CODE- 250: Performed by: EMERGENCY MEDICINE

## 2018-01-07 PROCEDURE — 94640 AIRWAY INHALATION TREATMENT: CPT

## 2018-01-07 PROCEDURE — 65660000000 HC RM CCU STEPDOWN

## 2018-01-07 PROCEDURE — 36415 COLL VENOUS BLD VENIPUNCTURE: CPT | Performed by: INTERNAL MEDICINE

## 2018-01-07 RX ORDER — IPRATROPIUM BROMIDE AND ALBUTEROL SULFATE 2.5; .5 MG/3ML; MG/3ML
3 SOLUTION RESPIRATORY (INHALATION)
Status: DISCONTINUED | OUTPATIENT
Start: 2018-01-07 | End: 2018-01-08 | Stop reason: HOSPADM

## 2018-01-07 RX ADMIN — Medication 10 ML: at 08:24

## 2018-01-07 RX ADMIN — Medication 10 ML: at 21:37

## 2018-01-07 RX ADMIN — VANCOMYCIN HYDROCHLORIDE 1000 MG: 1 INJECTION, POWDER, LYOPHILIZED, FOR SOLUTION INTRAVENOUS at 01:19

## 2018-01-07 RX ADMIN — AZTREONAM: 1 INJECTION, POWDER, LYOPHILIZED, FOR SOLUTION INTRAMUSCULAR; INTRAVENOUS at 00:00

## 2018-01-07 RX ADMIN — Medication 10 ML: at 14:56

## 2018-01-07 RX ADMIN — ASPIRIN 325 MG: 325 TABLET ORAL at 08:23

## 2018-01-07 RX ADMIN — DRONEDARONE 400 MG: 400 TABLET, FILM COATED ORAL at 17:59

## 2018-01-07 RX ADMIN — AZTREONAM: 1 INJECTION, POWDER, LYOPHILIZED, FOR SOLUTION INTRAMUSCULAR; INTRAVENOUS at 09:40

## 2018-01-07 RX ADMIN — DRONEDARONE 400 MG: 400 TABLET, FILM COATED ORAL at 08:23

## 2018-01-07 RX ADMIN — IPRATROPIUM BROMIDE AND ALBUTEROL SULFATE 3 ML: .5; 3 SOLUTION RESPIRATORY (INHALATION) at 14:30

## 2018-01-07 RX ADMIN — HYDROCHLOROTHIAZIDE 25 MG: 25 TABLET ORAL at 08:23

## 2018-01-07 RX ADMIN — HYDRALAZINE HYDROCHLORIDE 10 MG: 20 INJECTION INTRAMUSCULAR; INTRAVENOUS at 21:37

## 2018-01-07 RX ADMIN — PREDNISONE 20 MG: 20 TABLET ORAL at 17:59

## 2018-01-07 RX ADMIN — FLUTICASONE FUROATE AND VILANTEROL TRIFENATATE 1 PUFF: 100; 25 POWDER RESPIRATORY (INHALATION) at 10:24

## 2018-01-07 RX ADMIN — METOPROLOL SUCCINATE 50 MG: 50 TABLET, EXTENDED RELEASE ORAL at 08:23

## 2018-01-07 RX ADMIN — PREDNISONE 20 MG: 20 TABLET ORAL at 08:23

## 2018-01-07 RX ADMIN — HYDRALAZINE HYDROCHLORIDE 10 MG: 20 INJECTION INTRAMUSCULAR; INTRAVENOUS at 01:19

## 2018-01-07 RX ADMIN — Medication 10 ML: at 00:00

## 2018-01-07 RX ADMIN — ENOXAPARIN SODIUM 40 MG: 40 INJECTION SUBCUTANEOUS at 08:23

## 2018-01-07 RX ADMIN — IPRATROPIUM BROMIDE AND ALBUTEROL SULFATE 3 ML: .5; 3 SOLUTION RESPIRATORY (INHALATION) at 08:16

## 2018-01-07 RX ADMIN — AZTREONAM: 1 INJECTION, POWDER, LYOPHILIZED, FOR SOLUTION INTRAMUSCULAR; INTRAVENOUS at 18:08

## 2018-01-07 NOTE — PROGRESS NOTES
ADULT PROTOCOL: JET AEROSOL  REASSESSMENT    Patient  Amadou Yi     [de-identified] y.o.   female     1/7/2018  11:11 AM    Breath Sounds Pre Procedure:   Clear                                                      Post Procedure:  Clear                                   Breathing pattern: Pre procedure Breathing Pattern: Tachypneic          Post procedure Breathing Pattern: Tachypneic    Heart Rate: Pre procedure Pulse: 66           Post procedure Pulse: 65    Resp Rate: Pre procedure Respirations: 26           Post procedure Respirations: 24      Incentive Spirometry:  Actual Volume (ml): 75 ml          Cough: Pre procedure Cough: Non-productive               Post procedure Cough: Non-productive    Oxygen: O2 Device: Nasal cannula   3L     Changed: NO    SpO2: Pre procedure SpO2: 100 %               Post procedure SpO2: 98 %      Nebulizer Therapy: Current medications Aerosolized Medications: DuoNeb q6hrs      Changed: Yes to Tid per pt's request    Smoking History: Current    Problem List:   Patient Active Problem List   Diagnosis Code    Acute on chronic respiratory failure ([facility]) J96.20    COPD (chronic obstructive pulmonary disease) with acute bronchitis ([facility]) J44.0, J20.9    Lung nodule R91.1    Abnormal ECG R94.31    Hyperglycemia R73.9    Tobacco abuse Z72.0    Pulmonary hypertension, moderate to severe I27.20    Sepsis ([facility]) A41.9    COPD exacerbation ([facility]) J44.1    Respiratory failure ([facility]) J96.90       Respiratory Therapist: RT Kingsley

## 2018-01-07 NOTE — PROGRESS NOTES
Cardiology Progress Note  1/7/2018     Admit Date: 1/2/2018  Admit Diagnosis: Respiratory failure ([facility]  CC: none currently  Pulm: Dr Shauna Bullard Assessment/Plan:   Afib: Paroxysmal; Brief duration (few hrs) during admit 10/2017: started on dilt gtt: NSR spont after dilt gtt; Pt reports no Sx even with RVR.     Rec 10/2017: changed home norvasc (5) to dilt ; If recurrent afib, likely change dilt to metoprolol and add multaq. CHADS-Vasc: 4 (should be on anticoag if recurrent/persistent afib).     Recurrent Afib evening of 1/4 (approx 10 hrs): No Sx. With recurrent ASx afib, I again rec anticoagulation (pt declined in 10/2017 for pulm HTN and is unsure if she wants anticoag now: CM to look into cost). Changed dilt to toprol XL and add multaq; watch QTc, neha with levoflox.      HTN: med change as above.      Cor pulmonale/pulm HTN: significant when checked in 2011;   Echo 10/3/17: EF55%. RVE. Mild AS (mean 8). Severe TR. PAp 78.      Volume status: looks euvolemic; on home dose of HCTZ; previously on PO lasix (40). Renal function: stable      RBBB: chronic. PPM not indicated.      ID (PNA?), COPD/cor pulmonale/Resp failure, Dispo: all per primary team.     12/6: NSR; tolerating multaq; QTc 493.  Again, she should be anticoagulated. 12/7: NSR; on multaq; QTc 470s-480s. Xarelto/elquis very expensive for pt. She states to me she has never fallen: therefore, should be on anticoagulation until prove no further afib with multaq.  [facility] Problem List:  Active Problems:    Respiratory failure ([facility] (1/2/2018)         ____________________________________________________________________  Philip Cost is a [de-identified] y.o. female presents with Respiratory failure ([address]      As noted in H&P: \"82 y.o.   female with pmhx significant fo pulmonary htn, diastolic chf, COPD on home 3LNC, HTN, present to ED c/o progressively worsening of sob stated 2 days ago. Jovanna blas symptoms including fever ~102.  She also complains of associated productive cough.  Per pt's , home O2 turned up \"all the way\", however, pt remains hypoxic, tachypneic. In the ER, vitals: T 10.7, P 63, /53, SpO2 88% on 4LNC.  Pt denies any cp, palpitations, n/v/d. Pertinent labs: WBC 26.2, , hgb 12.1, Cr 1.07, trop neg, flu neg. CXR concerning for PNA\"     ______________________________________________________________________     As previously noted: \"The patient reports no CP; LUDWIG as above.     No PND, orthopnea, palpitations, pre-syncope, syncope, peripheral edema, or decrease in exercise tolerance. No current complaints.     ECG: sinus tachy then afib.     Tele: sinus then afib; now sinus.     CXR: \"Pulmonary hyperinflation and slight interstitial prominence\"     Notable labs: WBC26 to 11; Hg 12 to 10.1; BUN 29 to 39; nl Cr. ProBNP only 520. Neg Ck/trop x1.     Notable prior cardiac history:  Pafib 10/2017; Not Sx: reportedly declined anticoag. \"     For other plans, see orders.   Hospital problem list   Active Hospital Problems    Diagnosis Date Noted    [facility] 2018        Subjective: Barbra Hassan reports   Chest pain X none  consistent with:  Non-cardiac CP         Atypical CP     None now  On going  Anginal CP     Dyspnea  none  at rest  with exertion        x improved  unchanged  worse              PND X none  overnight       Orthopnea X none  improved  unchanged  worse   Presyncope X none  improved  unchanged  worse     Ambulated in hallway without symptoms   Yes   Ambulated in room without symptoms  Yes   Objective:    Physical Exam:  Overall VSSAF;    Visit Vitals    /66    Pulse (!) 59    Temp 98.3 °F (36.8 °C)    Resp 18    Ht 5' 3\" (1.6 m)    Wt 82.6 kg (182 lb 1.6 oz)    SpO2 100%    BMI 32.26 kg/m2     Temp (24hrs), Av.9 °F (36.6 °C), Min:97.6 °F (36.4 °C), Max:98.3 °F (36.8 °C)    Patient Vitals for the past 8 hrs:   Pulse 01/07/18 0743 (!) 59   01/07/18 0340 74    Patient Vitals for the past 8 hrs:   Resp   01/07/18 0743 18   01/07/18 0340 18    Patient Vitals for the past 8 hrs:   BP   01/07/18 0743 151/66   01/07/18 0340 159/74      No intake or output data in the 24 hours ending 01/07/18 1004  General Appearance: Well developed, well nourished, no acute distress. Ears/Nose/Mouth/Throat:   Normal MM; anicteric. JVP: WNL   Resp:   clear to auscultation bilaterally. Nl resp effort. Cardiovascular:  RRR, S1, S2 normal, no new murmur. No gallop or rub. Abdomen:   Soft, non-tender, bowel sounds are present. Extremities: No edema bilaterally. Skin:  Neuro: Warm and dry. A/O x3, grossly nonfocal   cath site intact w/o hematoma or new bruit; distal pulse unchanged  Yes   Data Review:     Telemetry independently reviewed x sinus  chronic afib  parox afib  NSVT     ECG independently reviewed  NSR  afib  no significant changes  NSST-Tw chgs   x no new ECG provided for review   Lab results reviewed as noted below. Current medications reviewed as noted below. No results for input(s): PH, PCO2, PO2 in the last 72 hours. No results for input(s): CPK, CKMB, CKNDX, TROIQ in the last 72 hours. Recent Labs      01/07/18   0335  01/06/18   0123  01/05/18   0450   NA  137  138  137   K  3.7  3.8  3.8   CL  100  102  104   CO2  32  29  25   BUN  29*  34*  39*   CREA  0.84  0.87  0.88   GLU  195*  171*  196*   CA  9.0  9.7  9.6   WBC   --   8.3  11.3*   HGB   --   10.3*  10.1*   HCT   --   31.4*  32.6*   PLT   --   259  270     No results found for: CHOL, CHOLX, CHLST, CHOLV, HDL, LDL, LDLC, DLDLP, TGLX, TRIGL, TRIGP, CHHD, CHHDX  No results for input(s): SGOT, GPT, AP, TBIL, TP, ALB, GLOB, GGT, AML, LPSE in the last 72 hours. No lab exists for component: AMYP, HLPSE  No results for input(s): INR, PTP, APTT in the last 72 hours.     No lab exists for component: INREXT, INREXT   No components found for: [place]    Current Facility-Administered Medications   Medication Dose Route Frequency    albuterol-ipratropium (DUO-NEB) 2.5 MG-0.5 MG/3 ML  3 mL Nebulization TID RT    vancomycin (VANCOCIN) 1,000 mg in 0.9% sodium chloride (MBP/ADV) 250 mL  1,000 mg IntraVENous Q12H    predniSONE (DELTASONE) tablet 20 mg  20 mg Oral BID WITH MEALS    dronedarone (MULTAQ) tablet tab 400 mg  400 mg Oral BID WITH MEALS    metoprolol succinate (TOPROL-XL) XL tablet 50 mg  50 mg Oral DAILY    sodium chloride (NS) flush 5-10 mL  5-10 mL IntraVENous PRN    aztreonam 1 g in 0.9% sodium chloride 100 mL IVPB   IntraVENous Q8H    albuterol-ipratropium (DUO-NEB) 2.5 MG-0.5 MG/3 ML  3 mL Nebulization Q4H PRN    sodium chloride (NS) flush 5-10 mL  5-10 mL IntraVENous Q8H    sodium chloride (NS) flush 5-10 mL  5-10 mL IntraVENous PRN    acetaminophen (TYLENOL) tablet 650 mg  650 mg Oral Q4H PRN    bisacodyl (DULCOLAX) suppository 10 mg  10 mg Rectal DAILY PRN    enoxaparin (LOVENOX) injection 40 mg  40 mg SubCUTAneous Q24H    aspirin (ASPIRIN) tablet 325 mg  325 mg Oral DAILY    fluticasone-vilanterol (BREO ELLIPTA) 100mcg-25mcg/puff  1 Puff Inhalation DAILY    hydroCHLOROthiazide (HYDRODIURIL) tablet 25 mg  25 mg Oral DAILY    hydrALAZINE (APRESOLINE) 20 mg/mL injection 10 mg  10 mg IntraVENous Q6H PRN        Kamar Ward MD

## 2018-01-07 NOTE — PROGRESS NOTES
ADULT PROTOCOL: JET AEROSOL  REASSESSMENT    Patient  Zac Linares     [de-identified] y.o.   female     1/7/2018  1:07 AM    Breath Sounds Pre Procedure: Clear  Breath Sounds Post Procedure: Clear    Breathing pattern: Pre procedure Breathing Pattern: Regular          Post procedure Breathing Pattern: Regular    Heart Rate: Pre procedure Pulse: 65           Post procedure Pulse: 68    Resp Rate: Pre procedure Respirations: 20           Post procedure Respirations: 20    Peak Flow: Pre bronchodilator             Post bronchodilator       FVC/FEV1:        Incentive Spirometry:  Actual Volume (ml): 750 ml          Cough: Pre procedure Cough: Non-productive               Post procedure Cough: Non-productive    Suctioned: NO    Sputum: Pre procedure                   Post procedure      Oxygen: O2 Device: Nasal cannula   Flow rate (L/min) 3     Changed: NO    SpO2: Pre procedure SpO2: 97 %   with oxygen              Post procedure SpO2: 98 %  with oxygen    Nebulizer Therapy: Current medications Aerosolized Medications: DuoNeb      Changed: NO    Smoking History:     Problem List:   Patient Active Problem List   Diagnosis Code    Acute on chronic respiratory failure ([facility]) J96.20    COPD (chronic obstructive pulmonary disease) with acute bronchitis ([facility]) J44.0, J20.9    Lung nodule R91.1    Abnormal ECG R94.31    Hyperglycemia R73.9    Tobacco abuse Z72.0    Pulmonary hypertension, moderate to severe I27.20    Sepsis ([facility]) A41.9    COPD exacerbation ([facility]) J44.1    Respiratory failure ([facility]) J96.90       Respiratory Therapist: Ry Rasheed RT

## 2018-01-07 NOTE — PROGRESS NOTES
2200: Significantly increased shortness of breath at rest and with exertion. Lungs sound the same. Placed on 6L by respiratory. 0300: Decrease to 4L NC. Does not want to walk to bathroom sits on bed pan on bed to urinate due to difficulty breathing.

## 2018-01-07 NOTE — PROGRESS NOTES
Hospitalist Progress Note    NAME: Jerrold Phalen   :  1935   MRN:  [id number]       Interim Hospital Summary: [de-identified] y.o. female whom presented on 2018 with      Assessment / Plan:  Sepsis improving   Acute on chronic hypoxic respiratory failure improving   HCAP  COPD, on 3LNC  continues to smoke  -CXR concerning for PNA.  Pt recently admitted in 10/2017  -flu neg  -BCx ordered no growth to date   -bipap prn  -abx to include: dc Levaquin   Recommendation:  Discontinue Vancomycin IV, complete Aztreonam coars thru day #7 . Would not recommend LQ due to QTc documentation .  -scheduled and prn nebs/po steroids   -pulmo following      Chronic diastolic heart failure due to severe pulmonary HTN  PAfib  HTN  -cont' HCTZ, change to b blocker per cardiology  -pt needs to be on [address] [place] [address], however refused in the past, will cont' ASA  -hydralazine prn   cardiology following   Spoke w pt-  pt to d /w dr Bouchra Tinoco her cardiologist upon dc for initiation of ac. Will provide a rx for Xarelto  20 mg daily on dc per pts wishes. See cm notes on cost-       Hypokalemia replace  prn       TOBB dep not ready to quits    anemia- monitor likely chronic  Will need f/u as oupt-  Code Status: DNR see h/p notes forms signed  Surrogate Decision Maker:   DVT Prophylaxis: lovenox  GI Prophylaxis: not indicated   Home likely in am             Subjective:     Chief Complaint / Reason for Physician Visit feels great    Discussed with RN events overnight. Review of Systems:  Symptom Y/N Comments  Symptom Y/N Comments   Fever/Chills n   Chest Pain n    Poor Appetite n   Edema     Cough y   Abdominal Pain n    Sputum    Joint Pain     SOB/LUDWIG y   Pruritis/Rash n    Nausea/vomit n   Tolerating PT/OT     Diarrhea nn   Tolerating Diet y    Constipation    Other       Could NOT obtain due to:      Objective:     VITALS:   Last 24hrs VS reviewed since prior progress note.  Most recent are:  Patient Vitals for the past 24 hrs: Temp Pulse Resp BP SpO2   01/07/18 0340 97.7 °F (36.5 °C) 74 18 159/74 92 %   01/07/18 0008 - 67 - 179/63 92 %   01/06/18 2104 - - - - 97 %   01/06/18 1953 97.8 °F (36.6 °C) 62 18 139/77 93 %   01/06/18 1528 - 69 18 140/69 94 %   01/06/18 1327 - - - - 96 %   01/06/18 1230 97.6 °F (36.4 °C) 66 18 (!) 110/95 96 %   01/06/18 0852 - 91 - 141/50 -   01/06/18 0749 - - - - 93 %     No intake or output data in the 24 hours ending 01/07/18 0743     PHYSICAL EXAM:  Constitutional: She appears well-developed and well-nourished. She appears  In  No distress  HENT:   Head: Normocephalic and atraumatic. Mouth/Throat: No oropharyngeal exudate. Eyes: Conjunctivae are normal. Pupils are equal, round, and reactive to light. Neck: Normal range of motion. Neck supple. No JVD present. Cardiovascular: Normal rate, regular rhythm, normal heart sounds and intact distal pulses.  Exam reveals no gallop and no friction rub.    No murmur heard. Pulmonary/Chest: decrease bs chin   Abdominal: Soft. Normal aorta and bowel sounds are normal. She exhibits no distension, no abdominal bruit, no pulsatile midline mass and no mass. There is no tenderness. There is no rebound and no guarding. Neurological: She is alert. She displays normal reflexes. Reviewed most current lab test results and cultures  YES  Reviewed most current radiology test results   YES  Review and summation of old records today    NO  Reviewed patient's current orders and MAR    YES  PMH/SH reviewed - no change compared to H&P  ________________________________________________________________________  Care Plan discussed with:    Comments   Patient x    Family      RN x    Care Manager     Consultant                        Multidiciplinary team rounds were held today with , nursing, pharmacist and clinical coordinator. Patient's plan of care was discussed; medications were reviewed and discharge planning was addressed. ________________________________________________________________________  Total NON critical care TIME: 30   Minutes    Total CRITICAL CARE TIME Spent:   Minutes non procedure based      Comments   >50% of visit spent in counseling and coordination of care x    ________________________________________________________________________  Cameron Nieves MD     Procedures: see electronic medical records for all procedures/Xrays and details which were not copied into this note but were reviewed prior to creation of Plan. LABS:  I reviewed today's most current labs and imaging studies.   Pertinent labs include:  Recent Labs      01/06/18 0123 01/05/18   0450   WBC  8.3  11.3*   HGB  10.3*  10.1*   HCT  31.4*  32.6*   PLT  259  270     Recent Labs      01/07/18   0335  01/06/18 0123 01/05/18 0450   NA  137  138  137   K  3.7  3.8  3.8   CL  100  102  104   CO2  32  29  25   GLU  195*  171*  196*   BUN  29*  34*  39*   CREA  0.84  0.87  0.88   CA  9.0  9.7  9.6       Signed: Cameron Nieves MD

## 2018-01-07 NOTE — PROGRESS NOTES
CM acknowledged consult. FOC offered to pt and family. Referral sent to [facility] PT/OT via CC, awaiting acceptance.      Dearl YVES Wilhelm Supervisee in Social Work, [employer]  [phone]

## 2018-01-07 NOTE — PROGRESS NOTES
Pharmacy Automatic Renal Dosing Protocol - Antimicrobials/Vancomycin     Indication for Antimicrobials:  HAP (was admitted 10/2-10/6/17)     Current Regimen of Each Antimicrobial:  Aztreonam 1 gram Q8H (Start Date 12; Day # 6  Vancomycin 1gm IV IV (Start Date 3; day #5    Discontinued:  Levofloxacin 750 mg q 24 hours (Start Date ; Day # 5    Allergy:  Keflex  Microbiology Results (Current encounter)   Date/Time Order Name Specimen Source Lab Status   18 0021 CULTURE, RESPIRATORY/SPUTUM/BRONCH W GRAM STAIN Sputum Final result   18 INFLUENZA A & B AG (RAPID TEST) Nasal washing Final result   18 1848 CULTURE, BLOOD Blood Preliminary result   18 1848 CULTURE, BLOOD Blood Preliminary result         Vancomycin Trough Goal: 15 - 20 mcg/mL     Vancomycin Level:    @ 0120 =6.7 mcg/mL-    Estimated Creatinine Clearance: 54.4 mL/min (based on Cr of 0.84). Estimated Creatinine Clearance (using IBW):44.2 mL/min    Recent Labs      18   0335  18   0123  18   0450   CREA  0.84  0.87  0.88   BUN  29*  34*  39*   WBC   --   8.3  11.3*   BANDS   --   2   --    NA  137  138  137   K  3.7  3.8  3.8   CA  9.0  9.7  9.6   HGB   --   10.3*  10.1*   HCT   --   31.4*  32.6*   PLT   --   259  270     Temp (24hrs), Av.9 ° F (36.6 ° C), Min:97.6 ° F (36.4 ° C), Max:98.3 ° F (36.8 ° C)    Paralysis, amputations, malnutrition: No     Impression/Plan:   · SCr stable  · No new bands with WBC < 10 on   ·  CXR: Pulmonary hyperinflation and slight interstitial prominence  ·  RCx (few GPC in pairs, light yeast) - final w/ out organism;  BCx ng x 5 and pending  · Daily SCr ordered per protocol  · LQ discontinued  (Multaq started , QTc  = 527)    Recommendation:  Discontinue Vancomycin IV, complete Aztreonam coars thru day #7 . Would not recommend LQ due to QTc documentation .         Pharmacy will follow daily and adjust medications as appropriate for renal function and/or serum levels.     Thank you,  Romel Cortez [facility]

## 2018-01-08 VITALS
RESPIRATION RATE: 18 BRPM | DIASTOLIC BLOOD PRESSURE: 86 MMHG | SYSTOLIC BLOOD PRESSURE: 157 MMHG | BODY MASS INDEX: 32.6 KG/M2 | HEIGHT: 63 IN | OXYGEN SATURATION: 94 % | TEMPERATURE: 98.5 F | WEIGHT: 184 LBS | HEART RATE: 80 BPM

## 2018-01-08 LAB
ANION GAP SERPL CALC-SCNC: 7 MMOL/L (ref 5–15)
BACTERIA SPEC CULT: NORMAL
BACTERIA SPEC CULT: NORMAL
BASOPHILS # BLD: 0 K/UL
BASOPHILS NFR BLD: 0 %
BUN SERPL-MCNC: 27 MG/DL (ref 6–20)
BUN/CREAT SERPL: 37 (ref 12–20)
CALCIUM SERPL-MCNC: 9.2 MG/DL (ref 8.5–10.1)
CHLORIDE SERPL-SCNC: 102 MMOL/L (ref 97–108)
CO2 SERPL-SCNC: 29 MMOL/L (ref 21–32)
CREAT SERPL-MCNC: 0.73 MG/DL (ref 0.55–1.02)
DIFFERENTIAL METHOD BLD: ABNORMAL
EOSINOPHIL # BLD: 0 K/UL
EOSINOPHIL NFR BLD: 0 %
ERYTHROCYTE [DISTWIDTH] IN BLOOD BY AUTOMATED COUNT: 14.3 % (ref 11.5–14.5)
GLUCOSE SERPL-MCNC: 164 MG/DL (ref 65–100)
HCT VFR BLD AUTO: 34.2 % (ref 35–47)
HGB BLD-MCNC: 10.9 G/DL (ref 11.5–16)
LYMPHOCYTES # BLD: 0.8 K/UL
LYMPHOCYTES NFR BLD: 6 %
MCH RBC QN AUTO: 27.2 PG (ref 26–34)
MCHC RBC AUTO-ENTMCNC: 31.9 G/DL (ref 30–36.5)
MCV RBC AUTO: 85.3 FL (ref 80–99)
MONOCYTES # BLD: 0.5 K/UL
MONOCYTES NFR BLD: 4 %
MYELOCYTES NFR BLD MANUAL: 3 %
NEUTS BAND NFR BLD MANUAL: 1 %
NEUTS SEG # BLD: 11.5 K/UL
NEUTS SEG NFR BLD: 86 %
PLATELET # BLD AUTO: 294 K/UL (ref 150–400)
POTASSIUM SERPL-SCNC: 3.9 MMOL/L (ref 3.5–5.1)
RBC # BLD AUTO: 4.01 M/UL (ref 3.8–5.2)
RBC MORPH BLD: ABNORMAL
SERVICE CMNT-IMP: NORMAL
SERVICE CMNT-IMP: NORMAL
SODIUM SERPL-SCNC: 138 MMOL/L (ref 136–145)
WBC # BLD AUTO: 13.2 K/UL (ref 3.6–11)

## 2018-01-08 PROCEDURE — 74011000250 HC RX REV CODE- 250: Performed by: INTERNAL MEDICINE

## 2018-01-08 PROCEDURE — 97116 GAIT TRAINING THERAPY: CPT

## 2018-01-08 PROCEDURE — 74011636637 HC RX REV CODE- 636/637: Performed by: INTERNAL MEDICINE

## 2018-01-08 PROCEDURE — 74011000258 HC RX REV CODE- 258: Performed by: INTERNAL MEDICINE

## 2018-01-08 PROCEDURE — 74011250636 HC RX REV CODE- 250/636: Performed by: INTERNAL MEDICINE

## 2018-01-08 PROCEDURE — 74011250637 HC RX REV CODE- 250/637: Performed by: INTERNAL MEDICINE

## 2018-01-08 PROCEDURE — 80048 BASIC METABOLIC PNL TOTAL CA: CPT | Performed by: INTERNAL MEDICINE

## 2018-01-08 PROCEDURE — 94640 AIRWAY INHALATION TREATMENT: CPT

## 2018-01-08 PROCEDURE — 85025 COMPLETE CBC W/AUTO DIFF WBC: CPT | Performed by: INTERNAL MEDICINE

## 2018-01-08 PROCEDURE — 77010033678 HC OXYGEN DAILY

## 2018-01-08 PROCEDURE — 36415 COLL VENOUS BLD VENIPUNCTURE: CPT | Performed by: INTERNAL MEDICINE

## 2018-01-08 RX ORDER — PREDNISONE 20 MG/1
20 TABLET ORAL 2 TIMES DAILY WITH MEALS
Qty: 5 TAB | Refills: 0 | Status: SHIPPED | OUTPATIENT
Start: 2018-01-08 | End: 2018-01-13

## 2018-01-08 RX ORDER — AMLODIPINE BESYLATE 5 MG/1
5 TABLET ORAL DAILY
Status: DISCONTINUED | OUTPATIENT
Start: 2018-01-08 | End: 2018-01-08 | Stop reason: HOSPADM

## 2018-01-08 RX ORDER — METOPROLOL SUCCINATE 50 MG/1
50 TABLET, EXTENDED RELEASE ORAL DAILY
Qty: 14 TAB | Refills: 0 | Status: SHIPPED | OUTPATIENT
Start: 2018-01-09 | End: 2018-11-16

## 2018-01-08 RX ORDER — AMLODIPINE BESYLATE 5 MG/1
5 TABLET ORAL DAILY
Qty: 10 TAB | Refills: 0 | Status: SHIPPED | OUTPATIENT
Start: 2018-01-09 | End: 2018-11-16

## 2018-01-08 RX ADMIN — HYDROCHLOROTHIAZIDE 25 MG: 25 TABLET ORAL at 08:10

## 2018-01-08 RX ADMIN — AZTREONAM: 1 INJECTION, POWDER, LYOPHILIZED, FOR SOLUTION INTRAMUSCULAR; INTRAVENOUS at 10:16

## 2018-01-08 RX ADMIN — AMLODIPINE BESYLATE 5 MG: 5 TABLET ORAL at 10:16

## 2018-01-08 RX ADMIN — DRONEDARONE 400 MG: 400 TABLET, FILM COATED ORAL at 08:10

## 2018-01-08 RX ADMIN — IPRATROPIUM BROMIDE AND ALBUTEROL SULFATE 3 ML: .5; 3 SOLUTION RESPIRATORY (INHALATION) at 07:35

## 2018-01-08 RX ADMIN — AZTREONAM: 1 INJECTION, POWDER, LYOPHILIZED, FOR SOLUTION INTRAMUSCULAR; INTRAVENOUS at 02:20

## 2018-01-08 RX ADMIN — IPRATROPIUM BROMIDE AND ALBUTEROL SULFATE 3 ML: .5; 3 SOLUTION RESPIRATORY (INHALATION) at 03:12

## 2018-01-08 RX ADMIN — HYDRALAZINE HYDROCHLORIDE 10 MG: 20 INJECTION INTRAMUSCULAR; INTRAVENOUS at 03:10

## 2018-01-08 RX ADMIN — METOPROLOL SUCCINATE 50 MG: 50 TABLET, EXTENDED RELEASE ORAL at 08:10

## 2018-01-08 RX ADMIN — FLUTICASONE FUROATE AND VILANTEROL TRIFENATATE 1 PUFF: 100; 25 POWDER RESPIRATORY (INHALATION) at 08:14

## 2018-01-08 RX ADMIN — Medication 10 ML: at 05:28

## 2018-01-08 RX ADMIN — ASPIRIN 325 MG: 325 TABLET ORAL at 08:10

## 2018-01-08 RX ADMIN — PREDNISONE 20 MG: 20 TABLET ORAL at 08:10

## 2018-01-08 RX ADMIN — ENOXAPARIN SODIUM 40 MG: 40 INJECTION SUBCUTANEOUS at 08:10

## 2018-01-08 NOTE — PROGRESS NOTES
Cardiology Progress Note  1/8/2018 9:27 AM  Admit Date: 1/2/2018  Admit Diagnosis/CC: Respiratory failure ([address]  Subjective:   Patient reports:  Chest Pain:  [x] none,  consistent with [] non-cardiac  [] atypical  []  anginal chest pain             [x] none now    []  on-going  Dyspnea: [x] none  [] at rest  [] with exertion  [] improved  [] unchanged [] worsening  PND:       [x] none  [] overnight   [] current  Orthopnea: [x] none  [] improved  [] unchanged  [] worsening  Presyncope: [x] none  [] improved  [] unchanged  [] worsening  Ambulated in hallway without symptoms  [] Yes  Ambulated in room without symptoms  [x] Yes  ROS(2+other systems)   Hematuria: [] Yes  [x] No.   Dysuria: [] Yes  [x] No                                           Cough:       [] Yes  [x] No.   Sputum: [] Yes  [x] No                                            Hematochezia: [] Yes  [x] No.   Melena: [] Yes  [x] No                                            No change in family and social history from H&P/Consult note.     Current Facility-Administered Medications   Medication Dose Route Frequency    amLODIPine (NORVASC) tablet 5 mg  5 mg Oral DAILY    albuterol-ipratropium (DUO-NEB) 2.5 MG-0.5 MG/3 ML  3 mL Nebulization TID RT    predniSONE (DELTASONE) tablet 20 mg  20 mg Oral BID WITH MEALS    dronedarone (MULTAQ) tablet tab 400 mg  400 mg Oral BID WITH MEALS    metoprolol succinate (TOPROL-XL) XL tablet 50 mg  50 mg Oral DAILY    sodium chloride (NS) flush 5-10 mL  5-10 mL IntraVENous PRN    aztreonam 1 g in 0.9% sodium chloride 100 mL IVPB   IntraVENous Q8H    albuterol-ipratropium (DUO-NEB) 2.5 MG-0.5 MG/3 ML  3 mL Nebulization Q4H PRN    sodium chloride (NS) flush 5-10 mL  5-10 mL IntraVENous Q8H    sodium chloride (NS) flush 5-10 mL  5-10 mL IntraVENous PRN    acetaminophen (TYLENOL) tablet 650 mg  650 mg Oral Q4H PRN    bisacodyl (DULCOLAX) suppository 10 mg  10 mg Rectal DAILY PRN    enoxaparin (LOVENOX) injection 40 mg 40 mg SubCUTAneous Q24H    aspirin (ASPIRIN) tablet 325 mg  325 mg Oral DAILY    fluticasone-vilanterol (BREO ELLIPTA) 100mcg-25mcg/puff  1 Puff Inhalation DAILY    hydroCHLOROthiazide (HYDRODIURIL) tablet 25 mg  25 mg Oral DAILY    hydrALAZINE (APRESOLINE) 20 mg/mL injection 10 mg  10 mg IntraVENous Q6H PRN       Objective:    Physical Exam:  Last VS:   Visit Vitals    /71 (BP 1 Location: Left arm, BP Patient Position: At rest)    Pulse 66    Temp 97.6 °F (36.4 °C)    Resp 20    Ht 5' 3\" (1.6 m)    Wt 83.5 kg (184 lb)    SpO2 91%    BMI 32.59 kg/m2    Temp (24hrs), Av.9 °F (36.6 °C), Min:97.5 °F (36.4 °C), Max:98.1 °F (36.7 °C)    24 hr VS reviewed. General Appearance:   [x] well developed, well nourished,  [x] NAD. [] agitated   [] lethargic but arousable  [] obtunded   ENT, Palate:    [x] WNL   [] dry palate/MM        Respiratory:    [x] CTA bilateral  [] rales  [] rhonchi  [] normal resp effort      [] similar to yesterday   [] worse    [] improved   Cardiovascular:   [x] RRR   [] Irregular rate and rhythm   [x] Normal S1, S2   [x] No gallop or rub. [] no murmur   [x] no new murmur  [] murmur c/w:   [x] no edema  RLE: []1+ []2+ [] 3+;  LLE: []1+ []2+ []3+      [] edema similar to yesterday   [] worse    [] improved   [x] normal JVP   [] Elevated JVP    [x] JVP similar to yesterday   [] worse    [] improved   [x] carotid upstroke unchanged   [x] abd aorta not palpated   [x] no stigmata of peripheral emboli   GI:    [x] abd soft, non-distented,bowel sounds present, no                     organomegaly appreciated   Skin:  Neuro:    Cath Site:  [x] warm and dry [] cold extremities   [x] A/O x 3, grossly non-focal      [] intact w/o hematoma or bruit; distal pulse unchanged.               Data Review:   Labs:    Recent Results (from the past 24 hour(s))   METABOLIC PANEL, BASIC    Collection Time: 18  3:17 AM   Result Value Ref Range    Sodium 138 136 - 145 mmol/L    Potassium 3.9 3.5 - 5.1 mmol/L    Chloride 102 97 - 108 mmol/L    CO2 29 21 - 32 mmol/L    Anion gap 7 5 - 15 mmol/L    Glucose 164 (H) 65 - 100 mg/dL    BUN 27 (H) 6 - 20 MG/DL    Creatinine 0.73 0.55 - 1.02 MG/DL    BUN/Creatinine ratio 37 (H) 12 - 20      GFR est AA >60 >60 ml/min/1.73m2    GFR est non-AA >60 >60 ml/min/1.73m2    Calcium 9.2 8.5 - 10.1 MG/DL   CBC WITH AUTOMATED DIFF    Collection Time: 01/08/18  3:17 AM   Result Value Ref Range    WBC 13.2 (H) 3.6 - 11.0 K/uL    RBC 4.01 3.80 - 5.20 M/uL    HGB 10.9 (L) 11.5 - 16.0 g/dL    HCT 34.2 (L) 35.0 - 47.0 %    MCV 85.3 80.0 - 99.0 FL    MCH 27.2 26.0 - 34.0 PG    MCHC 31.9 30.0 - 36.5 g/dL    RDW 14.3 11.5 - 14.5 %    PLATELET 924 127 - 747 K/uL    NEUTROPHILS 86 %    BAND NEUTROPHILS 1 %    LYMPHOCYTES 6 %    MONOCYTES 4 %    EOSINOPHILS 0 %    BASOPHILS 0 %    MYELOCYTES 3 %    ABS. NEUTROPHILS 11.5 K/UL    ABS. LYMPHOCYTES 0.8 K/UL    ABS. MONOCYTES 0.5 K/UL    ABS. EOSINOPHILS 0.0 K/UL    ABS. BASOPHILS 0.0 K/UL    DF MANUAL      RBC COMMENTS ANISOCYTOSIS  1+           Provided Telemetry: [x] sinus  [] chronic afib   [] paroxysmal afib  [] NSVT      Assessment:     Active Problems:    Respiratory failure ([facility] (1/2/2018)        Plan:     Atrial Fibrillation - Flutter  improved   Continue current meds  She has sever COPD. Now in [facility]. She should be on anticoagulants but is very opposed to the RX,    For all other plans, see orders.    [x] High complexity decision making was performed

## 2018-01-08 NOTE — PROGRESS NOTES
Rounds with MD and Pharmacy. Reviewed meds with patient. Pharmacy informed pt she will be  Completed IV abx today. Patient informed she will be discharged this afternoon. Patient is agreeable. Patient has refused HHPTOT and refused anticoagulant at this time  Cardiology office will contact patient to schedule appointment this week. Other follow ups have been scheduled. This CM has informed  of need for portable O2 tank. Patient is at baseline 3LNC.  informed of need for change of clothing.     [address] [facility]  Ext [phone]

## 2018-01-08 NOTE — PROGRESS NOTES
[facility]  Pulmonary, Critical Care, and Sleep Medicine      Name: Lee Alcantar MRN: [id number]   : 1935 Hospital: [facility]   Date: 2018        Request per DR. Sue Santos for respiratory failure, possible pneumonia. IMPRESSION:   · Acute on Chronic Respiratory Failure pt is on 3L NC continuously. · Bibasilar infiltrates, possible atelectasis. Possible HCAP, was last admitted on 10/2107 with pneumonia. · COPD with acute exacerbation, on home oxygen. · Presented with encephalopathy. Appears to be near baseline. · Had fever of 101.7 on presentation. · Anemia  · Hyperglycemia  · Severe Pulmonary Hypertension  · Diastolic Heart Function, Normal LVEF, has some LVH. · Atrial Fib. · Systemic Hypertension  · Hypokalemia  · Smoker still about 1/2 ppd. · Pt desires to be DNR. RECOMMENDATIONS:   · On Duonebs  · Steroid taper  · On Breo  · IV abx  · Wean oxygen to keep Pox over 90%  · Pt and Ot   · DVT prophylaxis  · Will see as needed  · Will sign off  · Can f/u with Dr Vannesa Su as out pt  · Does not need to be in pcu     Subjective:     Last 24 hrs: Pt feels that her breathing is slowly improving. No chest pain, has mild to moderate dry coughing.    Improving daily          Current Facility-Administered Medications   Medication Dose Route Frequency    amLODIPine (NORVASC) tablet 5 mg  5 mg Oral DAILY    albuterol-ipratropium (DUO-NEB) 2.5 MG-0.5 MG/3 ML  3 mL Nebulization TID RT    predniSONE (DELTASONE) tablet 20 mg  20 mg Oral BID WITH MEALS    dronedarone (MULTAQ) tablet tab 400 mg  400 mg Oral BID WITH MEALS    metoprolol succinate (TOPROL-XL) XL tablet 50 mg  50 mg Oral DAILY    aztreonam 1 g in 0.9% sodium chloride 100 mL IVPB   IntraVENous Q8H    sodium chloride (NS) flush 5-10 mL  5-10 mL IntraVENous Q8H    enoxaparin (LOVENOX) injection 40 mg  40 mg SubCUTAneous Q24H    aspirin (ASPIRIN) tablet 325 mg  325 mg Oral DAILY    fluticasone-vilanterol (BREO ELLIPTA) 100mcg-25mcg/puff  1 Puff Inhalation DAILY    hydroCHLOROthiazide (HYDRODIURIL) tablet 25 mg  25 mg Oral DAILY       Review of Systems:  C/o weakness, some cough    Objective:   Vital Signs:    Visit Vitals    /71 (BP 1 Location: Left arm, BP Patient Position: At rest)    Pulse 66    Temp 97.6 °F (36.4 °C)    Resp 20    Ht 5' 3\" (1.6 m)    Wt 83.5 kg (184 lb)    SpO2 91%    BMI 32.59 kg/m2       O2 Device: Nasal cannula   O2 Flow Rate (L/min): 3 l/min   Temp (24hrs), Av.9 °F (36.6 °C), Min:97.5 °F (36.4 °C), Max:98.1 °F (36.7 °C)       Intake/Output:   Last shift:         Last 3 shifts:  1901 -  0700  In: 860 [P.O.:760; I.V.:100]  Out: 400 [Urine:400]    Intake/Output Summary (Last 24 hours) at 18 0839  Last data filed at 18   Gross per 24 hour   Intake              680 ml   Output              400 ml   Net              280 ml      Physical Exam:   General:  Alert, cooperative, no distress, appears stated age. On NC oxygen. Head:  Normocephalic, without obvious abnormality, atraumatic. Eyes:  Conjunctivae/corneas clear. PERRL, EOMs intact. Nose: Nares normal. Septum midline. Mucosa normal. No drainage or sinus tenderness. Throat: Lips, mucosa, and tongue normal. Teeth and gums normal.   Neck: Supple, symmetrical, trachea midline, no adenopathy, thyroid: no enlargment/tenderness/nodules, no carotid bruit and no JVD. Back:   Symmetric, no curvature. ROM normal.   Lungs:   Decreased BS    Chest wall:  No tenderness or deformity. Heart:  Regular rate and rhythm, S1, S2 normal, no murmur, click, rub or gallop. Abdomen:   Soft, non-tender. Bowel sounds normal. No masses,  No organomegaly. Obese   Extremities: Extremities normal, atraumatic, no cyanosis or edema. Pulses: 2+ and symmetric all extremities.    Skin: Skin color, texture, turgor normal. No rashes or lesions   Lymph nodes: Cervical, supraclavicular, and axillary nodes normal.   Neurologic: Grossly nonfocal, motor 5/5 BUE and BLE. Psych: appropriate. No anxiety or depression. Data review:     Recent Results (from the past 24 hour(s))   METABOLIC PANEL, BASIC    Collection Time: 01/08/18  3:17 AM   Result Value Ref Range    Sodium 138 136 - 145 mmol/L    Potassium 3.9 3.5 - 5.1 mmol/L    Chloride 102 97 - 108 mmol/L    CO2 29 21 - 32 mmol/L    Anion gap 7 5 - 15 mmol/L    Glucose 164 (H) 65 - 100 mg/dL    BUN 27 (H) 6 - 20 MG/DL    Creatinine 0.73 0.55 - 1.02 MG/DL    BUN/Creatinine ratio 37 (H) 12 - 20      GFR est AA >60 >60 ml/min/1.73m2    GFR est non-AA >60 >60 ml/min/1.73m2    Calcium 9.2 8.5 - 10.1 MG/DL   CBC WITH AUTOMATED DIFF    Collection Time: 01/08/18  3:17 AM   Result Value Ref Range    WBC 13.2 (H) 3.6 - 11.0 K/uL    RBC 4.01 3.80 - 5.20 M/uL    HGB 10.9 (L) 11.5 - 16.0 g/dL    HCT 34.2 (L) 35.0 - 47.0 %    MCV 85.3 80.0 - 99.0 FL    MCH 27.2 26.0 - 34.0 PG    MCHC 31.9 30.0 - 36.5 g/dL    RDW 14.3 11.5 - 14.5 %    PLATELET 187 349 - 486 K/uL    NEUTROPHILS 86 %    BAND NEUTROPHILS 1 %    LYMPHOCYTES 6 %    MONOCYTES 4 %    EOSINOPHILS 0 %    BASOPHILS 0 %    MYELOCYTES 3 %    ABS. NEUTROPHILS 11.5 K/UL    ABS. LYMPHOCYTES 0.8 K/UL    ABS. MONOCYTES 0.5 K/UL    ABS. EOSINOPHILS 0.0 K/UL    ABS.  BASOPHILS 0.0 K/UL    DF MANUAL      RBC COMMENTS ANISOCYTOSIS  1+           Imaging:  I have personally reviewed the patients radiographs and have reviewed the reports:          Jenna Sheets MD

## 2018-01-08 NOTE — PROGRESS NOTES
ADULT PROTOCOL: JET AEROSOL  REASSESSMENT    Patient  Naeem Francis     [de-identified] y.o.   female     1/8/2018  2:03 AM    Breath Sounds Pre Procedure: Right Breath Sounds: Diminished                               Left Breath Sounds: Diminished    Breath Sounds Post Procedure: Right Breath Sounds: Diminished                                 Left Breath Sounds: Diminished    Breathing pattern: Pre procedure Breathing Pattern: Regular          Post procedure Breathing Pattern: Tachypneic    Heart Rate: Pre procedure Pulse: 62           Post procedure Pulse: 65    Resp Rate: Pre procedure Respirations: 18           Post procedure Respirations: 24    Peak Flow: Pre bronchodilator             Post bronchodilator       FVC/FEV1:  n/a    Incentive Spirometry:  Actual Volume (ml): 750 ml          Cough: Pre procedure Cough: Non-productive               Post procedure Cough: Non-productive    Suctioned: NO    Sputum: Pre procedure                   Post procedure      Oxygen: O2 Device: Nasal cannula   Flow rate (L/min) 3     Changed: NO    SpO2: Pre procedure SpO2: 94 %   with oxygen              Post procedure SpO2: 98 %  with oxygen    Nebulizer Therapy: Current medications Aerosolized Medications: DuoNeb      Changed: NO    Smoking History: n/a    Problem List:   Patient Active Problem List   Diagnosis Code    Acute on chronic respiratory failure ([facility]) J96.20    COPD (chronic obstructive pulmonary disease) with acute bronchitis ([facility]) J44.0, J20.9    Lung nodule R91.1    Abnormal ECG R94.31    Hyperglycemia R73.9    Tobacco abuse Z72.0    Pulmonary hypertension, moderate to severe I27.20    Sepsis ([facility]) A41.9    COPD exacerbation ([facility]) J44.1    Respiratory failure ([facility]) J96.90       Respiratory Therapist: Anita Coleman RT

## 2018-01-08 NOTE — PROGRESS NOTES
Bedside and Verbal shift change report given to Ricardo (oncoming nurse) by Biju Hannah (offgoing nurse). Report included the following information SBAR, Kardex, ED Summary, MAR, Recent Results and Cardiac Rhythm Sinus Rhythm.

## 2018-01-08 NOTE — PROGRESS NOTES
PCU SHIFT NURSING NOTE      Bedside and Verbal shift change report given to Li Hines (oncoming nurse) by Connor Antoine (offgoing nurse). Report included the following information SBAR, Kardex, Recent Results, Med Rec Status and Cardiac Rhythm NSR. Shift Summary:   0730: Pt resting comfortably in bed with no complaints.

## 2018-01-08 NOTE — DISCHARGE SUMMARY
Hospitalist Discharge Summary     Patient ID:  Satish Rogers  [id number]  [de-identified] y.o.  1935    PCP on record: Paul Moralez MD    Admit date: 1/2/2018  Discharge date and time: 1/8/2018      DISCHARGE DIAGNOSIS:    Sepsis improving   Acute on chronic hypoxic respiratory failure improving   HCAP  COPD, on 3LNC    Chronic diastolic heart failure due to severe pulmonary HTN  PAfib  HTN   Hypokalemia resolved     TOBB dep      anemia-     Code Status: DNR see h/p notes forms signed        CONSULTATIONS:  IP CONSULT TO PULMONOLOGY  IP CONSULT TO CARDIOLOGY    Excerpted HPI from H&P of Teresa Pineda MD:     CHIEF COMPLAINT: sob     HISTORY OF PRESENT ILLNESS:     Satish Rogers is a [de-identified] y.o.  female with pmhx significant fo pulmonary htn, diastolic chf, COPD on home 3LNC, HTN, present to ED c/o progressively worsening of sob stated 2 days ago. Other associated symptoms including fever ~102. She also complains of associated productive cough. Per pt's , home O2 turned up \"all the way\", however, pt remains hypoxic, tachypneic. In the ER, vitals: T 10.7, P 63, /53, SpO2 88% on 4LNC. Pt denies any cp, palpitations, n/v/d. Pertinent labs: WBC 26.2, , hgb 12.1, Cr 1.07, trop neg, flu neg. CXR concerning for PNA  We were asked to admit for work up and evaluation of the above problems.           ______________________________________________________________________  DISCHARGE SUMMARY/HOSPITAL COURSE:  for full details see H&P, daily progress notes, labs, consult notes.      Sepsis improving   Acute on chronic hypoxic respiratory failure improving   HCAP  COPD, on 3LNC  continues to smoke  -CXR concerning for PNA.  Pt recently admitted in 10/2017  -flu neg  -BCx ordered no growth to date   -bipap prn  -abx to include: dc Levaquin 1/6  Recommendation:  Discontinue Vancomycin IV, complete Aztreonam coars thru day #7 1/8.  Would not recommend LQ due to QTc documentation 1/5.  -scheduled and prn nebs/po steroids   -pulmo following f/u in2 weeks  completed abx on 1/8 for a total 7days  Will be dc on prednisone x 5 datys      Chronic diastolic heart failure due to severe pulmonary HTN  PAfib  HTN  -cont' HCTZ, change to b blocker/amlodopine per cardiology added multaq   -pt needs to be on [facility], however refused in the past,,  -hydralazine prn   cardiology following   Spoke w pt-  pt to d /w dr Mikie Basurto her cardiologist upon dc for initiation of ac. Will provide a rx for Xarelto  20 mg daily on dc per pts wishes. See cm notes on cost-   per cardiology  Atrial Fibrillation - Flutter  improved   Continue current meds  She has sever COPD. Now in [facility]. She should be on anticoagulants but is very opposed to the RX,            Hypokalemia replace  prn       TOBB dep not ready to quit   anemia- monitor likely chronic  Will need f/u as oupt-  Code Status: DNR see h/p notes forms signed  Surrogate Decision Maker:   DVT Prophylaxis: lovenox  GI Prophylaxis: not indicated          _______________________________________________________________________  Patient seen and examined by me on discharge day. Pertinent Findings:  Gen:    Not in distress  Chest: Clear lungs   CVS:   Regular rhythm. No edema  Abd:  Soft, not distended, not tender  Neuro:  Alert, OX3  _______________________________________________________________________  DISCHARGE MEDICATIONS:   Current Discharge Medication List      START taking these medications    Details   metoprolol succinate (TOPROL-XL) 50 mg XL tablet Take 1 Tab by mouth daily. Qty: 14 Tab, Refills: 0      dronedarone (MULTAQ) tab tablet Take 1 Tab by mouth two (2) times daily (with meals). Qty: 30 Tab, Refills: 0      amLODIPine (NORVASC) 5 mg tablet Take 1 Tab by mouth daily.   Qty: 10 Tab, Refills: 0         CONTINUE these medications which have CHANGED    Details   predniSONE (DELTASONE) 20 mg tablet Take 1 Tab by mouth two (2) times daily (with meals) for 5 days. Qty: 5 Tab, Refills: 0         CONTINUE these medications which have NOT CHANGED    Details   budesonide-formoterol (SYMBICORT) 160-4.5 mcg/actuation HFAA Take 2 Puffs by inhalation two (2) times a day. hydroCHLOROthiazide (HYDRODIURIL) 25 mg tablet Take 25 mg by mouth daily. umeclidinium (INCRUSE ELLIPTA) 62.5 mcg/actuation inhaler Take 1 Puff by inhalation daily. Oxygen 3 Each continuous. calcium carbonate (TUMS) 200 mg calcium (500 mg) Chew Take 1 Tab by mouth daily. Qty: 30 Tab, Refills: 0      cholecalciferol (VITAMIN D3) 1,000 unit tablet Take 1 Tab by mouth daily. Qty: 30 Tab, Refills: 1      acetaminophen (TYLENOL) 325 mg tablet Take 2 Tabs by mouth every four (4) hours as needed. Qty: 30 Tab, Refills: 0      fluticasone (FLONASE) 50 mcg/Actuation nasal spray 2 Sprays by Both Nostrils route. Use daily  Qty: 1 Bottle, Refills: 3      tiotropium (SPIRIVA WITH HANDIHALER) 18 mcg inhalation capsule Take 1 Cap by inhalation daily. albuterol (PROVENTIL, VENTOLIN) 90 mcg/Actuation inhaler Take 2 Puffs by inhalation every six (6) hours as needed. albuterol (PROVENTIL VENTOLIN) 2.5 mg /3 mL (0.083 %) nebulizer solution by Nebulization route every six (6) hours as needed. dextromethorphan-guaiFENesin (MUCINEX DM)  mg per tablet Take 1 Tab by mouth two (2) times a day. aspirin (ASPIRIN) 325 mg tablet Take 325 mg by mouth daily. STOP taking these medications       dilTIAZem CD (CARDIZEM CD) 180 mg ER capsule Comments:   Reason for Stopping:               My Recommended Diet, Activity, Wound Care, and follow-up labs are listed in the patient's Discharge Insturctions which I have personally completed and reviewed.     ______________________________________________________________________    Risk of deterioration: MODERATE     Condition at Discharge: Stable  ______________________________________________________________________    Disposition  HOME  ______________________________________________________________________    Care Plan discussed with:   , RN, PATIENT , CONSULTANT     ______________________________________________________________________    Code Status: DNR  ______________________________________________________________________      Follow up with:   PCP : Leanna Mayfield MD  Follow-up Information     Follow up With Details Comments Contact Info    Leanna Mayfield MD On 1/9/2018 2;40pm  hospital follow-up   Please note that this appointment is [facility]      [facility] On 1/17/2018 10;15am  Pulmonary follow-up [address] [facility]  [address]      Elinor Miller MD  Cardiology - follow up this week   The office will call the patient with a hospital follow-up appointment Germán Bess  [phone]      Mattie Long MD In 2 weeks  [address]  [phone]                Total time in minutes spent coordinating this discharge (includes going over instructions, follow-up, prescriptions, and preparing report for sign off to her PCP) :  30 minutes    Signed:  Abran Colorado MD

## 2018-01-08 NOTE — PROGRESS NOTES
Spiritual Care Assessment/Progress Notes    Ryne Santiago [id number]  [id number]    1935  [de-identified] y.o.  female    Patient Telephone Number: [phone] (home)   Latter day Affiliation: Luzma Cross   Language: English   Extended Emergency Contact Information  Primary Emergency Contact: Tato Hassan  Address: [address]           [address] , [address] [facility] [address] [facility] Phone: [phone]  Mobile Phone: [phone]  Relation: Spouse   Patient Active Problem List    Diagnosis Date Noted    Respiratory failure ([facility] 01/02/2018    Sepsis ([address] 10/02/2017    COPD exacerbation ([facility] 10/02/2017    Hyperglycemia 11/19/2011    Tobacco abuse 11/19/2011    Pulmonary hypertension, moderate to severe 11/19/2011    Acute on chronic respiratory failure ([facility] 11/17/2011    COPD (chronic obstructive pulmonary disease) with acute bronchitis ([facility] 11/17/2011    Lung nodule 11/17/2011    Abnormal ECG 11/17/2011        Date: 1/8/2018       Level of Latter day/Spiritual Activity:  [x]         Involved in bronwyn tradition/spiritual practice    []         Not involved in bronwyn tradition/spiritual practice  [x]         Spiritually oriented    []         Claims no spiritual orientation    []         seeking spiritual identity  []         Feels alienated from Pentecostalism practice/tradition  []         Feels angry about Pentecostalism practice/tradition  [x]         Spirituality/Pentecostalism tradition is a resource for coping at this time.   []         Not able to assess due to medical condition    Services Provided Today:  []         crisis intervention    []         reading Scriptures  [x]         spiritual assessment    [x]         prayer  []         empathic listening/emotional support  []         rites and rituals (cite in comments)  []         life review     []         Pentecostalism support  []         theological development   []         advocacy  []         ethical dialog     []         blessing  []         bereavement support    []         support to family  []         anticipatory grief support   []         help with AMD  []         spiritual guidance    []         meditation      Spiritual Care Needs  []         Emotional Support  []         Spiritual/Druze Care  []         Loss/Adjustment  []         Advocacy/Referral                /Ethics  [x]         No needs expressed at               this time  []         Other: (note in               comments)  [address]  []         Follow up visits with               pt/family  []         Provide materials  []         Schedule sacraments  []         Contact Community               Clergy  [x]         Follow up as needed  []         Other: (note in               comments)     Comments:  As I approached room on PCU to visit for spiritual assessment, patient was in a wheelchair ready to be wheeled downstairs for discharge. She inquired about my bronwyn tradition and told me she is [place] her words of encouragement and assurance of prayer for ongoing recovery.   GABRIEL Mesa, [facility], [address]    [address] Paging Service  [phone]

## 2018-01-08 NOTE — PROGRESS NOTES
Patient was given discharge instructions at the bedside and explain all instructions and medication/ prescriptions. Patient and  at the bedside, patient is alert and oriented x 4 and stated that she understood the discharge instructions and the medications. Rn also discussed all follow up appointment and patient stated she understood. Patient has no complaints of pain, distress or discomfort. Patient is able to leave the facility.

## 2018-01-08 NOTE — PROGRESS NOTES
Problem: Mobility Impaired (Adult and Pediatric)  Goal: *Acute Goals and Plan of Care (Insert Text)  Physical Therapy Goals  Initiated 1/3/2018  1. Patient will move from supine to sit and sit to supine , scoot up and down and roll side to side in bed with independence within 7 day(s). 2.  Patient will transfer from bed to chair and chair to bed with independence using the least restrictive device within 7 day(s). 3.  Patient will perform sit to stand with independence within 7 day(s). 4.  Patient will ambulate with independence for 100 feet with the least restrictive device within 7 day(s). 5.  Patient will ascend/descend 3 stairs with bilateral handrail(s) with modified independence within 7 day(s). All while maintaining O2 sats >90% on supplemental O2.      physical Therapy TREATMENT  Patient: Magalys Chu (89 y.o. female)  Date: 1/8/2018  Diagnosis: Respiratory failure (HCC) <principal problem not specified>       Precautions: DNR, Fall    ASSESSMENT:  Patient agreeable to light activity when offered to sit up for lunch but otherwise deferred d/t c/o limited sleep. Minimal progression of gait today but completed without DME and no LOB noted. The patient reports a sedentary lifestyle and \"leans\" frequently with a typical home distance being room to room. She anticipates discharge home tomorrow with her  and recommend discharge with HHPT services based on hospitalized mobility with high guard and reduced endurance. Progression toward goals:  []    Improving appropriately and progressing toward goals  [x]    Improving slowly and progressing toward goals  []    Not making progress toward goals and plan of care will be adjusted     PLAN:  Patient continues to benefit from skilled intervention to address the above impairments. Continue treatment per established plan of care.   Discharge Recommendations:  Home Health  Further Equipment Recommendations for Discharge:  None     SUBJECTIVE: Patient stated Chapito Dumont got less than two hours of sleep last night so I don't want to do anything!  \"Everyone that comes in here has their own agenda. \"  \"I will walk from my bedroom to my kitchen and lean at my sink or I will do my 5 stairs and I will lean at the top. \"    OBJECTIVE DATA SUMMARY:   Critical Behavior:  Neurologic State: Alert  Orientation Level: Oriented X4  Cognition: Appropriate decision making, Appropriate for age attention/concentration, Appropriate safety awareness  Safety/Judgement: Awareness of environment, Fall prevention, Home safety  Functional Mobility Training:  Bed Mobility:  Rolling: Supervision  Supine to Sit: Supervision              Transfers:  Sit to Stand: Contact guard assistance  Stand to Sit: Contact guard assistance                             Balance:  Sitting: Intact  Standing: Impaired  Standing - Static: Good  Standing - Dynamic : Fair  Ambulation/Gait Training:  Distance (ft): 15 Feet (ft)  Assistive Device: Gait belt  Ambulation - Level of Assistance: Stand-by asssistance        Gait Abnormalities: Decreased step clearance;Trunk sway increased        Base of Support: Widened     Speed/Kimberlyn: Pace decreased (<100 feet/min)     Pain:  Pain Scale 1: Numeric (0 - 10)  Pain Intensity 1: 0              Activity Tolerance:     SpO2 90% on 2L O2 via nasal canula  After treatment:   [x]    Patient left in no apparent distress sitting up in chair  []    Patient left in no apparent distress in bed  [x]    Call bell left within reach  [x]    Nursing notified  []    Caregiver present  []    Bed alarm activated    COMMUNICATION/COLLABORATION:   The patients plan of care was discussed with: Registered Nurse    Magda Arizmendi, PT, DPT   Time Calculation: 11 mins

## 2018-01-08 NOTE — PROGRESS NOTES
Cm acknowledged consult for HH PTOT. Chart review - FOC for [facility]. Referral submitted via cclink. CM revisited recommendation of HHPTOT with patient. Patient has refused. 2nd IM - copy to patient and on chart. CM confirmed that patient's  will bring portable at time of discharge. Patient informed CM \"I want to go home tomorrow. I wish people would leave me alone\". CM will continue to follow.     Nabeel Do RN CM  Ext [phone]

## 2018-01-08 NOTE — PROGRESS NOTES
Cm acknowledged discharge order. Follow up appointments are scheduled or noted. Patient is to be discharge home. 3LNC O2 baseline.  has been informed to bring  portable tank for discharge. Patient has refused HHPTOT. Patient has refused anticoagulant. CM tasks are complete. RN informed.      Jace Celis RN CM  Ext [phone]

## 2018-01-09 ENCOUNTER — PATIENT OUTREACH (OUTPATIENT)
Dept: FAMILY MEDICINE CLINIC | Age: 83
End: 2018-01-09

## 2018-01-09 NOTE — PROGRESS NOTES
Patient noted on AVS summary from hospital to have f/u appt with Dr. Lidia Gregory scheduled for 1-9-18 at 2:40pm.  I called Dr. Elvis Randolph office this afternoon and was notified by Angelique Garcia that patient has cancelled the appt for today and rescheduled for 1-16-18 at 2:40pm.  Per Angelique Garcia, per their privacy form  (dated June 2017) at Dr. Elvis Randolph office, the following are approved persons on her Privacy form:  Brenda Sargent () and Kenda Severin (son). I s/w Angelique Garcia asking if we could try to reschedule appt again for an appt within 3 days of discharge from the hospital and she indicated I would need to call the patient and have her call back to the office. I was unable to reach patient this afternoon but left my name/number asking for her to call me.

## 2018-01-10 ENCOUNTER — PATIENT OUTREACH (OUTPATIENT)
Dept: FAMILY MEDICINE CLINIC | Age: 83
End: 2018-01-10

## 2018-01-10 NOTE — PROGRESS NOTES
Patient was admitted to [facility] from 1-2-18 to 1-8-18 for sepsis, acute on chronic hypoxic respiratory failure, HCAP, and COPD. I have attempted to reach patient since yesterday without success and have left messages asking for return call (none so far). I will send GIT letter today along with some educational material on sepsis. Will continue to follow.

## 2018-01-16 ENCOUNTER — PATIENT OUTREACH (OUTPATIENT)
Dept: FAMILY MEDICINE CLINIC | Age: 83
End: 2018-01-16

## 2018-01-16 NOTE — PROGRESS NOTES
Follow up call made to Dr. Arnel Stack office today. I spoke with Susana Bundy there and she verified that Ms. Hassan had already arrived at the office this afternoon for her f/u appt with Dr. Cori Ramirez today.

## 2018-01-25 ENCOUNTER — PATIENT OUTREACH (OUTPATIENT)
Dept: FAMILY MEDICINE CLINIC | Age: 83
End: 2018-01-25

## 2018-01-25 NOTE — PROGRESS NOTES
Called today to verify if patient f/u with Pulmonary for scheduled f/u appt that was set for 1-17-18 after hospitalization. I s/w Nery Whitfield at [facility] and she notes the patient cancelled the appt and rescheduled it for 3-19-18 with her regular Pulmonary MD/Dr. Michael March

## 2018-02-07 ENCOUNTER — PATIENT OUTREACH (OUTPATIENT)
Dept: FAMILY MEDICINE CLINIC | Age: 83
End: 2018-02-07

## 2018-02-07 NOTE — PROGRESS NOTES
Patient is greater than 30 days post episode without further incidents. Patient has attended follow  up appointment as scheduled. Will close episode.

## 2018-11-11 ENCOUNTER — APPOINTMENT (OUTPATIENT)
Dept: GENERAL RADIOLOGY | Age: 83
DRG: 981 | End: 2018-11-11
Attending: EMERGENCY MEDICINE
Payer: MEDICARE

## 2018-11-11 ENCOUNTER — HOSPITAL ENCOUNTER (INPATIENT)
Age: 83
LOS: 3 days | Discharge: HOME HEALTH | DRG: 981 | End: 2018-11-16
Attending: EMERGENCY MEDICINE | Admitting: INTERNAL MEDICINE
Payer: MEDICARE

## 2018-11-11 DIAGNOSIS — J44.1 COPD WITH ACUTE EXACERBATION (HCC): ICD-10-CM

## 2018-11-11 DIAGNOSIS — R06.03 ACUTE RESPIRATORY DISTRESS: Primary | ICD-10-CM

## 2018-11-11 LAB
ALBUMIN SERPL-MCNC: 3.8 G/DL (ref 3.5–5)
ALBUMIN/GLOB SERPL: 0.9 {RATIO} (ref 1.1–2.2)
ALP SERPL-CCNC: 71 U/L (ref 45–117)
ALT SERPL-CCNC: 20 U/L (ref 12–78)
ANION GAP SERPL CALC-SCNC: 11 MMOL/L (ref 5–15)
ARTERIAL PATENCY WRIST A: YES
AST SERPL-CCNC: 18 U/L (ref 15–37)
ATRIAL RATE: 74 BPM
BASE EXCESS BLDA CALC-SCNC: 1.2 MMOL/L
BASOPHILS # BLD: 0 K/UL (ref 0–0.1)
BASOPHILS NFR BLD: 0 % (ref 0–1)
BDY SITE: ABNORMAL
BILIRUB SERPL-MCNC: 0.7 MG/DL (ref 0.2–1)
BNP SERPL-MCNC: 859 PG/ML (ref 0–450)
BREATHS.SPONTANEOUS ON VENT: 28
BUN SERPL-MCNC: 21 MG/DL (ref 6–20)
BUN/CREAT SERPL: 19 (ref 12–20)
CALCIUM SERPL-MCNC: 9 MG/DL (ref 8.5–10.1)
CALCULATED P AXIS, ECG09: 23 DEGREES
CALCULATED R AXIS, ECG10: 112 DEGREES
CALCULATED T AXIS, ECG11: 10 DEGREES
CHLORIDE SERPL-SCNC: 97 MMOL/L (ref 97–108)
CK MB CFR SERPL CALC: 4.4 % (ref 0–2.5)
CK MB SERPL-MCNC: 8 NG/ML (ref 5–25)
CK SERPL-CCNC: 183 U/L (ref 26–192)
CO2 SERPL-SCNC: 28 MMOL/L (ref 21–32)
CREAT SERPL-MCNC: 1.11 MG/DL (ref 0.55–1.02)
DIAGNOSIS, 93000: NORMAL
DIFFERENTIAL METHOD BLD: ABNORMAL
EOSINOPHIL # BLD: 0 K/UL (ref 0–0.4)
EOSINOPHIL NFR BLD: 0 % (ref 0–7)
EPAP/CPAP/PEEP, PAPEEP: 6
ERYTHROCYTE [DISTWIDTH] IN BLOOD BY AUTOMATED COUNT: 13.2 % (ref 11.5–14.5)
FIO2 ON VENT: 45 %
GAS FLOW.O2 SETTING OXYMISER: 4 L/MIN
GLOBULIN SER CALC-MCNC: 4.4 G/DL (ref 2–4)
GLUCOSE BLD STRIP.AUTO-MCNC: 211 MG/DL (ref 65–100)
GLUCOSE BLD STRIP.AUTO-MCNC: 234 MG/DL (ref 65–100)
GLUCOSE SERPL-MCNC: 198 MG/DL (ref 65–100)
HCO3 BLDA-SCNC: 27 MMOL/L (ref 22–26)
HCT VFR BLD AUTO: 44.7 % (ref 35–47)
HGB BLD-MCNC: 14.6 G/DL (ref 11.5–16)
IMM GRANULOCYTES # BLD: 0.1 K/UL (ref 0–0.04)
IMM GRANULOCYTES NFR BLD AUTO: 1 % (ref 0–0.5)
IPAP/PIP, IPAPIP: 12
LYMPHOCYTES # BLD: 0.9 K/UL (ref 0.8–3.5)
LYMPHOCYTES NFR BLD: 10 % (ref 12–49)
MCH RBC QN AUTO: 29.8 PG (ref 26–34)
MCHC RBC AUTO-ENTMCNC: 32.7 G/DL (ref 30–36.5)
MCV RBC AUTO: 91.2 FL (ref 80–99)
MONOCYTES # BLD: 1 K/UL (ref 0–1)
MONOCYTES NFR BLD: 11 % (ref 5–13)
NEUTS SEG # BLD: 7.5 K/UL (ref 1.8–8)
NEUTS SEG NFR BLD: 78 % (ref 32–75)
NRBC # BLD: 0 K/UL (ref 0–0.01)
NRBC BLD-RTO: 0 PER 100 WBC
P-R INTERVAL, ECG05: 114 MS
PCO2 BLDA: 47 MMHG (ref 35–45)
PH BLDA: 7.38 [PH] (ref 7.35–7.45)
PLATELET # BLD AUTO: 242 K/UL (ref 150–400)
PMV BLD AUTO: 9.5 FL (ref 8.9–12.9)
PO2 BLDA: 78 MMHG (ref 80–100)
POTASSIUM SERPL-SCNC: 3.5 MMOL/L (ref 3.5–5.1)
PROT SERPL-MCNC: 8.2 G/DL (ref 6.4–8.2)
Q-T INTERVAL, ECG07: 456 MS
QRS DURATION, ECG06: 142 MS
QTC CALCULATION (BEZET), ECG08: 506 MS
RBC # BLD AUTO: 4.9 M/UL (ref 3.8–5.2)
SAO2 % BLD: 95 % (ref 92–97)
SAO2% DEVICE SAO2% SENSOR NAME: ABNORMAL
SERVICE CMNT-IMP: ABNORMAL
SERVICE CMNT-IMP: ABNORMAL
SODIUM SERPL-SCNC: 136 MMOL/L (ref 136–145)
SPECIMEN SITE: ABNORMAL
TROPONIN I SERPL-MCNC: <0.05 NG/ML
VENTRICULAR RATE, ECG03: 74 BPM
WBC # BLD AUTO: 9.6 K/UL (ref 3.6–11)

## 2018-11-11 PROCEDURE — 36600 WITHDRAWAL OF ARTERIAL BLOOD: CPT

## 2018-11-11 PROCEDURE — 74011250637 HC RX REV CODE- 250/637: Performed by: INTERNAL MEDICINE

## 2018-11-11 PROCEDURE — 74011250636 HC RX REV CODE- 250/636: Performed by: INTERNAL MEDICINE

## 2018-11-11 PROCEDURE — 74011250636 HC RX REV CODE- 250/636: Performed by: EMERGENCY MEDICINE

## 2018-11-11 PROCEDURE — 51798 US URINE CAPACITY MEASURE: CPT

## 2018-11-11 PROCEDURE — 94640 AIRWAY INHALATION TREATMENT: CPT

## 2018-11-11 PROCEDURE — 93005 ELECTROCARDIOGRAM TRACING: CPT

## 2018-11-11 PROCEDURE — 96374 THER/PROPH/DIAG INJ IV PUSH: CPT

## 2018-11-11 PROCEDURE — 85025 COMPLETE CBC W/AUTO DIFF WBC: CPT

## 2018-11-11 PROCEDURE — 74011636637 HC RX REV CODE- 636/637: Performed by: INTERNAL MEDICINE

## 2018-11-11 PROCEDURE — 99218 HC RM OBSERVATION: CPT

## 2018-11-11 PROCEDURE — 77030029684 HC NEB SM VOL KT MONA -A

## 2018-11-11 PROCEDURE — 82962 GLUCOSE BLOOD TEST: CPT

## 2018-11-11 PROCEDURE — 82553 CREATINE MB FRACTION: CPT

## 2018-11-11 PROCEDURE — 5A09357 ASSISTANCE WITH RESPIRATORY VENTILATION, LESS THAN 24 CONSECUTIVE HOURS, CONTINUOUS POSITIVE AIRWAY PRESSURE: ICD-10-PCS | Performed by: INTERNAL MEDICINE

## 2018-11-11 PROCEDURE — 90686 IIV4 VACC NO PRSV 0.5 ML IM: CPT | Performed by: EMERGENCY MEDICINE

## 2018-11-11 PROCEDURE — 82803 BLOOD GASES ANY COMBINATION: CPT

## 2018-11-11 PROCEDURE — 84484 ASSAY OF TROPONIN QUANT: CPT

## 2018-11-11 PROCEDURE — 90471 IMMUNIZATION ADMIN: CPT

## 2018-11-11 PROCEDURE — 77010033678 HC OXYGEN DAILY

## 2018-11-11 PROCEDURE — 96375 TX/PRO/DX INJ NEW DRUG ADDON: CPT

## 2018-11-11 PROCEDURE — 74011000250 HC RX REV CODE- 250: Performed by: EMERGENCY MEDICINE

## 2018-11-11 PROCEDURE — 71045 X-RAY EXAM CHEST 1 VIEW: CPT

## 2018-11-11 PROCEDURE — 94660 CPAP INITIATION&MGMT: CPT

## 2018-11-11 PROCEDURE — 74011250636 HC RX REV CODE- 250/636

## 2018-11-11 PROCEDURE — 83880 ASSAY OF NATRIURETIC PEPTIDE: CPT

## 2018-11-11 PROCEDURE — 80053 COMPREHEN METABOLIC PANEL: CPT

## 2018-11-11 PROCEDURE — 74011000250 HC RX REV CODE- 250: Performed by: INTERNAL MEDICINE

## 2018-11-11 PROCEDURE — 99285 EMERGENCY DEPT VISIT HI MDM: CPT

## 2018-11-11 PROCEDURE — 36415 COLL VENOUS BLD VENIPUNCTURE: CPT

## 2018-11-11 RX ORDER — SODIUM CHLORIDE 0.9 % (FLUSH) 0.9 %
5-10 SYRINGE (ML) INJECTION AS NEEDED
Status: DISCONTINUED | OUTPATIENT
Start: 2018-11-11 | End: 2018-11-16 | Stop reason: HOSPADM

## 2018-11-11 RX ORDER — ONDANSETRON 2 MG/ML
INJECTION INTRAMUSCULAR; INTRAVENOUS
Status: COMPLETED
Start: 2018-11-11 | End: 2018-11-11

## 2018-11-11 RX ORDER — NALOXONE HYDROCHLORIDE 0.4 MG/ML
0.4 INJECTION, SOLUTION INTRAMUSCULAR; INTRAVENOUS; SUBCUTANEOUS AS NEEDED
Status: DISCONTINUED | OUTPATIENT
Start: 2018-11-11 | End: 2018-11-16 | Stop reason: HOSPADM

## 2018-11-11 RX ORDER — ASPIRIN 325 MG
325 TABLET ORAL DAILY
Status: DISCONTINUED | OUTPATIENT
Start: 2018-11-12 | End: 2018-11-16 | Stop reason: HOSPADM

## 2018-11-11 RX ORDER — SODIUM CHLORIDE 0.9 % (FLUSH) 0.9 %
5-10 SYRINGE (ML) INJECTION EVERY 8 HOURS
Status: DISCONTINUED | OUTPATIENT
Start: 2018-11-11 | End: 2018-11-15 | Stop reason: SDUPTHER

## 2018-11-11 RX ORDER — DEXTROSE 50 % IN WATER (D50W) INTRAVENOUS SYRINGE
12.5-25 AS NEEDED
Status: DISCONTINUED | OUTPATIENT
Start: 2018-11-11 | End: 2018-11-12 | Stop reason: SDUPTHER

## 2018-11-11 RX ORDER — MELATONIN
1000 DAILY
Status: DISCONTINUED | OUTPATIENT
Start: 2018-11-12 | End: 2018-11-16 | Stop reason: HOSPADM

## 2018-11-11 RX ORDER — SODIUM CHLORIDE 0.9 % (FLUSH) 0.9 %
5-10 SYRINGE (ML) INJECTION AS NEEDED
Status: DISCONTINUED | OUTPATIENT
Start: 2018-11-11 | End: 2018-11-15 | Stop reason: SDUPTHER

## 2018-11-11 RX ORDER — BISACODYL 5 MG
5 TABLET, DELAYED RELEASE (ENTERIC COATED) ORAL DAILY PRN
Status: DISCONTINUED | OUTPATIENT
Start: 2018-11-11 | End: 2018-11-16 | Stop reason: HOSPADM

## 2018-11-11 RX ORDER — ACETAMINOPHEN 325 MG/1
650 TABLET ORAL
Status: DISCONTINUED | OUTPATIENT
Start: 2018-11-11 | End: 2018-11-16 | Stop reason: HOSPADM

## 2018-11-11 RX ORDER — CALCIUM CARBONATE 200(500)MG
200 TABLET,CHEWABLE ORAL DAILY
Status: DISCONTINUED | OUTPATIENT
Start: 2018-11-12 | End: 2018-11-16 | Stop reason: HOSPADM

## 2018-11-11 RX ORDER — HYDROCHLOROTHIAZIDE 25 MG/1
25 TABLET ORAL DAILY
Status: DISCONTINUED | OUTPATIENT
Start: 2018-11-12 | End: 2018-11-12

## 2018-11-11 RX ORDER — METOPROLOL SUCCINATE 50 MG/1
50 TABLET, EXTENDED RELEASE ORAL DAILY
Status: DISCONTINUED | OUTPATIENT
Start: 2018-11-12 | End: 2018-11-13

## 2018-11-11 RX ORDER — SODIUM CHLORIDE 0.9 % (FLUSH) 0.9 %
5-10 SYRINGE (ML) INJECTION EVERY 8 HOURS
Status: DISCONTINUED | OUTPATIENT
Start: 2018-11-11 | End: 2018-11-16 | Stop reason: HOSPADM

## 2018-11-11 RX ORDER — AMLODIPINE BESYLATE 5 MG/1
5 TABLET ORAL DAILY
Status: DISCONTINUED | OUTPATIENT
Start: 2018-11-12 | End: 2018-11-16 | Stop reason: HOSPADM

## 2018-11-11 RX ORDER — ALBUTEROL SULFATE 0.83 MG/ML
5 SOLUTION RESPIRATORY (INHALATION)
Status: COMPLETED | OUTPATIENT
Start: 2018-11-11 | End: 2018-11-11

## 2018-11-11 RX ORDER — ALBUTEROL SULFATE 0.83 MG/ML
2.5 SOLUTION RESPIRATORY (INHALATION)
Status: DISCONTINUED | OUTPATIENT
Start: 2018-11-11 | End: 2018-11-16

## 2018-11-11 RX ORDER — NICOTINE 7MG/24HR
1 PATCH, TRANSDERMAL 24 HOURS TRANSDERMAL
Status: DISCONTINUED | OUTPATIENT
Start: 2018-11-11 | End: 2018-11-16 | Stop reason: HOSPADM

## 2018-11-11 RX ORDER — HEPARIN SODIUM 5000 [USP'U]/ML
5000 INJECTION, SOLUTION INTRAVENOUS; SUBCUTANEOUS EVERY 8 HOURS
Status: DISCONTINUED | OUTPATIENT
Start: 2018-11-11 | End: 2018-11-16 | Stop reason: HOSPADM

## 2018-11-11 RX ORDER — FLUTICASONE FUROATE AND VILANTEROL 200; 25 UG/1; UG/1
1 POWDER RESPIRATORY (INHALATION) DAILY
Status: DISCONTINUED | OUTPATIENT
Start: 2018-11-12 | End: 2018-11-16 | Stop reason: HOSPADM

## 2018-11-11 RX ORDER — MAGNESIUM SULFATE 100 %
4 CRYSTALS MISCELLANEOUS AS NEEDED
Status: DISCONTINUED | OUTPATIENT
Start: 2018-11-11 | End: 2018-11-16 | Stop reason: HOSPADM

## 2018-11-11 RX ORDER — ONDANSETRON 2 MG/ML
4 INJECTION INTRAMUSCULAR; INTRAVENOUS
Status: COMPLETED | OUTPATIENT
Start: 2018-11-11 | End: 2018-11-11

## 2018-11-11 RX ORDER — INSULIN LISPRO 100 [IU]/ML
INJECTION, SOLUTION INTRAVENOUS; SUBCUTANEOUS
Status: DISCONTINUED | OUTPATIENT
Start: 2018-11-11 | End: 2018-11-16 | Stop reason: HOSPADM

## 2018-11-11 RX ADMIN — Medication 10 ML: at 16:55

## 2018-11-11 RX ADMIN — METHYLPREDNISOLONE SODIUM SUCCINATE 40 MG: 40 INJECTION, POWDER, FOR SOLUTION INTRAMUSCULAR; INTRAVENOUS at 23:00

## 2018-11-11 RX ADMIN — INFLUENZA VIRUS VACCINE 0.5 ML: 15; 15; 15; 15 SUSPENSION INTRAMUSCULAR at 18:58

## 2018-11-11 RX ADMIN — ONDANSETRON 4 MG: 2 INJECTION INTRAMUSCULAR; INTRAVENOUS at 09:19

## 2018-11-11 RX ADMIN — Medication 10 ML: at 22:53

## 2018-11-11 RX ADMIN — Medication 10 ML: at 14:00

## 2018-11-11 RX ADMIN — INSULIN LISPRO 2 UNITS: 100 INJECTION, SOLUTION INTRAVENOUS; SUBCUTANEOUS at 16:55

## 2018-11-11 RX ADMIN — INSULIN LISPRO 1 UNITS: 100 INJECTION, SOLUTION INTRAVENOUS; SUBCUTANEOUS at 22:54

## 2018-11-11 RX ADMIN — GUAIFENESIN AND DEXTROMETHORPHAN HYDROBROMIDE 1 TABLET: 600; 30 TABLET, EXTENDED RELEASE ORAL at 17:01

## 2018-11-11 RX ADMIN — METHYLPREDNISOLONE SODIUM SUCCINATE 125 MG: 125 INJECTION, POWDER, FOR SOLUTION INTRAMUSCULAR; INTRAVENOUS at 08:48

## 2018-11-11 RX ADMIN — DRONEDARONE 400 MG: 400 TABLET, FILM COATED ORAL at 16:55

## 2018-11-11 RX ADMIN — HEPARIN SODIUM 5000 UNITS: 5000 INJECTION INTRAVENOUS; SUBCUTANEOUS at 23:00

## 2018-11-11 RX ADMIN — ALBUTEROL SULFATE 2.5 MG: 2.5 SOLUTION RESPIRATORY (INHALATION) at 19:39

## 2018-11-11 RX ADMIN — ALBUTEROL SULFATE 5 MG: 2.5 SOLUTION RESPIRATORY (INHALATION) at 07:01

## 2018-11-11 RX ADMIN — METHYLPREDNISOLONE SODIUM SUCCINATE 40 MG: 40 INJECTION, POWDER, FOR SOLUTION INTRAMUSCULAR; INTRAVENOUS at 16:35

## 2018-11-11 RX ADMIN — ALBUTEROL SULFATE 2.5 MG: 2.5 SOLUTION RESPIRATORY (INHALATION) at 16:41

## 2018-11-11 NOTE — ED NOTES
TRANSFER - IN REPORT: 
 
Verbal report received from Rickey Franks on [address]. Report consisted of patients Situation, Background, Assessment and  
Recommendations(SBAR). Information from the following report(s) SBAR and ED Summary was reviewed with the receiving nurse. Opportunity for questions and clarification was provided. Pt remains on BIPAP.

## 2018-11-11 NOTE — ED NOTES
Attempted to call report to IP nurse, but she is unavailable and will call the ED as soon as possible.

## 2018-11-11 NOTE — ED NOTES
Bedside and Verbal shift change report given to this RN (oncoming nurse) by Aliza Baptiste RN (offgoing nurse). Report included the following information SBAR. Assumed care of patient. Patient placed in position of comfort. Call bell in reach. Skin warm and dry. Respirations even and unlabored. In no apparent distress at this time.  6L O2 via NC.

## 2018-11-11 NOTE — H&P
History and Physical   
 
   
Pt Name  Tristin Dudley Date of Birth 1935 Medical Record Number  [id number] Age  82 y.o. PCP Aguila Lara MD  
Admit date:  11/11/2018 Room Number  ER09/09  @ [facility] Date of Service  11/11/2018 Admission Diagnoses:  Acute on chronic respiratory failure with acute COPD exacerbation. Certification: We are admitting Tristin Dudley 82 y.o. female with a principle diagnosis of acute on chronic respiratory failure This patient also suffers from other comorbidities listed below. I have a high level of concern for rapid decline in oxygenation leading to life threatening complications Assessment and plan: 
Acute on Chronic respiratory failure Acute COPD exacerbation.  
-Observe in remote tele bed.  
-Continue NC Oxygen and BiPAP support if needed. -IV Solumedrol  
-continue home meds including jet nebs, long acting beta agonists and ICS. -Lispro SSI (anticipating hyperglycemia in this pt who is going to receive IV steroids) History of PAF  
RBBB Frequent PVCs noted on monitor today Severe pulmonary hypertension Left ventricular dilation.  
-consult cardiologist Dr. Miladys Anaya to see her tomorrow.  
-remote telemetry monitoring. On going Tobacco abuse  
-Advised pt to stop smoking. HTN Stress urinary incontinence. (pt uses diapers) OA    
 
 
CODE STATUS  Full Functional Status  Pt is  and lives with her  in [facility]. She uses a lillie and wear oxygen at all times. She is able to ambulate in her house about 50 feet, Occasionally uses a lillie Surrogate decision maker: Pt's  Prophylaxis  Hep SQ Discharge Plan: SNF/LTC, There are currently no Active Isolations Payor: [employer] MEDICARE / Plan: [facility] HU[employer] MEDICARE CHOICE PPO/PFFS / Product Type: Managed Care Medicare /   
Social issues  Date Comment 11/11/18 2:18 PM  I met with her son and daughter in the ER room We talked about plan of care and answered all questions in simple language. Prognosis  Fair Subjective Data History of Present Illness : The pt was fine until about two days ago when she developed increasing shortness of breath. She continued to take her prescribed medications. But this morning she woke up with increased shortness of breath and wheezing. Workup in the ER suggests possible LLL PNA along with COPD exacerbation. We were asked to admit her for further Rx. Her daughter and son report that the pt didn't complain of any hemoptysis, fever but possible chills. Review of Systems - History obtained from child and the patient General ROS: positive for  - chills and fatigue 
negative for - fever Psychological ROS: negative Ophthalmic ROS: negative Respiratory ROS: positive for - cough, shortness of breath, sputum changes, tachypnea and wheezing 
negative for - hemoptysis Cardiovascular ROS: no chest pain or dyspnea on exertion Gastrointestinal ROS: no abdominal pain, change in bowel habits, or black or bloody stools Genito-Urinary ROS: no dysuria, trouble voiding, or hematuria Musculoskeletal ROS: positive for - muscular weakness Neurological ROS: no TIA or stroke symptoms ROS Past Medical History:  
Diagnosis Date  COPD  Hypertension Past Surgical History:  
Procedure Laterality Date  HX GYN    
 overy removed/ 2 c-sections Social History Tobacco Use  Smoking status: Current Every Day Smoker Packs/day: 0.25  Smokeless tobacco: Never Used Substance Use Topics  Alcohol use: No  
   
 
Family History Problem Relation Age of Onset  Cancer Brother   
     lung  Cancer Brother   
     lung  Heart Disease Mother Objective data Comment:  Very pleasant elderly lady lying in bed in no distress. Her children are at her side. Patient Vitals for the past 24 hrs: 
 Temp 11/11/18 1330 97 °F (36.1 °C)  
11/11/18 1130 97 °F (36.1 °C)  
11/11/18 0700 96.9 °F (36.1 °C)  
11/11/18 0603 96.7 °F (35.9 °C) Patient Vitals for the past 24 hrs: 
 Pulse 11/11/18 1330 72  
11/11/18 1130 71  
11/11/18 1045 67  
11/11/18 1030 67  
11/11/18 0930 71  
11/11/18 0925 72  
11/11/18 0915 73  
11/11/18 0900 70  
11/11/18 0845 71  
11/11/18 0715 73  
11/11/18 0700 73  
11/11/18 0645 73  
11/11/18 0630 73  
11/11/18 0615 80  
11/11/18 0603 77 Patient Vitals for the past 24 hrs: 
 Resp  
11/11/18 1330 18  
11/11/18 1130 23  
11/11/18 1045 22  
11/11/18 1030 23  
11/11/18 0930 26  
11/11/18 0925 20  
11/11/18 0915 20  
11/11/18 0900 26  
11/11/18 0845 21  
11/11/18 0715 25  
11/11/18 0700 28  
11/11/18 0645 26  
11/11/18 0630 25  
11/11/18 0615 29  
11/11/18 0603 (!) 34 Patient Vitals for the past 24 hrs: 
 BP  
11/11/18 1330 135/55  
11/11/18 1130 125/50  
11/11/18 1045 128/48  
11/11/18 1030 129/47  
11/11/18 0930 134/54  
11/11/18 0915 146/45  
11/11/18 0845 130/62  
11/11/18 0715 138/61  
11/11/18 0700 146/53  
11/11/18 0645 145/52  
11/11/18 0630 142/53  
11/11/18 0615 138/54  
11/11/18 0611 141/56  
11/11/18 0603 138/54 SpO2 Readings from Last 6 Encounters:  
11/11/18 97% 01/08/18 94% 10/06/17 93% 10/01/17 100% 10/13/13 93% 04/09/13 92% O2 Flow Rate (L/min): 6 l/min  O2 Device: Nasal cannula Body mass index is 29.71 kg/m². - Wt Readings from Last 10 Encounters:  
11/11/18 78.5 kg (173 lb 1 oz) 01/08/18 83.5 kg (184 lb) 10/01/17 87.1 kg (192 lb 0.3 oz) 10/13/13 83.5 kg (184 lb) 04/05/13 77.1 kg (169 lb 14.4 oz)  
11/17/11 84.5 kg (186 lb 4.6 oz) Physical Exam:            
General:  Alert, cooperative,  
well noursished,  
well developed,  
appears stated age Ears/Eyes:  Hearing intact Sclera anicteric. Pupils equal  
Mouth/Throat:  Mucous membranes normal pink and moist 
Oral pharynx clear Neck: Lungs:  Trachea midline Chest excursion symmetrical  
Auscultation B/L Symmetrical with Vesicular breath sounds Inspiratory and expiratory wheeze noted. No Crepitations noted Percussion note resonant on mid Clavicular line; no sign of pneumothorax CVS:  Regular rate and rhythm Soft systolic  murmur, No click, rub or gallop S1 normal  
S2 normal  
Pedal pulses  b/l symmetrical  
Abdomen:   
Soft, non-tender Bowel sounds normal 
No distension Percussion note tympanitic Extremities: No cyanosis, jaundice No edema noted No sign of DVT/cord like lesion on palpation No sign of acute trauma Age appropriate OA changes noted. Skin:   
Skin color, texture, turgor normal. no acute rash or lesions Lymph nodes: Musculoskeletal Muscle bulk B/L symmetrical  
Neuro Cranial nerves are intact,  
motor movement b/l symmetrical, Sensory evaluation b/l symmetrical   
Psych:  Alert and oriented, normal mood & affect given the clinical scenario Medications reviewed Current Facility-Administered Medications Medication Dose Route Frequency  sodium chloride (NS) flush 5-10 mL  5-10 mL IntraVENous Q8H  
 sodium chloride (NS) flush 5-10 mL  5-10 mL IntraVENous PRN Current Outpatient Medications Medication Sig  
 metoprolol succinate (TOPROL-XL) 50 mg XL tablet Take 1 Tab by mouth daily.  dronedarone (MULTAQ) tab tablet Take 1 Tab by mouth two (2) times daily (with meals).  amLODIPine (NORVASC) 5 mg tablet Take 1 Tab by mouth daily.  budesonide-formoterol (SYMBICORT) 160-4.5 mcg/actuation HFAA Take 2 Puffs by inhalation two (2) times a day.  hydroCHLOROthiazide (HYDRODIURIL) 25 mg tablet Take 25 mg by mouth daily.  Oxygen 3 Each continuous.  calcium carbonate (TUMS) 200 mg calcium (500 mg) Chew Take 1 Tab by mouth daily.  cholecalciferol (VITAMIN D3) 1,000 unit tablet Take 1 Tab by mouth daily.  acetaminophen (TYLENOL) 325 mg tablet Take 2 Tabs by mouth every four (4) hours as needed.  tiotropium (SPIRIVA WITH HANDIHALER) 18 mcg inhalation capsule Take 1 Cap by inhalation daily.  albuterol (PROVENTIL, VENTOLIN) 90 mcg/Actuation inhaler Take 2 Puffs by inhalation every six (6) hours as needed.  albuterol (PROVENTIL VENTOLIN) 2.5 mg /3 mL (0.083 %) nebulizer solution by Nebulization route every six (6) hours as needed.  dextromethorphan-guaiFENesin (MUCINEX DM)  mg per tablet Take 1 Tab by mouth two (2) times a day.  aspirin (ASPIRIN) 325 mg tablet Take 325 mg by mouth daily. Relevant other informations: Other medical conditions listed in this following active hospital problem list section; all of these and other pertinent data were taken into consideration when treatment plan is developed and customized to this patient's unique overall circumstances and needs. We have reviewed available old medical records within the constraints of this admission process. Present on Admission: **None** Data Review:  
Recent Days: 
All Micro Results None Recent Labs 11/11/18 
6370 WBC 9.6 HGB 14.6 HCT 44.7  Recent Labs 11/11/18 
9695   
K 3.5 CL 97  
CO2 28 * BUN 21* CREA 1.11* CA 9.0 ALB 3.8 TBILI 0.7 SGOT 18 ALT 20 No results found for: TSH, TSHEXT Care Plan discussed with: Patient/Family and Nurse Other medical conditions are listed in the active hospital problem list section; these and other pertinent data were taken into consideration when the treatment plan was developed and customized to this patient's unique overall circumstances and needs. High complexity decision making was performed for this patient who is at high risk for decompensation with multiple organ involvement.  
  
Today total floor/unit time was 65  minutes while caring for this patient and greater than 50% of that time was spent with patient (and/or family) coordinating patients clinical issues. Kati Argueta MD MPH  FACP                              
11/11/2018  
 
 
__________________________________________________________ FOR SOUND PHYSICIAN ADMINISTRATIVE USE ONLY  
MDM 
 
  
 Rebecca Matthews MD MPH FACP  
2:10 PM 
11/11/2018

## 2018-11-11 NOTE — PROGRESS NOTES
TRANSFER - IN REPORT: 
 
Verbal report received from Erlinda(name) on Alexandria Hassan  being received from ED (unit) for routine progression of care Report consisted of patients Situation, Background, Assessment and  
Recommendations(SBAR). Information from the following report(s) SBAR, Kardex, ED Summary and Intake/Output was reviewed with the receiving nurse. Primary Nurse Cb Blevins and Dina, RN performed a dual skin assessment on this patient No impairment noted Niraj score is 18. Patient has blanchable red bottom, scaly scabbed L elbow ,scattered brusing on herbilateral knees and arms 6:30 PM Post void bladder scan 43 ml.

## 2018-11-11 NOTE — ED NOTES
TRANSFER - OUT REPORT: 
 
Verbal report given to Rich Pereira RN (name) on Cecy Guzman  being transferred to [phone] (unit) for routine progression of care Report consisted of patients Situation, Background, Assessment and  
Recommendations(SBAR). Information from the following report(s) SBAR was reviewed with the receiving nurse. Lines:  
Peripheral IV 11/11/18 Left Antecubital (Active) Site Assessment Clean, dry, & intact 11/11/2018  8:51 AM  
Phlebitis Assessment 0 11/11/2018  8:51 AM  
Infiltration Assessment 0 11/11/2018  8:51 AM  
Dressing Status Clean, dry, & intact 11/11/2018  8:51 AM  
Dressing Type Transparent 11/11/2018  8:51 AM  
Hub Color/Line Status Pink 11/11/2018  8:51 AM  
Action Taken Blood drawn 11/11/2018  8:51 AM  
Alcohol Cap Used No 11/11/2018  8:51 AM  
  
 
Opportunity for questions and clarification was provided.

## 2018-11-11 NOTE — ED PROVIDER NOTES
EMERGENCY DEPARTMENT HISTORY AND PHYSICAL EXAM 
     
 
Date: 11/11/2018 Patient Name: Brittni Sanchez History of Presenting Illness Chief Complaint Patient presents with  Shortness of Breath Became SOB this past evening. Hx of COPD, wears 4L at home but her work of breathing increased significantly so EMS put her on BIPAP. History Provided By: Patient and EMS 
 
HPI: Brittni Sanchez is a 82 y.o. female, pmhx HTN / COPD, who presents via EMS to the ED in respiratory distress. Pt c/o acute onset SOB that began after waking this morning. She notes using 4L NC at baseline, but states it was did not help this morning. Pt denies any recent medications for her current sxs. EMS reports placing pt on CPAP given her increased work of breathing. Hx otherwise limited secondary to pt's respiratory distress. PCP: Torsten Michel MD 
 
Social Hx: +tobacco (0.25 ppd), -EtOH, -Illicit Drugs Hx limited secondary to respiratory distress. Current Facility-Administered Medications Medication Dose Route Frequency Provider Last Rate Last Dose  sodium chloride (NS) flush 5-10 mL  5-10 mL IntraVENous Q8H Blake , DO      
 sodium chloride (NS) flush 5-10 mL  5-10 mL IntraVENous PRN Blake , DO      
 
Current Outpatient Medications Medication Sig Dispense Refill  metoprolol succinate (TOPROL-XL) 50 mg XL tablet Take 1 Tab by mouth daily. 14 Tab 0  
 dronedarone (MULTAQ) tab tablet Take 1 Tab by mouth two (2) times daily (with meals). 30 Tab 0  
 amLODIPine (NORVASC) 5 mg tablet Take 1 Tab by mouth daily. 10 Tab 0  
 budesonide-formoterol (SYMBICORT) 160-4.5 mcg/actuation HFAA Take 2 Puffs by inhalation two (2) times a day.  hydroCHLOROthiazide (HYDRODIURIL) 25 mg tablet Take 25 mg by mouth daily.  umeclidinium (INCRUSE ELLIPTA) 62.5 mcg/actuation inhaler Take 1 Puff by inhalation daily.  Oxygen 3 Each continuous.  calcium carbonate (TUMS) 200 mg calcium (500 mg) Chew Take 1 Tab by mouth daily. 30 Tab 0  cholecalciferol (VITAMIN D3) 1,000 unit tablet Take 1 Tab by mouth daily. 30 Tab 1  
 acetaminophen (TYLENOL) 325 mg tablet Take 2 Tabs by mouth every four (4) hours as needed. 30 Tab 0  
 tiotropium (SPIRIVA WITH HANDIHALER) 18 mcg inhalation capsule Take 1 Cap by inhalation daily.  albuterol (PROVENTIL, VENTOLIN) 90 mcg/Actuation inhaler Take 2 Puffs by inhalation every six (6) hours as needed.  albuterol (PROVENTIL VENTOLIN) 2.5 mg /3 mL (0.083 %) nebulizer solution by Nebulization route every six (6) hours as needed.  dextromethorphan-guaiFENesin (MUCINEX DM)  mg per tablet Take 1 Tab by mouth two (2) times a day.  aspirin (ASPIRIN) 325 mg tablet Take 325 mg by mouth daily. Past History Past Medical History: 
Past Medical History:  
Diagnosis Date  COPD  Hypertension Past Surgical History: 
Past Surgical History:  
Procedure Laterality Date  HX GYN    
 overy removed/ 2 c-sections Family History: 
Family History Problem Relation Age of Onset  Cancer Brother   
     lung  Cancer Brother   
     lung  Heart Disease Mother Social History: 
Social History Tobacco Use  Smoking status: Current Every Day Smoker Packs/day: 0.25  Smokeless tobacco: Never Used Substance Use Topics  Alcohol use: No  
 Drug use: No  
 
 
Allergies: Allergies Allergen Reactions  Keflex [Cephalexin] Itching Review of Systems Review of Systems Unable to perform ROS: Severe respiratory distress Physical Exam  
Physical Exam  
Constitutional: She is oriented to person, place, and time. She appears well-developed and well-nourished. She appears distressed. HENT:  
Head: Normocephalic and atraumatic. Mouth/Throat: Oropharynx is clear and moist. No oropharyngeal exudate. Eyes: Conjunctivae and EOM are normal. Pupils are equal, round, and reactive to light. Neck: Normal range of motion. Neck supple. No JVD present. No tracheal deviation present. Cardiovascular: Normal rate, regular rhythm, normal heart sounds and intact distal pulses. No murmur heard. Pulmonary/Chest: No stridor. She is in respiratory distress. She has no wheezes. She has no rales. She exhibits no tenderness. Tachypnea with air hunger, increase work of breathing with accessory muscle use Abdominal: Soft. She exhibits no distension. There is no tenderness. There is no rebound and no guarding. Musculoskeletal: Normal range of motion. She exhibits no edema or tenderness. Neurological: She is alert and oriented to person, place, and time. No cranial nerve deficit. No motor or sensory deficits Skin: Skin is warm and dry. She is not diaphoretic. Psychiatric: She has a normal mood and affect. Her behavior is normal.  
Nursing note and vitals reviewed. Diagnostic Study Results Labs - Recent Results (from the past 12 hour(s)) CBC WITH AUTOMATED DIFF Collection Time: 11/11/18  6:15 AM  
Result Value Ref Range WBC 9.6 3.6 - 11.0 K/uL  
 RBC 4.90 3.80 - 5.20 M/uL  
 HGB 14.6 11.5 - 16.0 g/dL HCT 44.7 35.0 - 47.0 % MCV 91.2 80.0 - 99.0 FL  
 MCH 29.8 26.0 - 34.0 PG  
 MCHC 32.7 30.0 - 36.5 g/dL  
 RDW 13.2 11.5 - 14.5 % PLATELET 688 295 - 083 K/uL MPV 9.5 8.9 - 12.9 FL  
 NRBC 0.0 0  WBC ABSOLUTE NRBC 0.00 0.00 - 0.01 K/uL NEUTROPHILS 78 (H) 32 - 75 % LYMPHOCYTES 10 (L) 12 - 49 % MONOCYTES 11 5 - 13 % EOSINOPHILS 0 0 - 7 % BASOPHILS 0 0 - 1 % IMMATURE GRANULOCYTES 1 (H) 0.0 - 0.5 % ABS. NEUTROPHILS 7.5 1.8 - 8.0 K/UL  
 ABS. LYMPHOCYTES 0.9 0.8 - 3.5 K/UL  
 ABS. MONOCYTES 1.0 0.0 - 1.0 K/UL  
 ABS. EOSINOPHILS 0.0 0.0 - 0.4 K/UL  
 ABS. BASOPHILS 0.0 0.0 - 0.1 K/UL  
 ABS. IMM. GRANS. 0.1 (H) 0.00 - 0.04 K/UL  
 DF AUTOMATED METABOLIC PANEL, COMPREHENSIVE Collection Time: 11/11/18  6:15 AM  
Result Value Ref Range Sodium 136 136 - 145 mmol/L Potassium 3.5 3.5 - 5.1 mmol/L Chloride 97 97 - 108 mmol/L  
 CO2 28 21 - 32 mmol/L Anion gap 11 5 - 15 mmol/L Glucose 198 (H) 65 - 100 mg/dL BUN 21 (H) 6 - 20 MG/DL Creatinine 1.11 (H) 0.55 - 1.02 MG/DL  
 BUN/Creatinine ratio 19 12 - 20 GFR est AA 57 (L) >60 ml/min/1.73m2 GFR est non-AA 47 (L) >60 ml/min/1.73m2 Calcium 9.0 8.5 - 10.1 MG/DL Bilirubin, total 0.7 0.2 - 1.0 MG/DL  
 ALT (SGPT) 20 12 - 78 U/L  
 AST (SGOT) 18 15 - 37 U/L Alk. phosphatase 71 45 - 117 U/L Protein, total 8.2 6.4 - 8.2 g/dL Albumin 3.8 3.5 - 5.0 g/dL Globulin 4.4 (H) 2.0 - 4.0 g/dL A-G Ratio 0.9 (L) 1.1 - 2.2 CK W/ CKMB & INDEX Collection Time: 11/11/18  6:15 AM  
Result Value Ref Range  26 - 192 U/L  
 CK - MB 8.0 (H) <3.6 NG/ML  
 CK-MB Index 4.4 (H) 0 - 2.5    
TROPONIN I Collection Time: 11/11/18  6:15 AM  
Result Value Ref Range Troponin-I, Qt. <0.05 <0.05 ng/mL NT-PRO BNP Collection Time: 11/11/18  6:15 AM  
Result Value Ref Range NT pro- (H) 0 - 450 PG/ML  
BLOOD GAS, ARTERIAL Collection Time: 11/11/18  6:30 AM  
Result Value Ref Range pH 7.38 7.35 - 7.45    
 PCO2 47 (H) 35.0 - 45.0 mmHg PO2 78 (L) 80 - 100 mmHg O2 SAT 95 92 - 97 % BICARBONATE 27 (H) 22 - 26 mmol/L  
 BASE EXCESS 1.2 mmol/L  
 O2 METHOD BIPAP    
 FIO2 45 % SET RATE 4    
 SPONTANEOUS RATE 28.0 IPAP/PIP 12.0 EPAP/CPAP/PEEP 6.0 Sample source ARTERIAL    
 SITE RIGHT RADIAL EDER'S TEST YES Radiologic Studies -  
XR CHEST PORT Final Result CT Results  (Last 48 hours) None CXR Results  (Last 48 hours)  
          
 11/11/18 0625  XR CHEST PORT Final result Impression:  IMPRESSION:  
Stable mild interstitial prominence. .  . Narrative:  INDICATION:  Chest pain EXAM: Chest single view. COMPARISON: 1/5/2018. FINDINGS: A single frontal view of the chest at 0606 hours shows stable  
interstitial prominence with no focal infiltrate or effusion. The upper lung  
zones are obscured by the patient's chin. The heart, mediastinum and pulmonary  
vasculature are stable . The bony thorax is unremarkable for age. .  
   
  
  
 
 
 
Medical Decision Making I am the first provider for this patient. I reviewed the vital signs, available nursing notes, past medical history, past surgical history, family history and social history. Vital Signs-Reviewed the patient's vital signs. Patient Vitals for the past 12 hrs: 
 Temp Pulse Resp BP SpO2  
11/11/18 0700  73 28 146/53 97 % 11/11/18 0645  73 26 145/52 97 % 11/11/18 0630  73 25 142/53 96 % 11/11/18 0615  80 29 138/54 95 % 11/11/18 0613     95 % 11/11/18 0612     92 % 11/11/18 0611    141/56   
11/11/18 0603 96.7 °F (35.9 °C) 77 (!) 34 138/54 95 % Pulse Oximetry Analysis - 95% on Bipap Cardiac Monitor:  
Rate: 77 bpm 
Rhythm: Normal Sinus Rhythm Records Reviewed: Nursing Notes, Old Medical Records, Previous electrocardiograms, Ambulance Run Sheet, Previous Radiology Studies and Previous Laboratory Studies Provider Notes (Medical Decision Making): DDx: COPD exacerbation, CHF, PNA, pulmonary edema EKG: 
NSR, RBBB, rightward access, normal pr, prolonged qrs, NSST changes ED Course:  
Initial assessment performed. The patients presenting problems have been discussed, and they are in agreement with the care plan formulated and outlined with them. I have encouraged them to ask questions as they arise throughout their visit. PROGRESS NOTE: 
7:09 AM 
Pt responding well to bipap. Will continue to monitor. Pt will be given additional Albuterol tx.   
Written by Vesna Maurer ED Scribe, as dictated by Jaime Vela DO 
 
BEDSIDE: 
11:55 AM 
 Discussed pt's hx, disposition, and available diagnostic and imaging results with Phan Oneill MD. Reviewed care plans. Both providers are in agreement with care plan. Raissa Belcher DO is transferring care of the pt to Phan Oneill MD at this time. Written by Justus Rivera ED Scribe, as dictated by Phan Oneill MD. 
 
 
Consult Note: 
11:5 AM 
Phan Oneill MD spoke with Marco Fischer MD, Specialty: hospitalist 
Discussed pt's hx, disposition, and available diagnostic and imaging results. Reviewed care plans. Consultant agrees with plans as outlined. Dr. Malissa Kay will admit pt. Critical Care Time: CRITICAL CARE NOTE : 
 
11:51 AM 
IMPENDING DETERIORATION -Respiratory ASSOCIATED RISK FACTORS - Resp distress requiring BiPAP for improved ventilation MANAGEMENT- Bedside Assessment and Supervision of Care INTERPRETATION -  Xrays, Blood Gases, ECG, Blood Pressure, Cardiac Output Measures  and Labs INTERVENTIONS - BiPAP, Albuterol, steroids CASE REVIEW - Nursing, family, hospitalist 
TREATMENT RESPONSE -Improved PERFORMED BY - {Marvin Walsh DO 
 
NOTES   : 
I have spent 45 minutes of critical care time involved in lab review, consultations with specialist, family decision- making, bedside attention and documentation. During this entire length of time I was immediately available to the patient . Alex Villeda DO Admit Note: 
11:56 AM 
Pt is being admitted by Marco Fischer MD. The results of their tests and reason(s) for their admission have been discussed with pt and/or available family. They convey agreement and understanding for the need to be admitted and for admission diagnosis. Diagnosis Clinical Impression: 1. Acute respiratory distress 2. COPD with acute exacerbation ([facility] [address] PLAN: 
1. Admit to Hospital 
 
 
 
 
 
 
Attestations:  
 
This note is prepared by [facility], acting as Scribe for Raissa Belcher DO 
 
 Pascual Mendoza DO : The scribe's documentation has been prepared under my direction and personally reviewed by me in its entirety. I confirm that the note above accurately reflects all work, treatment, procedures, and medical decision making performed by me. This note will not be viewable in [address]

## 2018-11-12 LAB
ANION GAP SERPL CALC-SCNC: 7 MMOL/L (ref 5–15)
ATRIAL RATE: 68 BPM
BASOPHILS # BLD: 0 K/UL (ref 0–0.1)
BASOPHILS NFR BLD: 0 % (ref 0–1)
BUN SERPL-MCNC: 35 MG/DL (ref 6–20)
BUN/CREAT SERPL: 26 (ref 12–20)
CALCIUM SERPL-MCNC: 8.9 MG/DL (ref 8.5–10.1)
CALCULATED P AXIS, ECG09: 69 DEGREES
CALCULATED R AXIS, ECG10: 55 DEGREES
CALCULATED T AXIS, ECG11: 11 DEGREES
CHLORIDE SERPL-SCNC: 99 MMOL/L (ref 97–108)
CO2 SERPL-SCNC: 30 MMOL/L (ref 21–32)
CREAT SERPL-MCNC: 1.34 MG/DL (ref 0.55–1.02)
DIAGNOSIS, 93000: NORMAL
DIFFERENTIAL METHOD BLD: ABNORMAL
EOSINOPHIL # BLD: 0 K/UL (ref 0–0.4)
EOSINOPHIL NFR BLD: 0 % (ref 0–7)
ERYTHROCYTE [DISTWIDTH] IN BLOOD BY AUTOMATED COUNT: 13 % (ref 11.5–14.5)
EST. AVERAGE GLUCOSE BLD GHB EST-MCNC: 143 MG/DL
GLUCOSE BLD STRIP.AUTO-MCNC: 165 MG/DL (ref 65–100)
GLUCOSE BLD STRIP.AUTO-MCNC: 203 MG/DL (ref 65–100)
GLUCOSE BLD STRIP.AUTO-MCNC: 208 MG/DL (ref 65–100)
GLUCOSE BLD STRIP.AUTO-MCNC: 217 MG/DL (ref 65–100)
GLUCOSE SERPL-MCNC: 180 MG/DL (ref 65–100)
HBA1C MFR BLD: 6.6 % (ref 4.2–6.3)
HCT VFR BLD AUTO: 39.9 % (ref 35–47)
HGB BLD-MCNC: 13 G/DL (ref 11.5–16)
IMM GRANULOCYTES # BLD: 0.1 K/UL (ref 0–0.04)
IMM GRANULOCYTES NFR BLD AUTO: 1 % (ref 0–0.5)
LYMPHOCYTES # BLD: 0.4 K/UL (ref 0.8–3.5)
LYMPHOCYTES NFR BLD: 6 % (ref 12–49)
MCH RBC QN AUTO: 29.7 PG (ref 26–34)
MCHC RBC AUTO-ENTMCNC: 32.6 G/DL (ref 30–36.5)
MCV RBC AUTO: 91.1 FL (ref 80–99)
MONOCYTES # BLD: 0.3 K/UL (ref 0–1)
MONOCYTES NFR BLD: 4 % (ref 5–13)
NEUTS SEG # BLD: 7.2 K/UL (ref 1.8–8)
NEUTS SEG NFR BLD: 90 % (ref 32–75)
NRBC # BLD: 0 K/UL (ref 0–0.01)
NRBC BLD-RTO: 0 PER 100 WBC
P-R INTERVAL, ECG05: 152 MS
PLATELET # BLD AUTO: 214 K/UL (ref 150–400)
PMV BLD AUTO: 9.4 FL (ref 8.9–12.9)
POTASSIUM SERPL-SCNC: 3.7 MMOL/L (ref 3.5–5.1)
Q-T INTERVAL, ECG07: 512 MS
QRS DURATION, ECG06: 152 MS
QTC CALCULATION (BEZET), ECG08: 544 MS
RBC # BLD AUTO: 4.38 M/UL (ref 3.8–5.2)
SERVICE CMNT-IMP: ABNORMAL
SODIUM SERPL-SCNC: 136 MMOL/L (ref 136–145)
VENTRICULAR RATE, ECG03: 68 BPM
WBC # BLD AUTO: 8 K/UL (ref 3.6–11)

## 2018-11-12 PROCEDURE — 74011250637 HC RX REV CODE- 250/637: Performed by: INTERNAL MEDICINE

## 2018-11-12 PROCEDURE — 80048 BASIC METABOLIC PNL TOTAL CA: CPT

## 2018-11-12 PROCEDURE — 85025 COMPLETE CBC W/AUTO DIFF WBC: CPT

## 2018-11-12 PROCEDURE — 93005 ELECTROCARDIOGRAM TRACING: CPT

## 2018-11-12 PROCEDURE — 36415 COLL VENOUS BLD VENIPUNCTURE: CPT

## 2018-11-12 PROCEDURE — 74011250637 HC RX REV CODE- 250/637: Performed by: EMERGENCY MEDICINE

## 2018-11-12 PROCEDURE — 74011250636 HC RX REV CODE- 250/636: Performed by: INTERNAL MEDICINE

## 2018-11-12 PROCEDURE — 74011250636 HC RX REV CODE- 250/636: Performed by: EMERGENCY MEDICINE

## 2018-11-12 PROCEDURE — 77030013033 HC MSK BPAP/CPAP MMKA -B

## 2018-11-12 PROCEDURE — 77010033678 HC OXYGEN DAILY

## 2018-11-12 PROCEDURE — 74011000250 HC RX REV CODE- 250: Performed by: INTERNAL MEDICINE

## 2018-11-12 PROCEDURE — 99218 HC RM OBSERVATION: CPT

## 2018-11-12 PROCEDURE — 83036 HEMOGLOBIN GLYCOSYLATED A1C: CPT

## 2018-11-12 PROCEDURE — 77030038269 HC DRN EXT URIN PURWCK BARD -A

## 2018-11-12 PROCEDURE — 74011636637 HC RX REV CODE- 636/637: Performed by: INTERNAL MEDICINE

## 2018-11-12 PROCEDURE — 51798 US URINE CAPACITY MEASURE: CPT

## 2018-11-12 PROCEDURE — 82962 GLUCOSE BLOOD TEST: CPT

## 2018-11-12 PROCEDURE — 94760 N-INVAS EAR/PLS OXIMETRY 1: CPT

## 2018-11-12 PROCEDURE — 94640 AIRWAY INHALATION TREATMENT: CPT

## 2018-11-12 RX ORDER — DEXTROSE 50 % IN WATER (D50W) INTRAVENOUS SYRINGE
12.5-25 AS NEEDED
Status: DISCONTINUED | OUTPATIENT
Start: 2018-11-12 | End: 2018-11-16 | Stop reason: HOSPADM

## 2018-11-12 RX ORDER — DOXYCYCLINE HYCLATE 100 MG
100 TABLET ORAL EVERY 12 HOURS
Status: DISCONTINUED | OUTPATIENT
Start: 2018-11-12 | End: 2018-11-15

## 2018-11-12 RX ORDER — SODIUM CHLORIDE 9 MG/ML
75 INJECTION, SOLUTION INTRAVENOUS CONTINUOUS
Status: DISCONTINUED | OUTPATIENT
Start: 2018-11-12 | End: 2018-11-13

## 2018-11-12 RX ADMIN — METHYLPREDNISOLONE SODIUM SUCCINATE 40 MG: 40 INJECTION, POWDER, FOR SOLUTION INTRAMUSCULAR; INTRAVENOUS at 17:15

## 2018-11-12 RX ADMIN — ALBUTEROL SULFATE 2.5 MG: 2.5 SOLUTION RESPIRATORY (INHALATION) at 09:00

## 2018-11-12 RX ADMIN — Medication 10 ML: at 06:07

## 2018-11-12 RX ADMIN — SODIUM CHLORIDE 75 ML/HR: 900 INJECTION, SOLUTION INTRAVENOUS at 11:29

## 2018-11-12 RX ADMIN — HEPARIN SODIUM 5000 UNITS: 5000 INJECTION INTRAVENOUS; SUBCUTANEOUS at 17:15

## 2018-11-12 RX ADMIN — Medication 10 ML: at 22:32

## 2018-11-12 RX ADMIN — CALCIUM CARBONATE (ANTACID) CHEW TAB 500 MG 200 MG: 500 CHEW TAB at 09:16

## 2018-11-12 RX ADMIN — DOXYCYCLINE HYCLATE 100 MG: 100 TABLET, COATED ORAL at 11:32

## 2018-11-12 RX ADMIN — DOXYCYCLINE HYCLATE 100 MG: 100 TABLET, COATED ORAL at 22:32

## 2018-11-12 RX ADMIN — INSULIN LISPRO 2 UNITS: 100 INJECTION, SOLUTION INTRAVENOUS; SUBCUTANEOUS at 12:24

## 2018-11-12 RX ADMIN — UMECLIDINIUM 1 PUFF: 62.5 AEROSOL, POWDER ORAL at 09:18

## 2018-11-12 RX ADMIN — HYDROCHLOROTHIAZIDE 25 MG: 25 TABLET ORAL at 09:16

## 2018-11-12 RX ADMIN — INSULIN LISPRO 1 UNITS: 100 INJECTION, SOLUTION INTRAVENOUS; SUBCUTANEOUS at 22:38

## 2018-11-12 RX ADMIN — AMLODIPINE BESYLATE 5 MG: 5 TABLET ORAL at 09:16

## 2018-11-12 RX ADMIN — METHYLPREDNISOLONE SODIUM SUCCINATE 40 MG: 40 INJECTION, POWDER, FOR SOLUTION INTRAMUSCULAR; INTRAVENOUS at 11:32

## 2018-11-12 RX ADMIN — GUAIFENESIN AND DEXTROMETHORPHAN HYDROBROMIDE 1 TABLET: 600; 30 TABLET, EXTENDED RELEASE ORAL at 17:14

## 2018-11-12 RX ADMIN — HEPARIN SODIUM 5000 UNITS: 5000 INJECTION INTRAVENOUS; SUBCUTANEOUS at 23:15

## 2018-11-12 RX ADMIN — ALBUTEROL SULFATE 2.5 MG: 2.5 SOLUTION RESPIRATORY (INHALATION) at 20:09

## 2018-11-12 RX ADMIN — ALBUTEROL SULFATE 2.5 MG: 2.5 SOLUTION RESPIRATORY (INHALATION) at 04:05

## 2018-11-12 RX ADMIN — METHYLPREDNISOLONE SODIUM SUCCINATE 40 MG: 40 INJECTION, POWDER, FOR SOLUTION INTRAMUSCULAR; INTRAVENOUS at 23:15

## 2018-11-12 RX ADMIN — FLUTICASONE FUROATE AND VILANTEROL TRIFENATATE 1 PUFF: 200; 25 POWDER RESPIRATORY (INHALATION) at 09:18

## 2018-11-12 RX ADMIN — HEPARIN SODIUM 5000 UNITS: 5000 INJECTION INTRAVENOUS; SUBCUTANEOUS at 09:17

## 2018-11-12 RX ADMIN — ASPIRIN 325 MG: 325 TABLET ORAL at 09:16

## 2018-11-12 RX ADMIN — METHYLPREDNISOLONE SODIUM SUCCINATE 40 MG: 40 INJECTION, POWDER, FOR SOLUTION INTRAMUSCULAR; INTRAVENOUS at 06:07

## 2018-11-12 RX ADMIN — INSULIN LISPRO 2 UNITS: 100 INJECTION, SOLUTION INTRAVENOUS; SUBCUTANEOUS at 09:15

## 2018-11-12 RX ADMIN — VITAMIN D, TAB 1000IU (100/BT) 1000 UNITS: 25 TAB at 09:16

## 2018-11-12 RX ADMIN — GUAIFENESIN AND DEXTROMETHORPHAN HYDROBROMIDE 1 TABLET: 600; 30 TABLET, EXTENDED RELEASE ORAL at 09:19

## 2018-11-12 RX ADMIN — ALBUTEROL SULFATE 2.5 MG: 2.5 SOLUTION RESPIRATORY (INHALATION) at 13:50

## 2018-11-12 NOTE — PROGRESS NOTES
Problem: Falls - Risk of 
Goal: *Absence of Falls Document Anita BarrAniyah Fall Risk and appropriate interventions in the flowsheet. Outcome: Progressing Towards Goal 
Fall Risk Interventions: 
Mobility Interventions: Bed/chair exit alarm, Communicate number of staff needed for ambulation/transfer, Mechanical lift, OT consult for ADLs, Patient to call before getting OOB, PT Consult for mobility concerns, PT Consult for assist device competence Medication Interventions: Bed/chair exit alarm, Evaluate medications/consider consulting pharmacy, Patient to call before getting OOB, Teach patient to arise slowly, Utilize gait belt for transfers/ambulation Elimination Interventions: Bed/chair exit alarm, Call light in reach, Elevated toilet seat, Patient to call for help with toileting needs, Toilet paper/wipes in reach, Toileting schedule/hourly rounds, Urinal in reach History of Falls Interventions: Bed/chair exit alarm, Consult care management for discharge planning, Door open when patient unattended, Evaluate medications/consider consulting pharmacy, Investigate reason for fall, Room close to nurse's station, Utilize gait belt for transfer/ambulation Problem: Pressure Injury - Risk of 
Goal: *Prevention of pressure injury Document Niraj Scale and appropriate interventions in the flowsheet. Pressure Injury Interventions: 
Sensory Interventions: Assess changes in LOC, Assess need for specialty bed, Avoid rigorous massage over bony prominences, Chair cushion, Check visual cues for pain, Discuss PT/OT consult with provider, Float heels, Keep linens dry and wrinkle-free Moisture Interventions: Absorbent underpads, Apply protective barrier, creams and emollients, Assess need for specialty bed, Check for incontinence Q2 hours and as needed, Contain wound drainage, Internal/External urinary devices, Limit adult briefs Activity Interventions: Assess need for specialty bed, Chair cushion, Increase time out of bed, Pressure redistribution bed/mattress(bed type), PT/OT evaluation, Trapeze to reposition Mobility Interventions: Assess need for specialty bed, Chair cushion, Float heels, HOB 30 degrees or less, Pressure redistribution bed/mattress (bed type), PT/OT evaluation, Suspension boots, Trapeze to reposition Nutrition Interventions: Document food/fluid/supplement intake, Discuss nutritional consult with provider, Offer support with meals,snacks and hydration

## 2018-11-12 NOTE — PROGRESS NOTES
Note to be dictated. Agree Beta blockers and multaq to be held. May need PPm. She still smokes! . Severe COPD.

## 2018-11-12 NOTE — PROGRESS NOTES
Hospitalist Progress Note NAME: Charlene Amin :  1935 MRN:  [id number] Assessment / Plan: 
 
Acute on Chronic hypoxemic respiratory failure Acute on chronic COPD exacerbation.  
-Continue NC Oxygen and BiPAP support if needed. -IV Solumedrol, nebs, breo, add flutter valve, mucolytics -CXR w/o clear infiltrate, but productive cough, will get sputum cx and add doxy for suspected acute bronchitis Mild ALMAZ Suspected mild dehydration 
- creat up to 1.34, stop hctz, gentle fluids, follow Hyperglycemia- no hx of DM, check a1c, likely 2nd to steroids, follow with ssi for now, lantus/NPH with steroids doses if needed  
  
History of PAF, ? On multaq, no anticoag RBBB Frequent PVCs noted on monitor in ER, now bradycardic ? Intermittent 2ndHB Severe pulmonary hypertension Left ventricular dilation.  
-consult cardiologist Dr. Johanna Paz to see this am 
-cont telemetry monitoring.  
-asked nurse to hold bb and multaq this am until cardiology sees her and can decide if should continue them or not. 
-last echo 10/17 normal EF 
  
On going Tobacco abuse  
-Advised pt to stop smoking.  
-nicotine patch 
  
HTN- follow and adjust, stop HCTZ Stress urinary incontinence. (pt uses diapers) OA   
 
25.0 - 29.9 Overweight / Body mass index is 29.71 kg/m². Code status: Full Prophylaxis: Hep SQ Recommended Disposition: Home w/Family possible home health Subjective: Chief Complaint / Reason for Physician Visit Sob/wheezing Patient feels her breathing is much improved. She has not been on BiPAP since he came up to the floor. She uses 4 L of oxygen at home. She says that the symptoms came on pretty suddenly she had been coughing up some yellow sputum the last couple of days. She has a cough now but it is hard to bring sputum up. She has no history of diabetes.   And she was not able to tell me what her cardiac medications were or that she had a history of paroxysmal A. fib but follows with Dr. Georgina Santana and is not on anticoagulants that she is aware of. She denies any chest pain. She still has some shortness of breath. She ate well this morning denies any nausea vomiting or abdominal pain. Patient was evaluated at 8:40 AM 
 
Discussed with RN events overnight. Review of Systems: 
Symptom Y/N Comments  Symptom Y/N Comments Fever/Chills    Chest Pain n   
Poor Appetite    Edema Cough y   Abdominal Pain n   
Sputum y decreased  Joint Pain SOB/LUDWIG y better  Pruritis/Rash Nausea/vomit n   Tolerating PT/OT Diarrhea    Tolerating Diet y Constipation    Other Could NOT obtain due to:   
 
Objective: VITALS:  
Last 24hrs VS reviewed since prior progress note. Most recent are: 
Patient Vitals for the past 24 hrs: 
 Temp Pulse Resp BP SpO2  
11/12/18 0815 98.6 °F (37 °C) (!) 44 20 115/69 91 % 11/12/18 0404     91 % 11/12/18 0319 97.9 °F (36.6 °C) (!) 42 20 133/67 91 % 11/12/18 0055  (!) 44 20 133/70   
11/11/18 2341 97.9 °F (36.6 °C) 70 20 153/64 91 % 11/11/18 2003 98 °F (36.7 °C) 69 20 153/61 92 % 11/1935     92 % 11/11/18 1641     93 % 11/11/18 1529 98.1 °F (36.7 °C) 71 24 125/67 92 % 11/11/18 1500  67 23 122/43 94 % 11/11/18 1330 97 °F (36.1 °C) 72 18 135/55 97 % 11/11/18 1130 97 °F (36.1 °C) 71 23 125/50 92 % 11/11/18 1045  67 22 128/48 94 % 11/11/18 1030  67 23 129/47 93 % 11/11/18 0930  71 26 134/54 (!) 89 % 11/11/18 0925  72 20  91 % Intake/Output Summary (Last 24 hours) at 11/12/2018 2462 Last data filed at 11/12/2018 8647 Gross per 24 hour Intake 60 ml Output 350 ml Net -290 ml PHYSICAL EXAM: 
Patient is awake and alert she is oriented x3 on nasal cannula with dyspnea and prolonged expiratory respiratory phase while talking but not in distress.   Conjunctiva are pink oropharynx mucous membranes tacky cardiovascular bradycardic regular rate occasional irregularity no obvious murmur rubs or gallops. Lungs decreased breath sounds throughout poor air movement some soft expiratory wheezing and prolonged expiratory phase no crackles or rhonchi currently. Abdomen is obese bowel sounds present soft nontender extremities no clubbing cyanosis or edema. Neuro exam is nonfoca Reviewed most current lab test results and cultures  YES Reviewed most current radiology test results   YES Review and summation of old records today    NO Reviewed patient's current orders and MAR    YES 
PMH/SH reviewed - no change compared to H&P 
________________________________________________________________________ Care Plan discussed with: 
  Comments Patient y Family  y daughter RN y   
Care Manager Consultant Multidiciplinary team rounds were held today with , nursing, pharmacist and clinical coordinator. Patient's plan of care was discussed; medications were reviewed and discharge planning was addressed. ________________________________________________________________________ Total NON critical care TIME:    Minutes Total CRITICAL CARE TIME Spent:   Minutes non procedure based Comments >50% of visit spent in counseling and coordination of care    
________________________________________________________________________ Franco Hung MD  
 
Procedures: see electronic medical records for all procedures/Xrays and details which were not copied into this note but were reviewed prior to creation of Plan. LABS: 
I reviewed today's most current labs and imaging studies. Pertinent labs include: 
Recent Labs 11/12/18 
0306 11/11/18 
7224 WBC 8.0 9.6 HGB 13.0 14.6 HCT 39.9 44.7  242 Recent Labs 11/12/18 
0306 11/11/18 
9357  136  
K 3.7 3.5 CL 99 97 CO2 30 28 * 198* BUN 35* 21* CREA 1.34* 1.11* CA 8.9 9.0 ALB  --  3.8 TBILI  --  0.7 SGOT  --  18 ALT  --  20 Signed: Carrol Mckeon MD

## 2018-11-12 NOTE — PROGRESS NOTES
ADULT PROTOCOL: JET AEROSOL ASSESSMENT Patient  Katie Reese     82 y.o.   female     11/11/2018  10:47 PM 
 
Breath Sounds Pre Procedure: Right Breath Sounds: Expiratory wheezing Left Breath Sounds: Expiratory wheezing Breath Sounds Post Procedure: Right Breath Sounds: Expiratory wheezing Left Breath Sounds: Expiratory wheezing Breathing pattern: Pre procedure Breathing Pattern: Dyspnea at rest 
        Post procedure Breathing Pattern: Regular Heart Rate: Pre procedure Pulse: 69 
         Post procedure Pulse: 71 Resp Rate: Pre procedure Respirations: 22 Post procedure Respirations: 20 Peak Flow: Pre bronchodilator Post bronchodilator FVC/FEV1:  N/A Incentive Spirometry:    
     
 
Cough: Pre procedure Cough: Non-productive Post procedure Cough: Non-productive Suctioned: NO Sputum: Pre procedure Post procedure Oxygen: O2 Device: Nasal cannula   3L NC Changed: NO SpO2: Pre procedure SpO2: 92 %   with oxygen Post procedure SpO2: 93 %  with oxygen Nebulizer Therapy: Current medications Aerosolized Medications: DuoNeb Changed: NO Smoking History:  
Smoking status: Current Every Day Smoker   
    Packs/day: 0.25  Smokeless tobacco: Never Used Problem List:  
Patient Active Problem List  
Diagnosis Code  Acute on chronic respiratory failure (HCC) J96.20  
 COPD (chronic obstructive pulmonary disease) with acute bronchitis (HCC) J44.0, J20.9  Lung nodule R91.1  Abnormal ECG R94.31  
 Hyperglycemia R73.9  Tobacco abuse Z72.0  
 Pulmonary hypertension, moderate to severe I27.20  Sepsis ([address] A41.9  
 COPD exacerbation ([address] J44.1  Respiratory failure ([address] J96.90 Respiratory Therapist: Jaymie Mock RT

## 2018-11-12 NOTE — PROGRESS NOTES
Problem: Falls - Risk of 
Goal: *Absence of Falls Document Lai Richardson Fall Risk and appropriate interventions in the flowsheet. Outcome: Progressing Towards Goal 
Fall Risk Interventions: 
Mobility Interventions: Bed/chair exit alarm, Communicate number of staff needed for ambulation/transfer, OT consult for ADLs, Patient to call before getting OOB, PT Consult for mobility concerns, PT Consult for assist device competence Medication Interventions: Bed/chair exit alarm, Evaluate medications/consider consulting pharmacy, Patient to call before getting OOB, Teach patient to arise slowly Elimination Interventions: Bed/chair exit alarm, Call light in reach, Patient to call for help with toileting needs, Toilet paper/wipes in reach, Toileting schedule/hourly rounds History of Falls Interventions: Bed/chair exit alarm, Consult care management for discharge planning, Door open when patient unattended, Evaluate medications/consider consulting pharmacy, Investigate reason for fall, Room close to nurse's station

## 2018-11-12 NOTE — PROGRESS NOTES
F/u on cardiology consult was informed that that Dr. Tray Orona was aware of consult. Will continue to monitor.  
 
Swati Larios) Shift report given to Panfilo Gaitan

## 2018-11-12 NOTE — PROGRESS NOTES
OT orders received and chart reviewed and per nursing pt waiting on cardiac due to pts HR dropped to 39 this am.  Will defer and continue to follow

## 2018-11-12 NOTE — PROGRESS NOTES
Orders received, chart reviewed. Attempted to see pt this AM for PT evaluation however RN requesting therapy hold secondary to bradycardia. Pt with HR in low 40s and awaiting cardiology consult. Will defer however continue to follow.   
 
David Areas, PT, DPT

## 2018-11-13 LAB
ANION GAP SERPL CALC-SCNC: 7 MMOL/L (ref 5–15)
BASOPHILS # BLD: 0 K/UL (ref 0–0.1)
BASOPHILS NFR BLD: 0 % (ref 0–1)
BUN SERPL-MCNC: 48 MG/DL (ref 6–20)
BUN/CREAT SERPL: 41 (ref 12–20)
CALCIUM SERPL-MCNC: 8.7 MG/DL (ref 8.5–10.1)
CHLORIDE SERPL-SCNC: 100 MMOL/L (ref 97–108)
CO2 SERPL-SCNC: 30 MMOL/L (ref 21–32)
CREAT SERPL-MCNC: 1.18 MG/DL (ref 0.55–1.02)
DIFFERENTIAL METHOD BLD: ABNORMAL
EOSINOPHIL # BLD: 0 K/UL (ref 0–0.4)
EOSINOPHIL NFR BLD: 0 % (ref 0–7)
ERYTHROCYTE [DISTWIDTH] IN BLOOD BY AUTOMATED COUNT: 13.2 % (ref 11.5–14.5)
GLUCOSE BLD STRIP.AUTO-MCNC: 163 MG/DL (ref 65–100)
GLUCOSE BLD STRIP.AUTO-MCNC: 177 MG/DL (ref 65–100)
GLUCOSE BLD STRIP.AUTO-MCNC: 188 MG/DL (ref 65–100)
GLUCOSE BLD STRIP.AUTO-MCNC: 199 MG/DL (ref 65–100)
GLUCOSE SERPL-MCNC: 162 MG/DL (ref 65–100)
HCT VFR BLD AUTO: 36.5 % (ref 35–47)
HGB BLD-MCNC: 12 G/DL (ref 11.5–16)
IMM GRANULOCYTES # BLD: 0.1 K/UL (ref 0–0.04)
IMM GRANULOCYTES NFR BLD AUTO: 1 % (ref 0–0.5)
LYMPHOCYTES # BLD: 0.6 K/UL (ref 0.8–3.5)
LYMPHOCYTES NFR BLD: 4 % (ref 12–49)
MAGNESIUM SERPL-MCNC: 2.1 MG/DL (ref 1.6–2.4)
MCH RBC QN AUTO: 30 PG (ref 26–34)
MCHC RBC AUTO-ENTMCNC: 32.9 G/DL (ref 30–36.5)
MCV RBC AUTO: 91.3 FL (ref 80–99)
MONOCYTES # BLD: 0.5 K/UL (ref 0–1)
MONOCYTES NFR BLD: 4 % (ref 5–13)
NEUTS SEG # BLD: 11.7 K/UL (ref 1.8–8)
NEUTS SEG NFR BLD: 91 % (ref 32–75)
NRBC # BLD: 0 K/UL (ref 0–0.01)
NRBC BLD-RTO: 0 PER 100 WBC
PHOSPHATE SERPL-MCNC: 2.8 MG/DL (ref 2.6–4.7)
PLATELET # BLD AUTO: 209 K/UL (ref 150–400)
PMV BLD AUTO: 9.5 FL (ref 8.9–12.9)
POTASSIUM SERPL-SCNC: 3.8 MMOL/L (ref 3.5–5.1)
RBC # BLD AUTO: 4 M/UL (ref 3.8–5.2)
SERVICE CMNT-IMP: ABNORMAL
SODIUM SERPL-SCNC: 137 MMOL/L (ref 136–145)
WBC # BLD AUTO: 12.8 K/UL (ref 3.6–11)

## 2018-11-13 PROCEDURE — 65270000029 HC RM PRIVATE

## 2018-11-13 PROCEDURE — 74011250637 HC RX REV CODE- 250/637: Performed by: INTERNAL MEDICINE

## 2018-11-13 PROCEDURE — G8978 MOBILITY CURRENT STATUS: HCPCS

## 2018-11-13 PROCEDURE — 97535 SELF CARE MNGMENT TRAINING: CPT

## 2018-11-13 PROCEDURE — 74011000250 HC RX REV CODE- 250: Performed by: INTERNAL MEDICINE

## 2018-11-13 PROCEDURE — 83735 ASSAY OF MAGNESIUM: CPT

## 2018-11-13 PROCEDURE — 74011250636 HC RX REV CODE- 250/636: Performed by: INTERNAL MEDICINE

## 2018-11-13 PROCEDURE — 99218 HC RM OBSERVATION: CPT

## 2018-11-13 PROCEDURE — 77030038269 HC DRN EXT URIN PURWCK BARD -A

## 2018-11-13 PROCEDURE — 97161 PT EVAL LOW COMPLEX 20 MIN: CPT

## 2018-11-13 PROCEDURE — 87070 CULTURE OTHR SPECIMN AEROBIC: CPT

## 2018-11-13 PROCEDURE — 77010033678 HC OXYGEN DAILY

## 2018-11-13 PROCEDURE — 94640 AIRWAY INHALATION TREATMENT: CPT

## 2018-11-13 PROCEDURE — 82962 GLUCOSE BLOOD TEST: CPT

## 2018-11-13 PROCEDURE — 36415 COLL VENOUS BLD VENIPUNCTURE: CPT

## 2018-11-13 PROCEDURE — 80048 BASIC METABOLIC PNL TOTAL CA: CPT

## 2018-11-13 PROCEDURE — 85025 COMPLETE CBC W/AUTO DIFF WBC: CPT

## 2018-11-13 PROCEDURE — G8979 MOBILITY GOAL STATUS: HCPCS

## 2018-11-13 PROCEDURE — 87077 CULTURE AEROBIC IDENTIFY: CPT

## 2018-11-13 PROCEDURE — 84100 ASSAY OF PHOSPHORUS: CPT

## 2018-11-13 PROCEDURE — 97116 GAIT TRAINING THERAPY: CPT

## 2018-11-13 PROCEDURE — 74011250637 HC RX REV CODE- 250/637: Performed by: NURSE PRACTITIONER

## 2018-11-13 PROCEDURE — 94760 N-INVAS EAR/PLS OXIMETRY 1: CPT

## 2018-11-13 PROCEDURE — 87185 SC STD ENZYME DETCJ PER NZM: CPT

## 2018-11-13 PROCEDURE — 74011250637 HC RX REV CODE- 250/637: Performed by: EMERGENCY MEDICINE

## 2018-11-13 PROCEDURE — 74011250636 HC RX REV CODE- 250/636: Performed by: NURSE PRACTITIONER

## 2018-11-13 PROCEDURE — 97165 OT EVAL LOW COMPLEX 30 MIN: CPT

## 2018-11-13 RX ADMIN — FLUTICASONE FUROATE AND VILANTEROL TRIFENATATE 1 PUFF: 200; 25 POWDER RESPIRATORY (INHALATION) at 09:43

## 2018-11-13 RX ADMIN — Medication 10 ML: at 05:50

## 2018-11-13 RX ADMIN — UMECLIDINIUM 1 PUFF: 62.5 AEROSOL, POWDER ORAL at 09:43

## 2018-11-13 RX ADMIN — ALBUTEROL SULFATE 2.5 MG: 2.5 SOLUTION RESPIRATORY (INHALATION) at 14:48

## 2018-11-13 RX ADMIN — ASPIRIN 325 MG: 325 TABLET ORAL at 09:42

## 2018-11-13 RX ADMIN — ALBUTEROL SULFATE 2.5 MG: 2.5 SOLUTION RESPIRATORY (INHALATION) at 07:32

## 2018-11-13 RX ADMIN — Medication 10 ML: at 17:55

## 2018-11-13 RX ADMIN — HEPARIN SODIUM 5000 UNITS: 5000 INJECTION INTRAVENOUS; SUBCUTANEOUS at 09:42

## 2018-11-13 RX ADMIN — GUAIFENESIN AND DEXTROMETHORPHAN HYDROBROMIDE 1 TABLET: 600; 30 TABLET, EXTENDED RELEASE ORAL at 17:53

## 2018-11-13 RX ADMIN — DOXYCYCLINE HYCLATE 100 MG: 100 TABLET, COATED ORAL at 09:42

## 2018-11-13 RX ADMIN — METHYLPREDNISOLONE SODIUM SUCCINATE 40 MG: 125 INJECTION, POWDER, FOR SOLUTION INTRAMUSCULAR; INTRAVENOUS at 20:45

## 2018-11-13 RX ADMIN — VITAMIN D, TAB 1000IU (100/BT) 1000 UNITS: 25 TAB at 09:42

## 2018-11-13 RX ADMIN — METHYLPREDNISOLONE SODIUM SUCCINATE 40 MG: 40 INJECTION, POWDER, FOR SOLUTION INTRAMUSCULAR; INTRAVENOUS at 05:49

## 2018-11-13 RX ADMIN — HEPARIN SODIUM 5000 UNITS: 5000 INJECTION INTRAVENOUS; SUBCUTANEOUS at 17:53

## 2018-11-13 RX ADMIN — ALBUTEROL SULFATE 2.5 MG: 2.5 SOLUTION RESPIRATORY (INHALATION) at 02:55

## 2018-11-13 RX ADMIN — METHYLPREDNISOLONE SODIUM SUCCINATE 40 MG: 40 INJECTION, POWDER, FOR SOLUTION INTRAMUSCULAR; INTRAVENOUS at 12:39

## 2018-11-13 RX ADMIN — ALBUTEROL SULFATE 2.5 MG: 2.5 SOLUTION RESPIRATORY (INHALATION) at 20:23

## 2018-11-13 RX ADMIN — AMLODIPINE BESYLATE 5 MG: 5 TABLET ORAL at 09:42

## 2018-11-13 RX ADMIN — CALCIUM CARBONATE (ANTACID) CHEW TAB 500 MG 200 MG: 500 CHEW TAB at 09:41

## 2018-11-13 RX ADMIN — Medication 10 ML: at 20:47

## 2018-11-13 RX ADMIN — GUAIFENESIN AND DEXTROMETHORPHAN HYDROBROMIDE 1 TABLET: 600; 30 TABLET, EXTENDED RELEASE ORAL at 09:42

## 2018-11-13 RX ADMIN — DOXYCYCLINE HYCLATE 100 MG: 100 TABLET, COATED ORAL at 20:45

## 2018-11-13 NOTE — PROGRESS NOTES
Problem: Falls - Risk of 
Goal: *Absence of Falls Document Carlos Milliken Fall Risk and appropriate interventions in the flowsheet. Outcome: Progressing Towards Goal 
Fall Risk Interventions: 
Mobility Interventions: Bed/chair exit alarm, OT consult for ADLs, Patient to call before getting OOB, PT Consult for mobility concerns, PT Consult for assist device competence, Strengthening exercises (ROM-active/passive), Utilize walker, cane, or other assistive device, Utilize gait belt for transfers/ambulation Medication Interventions: Bed/chair exit alarm, Evaluate medications/consider consulting pharmacy, Patient to call before getting OOB, Teach patient to arise slowly, Utilize gait belt for transfers/ambulation Elimination Interventions: Bed/chair exit alarm, Call light in reach, Patient to call for help with toileting needs, Toileting schedule/hourly rounds History of Falls Interventions: Bed/chair exit alarm, Consult care management for discharge planning, Door open when patient unattended, Evaluate medications/consider consulting pharmacy, Investigate reason for fall, Room close to nurse's station, Utilize gait belt for transfer/ambulation

## 2018-11-13 NOTE — PHYSICIAN ADVISORY
Letter of Status Determination:  
Recommend hospitalization status upgraded from OBSERVATION  to INPATIENT  Status Pt Name:  Zulema Duncan MR#  
TERRA # [id number] / 
[id number] Payor: Debbie Shaffer / Plan: [address] [place] [address] HMO / Product Type: Managed Care Medicare /   
PERCY#  [id number] Room [facility] Hospital  2284/01  @ [facility] Hospitalization date  11/11/2018  5:58 AM  
Current Attending Physician  Madhav Hagan MD  
Principal diagnosis  <principal problem not specified> Acute on chronic respiratory failure Clinicals  82 y.o. y.o  female hospitalized with above diagnosis The pt is now noted to have developed second degree heart block with significant symptoms and runs of VT. Plan for pacemaker insertion is noted. Milliman (MCG) criteria Does   apply Arrhythmia STATUS DETERMINATION  Based on documented presenting clinical data, comorbid conditions, high risk of adverse events and deterioration, it is our recommendation that the patient's status should be upgraded from OBSERVATION to INPATIENT status. The final decision of the patient's hospitalization status depends on the attending physician's judgment. Additional comments Payor: Debbie Shaffer / [address]: [address] / Product Type: Managed Care Medicare /   
  
 
Breann Go MD MPH FACP Cell: [phone] Physician Advisor [address]  
[facility], [address] [place] [address]   
Cell  [phone]   
 
 
[phone] Luan Wolf

## 2018-11-13 NOTE — CONSULTS
[address]     Lor Rodríguez  MR#: [id number]  : 1935  ACCOUNT #: [de-identified]   DATE OF SERVICE: 2018    HISTORY OF PRESENT ILLNESS:  This is an 53-year-old lady seen in reference to her cardiac rhythm disturbance. She denies any chest pain, but she states she had abrupt onset of shortness of breath and came to the emergency room. She denies any chest pain, but it was just severely difficult to breathe. No cough, no sputum production. No difficulty with dizziness or fainting and no  cough or sputum production. SOCIAL HISTORY:  She has continued to smoke. She is on supplemental oxygen at home. Alcohol:  None. Daughter here in [place], daughter in [place] and son in [place]. FAMILY HISTORY:  Mother lived to be 78. Father lived to be 80. CURRENT MEDICATIONS:  As follows:  Multaq 400 mg twice daily for atrial fibrillation, amlodipine 5, metoprolol 50 mg daily, Ventolin, aspirin, Symbicort and Mucinex. REVIEW OF SYSTEMS:  She has had confusion when she was severely ill and hypoxic according to her . She has been troubled by arthritis. No headaches, blurred vision, double vision. CARDIOVASCULAR:  No chest pain. She is very sedentary. RESPIRATORY:  She has shortness of breath with minimal exertion. She has had home oxygen. GENITOURINARY:  No dysuria or frequency. NEUROPSYCHIATRIC:  She has confusion, which she becomes hypoxic. PHYSICAL EXAMINATION:  VITAL SIGNS:  Weight is approximately 175, blood pressure per [facility]. GENEAL:  She is alert and oriented at this time on supplemental oxygen. No facial asymmetry. LUNGS:  Reveal few rales and diminished breath sounds with scattered rhonchi. HEART:  Reveals regular rhythm. No gallop or murmur. ABDOMEN:  Nontender, protuberant, obese. EXTREMITIES:  No edema. Pulses diminished. No adenopathy. SKIN:  Warm and dry.     DIAGNOSTIC DATA:  EKG:  Regular sinus rhythm, right bundle branch block. Monitor:  Episodic second degree AV block. PROBLEM:  1. Second degree AV block. 2.  Severe chronic obstructive pulmonary disease. 3.  Episodic atrial fibrillation. DISCUSSION AND RECOMMENDATIONS:  The 2D echo done on 10/17 revealed an EF of 55-60%. RV systolic pressure was severely increased at 78 mmHg related to the patient's severe chronic obstructive lung disease. At this point, I believe the patient would likely need a pacemaker. I am concerned that ischemia may be playing a role in this overall clinical setting, but at this point she is asymptomatic as regards to chest discomfort. EKG is not showing any acute changes and her troponin is relatively unremarkable. We will hold her beta blockers and Multaq. I will follow up with you.       Viviane Douglas MD       [facility] / Héctor Shaikh  D: 11/12/2018 18:38     T: 11/12/2018 19:48  JOB #: [id number]  CC: Jean Carlos Case MD

## 2018-11-13 NOTE — PROGRESS NOTES
Reason for Admission:  Acute on chronic respiratory failure RRAT Score: 14 Do you (patient/family) have any concerns for transition/discharge? Not at this time Plan for utilizing home health:   Patient adamantly refuses [facility] Likelihood of readmission? Moderate to High 
         
Transition of Care Plan:   Patient is independent with ADL/IADL and drives. Patient denies past HH/Rehab and DME use. CM explained therapy's recommendation of HH and patient adamantly refused, stating that she does not want anyone in her house. Patient states that she does not know why she has a PCP, because she only goes if she absolutely has to. PCP-Dr Héctor Young [facility]- [facility] on [address] Care Management Interventions PCP Verified by CM: Yes(Dr Héctor Young) Mode of Transport at Discharge: Other (see comment)() Transition of Care Consult (CM Consult): Discharge Planning Discharge Durable Medical Equipment: No(oxygen) Physical Therapy Consult: Yes Occupational Therapy Consult: Yes Speech Therapy Consult: No 
Current Support Network: Lives with Spouse, Own Home(Lives in a one story home with 6 or 7 steps to enter the home) Confirm Follow Up Transport: Self Plan discussed with Pt/Family/Caregiver: Yes Discharge Location Discharge Placement: Home Ngozi Elkins Ext [id number]

## 2018-11-13 NOTE — PROGRESS NOTES
Cardiology Progress Note 11/13/2018 8:23 AM 
Admit Date: 11/11/2018 Admit Diagnosis/CC: Acute on chronic respiratory failure (HCC) Subjective:  
Patient reports: 
Chest Pain:  [x] none,  consistent with [] non-cardiac  [] atypical  []  anginal chest pain 
           [x] none now    []  on-going Dyspnea: [x] none  [] at rest  [] with exertion  [] improved  [] unchanged [] worsening PND:       [x] none  [] overnight   [] current Orthopnea: [x] none  [] improved  [] unchanged  [] worsening Presyncope: [x] none  [] improved  [] unchanged  [] worsening Ambulated in hallway without symptoms  [] Yes Ambulated in room without symptoms  [x] Yes 
ROS(2+other systems)   Hematuria: [] Yes  [x] No.   Dysuria: [] Yes  [x] No 
                                         Cough:       [] Yes  [x] No.   Sputum: [] Yes  [x] No  
                                         Hematochezia: [] Yes  [x] No.   Melena: [] Yes  [x] No 
                                        
 No change in family and social history from H&P/Consult note. Current Facility-Administered Medications Medication Dose Route Frequency  doxycycline (VIBRA-TABS) tablet 100 mg  100 mg Oral Q12H  
 dextrose (D50W) injection syrg 12.5-25 g  12.5-25 g IntraVENous PRN  
 0.9% sodium chloride infusion  75 mL/hr IntraVENous CONTINUOUS  
 sodium chloride (NS) flush 5-10 mL  5-10 mL IntraVENous Q8H  
 sodium chloride (NS) flush 5-10 mL  5-10 mL IntraVENous PRN  
 acetaminophen (TYLENOL) tablet 650 mg  650 mg Oral Q4H PRN  
 albuterol (PROVENTIL VENTOLIN) nebulizer solution 2.5 mg  2.5 mg Nebulization Q6H RT  
 amLODIPine (NORVASC) tablet 5 mg  5 mg Oral DAILY  aspirin tablet 325 mg  325 mg Oral DAILY  fluticasone-vilanterol (BREO ELLIPTA) 200mcg-25mcg/puff  1 Puff Inhalation DAILY  calcium carbonate (TUMS) chewable tablet 200 mg [elemental]  200 mg Oral DAILY  cholecalciferol (VITAMIN D3) tablet 1,000 Units  1,000 Units Oral DAILY  guaiFENesin-dextromethorphan SR (HUMIBID DM) 600-30 mg tablet 1 Tab  1 Tab Oral BID  
 umeclidinium (INCRUSE ELLIPTA) 62.5 mcg/actuation  1 Puff Inhalation DAILY  sodium chloride (NS) flush 5-10 mL  5-10 mL IntraVENous Q8H  
 sodium chloride (NS) flush 5-10 mL  5-10 mL IntraVENous PRN  
 naloxone (NARCAN) injection 0.4 mg  0.4 mg IntraVENous PRN  
 bisacodyl (DULCOLAX) tablet 5 mg  5 mg Oral DAILY PRN  
 nicotine (NICODERM CQ) 7 mg/24 hr patch 1 Patch  1 Patch TransDERmal DAILY PRN  
 heparin (porcine) injection 5,000 Units  5,000 Units SubCUTAneous Q8H  
 methylPREDNISolone (PF) (SOLU-MEDROL) injection 40 mg  40 mg IntraVENous Q6H  
 insulin lispro (HUMALOG) injection   SubCUTAneous AC&HS  
 glucose chewable tablet 16 g  4 Tab Oral PRN  
 glucagon (GLUCAGEN) injection 1 mg  1 mg IntraMUSCular PRN Objective:  
 Physical Exam: 
Last VS:  
Visit Vitals /69 (BP 1 Location: Left arm, BP Patient Position: At rest) Pulse (!) 35 Temp 97.3 °F (36.3 °C) Resp 20 Ht 5' 4\" (1.626 m) Wt 78.5 kg (173 lb 1 oz) SpO2 91% BMI 29.71 kg/m² Temp (24hrs), Av.7 °F (36.5 °C), Min:97.3 °F (36.3 °C), Max:98 °F (36.7 °C) 24 hr VS reviewed. General Appearance:   [x] well developed, well nourished,  [x] NAD. [] agitated   [] lethargic but arousable  [] obtunded ENT, Palate:    [x] WNL   [] dry palate/MM Respiratory:    [x] CTA bilateral  [] rales  [] rhonchi  [] normal resp effort 
    [] similar to yesterday   [] worse    [] improved Cardiovascular:   [x] RRR   [] Irregular rate and rhythm 
 [x] Normal S1, S2   [x] No gallop or rub. [] no murmur   [x] no new murmur  [] murmur c/w: 
 [x] no edema  RLE: []1+ []2+ [] 3+;  LLE: []1+ []2+ []3+ [] edema similar to yesterday   [] worse    [] improved [x] normal JVP   [] Elevated JVP [x] JVP similar to yesterday   [] worse    [] improved [x] carotid upstroke unchanged 
 [x] abd aorta not palpated [x] no stigmata of peripheral emboli GI:    [x] abd soft, non-distented,bowel sounds present, no                     organomegaly appreciated Skin: 
Neuro: 
 
Cath Site:  [x] warm and dry [] cold extremities [x] A/O x 3, grossly non-focal  
 
 [] intact w/o hematoma or bruit; distal pulse unchanged. Data Review:  
Labs:   
Recent Results (from the past 24 hour(s)) GLUCOSE, POC Collection Time: 11/12/18 11:26 AM  
Result Value Ref Range Glucose (POC) 217 (H) 65 - 100 mg/dL Performed by Chyna Amaral (PCT) GLUCOSE, POC Collection Time: 11/12/18  4:49 PM  
Result Value Ref Range Glucose (POC) 165 (H) 65 - 100 mg/dL Performed by Chyna Amaral (PCT) GLUCOSE, POC Collection Time: 11/12/18  9:46 PM  
Result Value Ref Range Glucose (POC) 203 (H) 65 - 100 mg/dL Performed by Juliet Blum (PCT) METABOLIC PANEL, BASIC Collection Time: 11/13/18  3:01 AM  
Result Value Ref Range Sodium 137 136 - 145 mmol/L Potassium 3.8 3.5 - 5.1 mmol/L Chloride 100 97 - 108 mmol/L  
 CO2 30 21 - 32 mmol/L Anion gap 7 5 - 15 mmol/L Glucose 162 (H) 65 - 100 mg/dL BUN 48 (H) 6 - 20 MG/DL Creatinine 1.18 (H) 0.55 - 1.02 MG/DL  
 BUN/Creatinine ratio 41 (H) 12 - 20 GFR est AA 53 (L) >60 ml/min/1.73m2 GFR est non-AA 44 (L) >60 ml/min/1.73m2 Calcium 8.7 8.5 - 10.1 MG/DL  
CBC WITH AUTOMATED DIFF Collection Time: 11/13/18  3:01 AM  
Result Value Ref Range WBC 12.8 (H) 3.6 - 11.0 K/uL  
 RBC 4.00 3.80 - 5.20 M/uL  
 HGB 12.0 11.5 - 16.0 g/dL HCT 36.5 35.0 - 47.0 % MCV 91.3 80.0 - 99.0 FL  
 MCH 30.0 26.0 - 34.0 PG  
 MCHC 32.9 30.0 - 36.5 g/dL  
 RDW 13.2 11.5 - 14.5 % PLATELET 271 045 - 290 K/uL MPV 9.5 8.9 - 12.9 FL  
 NRBC 0.0 0  WBC ABSOLUTE NRBC 0.00 0.00 - 0.01 K/uL NEUTROPHILS 91 (H) 32 - 75 % LYMPHOCYTES 4 (L) 12 - 49 % MONOCYTES 4 (L) 5 - 13 % EOSINOPHILS 0 0 - 7 % BASOPHILS 0 0 - 1 % IMMATURE GRANULOCYTES 1 (H) 0.0 - 0.5 % ABS. NEUTROPHILS 11.7 (H) 1.8 - 8.0 K/UL  
 ABS. LYMPHOCYTES 0.6 (L) 0.8 - 3.5 K/UL  
 ABS. MONOCYTES 0.5 0.0 - 1.0 K/UL  
 ABS. EOSINOPHILS 0.0 0.0 - 0.4 K/UL  
 ABS. BASOPHILS 0.0 0.0 - 0.1 K/UL  
 ABS. IMM. GRANS. 0.1 (H) 0.00 - 0.04 K/UL  
 DF AUTOMATED PHOSPHORUS Collection Time: 11/13/18  3:01 AM  
Result Value Ref Range Phosphorus 2.8 2.6 - 4.7 MG/DL MAGNESIUM Collection Time: 11/13/18  3:01 AM  
Result Value Ref Range Magnesium 2.1 1.6 - 2.4 mg/dL GLUCOSE, POC Collection Time: 11/13/18  7:21 AM  
Result Value Ref Range Glucose (POC) 177 (H) 65 - 100 mg/dL Performed by Krupa Soriano (PCT) Provided Telemetry: [x] sinus  [] chronic afib   [] paroxysmal afib  [] NSVT Assessment:  
 
Active Problems: 
  Acute on chronic respiratory failure ([facility] [address] (11/17/2011) Plan:  
 
Atrial Fibrillation - Flutter  unchanged   Continue current meds She has severe COPD . Will not tolerate BB or Multaq without PPM. Will D/W her family. For all other plans, see orders. [x] High complexity decision making was performed

## 2018-11-13 NOTE — PROGRESS NOTES
Bedside and Verbal shift change report given to sha abdi RN (oncoming nurse). Report included the following information SBAR, Kardex, ED Summary, Procedure Summary, Intake/Output, MAR and Recent Results. SHIFT SUMMARY: 
335: ECHO staff states pt on schedule for tomorrow.

## 2018-11-13 NOTE — INTERDISCIPLINARY ROUNDS
Cardiopulmonary Care Interdisciplinary Rounds were held today to discuss patient's plan of care and outcomes. The following members were present: NP/Physician, Pharmacy, Nursing and Case Management. Expected Length of Stay:  - - - Plan of Care: Continue current treatment plan 
? Pacer/ PT consult

## 2018-11-13 NOTE — PROGRESS NOTES
Hospitalist Progress Note NAME: Fredi Brittle :  1935 MRN:  [id number] Interim Hospital Summary: 82 y.o. female whom presented on 2018 with Assessment / Plan: 
 
Acute on Chronic hypoxemic respiratory failure Acute on chronic COPD exacerbation.  
- Continue NC Oxygen and BiPAP support if needed. - appreciate pulmonology input; continue with IV Solumedrol, nebs, breo, add flutter valve, mucolytics. Pt follows by Dr. Gonzalo Adrian as outpatient, requested to be seen by pulmonologist during hospitalization. 
- CXR w/o clear infiltrate, but productive cough, will get sputum cx and add doxy for suspected acute bronchitis - complete 5 day course of ABX with doxy 
  
Mild ALMAZ Suspected mild dehydration 
- resolved - pt is tolerating diabetic diet without difficulty. Stopped IVF Hyperglycemia 
- no hx of DM 
- HgbA1C 6.6; continue with diabetic diet 
- blood glucose worse with steroid (mostly stays less than 200); check qac/qhs blood glucose and follow SSI  
  
History of PAF 
RBBB Frequent PVCs noted on monitor in ER, now bradycardic ? Intermittent 2ndHB Severe pulmonary hypertension Left ventricular dilation HTN 
- appreciate cardiologist Dr. Michael Baum; hold multag and BB. ? Pacemaker. Dr. Tovar Heads discussed with Dr. Kit Herrmann. 
- Echo (10/2017): normal EF 
- continue with norvasc, HCTZ on hold 
  
On going Tobacco abuse - Advised pt to stop smoking.  
- pt declined for nicotine patch. Also requested not to talk about stop smoking since she already knows about the danger 
  
  
Stress urinary incontinence. (pt uses diapers) OA   
  
25.0 - 29.9 Overweight / Body mass index is 29.71 kg/m². 
  
Code status: Full Prophylaxis: Hep SQ Recommended Disposition: Home w/Family possible home health Subjective: Chief Complaint / Reason for Physician Visit \"don't waste your time to telling me what to do.  I will not work with PT or OT. I will talk to Dr. Tuan Peralta and my lung doctor\". Discussed with RN events overnight. Review of Systems: 
Symptom Y/N Comments  Symptom Y/N Comments Fever/Chills n   Chest Pain n   
Poor Appetite    Edema Cough y   Abdominal Pain Sputum n   Joint Pain SOB/LUDWIG y   Pruritis/Rash Nausea/vomit n   Tolerating PT/OT Diarrhea    Tolerating Diet Constipation    Other Could NOT obtain due to:   
 
Objective: VITALS:  
Last 24hrs VS reviewed since prior progress note. Most recent are: 
Patient Vitals for the past 24 hrs: 
 Temp Pulse Resp BP SpO2  
11/13/18 1448     95 % 11/13/18 1440 97.5 °F (36.4 °C) 69 20 153/55 96 % 11/13/18 1120 97.3 °F (36.3 °C) 82 20 116/75 94 % 11/13/18 0912  66     
11/13/18 0745 97.3 °F (36.3 °C) (!) 35 20 121/69 91 % 11/13/18 0732     93 % 11/13/18 0353 97.7 °F (36.5 °C) (!) 50 20 160/65 95 % 11/13/18 0253     92 % 11/12/18 2308 98 °F (36.7 °C) 70 18 132/44 92 % 11/12/18 2007     91 % 11/12/18 1915 97.8 °F (36.6 °C) 75 18 146/53 94 % Intake/Output Summary (Last 24 hours) at 11/13/2018 1724 Last data filed at 11/13/2018 1315 Gross per 24 hour Intake 720 ml Output 200 ml Net 520 ml PHYSICAL EXAM: 
General: Ill appearing. Alert, cooperative, no acute distress   
EENT:  EOMI. Anicteric sclerae. MMM Resp:  Exp wheezing with scattered rhonchi,  No accessory muscle use CV:  Regular  rhythm,  No edema GI:  Soft, Non distended, Non tender.  +Bowel sounds Neurologic:  Alert and oriented X 3, normal speech, Psych:   Fair insight. Not anxious nor agitated Skin:  No rashes. No jaundice Reviewed most current lab test results and cultures  YES Reviewed most current radiology test results   YES Review and summation of old records today    NO Reviewed patient's current orders and MAR    YES 
PMH/SH reviewed - no change compared to H&P 
 ________________________________________________________________________ Care Plan discussed with: 
  Comments Patient y Family RN y   
Care Manager y Consultant     
                 y Multidiciplinary team rounds were held today with , nursing, pharmacist and clinical coordinator. Patient's plan of care was discussed; medications were reviewed and discharge planning was addressed. ________________________________________________________________________ Total NON critical care TIME:  30   Minutes Total CRITICAL CARE TIME Spent:   Minutes non procedure based Comments >50% of visit spent in counseling and coordination of care    
________________________________________________________________________ Holden Butler NP Procedures: see electronic medical records for all procedures/Xrays and details which were not copied into this note but were reviewed prior to creation of Plan. LABS: 
I reviewed today's most current labs and imaging studies. Pertinent labs include: 
Recent Labs 11/13/18 0301 11/12/18 0306 11/11/18 
0974 WBC 12.8* 8.0 9.6 HGB 12.0 13.0 14.6 HCT 36.5 39.9 44.7  214 242 Recent Labs 11/13/18 
0301 11/12/18 0306 11/11/18 
1452  136 136  
K 3.8 3.7 3.5  99 97 CO2 30 30 28 * 180* 198* BUN 48* 35* 21* CREA 1.18* 1.34* 1.11* CA 8.7 8.9 9.0 MG 2.1  --   --   
PHOS 2.8  --   --   
ALB  --   --  3.8 TBILI  --   --  0.7 SGOT  --   --  18 ALT  --   --  20 Signed: )Gilberto Galaviz, NP

## 2018-11-13 NOTE — CONSULTS
[facility]  Pulmonary, Critical Care, and Sleep Medicine    Initial Patient Consult    Name: David Anderson MRN: [id number]   : 1935 Hospital: [facility]   Date: 2018        IMPRESSION:   · Chronic hypoxic respiratory failure  · COPD with acute exacerbation - patient of Dr. Ottoniel Richter  · 2nd degree AV block - being considered for pacemaker  · Ongoing tobacco use with no interest in quitting  · Generalized debility      RECOMMENDATIONS:   · O2 at baseline  · Steroids have already been discontinued by primary team  · Continue bronchodilators  · Finish empiric course of antibiotics   · Cardiology evaluation ongoing  · Tobacco cessation counseling provided, she has no interest or intention to quit smoking  · She was DNR during her last admission, has this changed? Will defer to primary team  · Little else to add from a pulmonary standpoint. Back to baseline. Follow up with Dr. Ottoniel Richter as outpatient. Subjective: This patient has been seen and evaluated at the request of Ms. Rachelle Fraire for COPD. Patient is a 82 y.o. female with COPD, tobacco abuse, presented 2 days ago with acute onset of shortness of breath. She was treated for a COPD exacerbation, and now her breathing is back to baseline. At baseline she has severe dyspnea on exertion with any exertion. She continues to smoke with no interest in quitting. She was found to have 2nd degree AV block and is being considered for a pacemaker. She follows with Dr. Ottoniel Richter for her COPD, but she and her  are not certain that it is worthwhile to follow up for chronic irreversible shortness of breath. Past Medical History:   Diagnosis Date    COPD     Hypertension       Past Surgical History:   Procedure Laterality Date    HX GYN      overy removed/ 2 c-sections      Prior to Admission medications    Medication Sig Start Date End Date Taking?  Authorizing Provider   albuterol (PROVENTIL VENTOLIN) 2.5 mg /3 mL (0.083 %) nebulizer solution by Nebulization route every six (6) hours as needed. Yes Mckayla Thomas MD   metoprolol succinate (TOPROL-XL) 50 mg XL tablet Take 1 Tab by mouth daily. 1/9/18   Paul Caldera MD   dronedarone (MULTAQ) tab tablet Take 1 Tab by mouth two (2) times daily (with meals). 1/8/18   Paul Caldera MD   amLODIPine (NORVASC) 5 mg tablet Take 1 Tab by mouth daily. 1/9/18   Paul Caldera MD   budesonide-formoterol (SYMBICORT) 160-4.5 mcg/actuation HFAA Take 2 Puffs by inhalation two (2) times a day. Provider, Brigitte   hydroCHLOROthiazide (HYDRODIURIL) 25 mg tablet Take 25 mg by mouth daily. Mckayla Thomas MD   Oxygen 3 Each continuous. Mckayla Thomas MD   calcium carbonate (TUMS) 200 mg calcium (500 mg) Chew Take 1 Tab by mouth daily. 4/9/13   Carolina Yap MD   cholecalciferol (VITAMIN D3) 1,000 unit tablet Take 1 Tab by mouth daily. 4/9/13   Carolina Yap MD   acetaminophen (TYLENOL) 325 mg tablet Take 2 Tabs by mouth every four (4) hours as needed. 11/21/11   Arabella Black Bacca B III, DO   tiotropium (SPIRIVA WITH HANDIHALER) 18 mcg inhalation capsule Take 1 Cap by inhalation daily. Mckayla Thomas MD   albuterol (PROVENTIL, VENTOLIN) 90 mcg/Actuation inhaler Take 2 Puffs by inhalation every six (6) hours as needed. Mckayla Thomas MD   dextromethorphan-guaiFENesin (MUCINEX DM)  mg per tablet Take 1 Tab by mouth two (2) times a day. Mckayla Thomas MD   aspirin (ASPIRIN) 325 mg tablet Take 325 mg by mouth daily.     Mckayla Thomas MD     Allergies   Allergen Reactions    Keflex [Cephalexin] Itching      Social History     Tobacco Use    Smoking status: Current Every Day Smoker     Packs/day: 0.25    Smokeless tobacco: Never Used   Substance Use Topics    Alcohol use: No      Family History   Problem Relation Age of Onset    Cancer Brother         lung     Cancer Brother         lung     Heart Disease Mother         Current Facility-Administered Medications   Medication Dose Route Frequency    doxycycline (VIBRA-TABS) tablet 100 mg  100 mg Oral Q12H    0.9% sodium chloride infusion  75 mL/hr IntraVENous CONTINUOUS    sodium chloride (NS) flush 5-10 mL  5-10 mL IntraVENous Q8H    albuterol (PROVENTIL VENTOLIN) nebulizer solution 2.5 mg  2.5 mg Nebulization Q6H RT    amLODIPine (NORVASC) tablet 5 mg  5 mg Oral DAILY    aspirin tablet 325 mg  325 mg Oral DAILY    fluticasone-vilanterol (BREO ELLIPTA) 200mcg-25mcg/puff  1 Puff Inhalation DAILY    calcium carbonate (TUMS) chewable tablet 200 mg [elemental]  200 mg Oral DAILY    cholecalciferol (VITAMIN D3) tablet 1,000 Units  1,000 Units Oral DAILY    guaiFENesin-dextromethorphan SR (HUMIBID DM) 600-30 mg tablet 1 Tab  1 Tab Oral BID    umeclidinium (INCRUSE ELLIPTA) 62.5 mcg/actuation  1 Puff Inhalation DAILY    sodium chloride (NS) flush 5-10 mL  5-10 mL IntraVENous Q8H    heparin (porcine) injection 5,000 Units  5,000 Units SubCUTAneous Q8H    methylPREDNISolone (PF) (SOLU-MEDROL) injection 40 mg  40 mg IntraVENous Q6H    insulin lispro (HUMALOG) injection   SubCUTAneous AC&HS       Review of Systems:  A comprehensive review of systems was negative except for that written in the HPI.     Objective:   Vital Signs:    Visit Vitals  /75 (BP 1 Location: Left arm, BP Patient Position: At rest;Sitting)   Pulse 82   Temp 97.3 °F (36.3 °C)   Resp 20   Ht 5' 4\" (1.626 m)   Wt 78.5 kg (173 lb 1 oz)   SpO2 94%   BMI 29.71 kg/m²       O2 Device: Nasal cannula   O2 Flow Rate (L/min): 4 l/min   Temp (24hrs), Av.6 °F (36.4 °C), Min:97.3 °F (36.3 °C), Max:98 °F (36.7 °C)       Intake/Output:   Last shift:      701 - 1900  In: 240 [P.O.:240]  Out: -   Last 3 shifts: 1901 - 700  In: 708.8 [P.O.:640; I.V.:68.8]  Out: 700 [Urine:700]    Intake/Output Summary (Last 24 hours) at 2018 1236  Last data filed at 2018 0912  Gross per 24 hour   Intake 480 ml   Output 200 ml Net 280 ml      Physical Exam:   General:  Alert, obese, no acute distress   Head:  Normocephalic, without obvious abnormality, atraumatic. Eyes:  Conjunctivae/corneas clear. Anicteric    Nose: Nares normal. Septum midline. Mucosa normal.    Throat: Lips, mucosa, and tongue normal.     Neck: Supple, symmetrical, trachea midline    Back:   Symmetric, no curvature. ROM normal.   Lungs:   Clear to auscultation bilaterally. Chest wall:  No tenderness or deformity. Heart:  Regular rate and rhythm    Abdomen:   Soft, non-tender. Bowel sounds normal.     Extremities: Extremities normal, atraumatic, no cyanosis or edema.    Skin: Skin color, texture, turgor normal. No rashes or lesions   Lymph nodes: Cervical, supraclavicular nodes normal.   Neurologic: Grossly nonfocal     Data review:     Recent Results (from the past 24 hour(s))   GLUCOSE, POC    Collection Time: 11/12/18  4:49 PM   Result Value Ref Range    Glucose (POC) 165 (H) 65 - 100 mg/dL    Performed by James Bowden (PCT)    GLUCOSE, POC    Collection Time: 11/12/18  9:46 PM   Result Value Ref Range    Glucose (POC) 203 (H) 65 - 100 mg/dL    Performed by Octavio Benitez (PCT)    METABOLIC PANEL, BASIC    Collection Time: 11/13/18  3:01 AM   Result Value Ref Range    Sodium 137 136 - 145 mmol/L    Potassium 3.8 3.5 - 5.1 mmol/L    Chloride 100 97 - 108 mmol/L    CO2 30 21 - 32 mmol/L    Anion gap 7 5 - 15 mmol/L    Glucose 162 (H) 65 - 100 mg/dL    BUN 48 (H) 6 - 20 MG/DL    Creatinine 1.18 (H) 0.55 - 1.02 MG/DL    BUN/Creatinine ratio 41 (H) 12 - 20      GFR est AA 53 (L) >60 ml/min/1.73m2    GFR est non-AA 44 (L) >60 ml/min/1.73m2    Calcium 8.7 8.5 - 10.1 MG/DL   CBC WITH AUTOMATED DIFF    Collection Time: 11/13/18  3:01 AM   Result Value Ref Range    WBC 12.8 (H) 3.6 - 11.0 K/uL    RBC 4.00 3.80 - 5.20 M/uL    HGB 12.0 11.5 - 16.0 g/dL    HCT 36.5 35.0 - 47.0 %    MCV 91.3 80.0 - 99.0 FL    MCH 30.0 26.0 - 34.0 PG    MCHC 32.9 30.0 - 36.5 g/dL    RDW 13.2 11.5 - 14.5 %    PLATELET 380 901 - 424 K/uL    MPV 9.5 8.9 - 12.9 FL    NRBC 0.0 0  WBC    ABSOLUTE NRBC 0.00 0.00 - 0.01 K/uL    NEUTROPHILS 91 (H) 32 - 75 %    LYMPHOCYTES 4 (L) 12 - 49 %    MONOCYTES 4 (L) 5 - 13 %    EOSINOPHILS 0 0 - 7 %    BASOPHILS 0 0 - 1 %    IMMATURE GRANULOCYTES 1 (H) 0.0 - 0.5 %    ABS. NEUTROPHILS 11.7 (H) 1.8 - 8.0 K/UL    ABS. LYMPHOCYTES 0.6 (L) 0.8 - 3.5 K/UL    ABS. MONOCYTES 0.5 0.0 - 1.0 K/UL    ABS. EOSINOPHILS 0.0 0.0 - 0.4 K/UL    ABS. BASOPHILS 0.0 0.0 - 0.1 K/UL    ABS. IMM.  GRANS. 0.1 (H) 0.00 - 0.04 K/UL    DF AUTOMATED     PHOSPHORUS    Collection Time: 11/13/18  3:01 AM   Result Value Ref Range    Phosphorus 2.8 2.6 - 4.7 MG/DL   MAGNESIUM    Collection Time: 11/13/18  3:01 AM   Result Value Ref Range    Magnesium 2.1 1.6 - 2.4 mg/dL   GLUCOSE, POC    Collection Time: 11/13/18  7:21 AM   Result Value Ref Range    Glucose (POC) 177 (H) 65 - 100 mg/dL    Performed by Mike Michelle (PCT)    GLUCOSE, POC    Collection Time: 11/13/18 11:57 AM   Result Value Ref Range    Glucose (POC) 199 (H) 65 - 100 mg/dL    Performed by Mike Michelle (PCT)        Imaging:  I have personally reviewed the patients radiographs and have reviewed the reports:  COPD, no acute process        Mavis Choudhary MD

## 2018-11-13 NOTE — PROGRESS NOTES
Problem: Falls - Risk of 
Goal: *Absence of Falls Document Quintero Thi Fall Risk and appropriate interventions in the flowsheet. Outcome: Progressing Towards Goal 
Fall Risk Interventions: 
Mobility Interventions: Bed/chair exit alarm, Communicate number of staff needed for ambulation/transfer, Mechanical lift, OT consult for ADLs, Patient to call before getting OOB, PT Consult for mobility concerns, PT Consult for assist device competence Medication Interventions: Assess postural VS orthostatic hypotension, Bed/chair exit alarm, Evaluate medications/consider consulting pharmacy, Patient to call before getting OOB, Teach patient to arise slowly, Utilize gait belt for transfers/ambulation Elimination Interventions: Bed/chair exit alarm, Call light in reach, Elevated toilet seat, Patient to call for help with toileting needs, Toilet paper/wipes in reach, Toileting schedule/hourly rounds, Urinal in reach History of Falls Interventions: Bed/chair exit alarm, Consult care management for discharge planning, Door open when patient unattended, Evaluate medications/consider consulting pharmacy, Investigate reason for fall, Room close to nurse's station, Utilize gait belt for transfer/ambulation Problem: Pressure Injury - Risk of 
Goal: *Prevention of pressure injury Document Niraj Scale and appropriate interventions in the flowsheet. Outcome: Progressing Towards Goal 
Pressure Injury Interventions: 
Sensory Interventions: Assess changes in LOC, Assess need for specialty bed, Avoid rigorous massage over bony prominences, Chair cushion, Check visual cues for pain, Discuss PT/OT consult with provider, Float heels, Keep linens dry and wrinkle-free Moisture Interventions: Absorbent underpads, Apply protective barrier, creams and emollients, Assess need for specialty bed, Check for incontinence Q2 hours and as needed, Internal/External urinary devices, Limit adult briefs Activity Interventions: Assess need for specialty bed, Chair cushion, Increase time out of bed, Pressure redistribution bed/mattress(bed type), PT/OT evaluation, Trapeze to reposition Mobility Interventions: Assess need for specialty bed, Chair cushion, Float heels, HOB 30 degrees or less, Pressure redistribution bed/mattress (bed type), PT/OT evaluation, Suspension boots, Trapeze to reposition Nutrition Interventions: Document food/fluid/supplement intake, Discuss nutritional consult with provider, Offer support with meals,snacks and hydration

## 2018-11-13 NOTE — PROGRESS NOTES
Problem: Mobility Impaired (Adult and Pediatric) Goal: *Acute Goals and Plan of Care (Insert Text) Physical Therapy Goals Initiated 11/13/2018 1. Patient will move from supine to sit and sit to supine , scoot up and down and roll side to side in bed with independence within 7 day(s). 2.  Patient will transfer from bed to chair and chair to bed with modified independence using the least restrictive device within 7 day(s). 3.  Patient will perform sit to stand with modified independence within 7 day(s). 4.  Patient will ambulate with modified independence for 50 feet with the least restrictive device within 7 day(s). 5.  Patient will ascend/descend 6 stairs with 1 handrail(s) with modified independence within 7 day(s). physical Therapy EVALUATION Patient: Autumn Manzano (2 y.o. female) Date: 11/13/2018 Primary Diagnosis: Acute on chronic respiratory failure (HCC) Precautions:     
 
ASSESSMENT : 
Based on the objective data described below, the patient presents with SOB/LUDWIG, decreased endurance/activity tolerance, mild impairments in dynamic balance, decreased insight, and overall impaired functional mobility. Pt received supine in bed on 4 LPM O2, cleared for participation with therapy by RN, and pt agreeable. HR improved this date and remained 58-76 bpm throughout therapy session. Pt required additional time as well as increased seated and standing rest breaks throughout functional mobility secondary to LUDWIG. Sit<>stand transfers and limited distance ambulation trial occurred with standbyA. Gait stability fair overall with pt exhibiting decreased gait speed and shuffled gait pattern. Pt noted to be reaching out for support of furniture/walls during ambulation trial to increase stability. Pt only able to ambulate ~10ft before requiring standing rest break secondary to respiratory status.  All mobility occurred on 4 LPM O2 with O2 sats 85-86% post activity and pt requiring x30-40 seconds of seated rest for recovery >90%. At this time, pt's biggest limitation to overall functional mobility is her respiratory status. Would recommend continued x1 person assist during all OOB mobility/ambulation while in hospital in order to prevent fall. Pt is safe to discharge home w/ assist of  and [facility] PT at discharge. Patient will benefit from skilled intervention to address the above impairments. Patients rehabilitation potential is considered to be Good Factors which may influence rehabilitation potential include:  
[]         None noted 
[]         Mental ability/status []         Medical condition 
[]         Home/family situation and support systems 
[]         Safety awareness 
[]         Pain tolerance/management 
[]         Other: PLAN : 
Recommendations and Planned Interventions: 
[x]           Bed Mobility Training             []    Neuromuscular Re-Education 
[x]           Transfer Training                   []    Orthotic/Prosthetic Training 
[x]           Gait Training                         []    Modalities [x]           Therapeutic Exercises           []    Edema Management/Control 
[x]           Therapeutic Activities            [x]    Patient and Family Training/Education 
[x]           Other (comment): stair climbing Frequency/Duration: Patient will be followed by physical therapy  3 times a week to address goals. Discharge Recommendations: Home Health Further Equipment Recommendations for Discharge: None SUBJECTIVE:  
Patient stated I have this banana thing here catching my urine, do you want it? Bettina Cueva OBJECTIVE DATA SUMMARY:  
HISTORY:   
Past Medical History:  
Diagnosis Date  COPD  Hypertension Past Surgical History:  
Procedure Laterality Date  HX GYN    
 overy removed/ 2 c-sections Prior Level of Function/Home Situation: Pt lives at home w/ .  Reports independence w/ ambulation however admits to relying on support of furniture/walls. States she can only ambulate short distances (~50ft max) before requiring seated rest break secondary to SOB/LUDWIG. History of 1 fall over previous year. Wears 4 LPM O2 at baseline. Mostly homebound due to respiratory status Personal factors and/or comorbidities impacting plan of care: copd Home Situation Home Environment: Private residence # Steps to Enter: 6 Rails to Enter: Yes Hand Rails : Bilateral 
One/Two Story Residence: One story Living Alone: No 
Support Systems: Spouse/Significant Other/Partner, Child(sabrina) Patient Expects to be Discharged to[de-identified] Private residence Current DME Used/Available at Home: Oxygen, portable, Cane, straight, Shower chair Tub or Shower Type: Shower EXAMINATION/PRESENTATION/DECISION MAKING: Critical Behavior: 
  
  
  
  
Hearing: Auditory Auditory Impairment: NoneSkin:  intact Edema: none noted Range Of Motion: 
AROM: Within functional limits Strength:   
Strength: Generally decreased, functional 
  
  
  
  
  
  
Tone & Sensation:  
Tone: Normal 
  
  
  
  
  
  
  
  
   
Coordination: 
Coordination: Within functional limits Vision:  
  
Functional Mobility: 
Bed Mobility: 
Rolling: Independent Supine to Sit: Independent Scooting: Independent Transfers: 
Sit to Stand: Stand-by assistance Stand to Sit: Stand-by assistance Balance:  
Sitting: Intact Standing: Impaired Standing - Static: Good Standing - Dynamic : FairAmbulation/Gait Training:Distance (ft): 20 Feet (ft)(10ft x2 w/ standing rest break at sink) Assistive Device: Gait belt Ambulation - Level of Assistance: Stand-by assistance Gait Abnormalities: Decreased step clearance;Shuffling gait Base of Support: Widened Speed/Kimberlyn: Shuffled Step Length: Left shortened;Right shortened Functional Measure: 
Barthel Index: 
 
Bathin Bladder: 5 Bowels: 10 
Groomin Dressing: 10 Feeding: 10 
 Mobility: 0 Stairs: 0 Toilet Use: 5 Transfer (Bed to Chair and Back): 10 Total: 55 Barthel and G-code impairment scale: 
Percentage of impairment CH 
0% CI 
1-19% CJ 
20-39% CK 
40-59% CL 
60-79% CM 
80-99% CN 
100% Barthel Score 0-100 100 99-80 79-60 59-40 20-39 1-19 
 0 Barthel Score 0-20 20 17-19 13-16 9-12 5-8 1-4 0 The Barthel ADL Index: Guidelines 1. The index should be used as a record of what a patient does, not as a record of what a patient could do. 2. The main aim is to establish degree of independence from any help, physical or verbal, however minor and for whatever reason. 3. The need for supervision renders the patient not independent. 4. A patient's performance should be established using the best available evidence. Asking the patient, friends/relatives and nurses are the usual sources, but direct observation and common sense are also important. However direct testing is not needed. 5. Usually the patient's performance over the preceding 24-48 hours is important, but occasionally longer periods will be relevant. 6. Middle categories imply that the patient supplies over 50 per cent of the effort. 7. Use of aids to be independent is allowed. Patrick Maurice., Barthel, D.W. ([id number]). Functional evaluation: the Barthel Index. [address] [facility] (14)2. Jaison Harper minda SHOBHA Coyle, Ranjit Burris., Cherelle Sidhu., [address] (1999). Measuring the change indisability after inpatient rehabilitation; comparison of the responsiveness of the Barthel Index and Functional [place] Measure. Journal of Neurology, Neurosurgery, and Psychiatry, 66(4), 050-673. Mali Yin, N.J.A, Ethan Nassar,  W.J.M, & Silverio Ledezma MForestA. (2004.) Assessment of post-stroke quality of life in cost-effectiveness studies: The usefulness of the Barthel Index and the EuroQoL-5D. [facility], 13, 848-16 G codes: In compliance with CMSs Claims Based Outcome Reporting, the following G-code set was chosen for this patient based on their primary functional limitation being treated: The outcome measure chosen to determine the severity of the functional limitation was the Barthel Index with a score of 55/100 which was correlated with the impairment scale. ? Mobility - Walking and Moving Around:  
  - CURRENT STATUS: CK - 40%-59% impaired, limited or restricted  - GOAL STATUS: CI - 1%-19% impaired, limited or restricted  - D/C STATUS:  ---------------To be determined--------------- Physical Therapy Evaluation Charge Determination History Examination Presentation Decision-Making MEDIUM  Complexity : 1-2 comorbidities / personal factors will impact the outcome/ POC  MEDIUM Complexity : 3 Standardized tests and measures addressing body structure, function, activity limitation and / or participation in recreation  MEDIUM Complexity : Evolving with changing characteristics  MEDIUM Complexity : FOTO score of 26-74 Based on the above components, the patient evaluation is determined to be of the following complexity level: MEDIUM Pain: 
Pain Scale 1: Numeric (0 - 10) Pain Intensity 1: 0 Activity Tolerance:  
O2 sats 85-86% post activity on 4 LPM O2, HR WNL throughout Please refer to the flowsheet for vital signs taken during this treatment. After treatment:  
[]         Patient left in no apparent distress sitting up in chair 
[x]         Patient left in no apparent distress in bed 
[x]         Call bell left within reach [x]         Nursing notified 
[x]         Caregiver present [x]         Bed alarm activated COMMUNICATION/EDUCATION:  
The patients plan of care was discussed with: Occupational Therapist and Registered Nurse. [x]         Fall prevention education was provided and the patient/caregiver indicated understanding. [x]         Patient/family have participated as able in goal setting and plan of care. [x]         Patient/family agree to work toward stated goals and plan of care. []         Patient understands intent and goals of therapy, but is neutral about his/her participation. []         Patient is unable to participate in goal setting and plan of care. Thank you for this referral. 
Fatimah Yoo, PT, DPT Time Calculation: 18 mins

## 2018-11-13 NOTE — PROGRESS NOTES
11/13/18 0745 Vital Signs Temp 97.3 °F (36.3 °C) Temp Source Oral  
Pulse (Heart Rate) (!) 35 Heart Rate Source Monitor Resp Rate 20  
O2 Sat (%) 91 % Level of Consciousness Alert /69 MAP (Calculated) 86 BP 1 Method Automatic  
BP 1 Location Left arm BP Patient Position At rest  
MEWS Score 3 Oxygen Therapy O2 Device Nasal cannula O2 Flow Rate (L/min) 4 l/min MDs aware of pt's bradycardia. Pt asymptomatic and does not sustain in the thirties. Will monitor closely.

## 2018-11-13 NOTE — PROGRESS NOTES
Problem: Self Care Deficits Care Plan (Adult) Goal: *Acute Goals and Plan of Care (Insert Text) Occupational Therapy Goals Initiated 11/13/2018 1. Patient will perform grooming standing at sink with modified indep within 7 day(s). 2.  Patient will perform lower body dressing with modified independence within 7 day(s). 3.  Patient will perform simple home management with supervision/set-up within 7 day(s). 4.  Patient will perform toilet transfers with modified independence within 7 day(s). 5.  Patient will perform all aspects of toileting with modified independence within 7 day(s). 6.  Patient will utilize energy conservation techniques during functional activities with verbal cues within 7 day(s). Occupational Therapy EVALUATION Patient: Elder Hardy (4 y.o. female) Date: 11/13/2018 Primary Diagnosis: Acute on chronic respiratory failure (HCC) Acute on chronic respiratory failure ([facility] [address] Precautions: Fall ASSESSMENT : 
Based on the objective data described below, the patient presents with impaired activity tolerance with basic ADL tasks s/p admission for acute on chronic resp failure with potential need for pacemaker. Patient unsure if she will have pacemaker done, will talk to  and cardiology. Hx of COPD and use of 4L O2 at baseline. She lives with  and amb without AD short distances and gets \"winded. \"  Indep ADL tasks. Amb to bathroom for grooming at sink, SpO2 on 4 L decrease to 86%. HR through out session 50s-70s on tele monitor. While standing at sink, resting on forearms which she reports she does at home. Began oral hygiene tasks then requesting to complete after therapy session on her own time. Provided education on basic ECT, patient somewhat impulsive with mobility and speed. Patient is able to complete ADL tasks with CGA-supervision and is limited secondary to respiratory impairments. Recommend  for home safety assessment. Patient will benefit from skilled intervention to address the above impairments. Patients rehabilitation potential is considered to be Good Factors which may influence rehabilitation potential include:  
[]             None noted []             Mental ability/status [x]             Medical condition [x]             Home/family situation and support systems []             Safety awareness []             Pain tolerance/management 
[]             Other: PLAN : 
Recommendations and Planned Interventions: 
[x]               Self Care Training                  [x]        Therapeutic Activities [x]               Functional Mobility Training    []        Cognitive Retraining 
[x]               Therapeutic Exercises           [x]        Endurance Activities [x]               Balance Training                   []        Neuromuscular Re-Education []               Visual/Perceptual Training     [x]   Home Safety Training 
[x]               Patient Education                 [x]        Family Training/Education []               Other (comment): Frequency/Duration: Patient will be followed by occupational therapy 3 times a week to address goals. Discharge Recommendations: Home Health Further Equipment Recommendations for Discharge: TBD SUBJECTIVE:  
Patient stated I just do it my way at home.  OBJECTIVE DATA SUMMARY:  
HISTORY:  
Past Medical History:  
Diagnosis Date  COPD  Hypertension Past Surgical History:  
Procedure Laterality Date  HX GYN    
 overy removed/ 2 c-sections Prior Level of Function/Environment/Context: Home with . Indep ADL tasks, no AD.  drives. Dtr present in room and supportive. Use of 4L baseline. Occupations in which the patient is/was successful, what are the barriers preventing that success:  
Performance Patterns (routines, roles, habits, and rituals):  
Personal Interests and/or values: Expanded or extensive additional review of patient history:  
 
Home Situation Home Environment: Private residence # Steps to Enter: 6 Rails to Enter: Yes Hand Rails : Bilateral 
One/Two Story Residence: One story Living Alone: No 
Support Systems: Spouse/Significant Other/Partner, Child(sabrina) Patient Expects to be Discharged to[de-identified] Private residence Current DME Used/Available at Home: Oxygen, portable, Cane, straight, Shower chair Tub or Shower Type: Shower Hand dominance: RightEXAMINATION OF PERFORMANCE DEFICITS: 
Cognitive/Behavioral Status: 
 
 
Hearing: Auditory Auditory Impairment: None Vision/Perceptual:   
    
    
    
  
    
Acuity: Within Defined Limits Range of Motion: 
AROM: Within functional limits Strength: 
Strength: Generally decreased, functional 
  
  
  
  
Coordination: 
Coordination: Within functional limits Fine Motor Skills-Upper: Left Intact; Right Intact Gross Motor Skills-Upper: Left Intact; Right Intact Tone & Sensation: 
Tone: Normal 
  
  
   
Balance: 
Sitting: Intact Standing: Impaired Standing - Static: Good Standing - Dynamic : Fair Functional Mobility and Transfers for ADLs:Bed Mobility: 
Rolling: Independent Supine to Sit: Independent Scooting: Independent Transfers: 
Sit to Stand: Stand-by assistance Stand to Sit: Stand-by assistance ADL Assessment: 
Feeding: Independent Oral Facial Hygiene/Grooming: Supervision Bathing: Additional time;Contact guard assistance Upper Body Dressing: Setup; Additional time Lower Body Dressing: Contact guard assistance; Additional time Toileting: Contact guard assistance; Additional time ADL Intervention and task modifications: 
  
Brief ed on ECT, fair understanding as patient reports she is frequently has LUDWIG. Amb to bathroom with SBA, quick pace. SOB with amb with SpO2 decrease to 86%. Returned to 90% with brief seated rest.  
Grooming Brushing Teeth: (began grooming then pateint requesting to do later) Lower Body Dressing Assistance Socks: Supervision/set-up; Compensatory technique training Functional Measure: 
Barthel Index: 
 
Bathin Bladder: 5 Bowels: 10 
Groomin Dressing: 10 Feeding: 10 Mobility: 0 Stairs: 0 Toilet Use: 5 Transfer (Bed to Chair and Back): 10 Total: 55 Barthel and G-code impairment scale: 
Percentage of impairment CH 
0% CI 
1-19% CJ 
20-39% CK 
40-59% CL 
60-79% CM 
80-99% CN 
100% Barthel Score 0-100 100 99-80 79-60 59-40 20-39 1-19 
 0 Barthel Score 0-20 20 17-19 13-16 9-12 5-8 1-4 0 The Barthel ADL Index: Guidelines 1. The index should be used as a record of what a patient does, not as a record of what a patient could do. 2. The main aim is to establish degree of independence from any help, physical or verbal, however minor and for whatever reason. 3. The need for supervision renders the patient not independent. 4. A patient's performance should be established using the best available evidence. Asking the patient, friends/relatives and nurses are the usual sources, but direct observation and common sense are also important. However direct testing is not needed. 5. Usually the patient's performance over the preceding 24-48 hours is important, but occasionally longer periods will be relevant. 6. Middle categories imply that the patient supplies over 50 per cent of the effort. 7. Use of aids to be independent is allowed. Mandi Lambert., Barthel, D.W. (1835). Functional evaluation: the Barthel Index. [address] [facility] (14)2. Cody Hsieh minda SHOBHA Coyle, Pamela Easley., Donovan Finnegan., [place], [address] Measuring the change indisability after inpatient rehabilitation; comparison of the responsiveness of the Barthel Index and Functional [place] Measure. Journal of Neurology, Neurosurgery, and Psychiatry, 66(4), 118-028. PHUC Peng, JOSUE Dawkins, & Trung Santiago M.A. (2004.) Assessment of post-stroke quality of life in cost-effectiveness studies: The usefulness of the Barthel Index and the EuroQoL-5D. [facility], 13, 563-49 G codes: In compliance with CMSs Claims Based Outcome Reporting, the following G-code set was chosen for this patient based on their primary functional limitation being treated: The outcome measure chosen to determine the severity of the functional limitation was the Bartehl index with a score of 55/100 which was correlated with the impairment scale. ? Self Care:  
  - CURRENT STATUS: CK - 40%-59% impaired, limited or restricted  - GOAL STATUS: CJ - 20%-39% impaired, limited or restricted  - D/C STATUS:  ---------------To be determined--------------- Occupational Therapy Evaluation Charge Determination History Examination Decision-Making LOW Complexity : Brief history review  LOW Complexity : 1-3 performance deficits relating to physical, cognitive , or psychosocial skils that result in activity limitations and / or participation restrictions  LOW Complexity : No comorbidities that affect functional and no verbal or physical assistance needed to complete eval tasks Based on the above components, the patient evaluation is determined to be of the following complexity level: LOW Pain: 
Pain Scale 1: Numeric (0 - 10) Pain Intensity 1: 0 Activity Tolerance:  
4L O2: SOB with amb with SpO2 decrease to 86%. Returned to 90% with brief seated rest.  
 
After treatment:  
[] Patient left in no apparent distress sitting up in chair 
[x] Patient left in no apparent distress in bed- daughter present [x] Call bell left within reach [x] Nursing notified 
[x] Caregiver present 
[] Bed alarm activated COMMUNICATION/EDUCATION:  
The patients plan of care was discussed with: Physical Therapist and Registered Nurse. [x] Home safety education was provided and the patient/caregiver indicated understanding. [] Patient/family have participated as able in goal setting and plan of care. [x] Patient/family agree to work toward stated goals and plan of care. [] Patient understands intent and goals of therapy, but is neutral about his/her participation. [] Patient is unable to participate in goal setting and plan of care. This patients plan of care is appropriate for delegation to \[facility] [place] [facility]\"". Thank you for this referral. 
Sonia Buckner OT Time Calculation: 18 mins

## 2018-11-13 NOTE — PROGRESS NOTES
ADULT PROTOCOL: JET AEROSOL  REASSESSMENT Patient  Zoie Linder     82 y.o.   female     11/13/2018  12:15 PM 
 
Breath Sounds Pre Procedure: Right Breath Sounds: Expiratory wheezing Left Breath Sounds: Expiratory wheezing Breath Sounds Post Procedure: Right Breath Sounds: Expiratory wheezing Left Breath Sounds: Expiratory wheezing Breathing pattern: Pre procedure Breathing Pattern: Regular Post procedure Breathing Pattern: Regular Heart Rate: Pre procedure Pulse: 50 
         Post procedure Pulse: 55 Resp Rate: Pre procedure Respirations: 20 
         Post procedure Respirations: 20 
 
 
Cough: Pre procedure Cough: Non-productive Post procedure Cough: Non-productive Oxygen: O2 Device: Nasal cannula SpO2: Pre procedure SpO2: 93 % Post procedure SpO2: 93 % Nebulizer Therapy: Current medications Aerosolized Medications: Albuterol Problem List:  
Patient Active Problem List  
Diagnosis Code  Acute on chronic respiratory failure (HCC) J96.20  
 COPD (chronic obstructive pulmonary disease) with acute bronchitis ([facility]) J44.0, J20.9  Lung nodule R91.1  Abnormal ECG R94.31  
 Hyperglycemia R73.9  Tobacco abuse Z72.0  
 Pulmonary hypertension, moderate to severe I27.20  Sepsis ([address] A41.9  
 COPD exacerbation ([address] J44.1  Respiratory failure ([address] J96.90 Respiratory Therapist: Timothy Young RT

## 2018-11-14 LAB
ANION GAP SERPL CALC-SCNC: 6 MMOL/L (ref 5–15)
BASOPHILS # BLD: 0 K/UL (ref 0–0.1)
BASOPHILS NFR BLD: 0 % (ref 0–1)
BUN SERPL-MCNC: 40 MG/DL (ref 6–20)
BUN/CREAT SERPL: 45 (ref 12–20)
CALCIUM SERPL-MCNC: 8.8 MG/DL (ref 8.5–10.1)
CHLORIDE SERPL-SCNC: 103 MMOL/L (ref 97–108)
CO2 SERPL-SCNC: 30 MMOL/L (ref 21–32)
CREAT SERPL-MCNC: 0.89 MG/DL (ref 0.55–1.02)
DIFFERENTIAL METHOD BLD: ABNORMAL
EOSINOPHIL # BLD: 0 K/UL (ref 0–0.4)
EOSINOPHIL NFR BLD: 0 % (ref 0–7)
ERYTHROCYTE [DISTWIDTH] IN BLOOD BY AUTOMATED COUNT: 13.3 % (ref 11.5–14.5)
GLUCOSE BLD STRIP.AUTO-MCNC: 162 MG/DL (ref 65–100)
GLUCOSE BLD STRIP.AUTO-MCNC: 169 MG/DL (ref 65–100)
GLUCOSE BLD STRIP.AUTO-MCNC: 191 MG/DL (ref 65–100)
GLUCOSE BLD STRIP.AUTO-MCNC: 200 MG/DL (ref 65–100)
GLUCOSE SERPL-MCNC: 161 MG/DL (ref 65–100)
HCT VFR BLD AUTO: 36.4 % (ref 35–47)
HGB BLD-MCNC: 11.9 G/DL (ref 11.5–16)
IMM GRANULOCYTES # BLD: 0.1 K/UL (ref 0–0.04)
IMM GRANULOCYTES NFR BLD AUTO: 1 % (ref 0–0.5)
LYMPHOCYTES # BLD: 0.4 K/UL (ref 0.8–3.5)
LYMPHOCYTES NFR BLD: 4 % (ref 12–49)
MAGNESIUM SERPL-MCNC: 2.2 MG/DL (ref 1.6–2.4)
MCH RBC QN AUTO: 29.9 PG (ref 26–34)
MCHC RBC AUTO-ENTMCNC: 32.7 G/DL (ref 30–36.5)
MCV RBC AUTO: 91.5 FL (ref 80–99)
MONOCYTES # BLD: 0.5 K/UL (ref 0–1)
MONOCYTES NFR BLD: 5 % (ref 5–13)
NEUTS SEG # BLD: 8.8 K/UL (ref 1.8–8)
NEUTS SEG NFR BLD: 90 % (ref 32–75)
NRBC # BLD: 0 K/UL (ref 0–0.01)
NRBC BLD-RTO: 0 PER 100 WBC
PHOSPHATE SERPL-MCNC: 2.4 MG/DL (ref 2.6–4.7)
PLATELET # BLD AUTO: 213 K/UL (ref 150–400)
PMV BLD AUTO: 9.9 FL (ref 8.9–12.9)
POTASSIUM SERPL-SCNC: 3.6 MMOL/L (ref 3.5–5.1)
RBC # BLD AUTO: 3.98 M/UL (ref 3.8–5.2)
RBC MORPH BLD: ABNORMAL
SERVICE CMNT-IMP: ABNORMAL
SODIUM SERPL-SCNC: 139 MMOL/L (ref 136–145)
WBC # BLD AUTO: 9.8 K/UL (ref 3.6–11)

## 2018-11-14 PROCEDURE — 74011250636 HC RX REV CODE- 250/636: Performed by: NURSE PRACTITIONER

## 2018-11-14 PROCEDURE — 74011636637 HC RX REV CODE- 636/637: Performed by: INTERNAL MEDICINE

## 2018-11-14 PROCEDURE — 36415 COLL VENOUS BLD VENIPUNCTURE: CPT

## 2018-11-14 PROCEDURE — 74011250637 HC RX REV CODE- 250/637: Performed by: NURSE PRACTITIONER

## 2018-11-14 PROCEDURE — 84100 ASSAY OF PHOSPHORUS: CPT

## 2018-11-14 PROCEDURE — 65270000029 HC RM PRIVATE

## 2018-11-14 PROCEDURE — 80048 BASIC METABOLIC PNL TOTAL CA: CPT

## 2018-11-14 PROCEDURE — 74011000250 HC RX REV CODE- 250: Performed by: INTERNAL MEDICINE

## 2018-11-14 PROCEDURE — 83735 ASSAY OF MAGNESIUM: CPT

## 2018-11-14 PROCEDURE — 74011250636 HC RX REV CODE- 250/636: Performed by: INTERNAL MEDICINE

## 2018-11-14 PROCEDURE — 74011636637 HC RX REV CODE- 636/637: Performed by: NURSE PRACTITIONER

## 2018-11-14 PROCEDURE — 93306 TTE W/DOPPLER COMPLETE: CPT

## 2018-11-14 PROCEDURE — 94640 AIRWAY INHALATION TREATMENT: CPT

## 2018-11-14 PROCEDURE — 94760 N-INVAS EAR/PLS OXIMETRY 1: CPT

## 2018-11-14 PROCEDURE — 77010033678 HC OXYGEN DAILY

## 2018-11-14 PROCEDURE — 74011250637 HC RX REV CODE- 250/637: Performed by: INTERNAL MEDICINE

## 2018-11-14 PROCEDURE — 82962 GLUCOSE BLOOD TEST: CPT

## 2018-11-14 PROCEDURE — 85025 COMPLETE CBC W/AUTO DIFF WBC: CPT

## 2018-11-14 RX ORDER — PREDNISONE 20 MG/1
40 TABLET ORAL
Status: DISCONTINUED | OUTPATIENT
Start: 2018-11-14 | End: 2018-11-16 | Stop reason: HOSPADM

## 2018-11-14 RX ADMIN — INSULIN LISPRO 1 UNITS: 100 INJECTION, SOLUTION INTRAVENOUS; SUBCUTANEOUS at 21:33

## 2018-11-14 RX ADMIN — UMECLIDINIUM 1 PUFF: 62.5 AEROSOL, POWDER ORAL at 11:42

## 2018-11-14 RX ADMIN — GUAIFENESIN AND DEXTROMETHORPHAN HYDROBROMIDE 1 TABLET: 600; 30 TABLET, EXTENDED RELEASE ORAL at 17:32

## 2018-11-14 RX ADMIN — VITAMIN D, TAB 1000IU (100/BT) 1000 UNITS: 25 TAB at 11:41

## 2018-11-14 RX ADMIN — Medication 10 ML: at 17:33

## 2018-11-14 RX ADMIN — GUAIFENESIN AND DEXTROMETHORPHAN HYDROBROMIDE 1 TABLET: 600; 30 TABLET, EXTENDED RELEASE ORAL at 11:39

## 2018-11-14 RX ADMIN — FLUTICASONE FUROATE AND VILANTEROL TRIFENATATE 1 PUFF: 200; 25 POWDER RESPIRATORY (INHALATION) at 11:42

## 2018-11-14 RX ADMIN — DOXYCYCLINE HYCLATE 100 MG: 100 TABLET, COATED ORAL at 21:33

## 2018-11-14 RX ADMIN — DOXYCYCLINE HYCLATE 100 MG: 100 TABLET, COATED ORAL at 11:41

## 2018-11-14 RX ADMIN — ALBUTEROL SULFATE 2.5 MG: 2.5 SOLUTION RESPIRATORY (INHALATION) at 08:02

## 2018-11-14 RX ADMIN — ASPIRIN 325 MG: 325 TABLET ORAL at 13:30

## 2018-11-14 RX ADMIN — ALBUTEROL SULFATE 2.5 MG: 2.5 SOLUTION RESPIRATORY (INHALATION) at 14:54

## 2018-11-14 RX ADMIN — PREDNISONE 40 MG: 20 TABLET ORAL at 11:41

## 2018-11-14 RX ADMIN — METHYLPREDNISOLONE SODIUM SUCCINATE 40 MG: 125 INJECTION, POWDER, FOR SOLUTION INTRAMUSCULAR; INTRAVENOUS at 06:09

## 2018-11-14 RX ADMIN — HEPARIN SODIUM 5000 UNITS: 5000 INJECTION INTRAVENOUS; SUBCUTANEOUS at 17:32

## 2018-11-14 RX ADMIN — ALBUTEROL SULFATE 2.5 MG: 2.5 SOLUTION RESPIRATORY (INHALATION) at 21:12

## 2018-11-14 RX ADMIN — AMLODIPINE BESYLATE 5 MG: 5 TABLET ORAL at 11:41

## 2018-11-14 RX ADMIN — CALCIUM CARBONATE (ANTACID) CHEW TAB 500 MG 200 MG: 500 CHEW TAB at 11:38

## 2018-11-14 RX ADMIN — Medication 10 ML: at 06:01

## 2018-11-14 RX ADMIN — HEPARIN SODIUM 5000 UNITS: 5000 INJECTION INTRAVENOUS; SUBCUTANEOUS at 01:05

## 2018-11-14 RX ADMIN — ALBUTEROL SULFATE 2.5 MG: 2.5 SOLUTION RESPIRATORY (INHALATION) at 02:30

## 2018-11-14 RX ADMIN — Medication 10 ML: at 21:34

## 2018-11-14 NOTE — PROGRESS NOTES
Hospitalist Progress Note NAME: Ivon Dc :  1935 MRN:  [id number] Interim Hospital Summary: 82 y.o. female whom presented on 2018 with Assessment / Plan: 
Acute on Chronic hypoxemic respiratory failure Acute on chronic COPD exacerbation.  
- Continue NC Oxygen and BiPAP support if needed. - appreciate pulmonology input; no more wheezing. Breathing much better today. Changed IV solumedrol to po prednisone. Continue with nebs, breo, add flutter valve, mucolytics. Pt follows by Dr. Tracy Delvalle as outpatient. Pt was happy to see the pulmonologist yesterday. - CXR w/o clear infiltrate, but productive cough, will get sputum cx and add doxy for suspected acute bronchitis - complete 5 day course of ABX with doxy 
  
Mild ALMAZ Suspected mild dehydration 
- resolved - pt is tolerating diabetic diet without difficulty. Stopped IVF 
  
Hyperglycemia 
- no hx of DM 
- HgbA1C 6.6; continue with diabetic diet. Diabetic education consult 
- blood glucose worse with steroid (mostly stays less than 200); check qac/qhs blood glucose and follow SSI  
  
History of PAF 
RBBB Frequent PVCs noted on monitor in ER, now bradycardic ? Intermittent 2ndHB  
Severe pulmonary hypertension Left ventricular dilation HTN 
- appreciate cardiologist Dr. Branden Mendez; hold multag and BB. 
- pt agreed with Pacemaker insertion plan. Dr. Fagan Brain discussed with Dr. Villagran Case. 
- Echo (10/2017): normal EF 
- continue with norvasc, HCTZ on hold 
  
On going Tobacco abuse - Advised pt to stop smoking.  
- pt declined for nicotine patch. Also requested not to talk about stop smoking since she already knows about the danger 
  
  
Stress urinary incontinence. (pt uses diapers) OA   
  
25.0 - 29.9 Overweight / Body mass index is 29.71 kg/m². 
  
Code status: Full Prophylaxis: Hep SQ Recommended Disposition: Home w/Family possible home health Subjective: Chief Complaint / Reason for Physician Visit \"I am breathing much better today, I think I want to get the procedure done\". Discussed with RN events overnight. Review of Systems: 
Symptom Y/N Comments  Symptom Y/N Comments Fever/Chills n   Chest Pain n   
Poor Appetite    Edema Cough    Abdominal Pain Sputum    Joint Pain SOB/LUDWIG y   Pruritis/Rash Nausea/vomit n   Tolerating PT/OT Diarrhea n   Tolerating Diet Constipation n   Other Could NOT obtain due to:   
 
Objective: VITALS:  
Last 24hrs VS reviewed since prior progress note. Most recent are: 
Patient Vitals for the past 24 hrs: 
 Temp Pulse Resp BP SpO2  
11/14/18 0810 98.1 °F (36.7 °C) 90 20 122/81 92 % 11/14/18 0803     92 % 11/14/18 0229     93 % 11/14/18 0056 98 °F (36.7 °C) (!) 47 20 149/48 91 % 11/13/18 2020     94 % 11/13/18 1908 97.8 °F (36.6 °C) (!) 55 20 156/53 95 % 11/13/18 1448     95 % 11/13/18 1440 97.5 °F (36.4 °C) 69 20 153/55 96 % 11/13/18 1120 97.3 °F (36.3 °C) 82 20 116/75 94 % Intake/Output Summary (Last 24 hours) at 11/14/2018 1002 Last data filed at 11/14/2018 6789 Gross per 24 hour Intake 780 ml Output 800 ml Net -20 ml PHYSICAL EXAM: 
General: Ill appearing. Alert, cooperative, no acute distress   
EENT:  EOMI. Anicteric sclerae. MMM Resp:  Tight air movement, no wheezing or rales. No accessory muscle use CV:  Regular with occasional irregular rhythm,  No edema GI:  Soft, Non distended, Non tender.  +Bowel sounds Neurologic:  Alert and oriented X 3, normal speech, Psych:   Good insight. Not anxious nor agitated Skin:  No rashes. No jaundice Reviewed most current lab test results and cultures  YES Reviewed most current radiology test results   YES Review and summation of old records today    NO Reviewed patient's current orders and MAR    YES 
PMH/SH reviewed - no change compared to H&P 
________________________________________________________________________ Care Plan discussed with: 
  Comments Patient y Family RN y   
Care Manager y Consultant  y Dr. Oneida Soliman  
                 y Multidiciplinary team rounds were held today with , nursing, pharmacist and clinical coordinator. Patient's plan of care was discussed; medications were reviewed and discharge planning was addressed. ________________________________________________________________________ Total NON critical care TIME:  30   Minutes Total CRITICAL CARE TIME Spent:   Minutes non procedure based Comments >50% of visit spent in counseling and coordination of care    
________________________________________________________________________ Holden Calderon NP Procedures: see electronic medical records for all procedures/Xrays and details which were not copied into this note but were reviewed prior to creation of Plan. LABS: 
I reviewed today's most current labs and imaging studies. Pertinent labs include: 
Recent Labs 11/14/18 
0057 11/13/18 
0301 11/12/18 
1323 WBC 9.8 12.8* 8.0 HGB 11.9 12.0 13.0 HCT 36.4 36.5 39.9  209 214 Recent Labs 11/14/18 
0057 11/13/18 
0301 11/12/18 
3489  137 136  
K 3.6 3.8 3.7  100 99 CO2 30 30 30 * 162* 180* BUN 40* 48* 35* CREA 0.89 1.18* 1.34* CA 8.8 8.7 8.9 MG 2.2 2.1  --   
PHOS 2.4* 2.8  --   
 
 
Signed: )Holden Calderon NP

## 2018-11-14 NOTE — PROGRESS NOTES
Bedside and Verbal shift change report given to sha abdi, RN (oncoming nurse). Report included the following information SBAR, Kardex, ED Summary, Procedure Summary, Intake/Output, MAR and Recent Results. SHIFT SUMMARY: 
1706: pt returned from echo 1119: paged dr vigil's office to ask about NPO status, as pt states he told her to remain NPO for possible pacer. 1130: dr Stephany Chapman states possible pacer placement today and to keep pt NPO with meds and to hold heparin. 110: dr Zeb Brice states pacer to be placed tomorrow and pt to  Resume diet now and become NPO without meds at midnight tonight. Pacer consent Pt has small skin tear on L elbow

## 2018-11-14 NOTE — PROGRESS NOTES
Problem: Pressure Injury - Risk of 
Goal: *Prevention of pressure injury Document Niraj Scale and appropriate interventions in the flowsheet. Outcome: Progressing Towards Goal 
Pressure Injury Interventions: 
Sensory Interventions: Assess changes in LOC, Chair cushion, Check visual cues for pain, Discuss PT/OT consult with provider, Float heels, Keep linens dry and wrinkle-free, Maintain/enhance activity level, Minimize linen layers, Monitor skin under medical devices, Turn and reposition approx. every two hours (pillows and wedges if needed) Moisture Interventions: Absorbent underpads, Apply protective barrier, creams and emollients, Limit adult briefs, Maintain skin hydration (lotion/cream), Minimize layers, Moisture barrier Activity Interventions: Chair cushion, Increase time out of bed, PT/OT evaluation Mobility Interventions: Chair cushion, Float heels, PT/OT evaluation, Turn and reposition approx. every two hours(pillow and wedges) Nutrition Interventions: Document food/fluid/supplement intake, Discuss nutritional consult with provider, Offer support with meals,snacks and hydration Friction and Shear Interventions: Apply protective barrier, creams and emollients, Feet elevated on foot rest, Foam dressings/transparent film/skin sealants, Lift sheet, Lift team/patient mobility team, Minimize layers

## 2018-11-14 NOTE — PROGRESS NOTES
11/14/18 1122 Vital Signs Temp 97.9 °F (36.6 °C) Temp Source Oral  
Pulse (Heart Rate) (!) 34 Heart Rate Source Monitor Resp Rate 20  
O2 Sat (%) 93 % Level of Consciousness Alert /89 MAP (Calculated) 97 BP 1 Method Automatic  
BP 1 Location Right arm BP Patient Position At rest  
MEWS Score 3 Oxygen Therapy O2 Device Nasal cannula O2 Flow Rate (L/min) 4 l/min Noted HR incorrect as kira-map is misreading pt's irregularity.

## 2018-11-14 NOTE — PROGRESS NOTES
I discussed the potential benefits, risks, and alternatives to a dual chamber permanent pacemaker at the left chest using transvenous leads. All questions answered. She agrees to proceed, will try to do tomorrow AM if the opportunity is there and if not, then early afternoon (I have a case at [facility] in the window). Signed By: Levon Martinez MD   
 November 14, 2018

## 2018-11-14 NOTE — PROGRESS NOTES
Problem: Falls - Risk of 
Goal: *Absence of Falls Document Genevive Camera Fall Risk and appropriate interventions in the flowsheet. Outcome: Progressing Towards Goal 
Fall Risk Interventions: 
Mobility Interventions: Bed/chair exit alarm, Communicate number of staff needed for ambulation/transfer, OT consult for ADLs, Patient to call before getting OOB, PT Consult for assist device competence, PT Consult for mobility concerns, Strengthening exercises (ROM-active/passive), Utilize walker, cane, or other assistive device, Utilize gait belt for transfers/ambulation Medication Interventions: Bed/chair exit alarm, Evaluate medications/consider consulting pharmacy, Patient to call before getting OOB, Teach patient to arise slowly, Utilize gait belt for transfers/ambulation Elimination Interventions: Bed/chair exit alarm, Call light in reach, Patient to call for help with toileting needs, Toileting schedule/hourly rounds History of Falls Interventions: Bed/chair exit alarm, Consult care management for discharge planning, Door open when patient unattended, Evaluate medications/consider consulting pharmacy, Investigate reason for fall, Room close to nurse's station, Utilize gait belt for transfer/ambulation

## 2018-11-14 NOTE — PROGRESS NOTES
Problem: Falls - Risk of 
Goal: *Absence of Falls Document [facility] Fall Risk and appropriate interventions in the flowsheet. Outcome: Progressing Towards Goal 
Fall Risk Interventions: 
Mobility Interventions: Bed/chair exit alarm, Communicate number of staff needed for ambulation/transfer, Mechanical lift, OT consult for ADLs, Patient to call before getting OOB, PT Consult for mobility concerns, PT Consult for assist device competence Medication Interventions: Bed/chair exit alarm, Evaluate medications/consider consulting pharmacy, Patient to call before getting OOB, Teach patient to arise slowly, Utilize gait belt for transfers/ambulation Elimination Interventions: Bed/chair exit alarm, Call light in reach, Elevated toilet seat, Patient to call for help with toileting needs, Toilet paper/wipes in reach, Toileting schedule/hourly rounds, Urinal in reach History of Falls Interventions: Bed/chair exit alarm, Consult care management for discharge planning, Door open when patient unattended, Evaluate medications/consider consulting pharmacy, Investigate reason for fall, Room close to nurse's station, Utilize gait belt for transfer/ambulation Problem: Pressure Injury - Risk of 
Goal: *Prevention of pressure injury Document Niraj Scale and appropriate interventions in the flowsheet. Outcome: Progressing Towards Goal 
Pressure Injury Interventions: 
Sensory Interventions: Assess changes in LOC, Assess need for specialty bed, Avoid rigorous massage over bony prominences, Chair cushion, Check visual cues for pain, Discuss PT/OT consult with provider, Float heels, Keep linens dry and wrinkle-free Moisture Interventions: Absorbent underpads, Apply protective barrier, creams and emollients, Assess need for specialty bed, Check for incontinence Q2 hours and as needed, Internal/External urinary devices, Limit adult briefs Activity Interventions: Assess need for specialty bed, Chair cushion, Increase time out of bed, Pressure redistribution bed/mattress(bed type), PT/OT evaluation, Trapeze to reposition Mobility Interventions: Assess need for specialty bed, Chair cushion, Float heels, HOB 30 degrees or less, Pressure redistribution bed/mattress (bed type), PT/OT evaluation, Suspension boots, Trapeze to reposition Nutrition Interventions: Document food/fluid/supplement intake, Discuss nutritional consult with provider, Offer support with meals,snacks and hydration Friction and Shear Interventions: Apply protective barrier, creams and emollients, Feet elevated on foot rest, Foam dressings/transparent film/skin sealants, HOB 30 degrees or less, Lift sheet, Lift team/patient mobility team, Minimize layers, Sit at 90-degree angle

## 2018-11-14 NOTE — PROGRESS NOTES
Cardiology Progress Note 11/14/2018 9:17 AM 
Admit Date: 11/11/2018 Admit Diagnosis/CC: Acute on chronic respiratory failure (HCC) Acute on chronic respiratory failure ([address] Subjective:  
Patient reports: 
Chest Pain:  [x] none,  consistent with [] non-cardiac  [] atypical  []  anginal chest pain 
           [x] none now    []  on-going Dyspnea: [x] none  [] at rest  [] with exertion  [] improved  [] unchanged [] worsening PND:       [x] none  [] overnight   [] current Orthopnea: [x] none  [] improved  [] unchanged  [] worsening Presyncope: [x] none  [] improved  [] unchanged  [] worsening Ambulated in hallway without symptoms  [] Yes Ambulated in room without symptoms  [x] Yes 
ROS(2+other systems)   Hematuria: [] Yes  [x] No.   Dysuria: [] Yes  [x] No 
                                         Cough:       [] Yes  [x] No.   Sputum: [] Yes  [x] No  
                                         Hematochezia: [] Yes  [x] No.   Melena: [] Yes  [x] No 
                                        
 No change in family and social history from H&P/Consult note. Current Facility-Administered Medications Medication Dose Route Frequency  methylPREDNISolone (PF) (SOLU-MEDROL) injection 40 mg  40 mg IntraVENous Q8H  
 doxycycline (VIBRA-TABS) tablet 100 mg  100 mg Oral Q12H  
 dextrose (D50W) injection syrg 12.5-25 g  12.5-25 g IntraVENous PRN  
 sodium chloride (NS) flush 5-10 mL  5-10 mL IntraVENous Q8H  
 sodium chloride (NS) flush 5-10 mL  5-10 mL IntraVENous PRN  
 acetaminophen (TYLENOL) tablet 650 mg  650 mg Oral Q4H PRN  
 albuterol (PROVENTIL VENTOLIN) nebulizer solution 2.5 mg  2.5 mg Nebulization Q6H RT  
 amLODIPine (NORVASC) tablet 5 mg  5 mg Oral DAILY  aspirin tablet 325 mg  325 mg Oral DAILY  fluticasone-vilanterol (BREO ELLIPTA) 200mcg-25mcg/puff  1 Puff Inhalation DAILY  calcium carbonate (TUMS) chewable tablet 200 mg [elemental]  200 mg Oral DAILY  cholecalciferol (VITAMIN D3) tablet 1,000 Units  1,000 Units Oral DAILY  guaiFENesin-dextromethorphan SR (HUMIBID DM) 600-30 mg tablet 1 Tab  1 Tab Oral BID  
 umeclidinium (INCRUSE ELLIPTA) 62.5 mcg/actuation  1 Puff Inhalation DAILY  sodium chloride (NS) flush 5-10 mL  5-10 mL IntraVENous Q8H  
 sodium chloride (NS) flush 5-10 mL  5-10 mL IntraVENous PRN  
 naloxone (NARCAN) injection 0.4 mg  0.4 mg IntraVENous PRN  
 bisacodyl (DULCOLAX) tablet 5 mg  5 mg Oral DAILY PRN  
 nicotine (NICODERM CQ) 7 mg/24 hr patch 1 Patch  1 Patch TransDERmal DAILY PRN  
 heparin (porcine) injection 5,000 Units  5,000 Units SubCUTAneous Q8H  
 insulin lispro (HUMALOG) injection   SubCUTAneous AC&HS  
 glucose chewable tablet 16 g  4 Tab Oral PRN  
 glucagon (GLUCAGEN) injection 1 mg  1 mg IntraMUSCular PRN Objective:  
 Physical Exam: 
Last VS:  
Visit Vitals /81 (BP 1 Location: Left arm, BP Patient Position: At rest) Pulse 90 Temp 98.1 °F (36.7 °C) Resp 20 Ht 5' 4\" (1.626 m) Wt 78.5 kg (173 lb 1 oz) SpO2 92% BMI 29.71 kg/m² Temp (24hrs), Av.7 °F (36.5 °C), Min:97.3 °F (36.3 °C), Max:98.1 °F (36.7 °C) 24 hr VS reviewed. General Appearance:   [x] well developed, well nourished,  [x] NAD. [] agitated   [] lethargic but arousable  [] obtunded ENT, Palate:    [x] WNL   [] dry palate/MM Respiratory:    [x] CTA bilateral  [] rales  [] rhonchi  [] normal resp effort 
    [] similar to yesterday   [] worse    [] improved Cardiovascular:   [x] RRR   [] Irregular rate and rhythm 
 [x] Normal S1, S2   [x] No gallop or rub. [] no murmur   [x] no new murmur  [] murmur c/w: 
 [x] no edema  RLE: []1+ []2+ [] 3+;  LLE: []1+ []2+ []3+ [] edema similar to yesterday   [] worse    [] improved [x] normal JVP   [] Elevated JVP [x] JVP similar to yesterday   [] worse    [] improved [x] carotid upstroke unchanged 
 [x] abd aorta not palpated [x] no stigmata of peripheral emboli GI:    [x] abd soft, non-distented,bowel sounds present, no                     organomegaly appreciated Skin: 
Neuro: 
 
Cath Site:  [x] warm and dry [] cold extremities [x] A/O x 3, grossly non-focal  
 
 [] intact w/o hematoma or bruit; distal pulse unchanged. Data Review:  
Labs:   
Recent Results (from the past 24 hour(s)) GLUCOSE, POC Collection Time: 11/13/18 11:57 AM  
Result Value Ref Range Glucose (POC) 199 (H) 65 - 100 mg/dL Performed by Kierra Shaikh (PCT) CULTURE, RESPIRATORY/SPUTUM/BRONCH W GRAM STAIN Collection Time: 11/13/18  3:12 PM  
Result Value Ref Range Special Requests: NO SPECIAL REQUESTS    
 GRAM STAIN RARE WBCS SEEN    
 GRAM STAIN FEW EPITHELIAL CELLS SEEN    
 GRAM STAIN 4+ GRAM NEGATIVE COCCOBACILLI    
 GRAM STAIN 1+ GRAM POSITIVE COCCI IN PAIRS Culture result: PENDING   
GLUCOSE, POC Collection Time: 11/13/18  4:46 PM  
Result Value Ref Range Glucose (POC) 163 (H) 65 - 100 mg/dL Performed by Alis Serradler (PCT) GLUCOSE, POC Collection Time: 11/13/18  9:18 PM  
Result Value Ref Range Glucose (POC) 188 (H) 65 - 100 mg/dL Performed by Leanor  (PCT) METABOLIC PANEL, BASIC Collection Time: 11/14/18 12:57 AM  
Result Value Ref Range Sodium 139 136 - 145 mmol/L Potassium 3.6 3.5 - 5.1 mmol/L Chloride 103 97 - 108 mmol/L  
 CO2 30 21 - 32 mmol/L Anion gap 6 5 - 15 mmol/L Glucose 161 (H) 65 - 100 mg/dL BUN 40 (H) 6 - 20 MG/DL Creatinine 0.89 0.55 - 1.02 MG/DL  
 BUN/Creatinine ratio 45 (H) 12 - 20 GFR est AA >60 >60 ml/min/1.73m2 GFR est non-AA >60 >60 ml/min/1.73m2 Calcium 8.8 8.5 - 10.1 MG/DL  
CBC WITH AUTOMATED DIFF Collection Time: 11/14/18 12:57 AM  
Result Value Ref Range WBC 9.8 3.6 - 11.0 K/uL  
 RBC 3.98 3.80 - 5.20 M/uL  
 HGB 11.9 11.5 - 16.0 g/dL HCT 36.4 35.0 - 47.0 %  MCV 91.5 80.0 - 99.0 FL  
 MCH 29.9 26.0 - 34.0 PG  
 MCHC 32.7 30.0 - 36.5 g/dL  
 RDW 13.3 11.5 - 14.5 % PLATELET 026 631 - 179 K/uL MPV 9.9 8.9 - 12.9 FL  
 NRBC 0.0 0  WBC ABSOLUTE NRBC 0.00 0.00 - 0.01 K/uL NEUTROPHILS 90 (H) 32 - 75 % LYMPHOCYTES 4 (L) 12 - 49 % MONOCYTES 5 5 - 13 % EOSINOPHILS 0 0 - 7 % BASOPHILS 0 0 - 1 % IMMATURE GRANULOCYTES 1 (H) 0.0 - 0.5 % ABS. NEUTROPHILS 8.8 (H) 1.8 - 8.0 K/UL  
 ABS. LYMPHOCYTES 0.4 (L) 0.8 - 3.5 K/UL  
 ABS. MONOCYTES 0.5 0.0 - 1.0 K/UL  
 ABS. EOSINOPHILS 0.0 0.0 - 0.4 K/UL  
 ABS. BASOPHILS 0.0 0.0 - 0.1 K/UL  
 ABS. IMM. GRANS. 0.1 (H) 0.00 - 0.04 K/UL  
 DF AUTOMATED    
 RBC COMMENTS NORMOCYTIC, NORMOCHROMIC MAGNESIUM Collection Time: 11/14/18 12:57 AM  
Result Value Ref Range Magnesium 2.2 1.6 - 2.4 mg/dL PHOSPHORUS Collection Time: 11/14/18 12:57 AM  
Result Value Ref Range Phosphorus 2.4 (L) 2.6 - 4.7 MG/DL  
GLUCOSE, POC Collection Time: 11/14/18  7:58 AM  
Result Value Ref Range Glucose (POC) 162 (H) 65 - 100 mg/dL Performed by Madan Stock (PCT) Provided Telemetry: [x] sinus  [] chronic afib   [] paroxysmal afib  [] NSVT Assessment:  
 
Active Problems: 
  Acute on chronic respiratory failure ([facility] [address] (11/17/2011) Plan:  
 
Atrial Fibrillation - Flutter  unchanged   Electrophysiology consult Needs PPM She has consented to go ahead. For all other plans, see orders. [x] High complexity decision making was performed

## 2018-11-14 NOTE — DIABETES MGMT
DTC Consult Note Recommendations/ Comments: A1c is suggestive of DM - please re-consult if education is required. Current hospital DM medication: Lipro high sensitivity ac and hs. Consult received for:  [x]             Assessment of home management- pre- dm 
              []      Medication Recommendations []             Meter/monitoring 
   []             Insulin instruction []             New diagnosis []             Outpatient education []             Insulin pump patient []             Insulin infusion 
   []             DKA/HHS Chart reviewed and initial evaluation complete on Alexandria Hassan. Patient is a 82 y.o. female with known pre DM - she confirms that her MD shared that her BG values are higher than normal over the past 2 years. She shared that she continues to consume regular soda daily and sweetened tea. Pt agrees that she could try to decrease/avoid simple sugars in beverages that encouraged 3-5 small meals as consistent with guidelines provided to her for her COPD. Assessed and instructed patient on the following:  
·  interpretation of lab results, blood sugar goals and complications of diabetes mellitus - with pre-diabetes. Encouraged the following:  
· increased exercise - walk as tolerated medically, dietary modifications: avoid sweet beverages. · Follow-up with PCP w/in 3 months to have A1c completed Provided patient with the following: []             Survival skills education materials []             Insulin education materials []             CHO counting education materials [x]             Outpatient DTC contact number and pre diabetes handout. []             Glucometer A1c:  
Lab Results Component Value Date/Time Hemoglobin A1c 6.6 (H) 11/12/2018 03:06 AM  
 
 
Recent Glucose Results:  
Lab Results Component Value Date/Time   (H) 11/14/2018 12:57 AM  
 GLUCPOC 191 (H) 11/14/2018 11:39 AM  
 GLUCPOC 162 (H) 11/14/2018 07:58 AM  
 GLUCPOC 188 (H) 11/13/2018 09:18 PM  
  
 
Lab Results Component Value Date/Time Creatinine 0.89 11/14/2018 12:57 AM  
 
Estimated Creatinine Clearance: 50.2 mL/min (based on SCr of 0.89 mg/dL). Active Orders Diet DIET DIABETIC CONSISTENT CARB Regular; AHA-LOW-CHOL FAT  
  
 
PO intake:  
Patient Vitals for the past 72 hrs: 
 % Diet Eaten 11/14/18 0858 15 % 11/13/18 1847 65 % 11/13/18 1315 30 % 11/13/18 0912 50 % 11/12/18 1915 90 % 11/12/18 1223 60 % 11/12/18 0930 5 % Will continue to follow as needed. Thank you. Emily Mccabe RD [facility]

## 2018-11-15 ENCOUNTER — APPOINTMENT (OUTPATIENT)
Dept: GENERAL RADIOLOGY | Age: 83
DRG: 981 | End: 2018-11-15
Attending: INTERNAL MEDICINE
Payer: MEDICARE

## 2018-11-15 ENCOUNTER — ANESTHESIA EVENT (OUTPATIENT)
Dept: NON INVASIVE DIAGNOSTICS | Age: 83
DRG: 981 | End: 2018-11-15
Payer: MEDICARE

## 2018-11-15 ENCOUNTER — ANESTHESIA (OUTPATIENT)
Dept: NON INVASIVE DIAGNOSTICS | Age: 83
DRG: 981 | End: 2018-11-15
Payer: MEDICARE

## 2018-11-15 LAB
ANION GAP SERPL CALC-SCNC: 5 MMOL/L (ref 5–15)
ATRIAL RATE: 315 BPM
BACTERIA SPEC CULT: ABNORMAL
BACTERIA SPEC CULT: ABNORMAL
BUN SERPL-MCNC: 33 MG/DL (ref 6–20)
BUN/CREAT SERPL: 38 (ref 12–20)
CALCIUM SERPL-MCNC: 9.2 MG/DL (ref 8.5–10.1)
CALCULATED P AXIS, ECG09: 42 DEGREES
CALCULATED R AXIS, ECG10: 42 DEGREES
CALCULATED T AXIS, ECG11: 20 DEGREES
CHLORIDE SERPL-SCNC: 106 MMOL/L (ref 97–108)
CO2 SERPL-SCNC: 29 MMOL/L (ref 21–32)
CREAT SERPL-MCNC: 0.86 MG/DL (ref 0.55–1.02)
DIAGNOSIS, 93000: NORMAL
GLUCOSE BLD STRIP.AUTO-MCNC: 117 MG/DL (ref 65–100)
GLUCOSE BLD STRIP.AUTO-MCNC: 126 MG/DL (ref 65–100)
GLUCOSE BLD STRIP.AUTO-MCNC: 130 MG/DL (ref 65–100)
GLUCOSE BLD STRIP.AUTO-MCNC: 156 MG/DL (ref 65–100)
GLUCOSE SERPL-MCNC: 140 MG/DL (ref 65–100)
GRAM STN SPEC: ABNORMAL
POTASSIUM SERPL-SCNC: 3.6 MMOL/L (ref 3.5–5.1)
Q-T INTERVAL, ECG07: 458 MS
QRS DURATION, ECG06: 138 MS
QTC CALCULATION (BEZET), ECG08: 453 MS
SERVICE CMNT-IMP: ABNORMAL
SODIUM SERPL-SCNC: 140 MMOL/L (ref 136–145)
VENTRICULAR RATE, ECG03: 59 BPM

## 2018-11-15 PROCEDURE — 93005 ELECTROCARDIOGRAM TRACING: CPT

## 2018-11-15 PROCEDURE — 71045 X-RAY EXAM CHEST 1 VIEW: CPT

## 2018-11-15 PROCEDURE — C1893 INTRO/SHEATH, FIXED,NON-PEEL: HCPCS

## 2018-11-15 PROCEDURE — 74011250636 HC RX REV CODE- 250/636: Performed by: NURSE PRACTITIONER

## 2018-11-15 PROCEDURE — A4565 SLINGS: HCPCS

## 2018-11-15 PROCEDURE — 74011250637 HC RX REV CODE- 250/637: Performed by: INTERNAL MEDICINE

## 2018-11-15 PROCEDURE — 77030039266 HC ADH SKN EXOFIN S2SG -A

## 2018-11-15 PROCEDURE — 77030002996 HC SUT SLK J&J -A

## 2018-11-15 PROCEDURE — 02H63JZ INSERTION OF PACEMAKER LEAD INTO RIGHT ATRIUM, PERCUTANEOUS APPROACH: ICD-10-PCS | Performed by: INTERNAL MEDICINE

## 2018-11-15 PROCEDURE — 74011250636 HC RX REV CODE- 250/636: Performed by: INTERNAL MEDICINE

## 2018-11-15 PROCEDURE — 94760 N-INVAS EAR/PLS OXIMETRY 1: CPT

## 2018-11-15 PROCEDURE — 77030031139 HC SUT VCRL2 J&J -A

## 2018-11-15 PROCEDURE — 82962 GLUCOSE BLOOD TEST: CPT

## 2018-11-15 PROCEDURE — C1785 PMKR, DUAL, RATE-RESP: HCPCS

## 2018-11-15 PROCEDURE — 74011250637 HC RX REV CODE- 250/637: Performed by: NURSE PRACTITIONER

## 2018-11-15 PROCEDURE — 74011250636 HC RX REV CODE- 250/636

## 2018-11-15 PROCEDURE — 77010033678 HC OXYGEN DAILY

## 2018-11-15 PROCEDURE — 74011000250 HC RX REV CODE- 250: Performed by: INTERNAL MEDICINE

## 2018-11-15 PROCEDURE — 77030018836 HC SOL IRR NACL ICUM -A

## 2018-11-15 PROCEDURE — 77030018673

## 2018-11-15 PROCEDURE — 77030038269 HC DRN EXT URIN PURWCK BARD -A

## 2018-11-15 PROCEDURE — C1898 LEAD, PMKR, OTHER THAN TRANS: HCPCS

## 2018-11-15 PROCEDURE — 33208 INSRT HEART PM ATRIAL & VENT: CPT

## 2018-11-15 PROCEDURE — C1894 INTRO/SHEATH, NON-LASER: HCPCS

## 2018-11-15 PROCEDURE — 65660000000 HC RM CCU STEPDOWN

## 2018-11-15 PROCEDURE — 36415 COLL VENOUS BLD VENIPUNCTURE: CPT

## 2018-11-15 PROCEDURE — 74011636637 HC RX REV CODE- 636/637: Performed by: NURSE PRACTITIONER

## 2018-11-15 PROCEDURE — 80048 BASIC METABOLIC PNL TOTAL CA: CPT

## 2018-11-15 PROCEDURE — 02HK3JZ INSERTION OF PACEMAKER LEAD INTO RIGHT VENTRICLE, PERCUTANEOUS APPROACH: ICD-10-PCS | Performed by: INTERNAL MEDICINE

## 2018-11-15 PROCEDURE — 94640 AIRWAY INHALATION TREATMENT: CPT

## 2018-11-15 PROCEDURE — 77030018729 HC ELECTRD DEFIB PAD CARD -B

## 2018-11-15 PROCEDURE — 0JH606Z INSERTION OF PACEMAKER, DUAL CHAMBER INTO CHEST SUBCUTANEOUS TISSUE AND FASCIA, OPEN APPROACH: ICD-10-PCS | Performed by: INTERNAL MEDICINE

## 2018-11-15 PROCEDURE — 74011000250 HC RX REV CODE- 250

## 2018-11-15 RX ORDER — MIDAZOLAM HYDROCHLORIDE 1 MG/ML
1-5 INJECTION, SOLUTION INTRAMUSCULAR; INTRAVENOUS
Status: DISCONTINUED | OUTPATIENT
Start: 2018-11-15 | End: 2018-11-15 | Stop reason: HOSPADM

## 2018-11-15 RX ORDER — HEPARIN SODIUM 200 [USP'U]/100ML
500 INJECTION, SOLUTION INTRAVENOUS ONCE
Status: COMPLETED | OUTPATIENT
Start: 2018-11-15 | End: 2018-11-15

## 2018-11-15 RX ORDER — BACITRACIN 50000 [IU]/1
50000 INJECTION, POWDER, FOR SOLUTION INTRAMUSCULAR ONCE
Status: COMPLETED | OUTPATIENT
Start: 2018-11-15 | End: 2018-11-15

## 2018-11-15 RX ORDER — SODIUM CHLORIDE 9 MG/ML
INJECTION, SOLUTION INTRAVENOUS
Status: DISCONTINUED | OUTPATIENT
Start: 2018-11-15 | End: 2018-11-15 | Stop reason: HOSPADM

## 2018-11-15 RX ORDER — LIDOCAINE HYDROCHLORIDE AND EPINEPHRINE 10; 10 MG/ML; UG/ML
1-20 INJECTION, SOLUTION INFILTRATION; PERINEURAL
Status: DISCONTINUED | OUTPATIENT
Start: 2018-11-15 | End: 2018-11-15 | Stop reason: HOSPADM

## 2018-11-15 RX ORDER — BACITRACIN 50000 [IU]/1
INJECTION, POWDER, FOR SOLUTION INTRAMUSCULAR
Status: COMPLETED
Start: 2018-11-15 | End: 2018-11-15

## 2018-11-15 RX ORDER — LIDOCAINE HYDROCHLORIDE AND EPINEPHRINE 20; 10 MG/ML; UG/ML
INJECTION, SOLUTION INFILTRATION; PERINEURAL
Status: COMPLETED
Start: 2018-11-15 | End: 2018-11-15

## 2018-11-15 RX ORDER — FENTANYL CITRATE 50 UG/ML
12.5-5 INJECTION, SOLUTION INTRAMUSCULAR; INTRAVENOUS
Status: DISCONTINUED | OUTPATIENT
Start: 2018-11-15 | End: 2018-11-15 | Stop reason: HOSPADM

## 2018-11-15 RX ORDER — VANCOMYCIN HYDROCHLORIDE 1 G/20ML
INJECTION, POWDER, LYOPHILIZED, FOR SOLUTION INTRAVENOUS
Status: DISCONTINUED
Start: 2018-11-15 | End: 2018-11-15 | Stop reason: ALTCHOICE

## 2018-11-15 RX ORDER — ACETAMINOPHEN 325 MG/1
650 TABLET ORAL
Status: DISCONTINUED | OUTPATIENT
Start: 2018-11-15 | End: 2018-11-15 | Stop reason: SDUPTHER

## 2018-11-15 RX ORDER — SODIUM CHLORIDE 0.9 % (FLUSH) 0.9 %
5-10 SYRINGE (ML) INJECTION AS NEEDED
Status: DISCONTINUED | OUTPATIENT
Start: 2018-11-15 | End: 2018-11-16 | Stop reason: HOSPADM

## 2018-11-15 RX ORDER — FENTANYL CITRATE 50 UG/ML
INJECTION, SOLUTION INTRAMUSCULAR; INTRAVENOUS AS NEEDED
Status: DISCONTINUED | OUTPATIENT
Start: 2018-11-15 | End: 2018-11-15 | Stop reason: HOSPADM

## 2018-11-15 RX ORDER — HEPARIN SODIUM 200 [USP'U]/100ML
INJECTION, SOLUTION INTRAVENOUS
Status: COMPLETED
Start: 2018-11-15 | End: 2018-11-15

## 2018-11-15 RX ORDER — PROPOFOL 10 MG/ML
INJECTION, EMULSION INTRAVENOUS AS NEEDED
Status: DISCONTINUED | OUTPATIENT
Start: 2018-11-15 | End: 2018-11-15 | Stop reason: HOSPADM

## 2018-11-15 RX ORDER — PROPOFOL 10 MG/ML
INJECTION, EMULSION INTRAVENOUS
Status: COMPLETED
Start: 2018-11-15 | End: 2018-11-15

## 2018-11-15 RX ORDER — DILTIAZEM HYDROCHLORIDE 90 MG/1
90 CAPSULE, EXTENDED RELEASE ORAL EVERY 12 HOURS
Status: DISCONTINUED | OUTPATIENT
Start: 2018-11-15 | End: 2018-11-16 | Stop reason: HOSPADM

## 2018-11-15 RX ORDER — PROPOFOL 10 MG/ML
INJECTION, EMULSION INTRAVENOUS
Status: DISCONTINUED | OUTPATIENT
Start: 2018-11-15 | End: 2018-11-15 | Stop reason: HOSPADM

## 2018-11-15 RX ORDER — SODIUM CHLORIDE 0.9 % (FLUSH) 0.9 %
5-10 SYRINGE (ML) INJECTION EVERY 8 HOURS
Status: DISCONTINUED | OUTPATIENT
Start: 2018-11-15 | End: 2018-11-16 | Stop reason: HOSPADM

## 2018-11-15 RX ORDER — FENTANYL CITRATE 50 UG/ML
INJECTION, SOLUTION INTRAMUSCULAR; INTRAVENOUS
Status: COMPLETED
Start: 2018-11-15 | End: 2018-11-15

## 2018-11-15 RX ORDER — SODIUM CHLORIDE 9 MG/ML
INJECTION, SOLUTION INTRAVENOUS
Status: COMPLETED
Start: 2018-11-15 | End: 2018-11-15

## 2018-11-15 RX ORDER — LEVOFLOXACIN 5 MG/ML
750 INJECTION, SOLUTION INTRAVENOUS
Status: DISCONTINUED | OUTPATIENT
Start: 2018-11-15 | End: 2018-11-16 | Stop reason: HOSPADM

## 2018-11-15 RX ADMIN — LIDOCAINE HYDROCHLORIDE,EPINEPHRINE BITARTRATE 20 ML: 20; .01 INJECTION, SOLUTION INFILTRATION; PERINEURAL at 14:35

## 2018-11-15 RX ADMIN — Medication 10 ML: at 04:50

## 2018-11-15 RX ADMIN — SODIUM CHLORIDE: 9 INJECTION, SOLUTION INTRAVENOUS at 14:00

## 2018-11-15 RX ADMIN — BACITRACIN 50000 UNITS: 50000 INJECTION, POWDER, FOR SOLUTION INTRAMUSCULAR at 14:50

## 2018-11-15 RX ADMIN — Medication 10 ML: at 13:51

## 2018-11-15 RX ADMIN — AMLODIPINE BESYLATE 5 MG: 5 TABLET ORAL at 09:06

## 2018-11-15 RX ADMIN — HEPARIN SODIUM 1000 UNITS: 200 INJECTION, SOLUTION INTRAVENOUS at 14:32

## 2018-11-15 RX ADMIN — GUAIFENESIN AND DEXTROMETHORPHAN HYDROBROMIDE 1 TABLET: 600; 30 TABLET, EXTENDED RELEASE ORAL at 09:06

## 2018-11-15 RX ADMIN — LEVOFLOXACIN 750 MG: 5 INJECTION, SOLUTION INTRAVENOUS at 12:13

## 2018-11-15 RX ADMIN — VANCOMYCIN HYDROCHLORIDE 1000 MG: 1 INJECTION, POWDER, LYOPHILIZED, FOR SOLUTION INTRAVENOUS at 14:04

## 2018-11-15 RX ADMIN — DRONEDARONE 400 MG: 400 TABLET, FILM COATED ORAL at 17:54

## 2018-11-15 RX ADMIN — ALBUTEROL SULFATE 2.5 MG: 2.5 SOLUTION RESPIRATORY (INHALATION) at 19:26

## 2018-11-15 RX ADMIN — BACITRACIN 50000 UNITS: 5000 INJECTION, POWDER, FOR SOLUTION INTRAMUSCULAR at 14:50

## 2018-11-15 RX ADMIN — DILTIAZEM HYDROCHLORIDE 90 MG: 90 CAPSULE, EXTENDED RELEASE ORAL at 22:22

## 2018-11-15 RX ADMIN — CALCIUM CARBONATE (ANTACID) CHEW TAB 500 MG 200 MG: 500 CHEW TAB at 09:06

## 2018-11-15 RX ADMIN — PROPOFOL 20 MG: 10 INJECTION, EMULSION INTRAVENOUS at 14:14

## 2018-11-15 RX ADMIN — Medication 10 ML: at 22:22

## 2018-11-15 RX ADMIN — PREDNISONE 40 MG: 20 TABLET ORAL at 09:06

## 2018-11-15 RX ADMIN — FLUTICASONE FUROATE AND VILANTEROL TRIFENATATE 1 PUFF: 200; 25 POWDER RESPIRATORY (INHALATION) at 09:08

## 2018-11-15 RX ADMIN — FENTANYL CITRATE 50 MCG: 50 INJECTION, SOLUTION INTRAMUSCULAR; INTRAVENOUS at 14:23

## 2018-11-15 RX ADMIN — HEPARIN SODIUM 5000 UNITS: 5000 INJECTION INTRAVENOUS; SUBCUTANEOUS at 17:53

## 2018-11-15 RX ADMIN — HEPARIN SODIUM IN SODIUM CHLORIDE 1000 UNITS: 200 INJECTION INTRAVENOUS at 14:32

## 2018-11-15 RX ADMIN — DOXYCYCLINE HYCLATE 100 MG: 100 TABLET, COATED ORAL at 09:06

## 2018-11-15 RX ADMIN — ALBUTEROL SULFATE 2.5 MG: 2.5 SOLUTION RESPIRATORY (INHALATION) at 13:47

## 2018-11-15 RX ADMIN — ALBUTEROL SULFATE 2.5 MG: 2.5 SOLUTION RESPIRATORY (INHALATION) at 07:49

## 2018-11-15 RX ADMIN — PROPOFOL 50 MCG/KG/MIN: 10 INJECTION, EMULSION INTRAVENOUS at 14:15

## 2018-11-15 RX ADMIN — GUAIFENESIN AND DEXTROMETHORPHAN HYDROBROMIDE 1 TABLET: 600; 30 TABLET, EXTENDED RELEASE ORAL at 17:54

## 2018-11-15 RX ADMIN — HEPARIN SODIUM 5000 UNITS: 5000 INJECTION INTRAVENOUS; SUBCUTANEOUS at 00:46

## 2018-11-15 RX ADMIN — PROPOFOL 30 MG: 10 INJECTION, EMULSION INTRAVENOUS at 14:24

## 2018-11-15 RX ADMIN — UMECLIDINIUM 1 PUFF: 62.5 AEROSOL, POWDER ORAL at 09:08

## 2018-11-15 RX ADMIN — PROPOFOL 30 MG: 10 INJECTION, EMULSION INTRAVENOUS at 14:25

## 2018-11-15 RX ADMIN — VITAMIN D, TAB 1000IU (100/BT) 1000 UNITS: 25 TAB at 09:06

## 2018-11-15 NOTE — PROGRESS NOTES
Chart reviewed. Pt currently off the floor for PPM. Will defer however f/u tomorrow for PT re-evaluation . Thank you Cristiano Mora, PT, DPT

## 2018-11-15 NOTE — PROGRESS NOTES
TRANSFER - OUT REPORT: 
Verbal report given to FLACA Paul on Celia Pi  being transferred to [facility] for routine post - op Report consisted of patients Situation, Background, Assessment and  
Recommendations(SBAR). Information from the following report(s) SBAR, Kardex, ED Summary, Procedure Summary, Intake/Output, MAR and Recent Results was reviewed with the receiving nurse. Lines:  
Peripheral IV 11/13/18 Distal;Inferior;Left;Lower; Posterior Arm (Active) Site Assessment Clean, dry, & intact 11/15/2018  8:02 AM  
Phlebitis Assessment 0 11/15/2018  8:02 AM  
Infiltration Assessment 0 11/15/2018  8:02 AM  
Dressing Status Clean, dry, & intact 11/15/2018  8:02 AM  
Dressing Type Transparent;Tape 11/15/2018  8:02 AM  
Hub Color/Line Status Blue;Capped 11/15/2018  8:02 AM  
   
Peripheral IV 11/15/18 Right Wrist (Active) Opportunity for questions and clarification was provided. Patient transported with: 
 Registered Nurse

## 2018-11-15 NOTE — PROGRESS NOTES
Problem: Falls - Risk of 
Goal: *Absence of Falls Document Samir Levels Fall Risk and appropriate interventions in the flowsheet. Outcome: Progressing Towards Goal 
Fall Risk Interventions: 
Mobility Interventions: Bed/chair exit alarm, Patient to call before getting OOB Medication Interventions: Bed/chair exit alarm, Patient to call before getting OOB Elimination Interventions: Call light in reach, Bed/chair exit alarm History of Falls Interventions: Bed/chair exit alarm Problem: Pressure Injury - Risk of 
Goal: *Prevention of pressure injury Document Niraj Scale and appropriate interventions in the flowsheet. Outcome: Progressing Towards Goal 
Pressure Injury Interventions: 
Sensory Interventions: Assess changes in LOC, Discuss PT/OT consult with provider Moisture Interventions: Absorbent underpads, Apply protective barrier, creams and emollients Activity Interventions: PT/OT evaluation Mobility Interventions: PT/OT evaluation Nutrition Interventions: Document food/fluid/supplement intake Friction and Shear Interventions: Apply protective barrier, creams and emollients

## 2018-11-15 NOTE — PROGRESS NOTES
Cardiology Progress Note 11/15/2018 8:35 AM 
Admit Date: 11/11/2018 Admit Diagnosis/CC: Acute on chronic respiratory failure (HCC) Acute on chronic respiratory failure ([facility] [address] Subjective:  
Patient reports: 
Chest Pain:  [x] none,  consistent with [] non-cardiac  [] atypical  []  anginal chest pain 
           [x] none now    []  on-going Dyspnea: [x] none  [] at rest  [] with exertion  [] improved  [] unchanged [] worsening PND:       [x] none  [] overnight   [] current Orthopnea: [x] none  [] improved  [] unchanged  [] worsening Presyncope: [x] none  [] improved  [] unchanged  [] worsening Ambulated in hallway without symptoms  [] Yes Ambulated in room without symptoms  [x] Yes 
ROS(2+other systems)   Hematuria: [] Yes  [x] No.   Dysuria: [] Yes  [x] No 
                                         Cough:       [] Yes  [x] No.   Sputum: [] Yes  [x] No  
                                         Hematochezia: [] Yes  [x] No.   Melena: [] Yes  [x] No 
                                        
 No change in family and social history from H&P/Consult note. Current Facility-Administered Medications Medication Dose Route Frequency  predniSONE (DELTASONE) tablet 40 mg  40 mg Oral DAILY WITH BREAKFAST  doxycycline (VIBRA-TABS) tablet 100 mg  100 mg Oral Q12H  
 dextrose (D50W) injection syrg 12.5-25 g  12.5-25 g IntraVENous PRN  
 sodium chloride (NS) flush 5-10 mL  5-10 mL IntraVENous Q8H  
 sodium chloride (NS) flush 5-10 mL  5-10 mL IntraVENous PRN  
 acetaminophen (TYLENOL) tablet 650 mg  650 mg Oral Q4H PRN  
 albuterol (PROVENTIL VENTOLIN) nebulizer solution 2.5 mg  2.5 mg Nebulization Q6H RT  
 amLODIPine (NORVASC) tablet 5 mg  5 mg Oral DAILY  aspirin tablet 325 mg  325 mg Oral DAILY  fluticasone-vilanterol (BREO ELLIPTA) 200mcg-25mcg/puff  1 Puff Inhalation DAILY  calcium carbonate (TUMS) chewable tablet 200 mg [elemental]  200 mg Oral DAILY  cholecalciferol (VITAMIN D3) tablet 1,000 Units  1,000 Units Oral DAILY  guaiFENesin-dextromethorphan SR (HUMIBID DM) 600-30 mg tablet 1 Tab  1 Tab Oral BID  
 umeclidinium (INCRUSE ELLIPTA) 62.5 mcg/actuation  1 Puff Inhalation DAILY  sodium chloride (NS) flush 5-10 mL  5-10 mL IntraVENous Q8H  
 sodium chloride (NS) flush 5-10 mL  5-10 mL IntraVENous PRN  
 naloxone (NARCAN) injection 0.4 mg  0.4 mg IntraVENous PRN  
 bisacodyl (DULCOLAX) tablet 5 mg  5 mg Oral DAILY PRN  
 nicotine (NICODERM CQ) 7 mg/24 hr patch 1 Patch  1 Patch TransDERmal DAILY PRN  
 heparin (porcine) injection 5,000 Units  5,000 Units SubCUTAneous Q8H  
 insulin lispro (HUMALOG) injection   SubCUTAneous AC&HS  
 glucose chewable tablet 16 g  4 Tab Oral PRN  
 glucagon (GLUCAGEN) injection 1 mg  1 mg IntraMUSCular PRN Objective:  
 Physical Exam: 
Last VS:  
Visit Vitals /66 (BP 1 Location: Left arm, BP Patient Position: Supine) Pulse 63 Temp 97.6 °F (36.4 °C) Resp 18 Ht 5' 4\" (1.626 m) Wt 78.5 kg (173 lb 1 oz) SpO2 96% BMI 29.71 kg/m² Temp (24hrs), Av.7 °F (36.5 °C), Min:97.5 °F (36.4 °C), Max:97.9 °F (36.6 °C) 24 hr VS reviewed. General Appearance:   [x] well developed, well nourished,  [x] NAD. [] agitated   [] lethargic but arousable  [] obtunded ENT, Palate:    [x] WNL   [] dry palate/MM Respiratory:    [x] CTA bilateral  [] rales  [] rhonchi  [] normal resp effort 
    [] similar to yesterday   [] worse    [] improved Cardiovascular:   [x] RRR   [] Irregular rate and rhythm 
 [x] Normal S1, S2   [x] No gallop or rub. [] no murmur   [x] no new murmur  [] murmur c/w: 
 [x] no edema  RLE: []1+ []2+ [] 3+;  LLE: []1+ []2+ []3+ [] edema similar to yesterday   [] worse    [] improved [x] normal JVP   [] Elevated JVP [x] JVP similar to yesterday   [] worse    [] improved [x] carotid upstroke unchanged 
 [x] abd aorta not palpated [x] no stigmata of peripheral emboli GI:    [x] abd soft, non-distented,bowel sounds present, no                     organomegaly appreciated Skin: 
Neuro: 
 
Cath Site:  [x] warm and dry [] cold extremities [x] A/O x 3, grossly non-focal  
 
 [] intact w/o hematoma or bruit; distal pulse unchanged. Data Review:  
Labs:   
Recent Results (from the past 24 hour(s)) GLUCOSE, POC Collection Time: 11/14/18 11:39 AM  
Result Value Ref Range Glucose (POC) 191 (H) 65 - 100 mg/dL Performed by Eureka King (PCT) GLUCOSE, POC Collection Time: 11/14/18  4:56 PM  
Result Value Ref Range Glucose (POC) 169 (H) 65 - 100 mg/dL Performed by Eureka King (PCT) GLUCOSE, POC Collection Time: 11/14/18  8:40 PM  
Result Value Ref Range Glucose (POC) 200 (H) 65 - 100 mg/dL Performed by Ra Sanchez EKG, 12 LEAD, SUBSEQUENT Collection Time: 11/15/18  1:05 AM  
Result Value Ref Range Ventricular Rate 59 BPM  
 Atrial Rate 315 BPM  
 QRS Duration 138 ms Q-T Interval 458 ms QTC Calculation (Bezet) 453 ms Calculated P Axis 42 degrees Calculated R Axis 42 degrees Calculated T Axis 20 degrees Diagnosis Atrial flutter with variable AV block Right bundle branch block When compared with ECG of 12-NOV-2018 03:04, Atrial flutter has replaced Sinus rhythm QT has shortened METABOLIC PANEL, BASIC Collection Time: 11/15/18  1:08 AM  
Result Value Ref Range Sodium 140 136 - 145 mmol/L Potassium 3.6 3.5 - 5.1 mmol/L Chloride 106 97 - 108 mmol/L  
 CO2 29 21 - 32 mmol/L Anion gap 5 5 - 15 mmol/L Glucose 140 (H) 65 - 100 mg/dL BUN 33 (H) 6 - 20 MG/DL Creatinine 0.86 0.55 - 1.02 MG/DL  
 BUN/Creatinine ratio 38 (H) 12 - 20 GFR est AA >60 >60 ml/min/1.73m2 GFR est non-AA >60 >60 ml/min/1.73m2 Calcium 9.2 8.5 - 10.1 MG/DL  
GLUCOSE, POC Collection Time: 11/15/18  7:10 AM  
Result Value Ref Range Glucose (POC) 117 (H) 65 - 100 mg/dL Performed by Dena Burnett  (PCT) Provided Telemetry: [x] sinus  [] chronic afib   [] paroxysmal afib  [] NSVT Assessment:  
 
Active Problems: 
  Acute on chronic respiratory failure ([facility] [address] (11/17/2011) Plan:  
 
Atrial Fibrillation - Flutter  improved   Electrophysiology consult She had at. Flutter last pm. Now with av block. Needs PPM. For all other plans, see orders. [x] High complexity decision making was performed

## 2018-11-15 NOTE — PROGRESS NOTES
TRANSFER - IN REPORT: 
 
Verbal report received from Rigo (daryn) on Curt Finger  being received from EP LAB (unit) for ordered procedure Report consisted of patients Situation, Background, Assessment and  
Recommendations(SBAR). Information from the following report(s) SBAR, Kardex, Procedure Summary, Intake/Output, MAR and Recent Results was reviewed with the receiving nurse. Opportunity for questions and clarification was provided. Assessment completed upon patients arrival to unit and care assumed.

## 2018-11-15 NOTE — PROGRESS NOTES
TRANSFER - IN REPORT: 
 
Verbal report received from anestheologist on Alexandria Hassan  being received from EP for routine post - op. Report consisted of patients Situation, Background, Assessment and Recommendations(SBAR). Information from the following report(s) Procedure Summary was reviewed with the receiving clinician. Opportunity for questions and clarification was provided. Assessment completed upon patients arrival from [address] [place] [address]. IVCU Recovery Arrival Note: 
 
Maxine Boogie arrived to [facility]. Patient procedure= dual chamber pacemaker. Patient on cardiac monitor, non-invasive blood pressure, SPO2 monitor. On O2 @ 4 lpm via NC.  IV of Vanc on pump at 125 ml/hr. Patient status doing well without problems. Patient is A&Ox 4. Patient reports no pain. PROCEDURE SITE CHECK: 
 
Procedure site:without any bleeding and 0  pain/discomfort reported at procedure site. No change in patient status. Continue to monitor patient and status.

## 2018-11-15 NOTE — PROGRESS NOTES
Hospitalist Progress Note NAME: Zulema Duncan :  1935 MRN:  [id number] Interim Hospital Summary: 82 y.o. female whom presented on 2018 with Assessment / Plan: 
Acute on Chronic hypoxemic respiratory failure Acute on chronic COPD exacerbation. - sputum cx grew heavy haemophilus influenza beta lactamase; started on Levo. D/c doxy. - Continue NC Oxygen and BiPAP support if needed; has not been needing BiPAP since the admission 
- appreciate pulmonology input; no more wheezing. Breathing much better today. Changed IV solumedrol to po prednisone. Continue with nebs, breo, add flutter valve, mucolytics. Pt follows by Dr. Minna Duggan as outpatient. Pt was happy to see the pulmonologist yesterday. - CXR w/o clear infiltrate, but productive cough, will get sputum cx and add doxy for suspected acute bronchitis  
  
Mild ALMAZ Suspected mild dehydration 
-Miles Chapat - pt is tolerating diabetic diet without difficulty. Stopped IVF 
  
Hyperglycemia 
- no hx of DM 
- HgbA1C 6.6; continue with diabetic diet. Diabetic education consult 
- blood glucose worse with steroid (mostly stays less than 200); check qac/qhs blood glucose and follow SSI  
  
History of PAF 
RBBB Frequent PVCs noted on monitor in ER, now bradycardic ? Intermittent 2ndHB  
Severe pulmonary hypertension Left ventricular dilation HTN 
- appreciate cardiologist Dr. Mary Yin; hold multag and BB. 
- Schedule to have Pacemaker insertion today. - Echo (10/2017): normal EF 
- continue with norvasc, HCTZ on hold 
  
On going Tobacco abuse - Advised pt to stop smoking.  
- pt declined for nicotine patch. Also requested not to talk about stop smoking since she already knows about the danger 
  
  
Stress urinary incontinence. (pt uses diapers) OA   
  
25.0 - 29.9 Overweight / Body mass index is 29.71 kg/m². 
  
Code status: Full Prophylaxis: Hep SQ Recommended Disposition: Home w/Family possible home health 
  
  
 
 
 Subjective: Chief Complaint / Reason for Physician Visit \"I am feeling ok\". Discussed with RN events overnight. Review of Systems: 
Symptom Y/N Comments  Symptom Y/N Comments Fever/Chills n   Chest Pain n   
Poor Appetite    Edema Cough    Abdominal Pain Sputum    Joint Pain SOB/LUDWIG y   Pruritis/Rash Nausea/vomit n   Tolerating PT/OT Diarrhea    Tolerating Diet Constipation    Other Could NOT obtain due to:   
 
Objective: VITALS:  
Last 24hrs VS reviewed since prior progress note. Most recent are: 
Patient Vitals for the past 24 hrs: 
 Temp Pulse Resp BP SpO2  
11/15/18 1622  67 20 154/75 91 % 11/15/18 1600  65 17 168/67 90 % 11/15/18 1545  65 17 158/70 92 % 11/15/18 1540  68 15 156/83 92 % 11/15/18 1535  73 14 163/79 90 % 11/15/18 1530  75 19 176/72 93 % 11/15/18 1525  71 18 161/61   
11/15/18 1520  78 17 153/69 97 % 11/15/18 1517 97.4 °F (36.3 °C) 74 14  93 % 11/15/18 1505  74 14 143/55 95 % 11/15/18 1028 97.6 °F (36.4 °C) 68 18 162/60 96 % 11/15/18 0749     96 % 11/15/18 0708 97.6 °F (36.4 °C) 63 18 156/66 96 % 11/15/18 0120  61     
11/15/18 0112 97.9 °F (36.6 °C) (!) 56 20 156/87 92 % 11/15/18 0052  (!) 55 20 (!) 149/99 92 % 11/14/18 2330  (!) 55  150/70   
11/14/18 2310 97.6 °F (36.4 °C) 66 20 (!) 150/93 96 % 11/14/18 1945 97.8 °F (36.6 °C) 67 20 166/68 93 % Intake/Output Summary (Last 24 hours) at 11/15/2018 1634 Last data filed at 11/15/2018 1200 Gross per 24 hour Intake 100 ml Output 1100 ml Net -1000 ml PHYSICAL EXAM: 
General: WD, WN. Alert, cooperative, no acute distress   
EENT:  EOMI. Anicteric sclerae. MMM Resp:  A few bilateral rhonchi, otherwise clear breath sounds, no wheezing or rales. No accessory muscle use CV:  Regular  rhythm,  No edema GI:  Soft, Non distended, Non tender.  +Bowel sounds Neurologic:  Alert and oriented X 3, normal speech,  
 Psych:   Good insight. Not anxious nor agitated Skin:  No rashes. No jaundice Reviewed most current lab test results and cultures  YES Reviewed most current radiology test results   YES Review and summation of old records today    NO Reviewed patient's current orders and MAR    YES 
PMH/SH reviewed - no change compared to H&P 
________________________________________________________________________ Care Plan discussed with: 
  Comments Patient y Family  y  and daughter at bedside RN y   
Care Manager y Consultant     
                 y Multidiciplinary team rounds were held today with , nursing, pharmacist and clinical coordinator. Patient's plan of care was discussed; medications were reviewed and discharge planning was addressed. ________________________________________________________________________ Total NON critical care TIME:  30  Minutes Total CRITICAL CARE TIME Spent:   Minutes non procedure based Comments >50% of visit spent in counseling and coordination of care    
________________________________________________________________________ Holden Ko NP Procedures: see electronic medical records for all procedures/Xrays and details which were not copied into this note but were reviewed prior to creation of Plan. LABS: 
I reviewed today's most current labs and imaging studies. Pertinent labs include: 
Recent Labs 11/14/18 0057 11/13/18 
0301 WBC 9.8 12.8* HGB 11.9 12.0 HCT 36.4 36.5  209 Recent Labs 11/15/18 
0108 11/14/18 0057 11/13/18 
0301  139 137  
K 3.6 3.6 3.8  103 100 CO2 29 30 30 * 161* 162* BUN 33* 40* 48* CREA 0.86 0.89 1.18* CA 9.2 8.8 8.7 MG  --  2.2 2.1 PHOS  --  2.4* 2.8 Signed: )Cinda Sunshine, NP

## 2018-11-15 NOTE — PROGRESS NOTES
Report received from [facility]. Kardex, Procedure Summary or Cardiac Rhythm sa were discussed. , RN assumed care of the pt. Annel Leonard, RN  
 
 
1412 Patient woke up extremely SOB, O2 stats 88 on 4LNC. rhythm change from sinus arthymia to aflutter 50-60\"s. /99  EKG completed. Page placed to Dr. HELLER [facility], 
 
[address] Patient converted back to sinus arthymia.  [facility] made aware; no orders received.

## 2018-11-15 NOTE — PROCEDURES
[address]  [phone]    Patient ID:  Patient: Ivon Dc  MRN: [id number]  Age: 82 y.o.  : 1935  Gender: female  Study Date: 11/15/2018    History: This is a female with nonreversible sick sinus syndrome and symptomatic second degree AV block with bradycardia including 2:1 block with right bundle branch block here for permanent pacemaker. She is unable to take her beta-blocker or diltiazem for rate control of paroxysmal atrial flutter, so is here for a pacemaker. Procedures Performed:  1. DUAL CHAMBER PACEMAKER ([id number])    The patient was brought to the EP lab in a postabsorptive state after informed consent had been previously obtained. Continuous electrocardiographic and hemodynamic monitoring was performed. Sedation was performed by the anesthesiologist who was in constant attendance throughout the procedure. Using 1% lidocaine with epinephrine, the left chest site was anesthetized. The pocket was formed in the usual fashion and ultimately axillary venous access was obtained using a micropuncture needle. Two safe sheaths were placed. The tined right ventricular lead (4074-58 cm, [id number]) was advanced to the RV apex. The tined right atrial lead (4574-52 cm, [id number]) was advanced to the RA appendage. Each lead was fixed and tested, performance verified. The leads were anchored to the pocket floor using two 0-silk sutures at each anchor sleeve. The pulse generator was connected to the leads and placed in the pocket after hemostasis was confirmed. Vigorous irrigation with antibiotic solution was performed. A single 0-silk suture was used to anchor the pulse generator to the pocket floor. The pocket was closed using a running 2-0 Vicryl layer x1, followed by a more superficial layer of running 4-0 Vicryl in a subcuticular fashion to close the skin.   Final fluoroscopic check revealed adequate redundancy of the leads and the absence of pneumothorax. Dermabond was applied. Preoperative Diagnosis: As above. Postoperative Diagnosis: As above. Procedure:  As above. Surgeon(s) and Role:  Nestor Marin MD - Primary   Anesthesia:   MAC. Estimated Blood Loss:  <5 cc. Specimens: * No specimens in log *   Findings:  As below. Complications:  None. SETTINGS:  [employer] Advisa DR NOE SureScan [id number], [id number]  RA 1.5, 0.7, 577  RV 3.8, 0.4, 967  DDDR     Recommendations:  After successful dual chamber permanent pacemaker implant to the left chest with tined transvenous leads, routine follow-up in 2-4 weeks for wound and device check. Will consider initiating beta-blocker.       Signed By: Manpreet Hutchison MD     November 15, 2018

## 2018-11-15 NOTE — ANESTHESIA PREPROCEDURE EVALUATION
Anesthetic History No history of anesthetic complications Review of Systems / Medical History Patient summary reviewed, nursing notes reviewed and pertinent labs reviewed Pulmonary COPD (4L NC): severe Smoker Neuro/Psych Within defined limits Psychiatric history Cardiovascular Hypertension Dysrhythmias : atrial fibrillation and atrial flutter Exercise tolerance: <4 METS Comments: Ejection fraction was 
estimated in the range of 55 % to 60 %. Mild MR/AS  
GI/Hepatic/Renal 
Within defined limits Endo/Other Obesity Other Findings Physical Exam 
 
Airway Mallampati: III 
TM Distance: 4 - 6 cm Neck ROM: normal range of motion Mouth opening: Normal 
 
 Cardiovascular Regular rate and rhythm,  S1 and S2 normal,  no murmur, click, rub, or gallop Dental 
No notable dental hx Pulmonary Breath sounds clear to auscultation Abdominal 
GI exam deferred Other Findings Anesthetic Plan ASA: 4 Anesthesia type: total IV anesthesia and MAC Induction: Intravenous Anesthetic plan and risks discussed with: Patient

## 2018-11-15 NOTE — PROGRESS NOTES
Problem: Falls - Risk of 
Goal: *Absence of Falls Document Rocky Felix Fall Risk and appropriate interventions in the flowsheet. Outcome: Progressing Towards Goal 
Fall Risk Interventions: 
Mobility Interventions: Assess mobility with egress test, Bed/chair exit alarm, Communicate number of staff needed for ambulation/transfer, Mechanical lift, OT consult for ADLs, Patient to call before getting OOB, PT Consult for mobility concerns, PT Consult for assist device competence Medication Interventions: Assess postural VS orthostatic hypotension, Bed/chair exit alarm, Evaluate medications/consider consulting pharmacy, Patient to call before getting OOB, Teach patient to arise slowly, Utilize gait belt for transfers/ambulation Elimination Interventions: Bed/chair exit alarm, Call light in reach, Elevated toilet seat, Patient to call for help with toileting needs, Toilet paper/wipes in reach, Toileting schedule/hourly rounds, Urinal in reach History of Falls Interventions: Bed/chair exit alarm, Consult care management for discharge planning, Door open when patient unattended, Evaluate medications/consider consulting pharmacy, Investigate reason for fall, Room close to nurse's station, Utilize gait belt for transfer/ambulation Problem: Pressure Injury - Risk of 
Goal: *Prevention of pressure injury Document Niraj Scale and appropriate interventions in the flowsheet. Outcome: Progressing Towards Goal 
Pressure Injury Interventions: 
Sensory Interventions: Assess changes in LOC, Assess need for specialty bed, Avoid rigorous massage over bony prominences, Chair cushion, Check visual cues for pain, Discuss PT/OT consult with provider, Float heels, Keep linens dry and wrinkle-free Moisture Interventions: Absorbent underpads, Apply protective barrier, creams and emollients, Assess need for specialty bed, Check for incontinence Q2 hours and as needed, Contain wound drainage, Limit adult briefs Activity Interventions: Assess need for specialty bed, Chair cushion, Increase time out of bed, Pressure redistribution bed/mattress(bed type), PT/OT evaluation, Trapeze to reposition Mobility Interventions: Assess need for specialty bed, Chair cushion, Float heels, HOB 30 degrees or less, Pressure redistribution bed/mattress (bed type), PT/OT evaluation, Suspension boots, Trapeze to reposition Nutrition Interventions: Document food/fluid/supplement intake, Discuss nutritional consult with provider, Offer support with meals,snacks and hydration Friction and Shear Interventions: Apply protective barrier, creams and emollients, Feet elevated on foot rest, Foam dressings/transparent film/skin sealants, HOB 30 degrees or less, Lift sheet, Lift team/patient mobility team, Minimize layers, Sit at 90-degree angle

## 2018-11-15 NOTE — PROGRESS NOTES
0700: Bedside shift change report given to Rickey Dalal RN (oncoming nurse) by Alivia Felder RN (offgoing nurse). Report included the following information SBAR, Kardex, ED Summary, Intake/Output, MAR and Recent Results. 0915: pt prepped for pacemaker procedure w/ CHG wipes and new gown 1355: Pt transported off floor for pacemaker placement

## 2018-11-16 VITALS
OXYGEN SATURATION: 96 % | RESPIRATION RATE: 19 BRPM | HEIGHT: 64 IN | TEMPERATURE: 98.1 F | HEART RATE: 68 BPM | DIASTOLIC BLOOD PRESSURE: 89 MMHG | SYSTOLIC BLOOD PRESSURE: 156 MMHG | BODY MASS INDEX: 29.55 KG/M2 | WEIGHT: 173.06 LBS

## 2018-11-16 PROBLEM — I44.1 SECOND DEGREE HEART BLOCK: Status: ACTIVE | Noted: 2018-11-16

## 2018-11-16 PROBLEM — Z95.0 S/P PLACEMENT OF CARDIAC PACEMAKER: Status: ACTIVE | Noted: 2018-11-16

## 2018-11-16 LAB
ANION GAP SERPL CALC-SCNC: 4 MMOL/L (ref 5–15)
BASOPHILS # BLD: 0 K/UL (ref 0–0.1)
BASOPHILS NFR BLD: 0 % (ref 0–1)
BUN SERPL-MCNC: 22 MG/DL (ref 6–20)
BUN/CREAT SERPL: 28 (ref 12–20)
CALCIUM SERPL-MCNC: 9 MG/DL (ref 8.5–10.1)
CHLORIDE SERPL-SCNC: 105 MMOL/L (ref 97–108)
CO2 SERPL-SCNC: 31 MMOL/L (ref 21–32)
CREAT SERPL-MCNC: 0.8 MG/DL (ref 0.55–1.02)
DIFFERENTIAL METHOD BLD: NORMAL
EOSINOPHIL # BLD: 0.1 K/UL (ref 0–0.4)
EOSINOPHIL NFR BLD: 1 % (ref 0–7)
ERYTHROCYTE [DISTWIDTH] IN BLOOD BY AUTOMATED COUNT: 13.3 % (ref 11.5–14.5)
GLUCOSE BLD STRIP.AUTO-MCNC: 93 MG/DL (ref 65–100)
GLUCOSE SERPL-MCNC: 96 MG/DL (ref 65–100)
HCT VFR BLD AUTO: 36.7 % (ref 35–47)
HGB BLD-MCNC: 12.1 G/DL (ref 11.5–16)
IMM GRANULOCYTES # BLD: 0 K/UL (ref 0–0.04)
IMM GRANULOCYTES NFR BLD AUTO: 0 % (ref 0–0.5)
LYMPHOCYTES # BLD: 1.9 K/UL (ref 0.8–3.5)
LYMPHOCYTES NFR BLD: 23 % (ref 12–49)
MCH RBC QN AUTO: 30.1 PG (ref 26–34)
MCHC RBC AUTO-ENTMCNC: 33 G/DL (ref 30–36.5)
MCV RBC AUTO: 91.3 FL (ref 80–99)
METAMYELOCYTES NFR BLD MANUAL: 2 %
MONOCYTES # BLD: 0.6 K/UL (ref 0–1)
MONOCYTES NFR BLD: 7 % (ref 5–13)
NEUTS BAND NFR BLD MANUAL: 1 %
NEUTS SEG # BLD: 5.6 K/UL (ref 1.8–8)
NEUTS SEG NFR BLD: 66 % (ref 32–75)
NRBC # BLD: 0 K/UL (ref 0–0.01)
NRBC BLD-RTO: 0 PER 100 WBC
PLATELET # BLD AUTO: 202 K/UL (ref 150–400)
PMV BLD AUTO: 9.4 FL (ref 8.9–12.9)
POTASSIUM SERPL-SCNC: 3.4 MMOL/L (ref 3.5–5.1)
RBC # BLD AUTO: 4.02 M/UL (ref 3.8–5.2)
RBC MORPH BLD: NORMAL
SERVICE CMNT-IMP: NORMAL
SODIUM SERPL-SCNC: 140 MMOL/L (ref 136–145)
WBC # BLD AUTO: 8.3 K/UL (ref 3.6–11)

## 2018-11-16 PROCEDURE — 36415 COLL VENOUS BLD VENIPUNCTURE: CPT

## 2018-11-16 PROCEDURE — 74011250637 HC RX REV CODE- 250/637: Performed by: INTERNAL MEDICINE

## 2018-11-16 PROCEDURE — 74011250637 HC RX REV CODE- 250/637: Performed by: NURSE PRACTITIONER

## 2018-11-16 PROCEDURE — 74011000250 HC RX REV CODE- 250: Performed by: EMERGENCY MEDICINE

## 2018-11-16 PROCEDURE — 82962 GLUCOSE BLOOD TEST: CPT

## 2018-11-16 PROCEDURE — 74011000250 HC RX REV CODE- 250: Performed by: INTERNAL MEDICINE

## 2018-11-16 PROCEDURE — 94760 N-INVAS EAR/PLS OXIMETRY 1: CPT

## 2018-11-16 PROCEDURE — 74011250636 HC RX REV CODE- 250/636: Performed by: INTERNAL MEDICINE

## 2018-11-16 PROCEDURE — 94640 AIRWAY INHALATION TREATMENT: CPT

## 2018-11-16 PROCEDURE — 74011636637 HC RX REV CODE- 636/637: Performed by: NURSE PRACTITIONER

## 2018-11-16 PROCEDURE — 80048 BASIC METABOLIC PNL TOTAL CA: CPT

## 2018-11-16 PROCEDURE — 85025 COMPLETE CBC W/AUTO DIFF WBC: CPT

## 2018-11-16 PROCEDURE — 77010033678 HC OXYGEN DAILY

## 2018-11-16 RX ORDER — ALBUTEROL SULFATE 0.83 MG/ML
2.5 SOLUTION RESPIRATORY (INHALATION)
Status: DISCONTINUED | OUTPATIENT
Start: 2018-11-16 | End: 2018-11-16 | Stop reason: HOSPADM

## 2018-11-16 RX ORDER — DILTIAZEM HYDROCHLORIDE 90 MG/1
90 CAPSULE, EXTENDED RELEASE ORAL EVERY 12 HOURS
Qty: 30 CAP | Refills: 0 | Status: SHIPPED | OUTPATIENT
Start: 2018-11-16 | End: 2019-01-03

## 2018-11-16 RX ORDER — PREDNISONE 20 MG/1
40 TABLET ORAL
Qty: 7 TAB | Refills: 0 | Status: SHIPPED | OUTPATIENT
Start: 2018-11-17 | End: 2018-12-17

## 2018-11-16 RX ORDER — NICOTINE 7MG/24HR
1 PATCH, TRANSDERMAL 24 HOURS TRANSDERMAL
Qty: 30 PATCH | Refills: 0 | Status: SHIPPED | OUTPATIENT
Start: 2018-11-16 | End: 2018-12-17

## 2018-11-16 RX ORDER — LEVOFLOXACIN 750 MG/1
750 TABLET ORAL EVERY OTHER DAY
Qty: 3 TAB | Refills: 0 | Status: SHIPPED | OUTPATIENT
Start: 2018-11-16 | End: 2018-11-21

## 2018-11-16 RX ORDER — POTASSIUM CHLORIDE 750 MG/1
20 TABLET, FILM COATED, EXTENDED RELEASE ORAL
Status: COMPLETED | OUTPATIENT
Start: 2018-11-16 | End: 2018-11-16

## 2018-11-16 RX ADMIN — DILTIAZEM HYDROCHLORIDE 90 MG: 90 CAPSULE, EXTENDED RELEASE ORAL at 08:47

## 2018-11-16 RX ADMIN — HEPARIN SODIUM 5000 UNITS: 5000 INJECTION INTRAVENOUS; SUBCUTANEOUS at 00:00

## 2018-11-16 RX ADMIN — FLUTICASONE FUROATE AND VILANTEROL TRIFENATATE 1 PUFF: 200; 25 POWDER RESPIRATORY (INHALATION) at 08:44

## 2018-11-16 RX ADMIN — ASPIRIN 325 MG: 325 TABLET ORAL at 08:44

## 2018-11-16 RX ADMIN — DRONEDARONE 400 MG: 400 TABLET, FILM COATED ORAL at 08:44

## 2018-11-16 RX ADMIN — Medication 10 ML: at 05:02

## 2018-11-16 RX ADMIN — AMLODIPINE BESYLATE 5 MG: 5 TABLET ORAL at 08:44

## 2018-11-16 RX ADMIN — PREDNISONE 40 MG: 20 TABLET ORAL at 08:44

## 2018-11-16 RX ADMIN — ALBUTEROL SULFATE 2.5 MG: 2.5 SOLUTION RESPIRATORY (INHALATION) at 07:51

## 2018-11-16 RX ADMIN — CALCIUM CARBONATE (ANTACID) CHEW TAB 500 MG 200 MG: 500 CHEW TAB at 08:44

## 2018-11-16 RX ADMIN — POTASSIUM CHLORIDE 20 MEQ: 750 TABLET, FILM COATED, EXTENDED RELEASE ORAL at 10:41

## 2018-11-16 RX ADMIN — HEPARIN SODIUM 5000 UNITS: 5000 INJECTION INTRAVENOUS; SUBCUTANEOUS at 08:45

## 2018-11-16 RX ADMIN — ALBUTEROL SULFATE 2.5 MG: 2.5 SOLUTION RESPIRATORY (INHALATION) at 01:27

## 2018-11-16 RX ADMIN — GUAIFENESIN AND DEXTROMETHORPHAN HYDROBROMIDE 1 TABLET: 600; 30 TABLET, EXTENDED RELEASE ORAL at 08:47

## 2018-11-16 RX ADMIN — VITAMIN D, TAB 1000IU (100/BT) 1000 UNITS: 25 TAB at 08:43

## 2018-11-16 RX ADMIN — UMECLIDINIUM 1 PUFF: 62.5 AEROSOL, POWDER ORAL at 09:00

## 2018-11-16 NOTE — PROGRESS NOTES
CM acknowledged discharge order. Chart review. 4LNC02. Chronic. Geeta Lung is provider. PT is recommending HHPT. Consult for follow up appointments acknowledged. Appointments noted on AVS as scheduled or attempted. This CM met with patient to discuss discharge planning. Daughter is present to provide transportation home.  is at home and will assist as needed. Patient is not agreeable to HHPT or H2H. \"I will do my own thing\". Was patient's response to CM. CM reviewed scheduled appointments. \"I don't want a lot of appointments. \" 
Patient has refused to follow up with NP at [facility]. Dr. Madiha Childs was not available. Patient informed CM she is willing to see another physician within the group. CM contacted [facility]. CM informed team that patient was unwilling to be seen by NP. Dr. Abran Kayser first available is January 16, 2019. This is patient's assigned MD.  She needs to follow up with this MD or his NP. Patient informed.  will bring portable Oxygen for discharge home. Shamika Gatica RN informed that CM tasks are complete. Care Management Interventions PCP Verified by CM: Yes Mode of Transport at Discharge: Other (see comment)(family) Transition of Care Consult (CM Consult): Discharge Planning Discharge Durable Medical Equipment: No 
Physical Therapy Consult: Yes Occupational Therapy Consult: Yes Speech Therapy Consult: No 
Current Support Network: Lives with Spouse Confirm Follow Up Transport: Self Plan discussed with Pt/Family/Caregiver: Yes Discharge Location Discharge Placement: Home Jj York RN CM Ext [id number]

## 2018-11-16 NOTE — DISCHARGE SUMMARY
Hospitalist Discharge Summary     Patient ID:  Celia Grace  [id number]  26 y.o.  1935    PCP on record: Lucius Iyer MD    Admit date: 11/11/2018  Discharge date and time: 11/16/2018      DISCHARGE DIAGNOSIS:  Acute on Chronic hypoxemic respiratory failure   Acute on chronic COPD exacerbation. Mild ALMAZ  Suspected mild dehydration  Hyperglycemia  History of PAF  RBBB  Frequent PVCs noted on monitor in ER, now bradycardic ? Intermittent 2ndHB   Severe pulmonary hypertension  Left ventricular dilation  HTN  On going Tobacco abuse   Stress urinary incontinence  OA       25.0 - 29.9 Overweight / Body mass index is 29.71 kg/m². CONSULTATIONS:  IP CONSULT TO PULMONOLOGY    Excerpted HPI from H&P of Russel Ndiaye MD:  The pt was fine until about two days ago when she developed increasing shortness of breath. She continued to take her prescribed medications. But this morning she woke up with increased shortness of breath and wheezing. Workup in the ER suggests possible LLL PNA along with COPD exacerbation. We were asked to admit her for further Rx. Her daughter and son report that the pt didn't complain of any hemoptysis, fever but possible chills. ______________________________________________________________________  DISCHARGE SUMMARY/HOSPITAL COURSE:  for full details see H&P, daily progress notes, labs, consult notes. Acute on Chronic hypoxemic respiratory failure   Acute on chronic COPD exacerbation. - sputum cx grew heavy haemophilus influenza beta lactamase; pt will complete 5 day of ABX with Levo. D/c doxy. - Continue NC Oxygen and BiPAP support if needed; has not been needing BiPAP since the admission  - appreciate pulmonology input; no more wheezing. Breathing much better today.  Complete prednisone taper as directed  - CXR w/o infiltrate  - continue with home inhalers;  Symbicort, spirvia     Mild ALMAZ  Suspected mild dehydration  - resolved  - pt is tolerating diabetic diet without difficulty.      Hyperglycemia  - no hx of DM  - HgbA1C 6.6; continue with diabetic diet. Patient was seen by the Diabetic educator during this admission     History of PAF  RBBB  Frequent PVCs noted on monitor in ER, now bradycardic ? Intermittent 2ndHB   Severe pulmonary hypertension  Left ventricular dilation  HTN  - s/p dual chamber insertion by Dr. Sherley Juarez on 11/15. Resumed multag   - Cardizem started (RX given). BB on hold for now. Dr. Deborah Farley will reevaluate whether to start the medication or not during follow visit  - Echo (10/2017): normal EF  - Norvasc and HCTZ have been discharged     On going Tobacco abuse   - Advised pt to stop smoking.   - pt declined for nicotine patch. Also requested not to talk about stop smoking since she already knows about the danger        Stress urinary incontinence. (pt uses diapers)  OA       25.0 - 29.9 Overweight / Body mass index is 29.71 kg/m².            _______________________________________________________________________  Patient seen and examined by me on discharge day. Pertinent Findings:  Gen:    Not in distress  Chest: A few exp wheezing  CVS:   Regular rhythm. No edema  Abd:  Soft, not distended, not tender  Neuro:  Alert, orient x 4. Pacer insertion site dressing dry and intact  _______________________________________________________________________  DISCHARGE MEDICATIONS:   Discharge Medication List as of 11/16/2018 10:18 AM      START taking these medications    Details   dilTIAZem SR (CARDIZEM SR) 90 mg SR capsule Take 1 Cap by mouth every twelve (12) hours. , Print, Disp-30 Cap, R-0      nicotine (NICODERM CQ) 7 mg/24 hr 1 Patch by TransDERmal route daily as needed (Tobacco crave) for up to 30 days. , Print, Disp-30 Patch, R-0      levoFLOXacin (LEVAQUIN) 750 mg tablet Take 1 Tab by mouth every other day for 3 doses. , Alejandrina, Disp-3 Tab, R-0      predniSONE (DELTASONE) 20 mg tablet Take 40 mg by mouth daily (with breakfast). 2 tab once a day for 2 days  1 tab once a day for 2 days,  Then 1/2 tab once a day for 2 days, Print, Disp-7 Tab, R-0         CONTINUE these medications which have NOT CHANGED    Details   dronedarone (MULTAQ) tab tablet Take 1 Tab by mouth two (2) times daily (with meals). , Print, Disp-30 Tab, R-0      amLODIPine (NORVASC) 5 mg tablet Take 1 Tab by mouth daily. , Print, Disp-10 Tab, R-0      budesonide-formoterol (SYMBICORT) 160-4.5 mcg/actuation HFAA Take 2 Puffs by inhalation two (2) times a day., Historical Med      Oxygen 3 Each continuous. , Historical Med      calcium carbonate (TUMS) 200 mg calcium (500 mg) Chew 200 mg elemental Calcium = 500 mg calcium carbonateTake 1 Tab by mouth daily. Print, 200 mg, Disp-30 Tab, R-0      cholecalciferol (VITAMIN D3) 1,000 unit tablet Take 1 Tab by mouth daily. Normal, 1,000 Units, Disp-30 Tab, R-1      acetaminophen (TYLENOL) 325 mg tablet Take 2 Tabs by mouth every four (4) hours as needed. No Print, 650 mg, Disp-30 Tab, R-0      tiotropium (SPIRIVA WITH HANDIHALER) 18 mcg inhalation capsule Not intended for PRN useTake 1 Cap by inhalation daily. Historical Med, 1 Cap      albuterol (PROVENTIL, VENTOLIN) 90 mcg/Actuation inhaler Take 2 Puffs by inhalation every six (6) hours as needed. Historical Med, 2 Puff      albuterol (PROVENTIL VENTOLIN) 2.5 mg /3 mL (0.083 %) nebulizer solution by Nebulization route every six (6) hours as needed. Historical Med      dextromethorphan-guaiFENesin (MUCINEX DM)  mg per tablet Take 1 Tab by mouth two (2) times a day. Historical Med, 1 Tab      aspirin (ASPIRIN) 325 mg tablet Take 325 mg by mouth daily. Historical Med, 325 mg         STOP taking these medications       metoprolol succinate (TOPROL-XL) 50 mg XL tablet Comments:   Reason for Stopping:         hydroCHLOROthiazide (HYDRODIURIL) 25 mg tablet Comments:   Reason for Stopping:         umeclidinium (INCRUSE ELLIPTA) 62.5 mcg/actuation inhaler Comments:   Reason for Stopping: Patient hs been informed to stop taking Norvasc and take daily cardizem instead. My Recommended Diet, Activity, Wound Care, and follow-up labs are listed in the patient's Discharge Insturctions which I have personally completed and reviewed. _______________________________________________________________________  DISPOSITION:    Home with Family: y   Home with HH/PT/OT/RN:    SNF/LTC:    MAR:    OTHER:        Condition at Discharge:  Stable  Pt declined the home health services. _______________________________________________________________________  Follow up with:   PCP : Goran Michel MD  Follow-up Information     Follow up With Specialties Details Why Contact Info    Goran Michel MD Internal Medicine Go on 11/26/2018 For hospital follow up appointment at 2:40PM  [facility]      Patrica Suarez MD Pulmonary Disease Go on 1/16/2019 Pulomonary - follow up with Dr. Vianey Rae. January 16 at 11:30am.  You are on a waiting list if there is a cancellation they will call you. NEW ADDRESS:  [address] [place] [address], [address] [place] [address] Drive,[facility] [address]  [address] [place] [address] [facility]      Alexis Betancourt MD Cardiology Call Cardiolgy - one week follow up. Nurse will call you to schedule. Raulito SCHWARTZ [phone]      Gerry Simmons MD Cardiology Call Cardiology - one week follow up. Nurse will call you to schedule. [address]  [phone]      [facility] In-Home Clinical Assessments On 11/18/2018 Expect a phone call from [place] to schedule a follow up visit with you in 24-48 hours. If you have questions please Contact #[phone] Visit at [place] [address] [facility] [address] [facility]  [phone]              Total time in minutes spent coordinating this discharge (includes going over instructions, follow-up, prescriptions, and preparing report for sign off to her PCP) :  30 minutes    Signed:  Holden Leiva NP

## 2018-11-16 NOTE — ROUTINE PROCESS
Follow up apt scheduled with Indio Wong on 11/18/18 at [phone] will contact the patient for further information.   PCP KO apt scheduled with Dr. Renetta More on 11/26/18 at 2:40PM.  Apts added to AVS

## 2018-11-16 NOTE — PROGRESS NOTES
Cardiology Progress Note 11/16/2018 8:54 AM 
Admit Date: 11/11/2018 Admit Diagnosis/CC: Acute on chronic respiratory failure (HCC) Acute on chronic respiratory failure ([facility] [address] Subjective:  
Patient reports: 
Chest Pain:  [x] none,  consistent with [] non-cardiac  [] atypical  []  anginal chest pain 
           [x] none now    []  on-going Dyspnea: [x] none  [] at rest  [] with exertion  [] improved  [] unchanged [] worsening PND:       [x] none  [] overnight   [] current Orthopnea: [x] none  [] improved  [] unchanged  [] worsening Presyncope: [x] none  [] improved  [] unchanged  [] worsening Ambulated in hallway without symptoms  [] Yes Ambulated in room without symptoms  [x] Yes 
ROS(2+other systems)   Hematuria: [] Yes  [x] No.   Dysuria: [] Yes  [x] No 
                                         Cough:       [] Yes  [x] No.   Sputum: [] Yes  [x] No  
                                         Hematochezia: [] Yes  [x] No.   Melena: [] Yes  [x] No 
                                        
 No change in family and social history from H&P/Consult note. Current Facility-Administered Medications Medication Dose Route Frequency  albuterol (PROVENTIL VENTOLIN) nebulizer solution 2.5 mg  2.5 mg Nebulization Q6HWA RT  
 albuterol (PROVENTIL VENTOLIN) nebulizer solution 2.5 mg  2.5 mg Nebulization Q4H PRN  potassium chloride SR (KLOR-CON 10) tablet 20 mEq  20 mEq Oral NOW  levoFLOXacin (LEVAQUIN) 750 mg in D5W IVPB  750 mg IntraVENous Q48H  
 dronedarone (MULTAQ) tablet tab 400 mg  400 mg Oral BID WITH MEALS  dilTIAZem SR (CARDIZEM SR) capsule 90 mg  90 mg Oral Q12H  
 sodium chloride (NS) flush 5-10 mL  5-10 mL IntraVENous Q8H  
 sodium chloride (NS) flush 5-10 mL  5-10 mL IntraVENous PRN  predniSONE (DELTASONE) tablet 40 mg  40 mg Oral DAILY WITH BREAKFAST  dextrose (D50W) injection syrg 12.5-25 g  12.5-25 g IntraVENous PRN  
 acetaminophen (TYLENOL) tablet 650 mg  650 mg Oral Q4H PRN  
  amLODIPine (NORVASC) tablet 5 mg  5 mg Oral DAILY  aspirin tablet 325 mg  325 mg Oral DAILY  fluticasone-vilanterol (BREO ELLIPTA) 200mcg-25mcg/puff  1 Puff Inhalation DAILY  calcium carbonate (TUMS) chewable tablet 200 mg [elemental]  200 mg Oral DAILY  cholecalciferol (VITAMIN D3) tablet 1,000 Units  1,000 Units Oral DAILY  guaiFENesin-dextromethorphan SR (HUMIBID DM) 600-30 mg tablet 1 Tab  1 Tab Oral BID  
 umeclidinium (INCRUSE ELLIPTA) 62.5 mcg/actuation  1 Puff Inhalation DAILY  sodium chloride (NS) flush 5-10 mL  5-10 mL IntraVENous Q8H  
 sodium chloride (NS) flush 5-10 mL  5-10 mL IntraVENous PRN  
 naloxone (NARCAN) injection 0.4 mg  0.4 mg IntraVENous PRN  
 bisacodyl (DULCOLAX) tablet 5 mg  5 mg Oral DAILY PRN  
 nicotine (NICODERM CQ) 7 mg/24 hr patch 1 Patch  1 Patch TransDERmal DAILY PRN  
 heparin (porcine) injection 5,000 Units  5,000 Units SubCUTAneous Q8H  
 insulin lispro (HUMALOG) injection   SubCUTAneous AC&HS  
 glucose chewable tablet 16 g  4 Tab Oral PRN  
 glucagon (GLUCAGEN) injection 1 mg  1 mg IntraMUSCular PRN Objective:  
 Physical Exam: 
Last VS:  
Visit Vitals /89 Pulse 68 Temp 98.1 °F (36.7 °C) Resp 19 Ht 5' 4\" (1.626 m) Wt 78.5 kg (173 lb 1 oz) SpO2 96% BMI 29.71 kg/m² Temp (24hrs), Av.9 °F (36.6 °C), Min:97.4 °F (36.3 °C), Max:98.3 °F (36.8 °C) 24 hr VS reviewed. General Appearance:   [x] well developed, well nourished,  [x] NAD. [] agitated   [] lethargic but arousable  [] obtunded ENT, Palate:    [x] WNL   [] dry palate/MM Respiratory:    [x] CTA bilateral  [] rales  [] rhonchi  [] normal resp effort 
    [] similar to yesterday   [] worse    [] improved Cardiovascular:   [x] RRR   [] Irregular rate and rhythm 
 [x] Normal S1, S2   [x] No gallop or rub.  
 [] no murmur   [x] no new murmur  [] murmur c/w: 
 [x] no edema  RLE: []1+ []2+ [] 3+;  LLE: []1+ []2+ []3+ 
 [] edema similar to yesterday   [] worse    [] improved [x] normal JVP   [] Elevated JVP [x] JVP similar to yesterday   [] worse    [] improved [x] carotid upstroke unchanged 
 [x] abd aorta not palpated 
 [x] no stigmata of peripheral emboli GI:    [x] abd soft, non-distented,bowel sounds present, no                     organomegaly appreciated Skin: 
Neuro: 
 
Cath Site:  [x] warm and dry [] cold extremities [x] A/O x 3, grossly non-focal  
 
 [] intact w/o hematoma or bruit; distal pulse unchanged. Data Review:  
Labs:   
Recent Results (from the past 24 hour(s)) GLUCOSE, POC Collection Time: 11/15/18 11:12 AM  
Result Value Ref Range Glucose (POC) 130 (H) 65 - 100 mg/dL Performed by Kerry Michel  (PCT) GLUCOSE, POC Collection Time: 11/15/18  4:41 PM  
Result Value Ref Range Glucose (POC) 156 (H) 65 - 100 mg/dL Performed by Landon Anthony, POC Collection Time: 11/15/18 10:20 PM  
Result Value Ref Range Glucose (POC) 126 (H) 65 - 100 mg/dL Performed by Jorge Luis Dali PANEL, BASIC Collection Time: 11/16/18  5:08 AM  
Result Value Ref Range Sodium 140 136 - 145 mmol/L Potassium 3.4 (L) 3.5 - 5.1 mmol/L Chloride 105 97 - 108 mmol/L  
 CO2 31 21 - 32 mmol/L Anion gap 4 (L) 5 - 15 mmol/L Glucose 96 65 - 100 mg/dL BUN 22 (H) 6 - 20 MG/DL Creatinine 0.80 0.55 - 1.02 MG/DL  
 BUN/Creatinine ratio 28 (H) 12 - 20 GFR est AA >60 >60 ml/min/1.73m2 GFR est non-AA >60 >60 ml/min/1.73m2 Calcium 9.0 8.5 - 10.1 MG/DL  
CBC WITH AUTOMATED DIFF Collection Time: 11/16/18  5:08 AM  
Result Value Ref Range WBC 8.3 3.6 - 11.0 K/uL  
 RBC 4.02 3.80 - 5.20 M/uL  
 HGB 12.1 11.5 - 16.0 g/dL HCT 36.7 35.0 - 47.0 % MCV 91.3 80.0 - 99.0 FL  
 MCH 30.1 26.0 - 34.0 PG  
 MCHC 33.0 30.0 - 36.5 g/dL  
 RDW 13.3 11.5 - 14.5 % PLATELET 719 859 - 749 K/uL MPV 9.4 8.9 - 12.9 FL  
 NRBC 0.0 0  WBC ABSOLUTE NRBC 0.00 0.00 - 0.01 K/uL NEUTROPHILS 66 32 - 75 % BAND NEUTROPHILS 1 % LYMPHOCYTES 23 12 - 49 % MONOCYTES 7 5 - 13 % EOSINOPHILS 1 0 - 7 % BASOPHILS 0 0 - 1 % METAMYELOCYTES 2 % IMMATURE GRANULOCYTES 0 0.0 - 0.5 % ABS. NEUTROPHILS 5.6 1.8 - 8.0 K/UL  
 ABS. LYMPHOCYTES 1.9 0.8 - 3.5 K/UL  
 ABS. MONOCYTES 0.6 0.0 - 1.0 K/UL  
 ABS. EOSINOPHILS 0.1 0.0 - 0.4 K/UL  
 ABS. BASOPHILS 0.0 0.0 - 0.1 K/UL  
 ABS. IMM. GRANS. 0.0 0.00 - 0.04 K/UL  
 DF MANUAL    
 RBC COMMENTS NORMOCYTIC, NORMOCHROMIC    
GLUCOSE, POC Collection Time: 11/16/18  7:59 AM  
Result Value Ref Range Glucose (POC) 93 65 - 100 mg/dL Performed by Yehuda Cox Provided Telemetry: [x] sinus  [] chronic afib   [] paroxysmal afib  [] NSVT Assessment:  
 
Active Problems: 
  Acute on chronic respiratory failure ([facility] [address] (11/17/2011) Plan:  
 
Atrial Fibrillation - Flutter  improved   Continue current meds Doing wellpost PPM. In RSR. OK for D/C. For all other plans, see orders. [x] High complexity decision making was performed

## 2018-11-16 NOTE — PROGRESS NOTES
POD#1 site and device programming check OK. S/p Medtronic dual chamber pacemaker yesterday. Skin glue intact. No hematoma. Ambulatory and taking oral. 
 
 
Visit Vitals /89 Pulse 68 Temp 98.1 °F (36.7 °C) Resp 19 Ht 5' 4\" (1.626 m) Wt 78.5 kg (173 lb 1 oz) SpO2 96% BMI 29.71 kg/m² ND, NAD. L chest site OK. Expected bruising. No hematoma. RRR, no rub. Lungs with unlabored respirations. No unilateral arm edema. Awake, appropriate, neuro grossly nonfocal. 
 
Tele:  Currently sinus with sequential AV conduction. Intermittent V pacing last night. PLAN:  Discharge to home with F/U in Dr. Tang Sat office for wound and device programming check per his instruction. Remote monitoring can be set up through there. All questions answered. Patient is aware of signs and sx warranting urgent med F/U or calling 911. Signed By: Jose Flores MD   
 November 16, 2018

## 2018-11-16 NOTE — PROGRESS NOTES
ADULT PROTOCOL: JET AEROSOL  REASSESSMENT Patient  Elder Hardy     82 y.o.   female     11/16/2018  3:03 AM 
 
Breath Sounds Pre Procedure: Right Breath Sounds: Diminished Left Breath Sounds: Diminished Breath Sounds Post Procedure: Right Breath Sounds: Diminished Left Breath Sounds: Diminished Breathing pattern: Pre procedure Breathing Pattern: Regular Post procedure Breathing Pattern: Regular Heart Rate: Pre procedure Pulse: 65 
         Post procedure Pulse: 71 Resp Rate: Pre procedure Respirations: 18 Post procedure Respirations: 20 Peak Flow: Pre bronchodilator   n/a Post bronchodilator   n/a Incentive Spirometry:   n/a 
    n/a Cough: Pre procedure Cough: Non-productive Post procedure Cough: Non-productive Sputum: Pre procedure  none Post procedure  none Oxygen: O2 Device: Nasal cannula   Flow rate (L/min) 4 Changed: NO SpO2: Pre procedure SpO2: 95 %   with oxygen Post procedure SpO2: 96 %  with oxygen Nebulizer Therapy: Current medications Aerosolized Medications: Albuterol Changed: YES Problem List:  
Patient Active Problem List  
Diagnosis Code  Acute on chronic respiratory failure (HCC) J96.20  
 COPD (chronic obstructive pulmonary disease) with acute bronchitis (HCC) J44.0, J20.9  Lung nodule R91.1  Abnormal ECG R94.31  
 Hyperglycemia R73.9  Tobacco abuse Z72.0  
 Pulmonary hypertension, moderate to severe I27.20  Sepsis ([address] A41.9  
 COPD exacerbation ([address] J44.1  Respiratory failure ([facility] [address] J96.90 Respiratory Therapist: Lindsey Gomez, RT

## 2018-11-16 NOTE — DISCHARGE INSTRUCTIONS
Patient Discharge Instructions     Pt Name  Mary Ann Holbrook   Date of Birth 1935   Age  82 y.o. Medical Record Number  [id number]   PCP Giuseppe Grant MD    Admit date:  11/11/2018 @    [place] [address]    Room Number  2155/01   Date of Discharge 11/16/2018     Admission Diagnoses:     Respiratory failure ([address]          Allergies   Allergen Reactions    Keflex [Cephalexin] Itching        You were admitted to [address] for  Respiratory failure ([facility]    YOUR OTHER MEDICAL DIAGNOSES INCLUDE (BUT NOT LIMITED TO ):  Present on Admission:   Acute on chronic respiratory failure (HCC)   Abnormal ECG   COPD (chronic obstructive pulmonary disease) with acute bronchitis (HCC)   Hyperglycemia   Respiratory failure (HCC)   Tobacco abuse   Second degree heart block   S/P placement of cardiac pacemaker      DIET:  Cardiac and Diabetic Diet   Recommended activity: Activity as tolerated  Follow up : Follow-up Information     Follow up With Specialties Details Why Contact Info    Giuseppe Grant MD Internal Medicine Go on 11/26/2018 For hospital follow up appointment at 2:40PM  [address] [place] [address]      Smith Coleman MD Pulmonary Disease Go on 1/16/2019 Pulomonary - follow up with Dr. Joseline Herrera. January 16 at 11:30am.  You are on a waiting list if there is a cancellation they will call you. NEW ADDRESS:  [address] [place] [address], [address] [place] [address] Drive,[facility] [address]  [address] [place] [address] Nahid Pinto MD Cardiology Call Cardiolgy - one week follow up. Nurse will call you to schedule. [address]  [phone]      Ruma Gleason MD Cardiology Call Cardiology - one week follow up. Nurse will call you to schedule.   [address]  [address] [place] [address]  [phone]      [facility] On 11/18/2018 Expect a phone call from [place] to schedule a follow up visit with you in 24-48 hours. If you have questions please Contact #[phone] Visit at [place] [address] [facility] That Comes To [address] [facility]. [facility]  [phone]               Stopping Smoking: Care Instructions  Your Care Instructions  Cigarette smokers crave the nicotine in cigarettes. Giving it up is much harder than simply changing a habit. Your body has to stop craving the nicotine. It is hard to quit, but you can do it. There are many tools that people use to quit smoking. You may find that combining tools works best for you. There are several steps to quitting. First you get ready to quit. Then you get support to help you. After that, you learn new skills and behaviors to become a nonsmoker. For many people, a necessary step is getting and using medicine. Your doctor will help you set up the plan that best meets your needs. You may want to attend a smoking cessation program to help you quit smoking. When you choose a program, look for one that has proven success. Ask your doctor for ideas. You will greatly increase your chances of success if you take medicine as well as get counseling or join a cessation program.  Some of the changes you feel when you first quit tobacco are uncomfortable. Your body will miss the nicotine at first, and you may feel short-tempered and grumpy. You may have trouble sleeping or concentrating. Medicine can help you deal with these symptoms. You may struggle with changing your smoking habits and rituals. The last step is the tricky one: Be prepared for the smoking urge to continue for a time. This is a lot to deal with, but keep at it. You will feel better. Follow-up care is a key part of your treatment and safety. Be sure to make and go to all appointments, and call your doctor if you are having problems.  It's also a good idea to know your test results and keep a list of the medicines you take. How can you care for yourself at home? · Ask your family, friends, and coworkers for support. You have a better chance of quitting if you have help and support. · Join a support group, such as Nicotine Anonymous, for people who are trying to quit smoking. · Consider signing up for a smoking cessation program, such as the American Lung Association's Freedom from Smoking program.  · Get text messaging support. Go to the website at www.smokefree. gov to sign up for the [facility] program.  · Set a quit date. Pick your date carefully so that it is not right in the middle of a big deadline or stressful time. Once you quit, do not even take a puff. Get rid of all ashtrays and lighters after your last cigarette. Clean your house and your clothes so that they do not smell of smoke. · Learn how to be a nonsmoker. Think about ways you can avoid those things that make you reach for a cigarette. ? Avoid situations that put you at greatest risk for smoking. For some people, it is hard to have a drink with friends without smoking. For others, they might skip a coffee break with coworkers who smoke. ? Change your daily routine. Take a different route to work or eat a meal in a different place. · Cut down on stress. Calm yourself or release tension by doing an activity you enjoy, such as reading a book, taking a hot bath, or gardening. · Talk to your doctor or pharmacist about nicotine replacement therapy, which replaces the nicotine in your body. You still get nicotine but you do not use tobacco. Nicotine replacement products help you slowly reduce the amount of nicotine you need. These products come in several forms, many of them available over-the-counter:  ? Nicotine patches  ? Nicotine gum and lozenges  ? Nicotine inhaler  · Ask your doctor about bupropion (Wellbutrin) or varenicline (Chantix), which are prescription medicines. They do not contain nicotine.  They help you by reducing withdrawal symptoms, such as stress and anxiety. · Some people find hypnosis, acupuncture, and massage helpful for ending the smoking habit. · Eat a healthy diet and get regular exercise. Having healthy habits will help your body move past its craving for nicotine. · Be prepared to keep trying. Most people are not successful the first few times they try to quit. Do not get mad at yourself if you smoke again. Make a list of things you learned and think about when you want to try again, such as next week, next month, or next year. Where can you learn more? Go to http://kathUrban Mappingliberty.info/. Enter N397 in the search box to learn more about \"Stopping Smoking: Care Instructions. \"  Current as of: November 29, 2017  Content Version: 11.8  © 1893-2082 [employer]. Care instructions adapted under license by "[employer]" (which disclaims liability or warranty for this information). If you have questions about a medical condition or this instruction, always ask your healthcare professional. [address] any warranty or liability for your use of this information. Nutrition Tips for Diabetes: After Your Visit  Your Care Instructions  A healthy diet is important to manage diabetes. It helps you lose weight (if you need to) and keep it off. It gives you the nutrition and energy your body needs and helps prevent heart disease. But a diet for diabetes does not mean that you have to eat special foods. You can eat what your family eats, including occasional sweets and other favorites. But you do have to pay attention to how often you eat and how much you eat of certain foods. The right plan for you will give you meals that help you keep your blood sugar at healthy levels. Try to eat a variety of foods and to spread carbohydrate throughout the day. Carbohydrate raises blood sugar higher and more quickly than any other nutrient does.  Carbohydrate is found in sugar, breads and cereals, fruit, starchy vegetables such as potatoes and corn, and milk and yogurt. You may want to work with a dietitian or diabetes educator to help you plan meals and snacks. A dietitian or diabetes educator also can help you lose weight if that is one of your goals. The following tips can help you enjoy your meals and stay healthy. Follow-up care is a key part of your treatment and safety. Be sure to make and go to all appointments, and call your doctor if you are having problems. Its also a good idea to know your test results and keep a list of the medicines you take. How can you care for yourself at home? · Learn which foods have carbohydrate and how much carbohydrate to eat. A dietitian or diabetes educator can help you learn to keep track of how much carbohydrate you eat. · Spread carbohydrate throughout the day. Eat some carbohydrate at all meals, but do not eat too much at any one time. · Plan meals to include food from all the food groups. These are the food groups and some example portion sizes:  ¨ Grains: 1 slice of bread (1 ounce), ½ cup of cooked cereal, and 1/3 cup of cooked pasta or rice. These have about 15 grams of carbohydrate in a serving. Choose whole grains such as whole wheat bread or crackers, oatmeal, and brown rice more often than refined grains. ¨ Fruit: 1 small fresh fruit, such as an apple or orange; ½ of a banana; ½ cup of chopped, cooked, or canned fruit; ½ cup of fruit juice; 1 cup of melon or raspberries; and 2 tablespoons of dried fruit. These have about 15 grams of carbohydrate in a serving. ¨ Dairy: 1 cup of nonfat or low-fat milk and 2/3 cup of plain yogurt. These have about 15 grams of carbohydrate in a serving. ¨ Protein foods: Beef, chicken, turkey, fish, eggs, tofu, cheese, cottage cheese, and peanut butter. A serving size of meat is 3 ounces, which is about the size of a deck of cards.  Examples of meat substitute serving sizes (equal to 1 ounce of meat) are 1/4 cup of cottage cheese, 1 egg, 1 tablespoon of peanut butter, and ½ cup of tofu. These have very little or no carbohydrate per serving. ¨ Vegetables: Starchy vegetables such as ½ cup of cooked dried beans, peas, potatoes, or corn have about 15 grams of carbohydrate. Nonstarchy vegetables have very little carbohydrate, such as 1 cup of raw leafy vegetables (such as spinach), ½ cup of other vegetables (cooked or chopped), and 3/4 cup of vegetable juice. · Use the plate format to plan meals. It is a good, quick way to make sure that you have a balanced meal. It also helps you spread carbohydrate throughout the day. You divide your plate by types of foods. Put vegetables on half the plate, meat or meat substitutes on one-quarter of the plate, and a grain or starchy vegetable (such as brown rice or a potato) in the final quarter of the plate. To this you can add a small piece of fruit and 1 cup of milk or yogurt, depending on how much carbohydrate you are supposed to eat at a meal.  · Talk to your dietitian or diabetes educator about ways to add limited amounts of sweets into your meal plan. You can eat these foods now and then, as long as you include the amount of carbohydrate they have in your daily carbohydrate allowance. · If you drink alcohol, limit it to no more than 1 drink a day for women and 2 drinks a day for men. If you are pregnant, no amount of alcohol is known to be safe. · Protein, fat, and fiber do not raise blood sugar as much as carbohydrate does. If you eat a lot of these nutrients in a meal, your blood sugar will rise more slowly than it would otherwise. · Limit saturated fats, such as those from meat and dairy products. Try to replace it with monounsaturated fat, such as olive oil. This is a healthier choice because people who have diabetes are at higher-than-average risk of heart disease. But use a modest amount of olive oil.  A tablespoon of olive oil has 14 grams of fat and 120 calories. · Exercise lowers blood sugar. If you take insulin by shots or pump, you can use less than you would if you were not exercising. Keep in mind that timing matters. If you exercise within 1 hour after a meal, your body may need less insulin for that meal than it would if you exercised 3 hours after the meal. Test your blood sugar to find out how exercise affects your need for insulin. · Exercise on most days of the week. Aim for at least 30 minutes. Exercise helps you stay at a healthy weight and helps your body use insulin. Walking is an easy way to get exercise. Gradually increase the amount you walk every day. You also may want to swim, bike, or do other activities. When you eat out  · Learn to estimate the serving sizes of foods that have carbohydrate. If you measure food at home, it will be easier to estimate the amount in a serving of restaurant food. · If the meal you order has too much carbohydrate (such as potatoes, corn, or baked beans), ask to have a low-carbohydrate food instead. Ask for a salad or green vegetables. · If you use insulin, check your blood sugar before and after eating out to help you plan how much to eat in the future. · If you eat more carbohydrate at a meal than you had planned, take a walk or do other exercise. This will help lower your blood sugar. Where can you learn more? Go to NineSixFive.be  Enter I860 in the search box to learn more about \"Nutrition Tips for Diabetes: After Your Visit. \"   © 5826-2098 [employer]. Care instructions adapted under license by [employer] (which disclaims liability or warranty for this information). This care instruction is for use with your licensed healthcare professional. If you have questions about a medical condition or this instruction, always ask your healthcare professional. [place] [address] any warranty or liability for your use of this information.   Content Version: 00.3.049783; Current as of: June 4, 2014                  · It is important that you take the medication exactly as they are prescribed. · Keep your medication in the bottles provided by the pharmacist and keep a list of the medication names, dosages, and times to be taken in your wallet. · Do not take other medications without consulting your doctor. ADDITIONAL INFORMATION: If you experience any of the following symptoms or have any health problem not listed below, then please call your primary care physician or return to the emergency room if you cannot get hold of your doctor: Fever, chills, nausea, vomiting, diarrhea, change in mentation, falling, bleeding, shortness of breath. I understand that if any problems occur once I am discharged, I am supposed to call my Primary care physician for further care or seek help in the Emergency Department at the nearest Healthcare facility. I have had an opportunity to discuss my clinical issues with my doctor and nursing staff. I understand and acknowledge receipt of the above instructions. Physician's or R.N.'s Signature                                                            Date/Time                                                                                                                                              Patient or Representative Signature                                                 Date/Time                    DISCHARGE INSTRUCTIONS FOR PATIENTS WITH PACEMAKERS    You had a dual chamber Medtronic pacemaker implanted by Dr. Ghazala Tony due to a slow heart rate problem identified by Dr. Oneida Soliman. 1. Remember to call for an appointment with Dr. Promise Fair office to check healing and implant programming at his direction.   2. Medic Alert Bracelets are available from your pharmacist to wear at all times if you choose to wear one. 3. Carry your ID card for pacemaker with you at all times. This card will be given to you in the hospital or mailed to you. 4. The pacemaker will bulge slightly under your skin. The bulge will decrease in size over the next few weeks. Please notify the doctor's office if you notice any of the following around your site:   A.  A bruise that does not go away. B.  Soreness or yellow, green, or brown drainage from the site. C. Any swelling from the site. D. If you have a fever of 100 degrees or higher that lasts for a few days. INCISION CARE       1.  Leave skin glue over your site until it starts to fall off, usually in a few weeks. 2.  You may shower after 3 days as long as your incision isnt submerged or directly sprayed upon until well healed. 3.  For comfort, wear loose fitting clothing. 4.  Report any signs of infection, fever, pain, swelling, redness, oozing, or heat at site especially if these symptoms increase after the first 3 to 4 days. ACTIVITY PRECAUTIONS     1. Avoid rough contact with the implant site. 2. No driving for 14 days. 3. Avoid lifting your arm over your head, carrying anything on the affected side, or lifting over 10 pounds for 30 days. For the first 2 days only bend your arm at the elbow. 4. Any extreme activity such as golf, weight lifting or exercise biking should be restricted for 60 days. 5. Do not carry objects by holding them against your implant site. 6.  No shooting rifles or any type of gun with the affected shoulder permanently. SPECIAL PRECAUTIONS     1. You should avoid all strong magnetic fields, such as arc welding, large transformers, large motors. 2.  You may not have an MRI which uses a strong magnet to take pictures for 6 weeks and only if given the OK by your cardiologist.  3.  Treatments or surgery that requires diathermy or electrocautery should be discussed with your doctor before scheduled.   4. Avoid radio frequency transmitters, including radar. 5. Advise dentist or other medical personnel you see that you have a pacemaker. 6.  Cell phones and microwave oven use is okay. 7.  If you plan to move or take a trip to a new area, the doctor's office will give you a name of a doctor to contact for any problems. ANTIBIOTIC THERAPY    During the first 8 weeks after your pacemaker insertion, you may need antibiotics before any dental work or certain tests or operations. Let the dentist or doctor who is caring for you know that you have had an implanted device.

## 2018-11-16 NOTE — PROGRESS NOTES
Discharge instructions reviewed with patient and Daughter. Allowed adequate time to ask questions, all questions answered. Printed copy of AVS given to patient. All belongings gathered, IV and tele discontinued. Transported via wheelchair to main entrance and into care of family.

## 2018-11-16 NOTE — PROGRESS NOTES
1930 - Bedside report from [facility]. Paced 60-70's. Very dim lungs sounds. Awake alert. Voices no complaints. L upper chest intact, benign. Immobilizer L arm intact. BP still up. 2030 - Declining to get out of bed at this time. 2300 - Did not get oob tonight, refused. No complaints. L chest benign. 0500 - Up to sitting position to wash up and oral care. CHG performed. No acute distress, mild shortness of breath at rest.  Pt talking about potential discharge home today. 0700 - Bedside report to RN. Paced.

## 2018-12-17 ENCOUNTER — HOSPITAL ENCOUNTER (INPATIENT)
Age: 83
LOS: 17 days | Discharge: HOME | DRG: 871 | End: 2019-01-03
Attending: EMERGENCY MEDICINE | Admitting: INTERNAL MEDICINE
Payer: MEDICARE

## 2018-12-17 ENCOUNTER — APPOINTMENT (OUTPATIENT)
Dept: GENERAL RADIOLOGY | Age: 83
DRG: 871 | End: 2018-12-17
Attending: EMERGENCY MEDICINE
Payer: MEDICARE

## 2018-12-17 DIAGNOSIS — J18.9 PNEUMONIA OF BOTH LOWER LOBES DUE TO INFECTIOUS ORGANISM: Primary | ICD-10-CM

## 2018-12-17 DIAGNOSIS — J96.21 ACUTE ON CHRONIC RESPIRATORY FAILURE WITH HYPOXIA (HCC): ICD-10-CM

## 2018-12-17 DIAGNOSIS — R65.20 SEVERE SEPSIS (HCC): ICD-10-CM

## 2018-12-17 DIAGNOSIS — A41.9 SEVERE SEPSIS (HCC): ICD-10-CM

## 2018-12-17 PROBLEM — J96.22 ACUTE ON CHRONIC RESPIRATORY FAILURE WITH HYPOXIA AND HYPERCAPNIA (HCC): Status: ACTIVE | Noted: 2018-12-17

## 2018-12-17 LAB
ALBUMIN SERPL-MCNC: 3.2 G/DL (ref 3.5–5)
ALBUMIN/GLOB SERPL: 0.7 {RATIO} (ref 1.1–2.2)
ALP SERPL-CCNC: 73 U/L (ref 45–117)
ALT SERPL-CCNC: 16 U/L (ref 12–78)
ANION GAP SERPL CALC-SCNC: 7 MMOL/L (ref 5–15)
AST SERPL-CCNC: 11 U/L (ref 15–37)
BASE DEFICIT BLDV-SCNC: 2.1 MMOL/L
BASOPHILS # BLD: 0.1 K/UL (ref 0–0.1)
BASOPHILS NFR BLD: 0 % (ref 0–1)
BDY SITE: ABNORMAL
BILIRUB SERPL-MCNC: 0.9 MG/DL (ref 0.2–1)
BNP SERPL-MCNC: 666 PG/ML (ref 0–450)
BUN SERPL-MCNC: 12 MG/DL (ref 6–20)
BUN/CREAT SERPL: 14 (ref 12–20)
CALCIUM SERPL-MCNC: 9.2 MG/DL (ref 8.5–10.1)
CHLORIDE SERPL-SCNC: 101 MMOL/L (ref 97–108)
CO2 SERPL-SCNC: 30 MMOL/L (ref 21–32)
CREAT SERPL-MCNC: 0.88 MG/DL (ref 0.55–1.02)
DIFFERENTIAL METHOD BLD: ABNORMAL
EOSINOPHIL # BLD: 0 K/UL (ref 0–0.4)
EOSINOPHIL NFR BLD: 0 % (ref 0–7)
ERYTHROCYTE [DISTWIDTH] IN BLOOD BY AUTOMATED COUNT: 13.6 % (ref 11.5–14.5)
FLUAV AG NPH QL IA: NEGATIVE
FLUBV AG NOSE QL IA: NEGATIVE
GAS FLOW.O2 O2 DELIVERY SYS: 4 L/MIN
GLOBULIN SER CALC-MCNC: 4.3 G/DL (ref 2–4)
GLUCOSE BLD STRIP.AUTO-MCNC: 220 MG/DL (ref 65–100)
GLUCOSE SERPL-MCNC: 157 MG/DL (ref 65–100)
HCO3 BLDV-SCNC: 21 MMOL/L (ref 23–28)
HCT VFR BLD AUTO: 40.6 % (ref 35–47)
HGB BLD-MCNC: 13.3 G/DL (ref 11.5–16)
IMM GRANULOCYTES # BLD: 0.2 K/UL (ref 0–0.04)
IMM GRANULOCYTES NFR BLD AUTO: 1 % (ref 0–0.5)
INR PPP: 1.1 (ref 0.9–1.1)
LACTATE SERPL-SCNC: 1.3 MMOL/L (ref 0.4–2)
LYMPHOCYTES # BLD: 0.9 K/UL (ref 0.8–3.5)
LYMPHOCYTES NFR BLD: 4 % (ref 12–49)
MCH RBC QN AUTO: 29.7 PG (ref 26–34)
MCHC RBC AUTO-ENTMCNC: 32.8 G/DL (ref 30–36.5)
MCV RBC AUTO: 90.6 FL (ref 80–99)
MONOCYTES # BLD: 1.1 K/UL (ref 0–1)
MONOCYTES NFR BLD: 5 % (ref 5–13)
NEUTS SEG # BLD: 17.7 K/UL (ref 1.8–8)
NEUTS SEG NFR BLD: 89 % (ref 32–75)
NRBC # BLD: 0 K/UL (ref 0–0.01)
NRBC BLD-RTO: 0 PER 100 WBC
PCO2 BLDV: 31 MMHG (ref 41–51)
PH BLDV: 7.44 [PH] (ref 7.32–7.42)
PLATELET # BLD AUTO: 333 K/UL (ref 150–400)
PMV BLD AUTO: 9.7 FL (ref 8.9–12.9)
PO2 BLDV: 53 MMHG (ref 25–40)
POTASSIUM SERPL-SCNC: 3.6 MMOL/L (ref 3.5–5.1)
PROT SERPL-MCNC: 7.5 G/DL (ref 6.4–8.2)
PROTHROMBIN TIME: 11.2 SEC (ref 9–11.1)
RBC # BLD AUTO: 4.48 M/UL (ref 3.8–5.2)
SAO2 % BLDV: 89 % (ref 65–88)
SAO2% DEVICE SAO2% SENSOR NAME: ABNORMAL
SERVICE CMNT-IMP: ABNORMAL
SODIUM SERPL-SCNC: 138 MMOL/L (ref 136–145)
SPECIMEN SITE: ABNORMAL
TROPONIN I SERPL-MCNC: <0.05 NG/ML
WBC # BLD AUTO: 19.9 K/UL (ref 3.6–11)

## 2018-12-17 PROCEDURE — 87804 INFLUENZA ASSAY W/OPTIC: CPT

## 2018-12-17 PROCEDURE — 65270000029 HC RM PRIVATE

## 2018-12-17 PROCEDURE — 82803 BLOOD GASES ANY COMBINATION: CPT

## 2018-12-17 PROCEDURE — 74011250636 HC RX REV CODE- 250/636: Performed by: EMERGENCY MEDICINE

## 2018-12-17 PROCEDURE — 74011250637 HC RX REV CODE- 250/637: Performed by: INTERNAL MEDICINE

## 2018-12-17 PROCEDURE — 77030029684 HC NEB SM VOL KT MONA -A

## 2018-12-17 PROCEDURE — 65660000000 HC RM CCU STEPDOWN

## 2018-12-17 PROCEDURE — 85025 COMPLETE CBC W/AUTO DIFF WBC: CPT

## 2018-12-17 PROCEDURE — 36415 COLL VENOUS BLD VENIPUNCTURE: CPT

## 2018-12-17 PROCEDURE — 82962 GLUCOSE BLOOD TEST: CPT

## 2018-12-17 PROCEDURE — 80053 COMPREHEN METABOLIC PANEL: CPT

## 2018-12-17 PROCEDURE — 5A09557 ASSISTANCE WITH RESPIRATORY VENTILATION, GREATER THAN 96 CONSECUTIVE HOURS, CONTINUOUS POSITIVE AIRWAY PRESSURE: ICD-10-PCS | Performed by: INTERNAL MEDICINE

## 2018-12-17 PROCEDURE — 74011000250 HC RX REV CODE- 250: Performed by: INTERNAL MEDICINE

## 2018-12-17 PROCEDURE — 93005 ELECTROCARDIOGRAM TRACING: CPT

## 2018-12-17 PROCEDURE — 83880 ASSAY OF NATRIURETIC PEPTIDE: CPT

## 2018-12-17 PROCEDURE — 85610 PROTHROMBIN TIME: CPT

## 2018-12-17 PROCEDURE — 96367 TX/PROPH/DG ADDL SEQ IV INF: CPT

## 2018-12-17 PROCEDURE — 96361 HYDRATE IV INFUSION ADD-ON: CPT

## 2018-12-17 PROCEDURE — 71045 X-RAY EXAM CHEST 1 VIEW: CPT

## 2018-12-17 PROCEDURE — 94640 AIRWAY INHALATION TREATMENT: CPT

## 2018-12-17 PROCEDURE — 94660 CPAP INITIATION&MGMT: CPT

## 2018-12-17 PROCEDURE — 87040 BLOOD CULTURE FOR BACTERIA: CPT

## 2018-12-17 PROCEDURE — 84484 ASSAY OF TROPONIN QUANT: CPT

## 2018-12-17 PROCEDURE — 99285 EMERGENCY DEPT VISIT HI MDM: CPT

## 2018-12-17 PROCEDURE — 74011000258 HC RX REV CODE- 258: Performed by: INTERNAL MEDICINE

## 2018-12-17 PROCEDURE — 74011250636 HC RX REV CODE- 250/636: Performed by: INTERNAL MEDICINE

## 2018-12-17 PROCEDURE — 74011000258 HC RX REV CODE- 258: Performed by: EMERGENCY MEDICINE

## 2018-12-17 PROCEDURE — 96365 THER/PROPH/DIAG IV INF INIT: CPT

## 2018-12-17 PROCEDURE — 74011250637 HC RX REV CODE- 250/637: Performed by: EMERGENCY MEDICINE

## 2018-12-17 PROCEDURE — 94761 N-INVAS EAR/PLS OXIMETRY MLT: CPT

## 2018-12-17 PROCEDURE — 96375 TX/PRO/DX INJ NEW DRUG ADDON: CPT

## 2018-12-17 PROCEDURE — 83605 ASSAY OF LACTIC ACID: CPT

## 2018-12-17 PROCEDURE — 74011000250 HC RX REV CODE- 250: Performed by: EMERGENCY MEDICINE

## 2018-12-17 PROCEDURE — 74011636637 HC RX REV CODE- 636/637: Performed by: INTERNAL MEDICINE

## 2018-12-17 RX ORDER — IPRATROPIUM BROMIDE AND ALBUTEROL SULFATE 2.5; .5 MG/3ML; MG/3ML
3 SOLUTION RESPIRATORY (INHALATION)
Status: COMPLETED | OUTPATIENT
Start: 2018-12-17 | End: 2018-12-17

## 2018-12-17 RX ORDER — NALOXONE HYDROCHLORIDE 0.4 MG/ML
0.4 INJECTION, SOLUTION INTRAMUSCULAR; INTRAVENOUS; SUBCUTANEOUS AS NEEDED
Status: DISCONTINUED | OUTPATIENT
Start: 2018-12-17 | End: 2019-01-03 | Stop reason: HOSPADM

## 2018-12-17 RX ORDER — FUROSEMIDE 20 MG/1
20 TABLET ORAL DAILY
COMMUNITY
End: 2019-01-03

## 2018-12-17 RX ORDER — NITROGLYCERIN 0.4 MG/1
0.4 TABLET SUBLINGUAL
Status: DISCONTINUED | OUTPATIENT
Start: 2018-12-17 | End: 2019-01-03 | Stop reason: HOSPADM

## 2018-12-17 RX ORDER — AMLODIPINE BESYLATE 5 MG/1
5 TABLET ORAL DAILY
Status: DISCONTINUED | OUTPATIENT
Start: 2018-12-18 | End: 2018-12-18

## 2018-12-17 RX ORDER — SODIUM CHLORIDE 0.9 % (FLUSH) 0.9 %
5-10 SYRINGE (ML) INJECTION AS NEEDED
Status: DISCONTINUED | OUTPATIENT
Start: 2018-12-17 | End: 2019-01-03 | Stop reason: HOSPADM

## 2018-12-17 RX ORDER — IBUPROFEN 200 MG
400 TABLET ORAL
COMMUNITY
End: 2019-01-03

## 2018-12-17 RX ORDER — IPRATROPIUM BROMIDE AND ALBUTEROL SULFATE 2.5; .5 MG/3ML; MG/3ML
3 SOLUTION RESPIRATORY (INHALATION)
Status: DISCONTINUED | OUTPATIENT
Start: 2018-12-17 | End: 2018-12-21

## 2018-12-17 RX ORDER — MAGNESIUM SULFATE 100 %
4 CRYSTALS MISCELLANEOUS AS NEEDED
Status: DISCONTINUED | OUTPATIENT
Start: 2018-12-17 | End: 2019-01-03 | Stop reason: HOSPADM

## 2018-12-17 RX ORDER — HEPARIN SODIUM 5000 [USP'U]/ML
5000 INJECTION, SOLUTION INTRAVENOUS; SUBCUTANEOUS EVERY 8 HOURS
Status: DISCONTINUED | OUTPATIENT
Start: 2018-12-17 | End: 2018-12-20

## 2018-12-17 RX ORDER — FLUTICASONE FUROATE AND VILANTEROL 200; 25 UG/1; UG/1
2 POWDER RESPIRATORY (INHALATION) DAILY
Status: DISCONTINUED | OUTPATIENT
Start: 2018-12-18 | End: 2019-01-02

## 2018-12-17 RX ORDER — MAGNESIUM SULFATE HEPTAHYDRATE 40 MG/ML
2 INJECTION, SOLUTION INTRAVENOUS ONCE
Status: COMPLETED | OUTPATIENT
Start: 2018-12-17 | End: 2018-12-17

## 2018-12-17 RX ORDER — FLUTICASONE FUROATE AND VILANTEROL 200; 25 UG/1; UG/1
2 POWDER RESPIRATORY (INHALATION) DAILY
COMMUNITY

## 2018-12-17 RX ORDER — DILTIAZEM HYDROCHLORIDE 90 MG/1
90 CAPSULE, EXTENDED RELEASE ORAL EVERY 12 HOURS
Status: DISCONTINUED | OUTPATIENT
Start: 2018-12-17 | End: 2018-12-20

## 2018-12-17 RX ORDER — SODIUM CHLORIDE 0.9 % (FLUSH) 0.9 %
5-10 SYRINGE (ML) INJECTION EVERY 8 HOURS
Status: DISCONTINUED | OUTPATIENT
Start: 2018-12-17 | End: 2019-01-03 | Stop reason: HOSPADM

## 2018-12-17 RX ORDER — HYDRALAZINE HYDROCHLORIDE 20 MG/ML
20 INJECTION INTRAMUSCULAR; INTRAVENOUS
Status: DISCONTINUED | OUTPATIENT
Start: 2018-12-17 | End: 2019-01-03 | Stop reason: HOSPADM

## 2018-12-17 RX ORDER — VANCOMYCIN 1.75 GRAM/500 ML IN 0.9 % SODIUM CHLORIDE INTRAVENOUS
1750
Status: COMPLETED | OUTPATIENT
Start: 2018-12-17 | End: 2018-12-17

## 2018-12-17 RX ORDER — ASPIRIN 325 MG
325 TABLET ORAL DAILY
Status: DISCONTINUED | OUTPATIENT
Start: 2018-12-18 | End: 2018-12-20

## 2018-12-17 RX ORDER — ONDANSETRON 2 MG/ML
4 INJECTION INTRAMUSCULAR; INTRAVENOUS
Status: DISCONTINUED | OUTPATIENT
Start: 2018-12-17 | End: 2018-12-20

## 2018-12-17 RX ORDER — AMLODIPINE BESYLATE 5 MG/1
5 TABLET ORAL DAILY
COMMUNITY

## 2018-12-17 RX ORDER — DEXTROSE 50 % IN WATER (D50W) INTRAVENOUS SYRINGE
12.5-25 AS NEEDED
Status: DISCONTINUED | OUTPATIENT
Start: 2018-12-17 | End: 2019-01-03 | Stop reason: HOSPADM

## 2018-12-17 RX ORDER — FUROSEMIDE 40 MG/1
20 TABLET ORAL DAILY
Status: DISCONTINUED | OUTPATIENT
Start: 2018-12-18 | End: 2018-12-18

## 2018-12-17 RX ORDER — ERGOCALCIFEROL 1.25 MG/1
50000 CAPSULE ORAL
COMMUNITY

## 2018-12-17 RX ORDER — ACETAMINOPHEN 325 MG/1
650 TABLET ORAL
Status: DISCONTINUED | OUTPATIENT
Start: 2018-12-17 | End: 2019-01-03 | Stop reason: HOSPADM

## 2018-12-17 RX ORDER — FACIAL-BODY WIPES
10 EACH TOPICAL DAILY PRN
Status: DISCONTINUED | OUTPATIENT
Start: 2018-12-17 | End: 2019-01-03 | Stop reason: HOSPADM

## 2018-12-17 RX ORDER — INSULIN LISPRO 100 [IU]/ML
INJECTION, SOLUTION INTRAVENOUS; SUBCUTANEOUS
Status: DISCONTINUED | OUTPATIENT
Start: 2018-12-17 | End: 2019-01-03 | Stop reason: HOSPADM

## 2018-12-17 RX ADMIN — NITROGLYCERIN 0.4 MG: 0.4 TABLET SUBLINGUAL at 16:37

## 2018-12-17 RX ADMIN — SODIUM CHLORIDE 500 ML: 900 INJECTION, SOLUTION INTRAVENOUS at 20:48

## 2018-12-17 RX ADMIN — Medication 10 ML: at 22:02

## 2018-12-17 RX ADMIN — IPRATROPIUM BROMIDE AND ALBUTEROL SULFATE 3 ML: .5; 3 SOLUTION RESPIRATORY (INHALATION) at 23:35

## 2018-12-17 RX ADMIN — IPRATROPIUM BROMIDE AND ALBUTEROL SULFATE 3 ML: .5; 3 SOLUTION RESPIRATORY (INHALATION) at 15:28

## 2018-12-17 RX ADMIN — METHYLPREDNISOLONE SODIUM SUCCINATE 125 MG: 125 INJECTION, POWDER, FOR SOLUTION INTRAMUSCULAR; INTRAVENOUS at 15:28

## 2018-12-17 RX ADMIN — SODIUM CHLORIDE 1000 ML: 900 INJECTION, SOLUTION INTRAVENOUS at 16:39

## 2018-12-17 RX ADMIN — MAGNESIUM SULFATE HEPTAHYDRATE 2 G: 40 INJECTION, SOLUTION INTRAVENOUS at 15:28

## 2018-12-17 RX ADMIN — PIPERACILLIN SODIUM,TAZOBACTAM SODIUM 3.38 G: 3; .375 INJECTION, POWDER, FOR SOLUTION INTRAVENOUS at 16:34

## 2018-12-17 RX ADMIN — IPRATROPIUM BROMIDE AND ALBUTEROL SULFATE 3 ML: .5; 3 SOLUTION RESPIRATORY (INHALATION) at 20:46

## 2018-12-17 RX ADMIN — INSULIN LISPRO 2 UNITS: 100 INJECTION, SOLUTION INTRAVENOUS; SUBCUTANEOUS at 22:11

## 2018-12-17 RX ADMIN — HEPARIN SODIUM 5000 UNITS: 5000 INJECTION INTRAVENOUS; SUBCUTANEOUS at 20:53

## 2018-12-17 RX ADMIN — DILTIAZEM HYDROCHLORIDE 90 MG: 90 CAPSULE, EXTENDED RELEASE ORAL at 22:02

## 2018-12-17 RX ADMIN — TOBRAMYCIN 331 MG: 40 INJECTION INTRAMUSCULAR; INTRAVENOUS at 17:38

## 2018-12-17 RX ADMIN — VANCOMYCIN HYDROCHLORIDE 1750 MG: 10 INJECTION, POWDER, LYOPHILIZED, FOR SOLUTION INTRAVENOUS at 20:53

## 2018-12-17 RX ADMIN — PIPERACILLIN SODIUM,TAZOBACTAM SODIUM 3.38 G: 3; .375 INJECTION, POWDER, FOR SOLUTION INTRAVENOUS at 23:36

## 2018-12-17 RX ADMIN — METHYLPREDNISOLONE SODIUM SUCCINATE 40 MG: 40 INJECTION, POWDER, FOR SOLUTION INTRAMUSCULAR; INTRAVENOUS at 20:46

## 2018-12-17 RX ADMIN — SODIUM CHLORIDE 1000 ML: 900 INJECTION, SOLUTION INTRAVENOUS at 19:19

## 2018-12-17 NOTE — ED NOTES
Rectal T obtained at time. Pt refused at ED arrival.  underpad changed also at time due to soiled with urine.

## 2018-12-17 NOTE — PROGRESS NOTES
Pharmacy Clarification of Prior to Admission Medication Regimen     The patient was interviewed regarding clarification of the prior to admission medication regimen. The patient's  and daughter were present in room and obtained permission from patient to discuss drug regimen with visitor(s) present. The patient was questioned regarding use of any other inhalers, topical products, over the counter medications, herbal medications, vitamin products or ophthalmic/nasal/otic medication use. Information Obtained From: Patient, patient's , Rx Query    Pertinent Pharmacy Findings:   Updated patients preferred outpatient pharmacy to: [facility] [address] [place] [address], [place] [address] [address] , [address] [facility] AT [facility] Patient's  stated that patient normally takes ergocalciferol 50,000 units every other Monday, but took this week's dose last night (12/16/18).  Patient's  stated that patient has run out of diltiazem SR 90 mg capsule. Last dose 12/16/18. PTA medication list was corrected to the following:     Prior to Admission Medications   Prescriptions Last Dose Informant Patient Reported? Taking? albuterol (PROVENTIL VENTOLIN) 2.5 mg /3 mL (0.083 %) nebulizer solution 12/16/2018 at Unknown time Significant Other Yes Yes   Sig: by Nebulization route every six (6) hours as needed. albuterol (PROVENTIL, VENTOLIN) 90 mcg/Actuation inhaler 12/16/2018 at Unknown time Significant Other Yes Yes   Sig: Take 2 Puffs by inhalation every six (6) hours as needed. amLODIPine (NORVASC) 5 mg tablet 12/16/2018 at Unknown time Significant Other Yes Yes   Sig: Take 5 mg by mouth daily. aspirin (ASPIRIN) 325 mg tablet 12/16/2018 at Unknown time Significant Other Yes Yes   Sig: Take 325 mg by mouth daily. dextromethorphan-guaiFENesin [facility] DM) 60-1,200 mg Tb12 12/16/2018 at Unknown time Significant Other Yes Yes   Sig: Take 1 Tab by mouth daily.    dilTIAZem SR (CARDIZEM SR) 90 mg SR capsule 12/16/2018 at Unknown time Significant Other No Yes   Sig: Take 1 Cap by mouth every twelve (12) hours. ergocalciferol (ERGOCALCIFEROL) 50,000 unit capsule 12/16/2018 at Unknown time Significant Other Yes Yes   Sig: Take 50,000 Units by mouth every fourteen (14) days. \"takes every other Monday\"   fluticasone-vilanterol (BREO ELLIPTA) 200-25 mcg/dose inhaler 12/16/2018 at Unknown time Significant Other Yes Yes   Sig: Take 2 Puffs by inhalation daily. furosemide (LASIX) 20 mg tablet 12/16/2018 at Unknown time Significant Other Yes Yes   Sig: Take 20 mg by mouth daily. ibuprofen (MOTRIN) 200 mg tablet 12/16/2018 at Unknown time Significant Other Yes Yes   Sig: Take 400 mg by mouth daily as needed for Pain.       Facility-Administered Medications: None          Thank you,  Christopher Martinez  Medication History Pharmacy Technician

## 2018-12-17 NOTE — ED NOTES
Assumed care of pt from [address] [place] [address]. Pt resting in bed with bipap in place. Pt is alert and oriented x 4. Use of accessory muscles still prominent. Pt denies CP. Wears 4L of 02 at home 24/7. Pt does smoke. Unable to tell me who pulmonologist is at this time.

## 2018-12-17 NOTE — ED PROVIDER NOTES
EMERGENCY DEPARTMENT HISTORY AND PHYSICAL EXAM      Date: 12/17/2018  Patient Name: Loco Campuzano    History of Presenting Illness     Chief Complaint   Patient presents with    Respiratory Distress     sudden onset of distress today; denies pain       History Provided By: Patient and EMS    HPI: Loco Campuzano, 82 y.o. female with PMHx significant for COPD, HTN, presents via EMS transport to the ED in respiratory distress. EMS reports that that they were not able to get much history from the pt and were unable to obtain when her SOB was exacerbated. They note giving the pt 1 neb treatment en route. Pt states that she is on 4L O2 at baseline. When asked how long pt has been SOB she stated \"a long time. \" Pt denies a hx of blood clots. She denies a hx of BiPAP but per chart review pt was placed on BiPAP during her last visit on 11/11/2018. Pt specifically denies any fever, cough, CP, N/V/D. History is limited due to severe respiratory distress.      Social Hx: - EtOH; + Smoker (1/4 ppd); - Illicit Drugs   Surgical Hx: PM placement    PCP: Sujit Angeles MD    Current Facility-Administered Medications   Medication Dose Route Frequency Provider Last Rate Last Dose    nitroglycerin (NITROSTAT) tablet 0.4 mg  0.4 mg SubLINGual Q5MIN PRN Phyllis [place], DO   0.4 mg at 12/17/18 1637    sodium chloride (NS) flush 5-10 mL  5-10 mL IntraVENous Q8H Sue Valera MD   10 mL at 12/17/18 2202    sodium chloride (NS) flush 5-10 mL  5-10 mL IntraVENous PRN Sue Valera MD        acetaminophen (TYLENOL) tablet 650 mg  650 mg Oral Q6H PRN Sue Valera MD        naloxone [facility]) injection 0.4 mg  0.4 mg IntraVENous PRN Sue Valera MD        ondansetron [facility]) injection 4 mg  4 mg IntraVENous Q4H PRN Seu Valera MD        bisacodyl (DULCOLAX) suppository 10 mg  10 mg Rectal DAILY PRN Sue Valera MD        heparin (porcine) injection 5,000 Units  5,000 Units SubCUTAneous Q8H Jori Manley MD   5,000 Units at 12/17/18 2053    albuterol-ipratropium (DUO-NEB) 2.5 MG-0.5 MG/3 ML  3 mL Nebulization Q4H RT Jori Manley MD   3 mL at 12/17/18 2335    insulin lispro (HUMALOG) injection   SubCUTAneous AC&HS Jori Manley MD   2 Units at 12/17/18 2211    glucose chewable tablet 16 g  4 Tab Oral PRN Jori Manley MD        dextrose (D50W) injection syrg 12.5-25 g  12.5-25 g IntraVENous PRN Jori Manley MD        glucagon (GLUCAGEN) injection 1 mg  1 mg IntraMUSCular PRN Jori Manley MD        methylPREDNISolone (PF) (SOLU-MEDROL) injection 40 mg  40 mg IntraVENous Q6H Jori Manley MD   40 mg at 12/17/18 2046    amLODIPine (NORVASC) tablet 5 mg  5 mg Oral DAILY Jori Manley MD        aspirin tablet 325 mg  325 mg Oral DAILY Kelsie Javed MD        dilTIAZem SR (CARDIZEM SR) capsule 90 mg  90 mg Oral Q12H Jori Manley MD   90 mg at 12/17/18 2202    furosemide (LASIX) tablet 20 mg  20 mg Oral DAILY Kelsie Javed MD        fluticasone-vilanterol (BREO ELLIPTA) 200mcg-25mcg/puff  2 Puff Inhalation DAILY Jori Manley MD        piperacillin-tazobactam (ZOSYN) 3.375 g in 0.9% sodium chloride (MBP/ADV) 100 mL  3.375 g IntraVENous Q8H Jori Manley MD 25 mL/hr at 12/17/18 2336 3.375 g at 12/17/18 2336    vancomycin (VANCOCIN) 1,000 mg in 0.9% sodium chloride 250 mL ([id number])  1,000 mg IntraVENous Q16H Jori Manley MD        hydrALAZINE (APRESOLINE) 20 mg/mL injection 20 mg  20 mg IntraVENous Q6H PRN Jori Manley MD         Current Outpatient Medications   Medication Sig Dispense Refill    amLODIPine (NORVASC) 5 mg tablet Take 5 mg by mouth daily.  fluticasone-vilanterol (BREO ELLIPTA) 200-25 mcg/dose inhaler Take 2 Puffs by inhalation daily.  furosemide (LASIX) 20 mg tablet Take 20 mg by mouth daily.       ergocalciferol (ERGOCALCIFEROL) 50,000 unit capsule Take 50,000 Units by mouth every fourteen (14) days. \"takes every other Monday\"      ibuprofen (MOTRIN) 200 mg tablet Take 400 mg by mouth daily as needed for Pain.  dilTIAZem SR (CARDIZEM SR) 90 mg SR capsule Take 1 Cap by mouth every twelve (12) hours. 30 Cap 0    albuterol (PROVENTIL, VENTOLIN) 90 mcg/Actuation inhaler Take 2 Puffs by inhalation every six (6) hours as needed.  albuterol (PROVENTIL VENTOLIN) 2.5 mg /3 mL (0.083 %) nebulizer solution by Nebulization route every six (6) hours as needed.  dextromethorphan-guaiFENesin (MUCINEX DM) 60-1,200 mg Tb12 Take 1 Tab by mouth daily.  aspirin (ASPIRIN) 325 mg tablet Take 325 mg by mouth daily. Past History     Past Medical History:  Past Medical History:   Diagnosis Date    COPD     Hypertension        Past Surgical History:  Past Surgical History:   Procedure Laterality Date    HX GYN      overy removed/ 2 c-sections       Family History:  Family History   Problem Relation Age of Onset    Cancer Brother         lung     Cancer Brother         lung     Heart Disease Mother        Social History:  Social History     Tobacco Use    Smoking status: Current Every Day Smoker     Packs/day: 0.25    Smokeless tobacco: Never Used   Substance Use Topics    Alcohol use: No    Drug use: No       Allergies: Allergies   Allergen Reactions    Keflex [Cephalexin] Itching       Review of Systems   Review of Systems   Unable to perform ROS: Severe respiratory distress     Physical Exam   Physical Exam   Constitutional: She is oriented to person, place, and time. She appears well-developed and well-nourished. She appears distressed (moderate). HENT:   Head: Normocephalic and atraumatic. Moist mucous membranes   Eyes: Conjunctivae are normal. Pupils are equal, round, and reactive to light. Right eye exhibits no discharge. Left eye exhibits no discharge. Neck: Normal range of motion. Neck supple.  No tracheal deviation present. Cardiovascular: Normal rate, regular rhythm and normal heart sounds. No murmur heard. Pulmonary/Chest: Accessory muscle usage present. She is in respiratory distress. She has wheezes. She has no rales. Increased WOB  Pursed lip breathing  Decreased air movement in the bases bilaterally. Abdominal: Soft. Bowel sounds are normal. There is no tenderness. There is no rebound and no guarding. Musculoskeletal: Normal range of motion. She exhibits no edema, tenderness or deformity. Neurological: She is alert and oriented to person, place, and time. Skin: Skin is warm and dry. No rash noted. No erythema. Psychiatric: Her behavior is normal.   Nursing note and vitals reviewed. Diagnostic Study Results     Labs -     Recent Results (from the past 12 hour(s))   CBC WITH AUTOMATED DIFF    Collection Time: 12/17/18  3:15 PM   Result Value Ref Range    WBC 19.9 (H) 3.6 - 11.0 K/uL    RBC 4.48 3.80 - 5.20 M/uL    HGB 13.3 11.5 - 16.0 g/dL    HCT 40.6 35.0 - 47.0 %    MCV 90.6 80.0 - 99.0 FL    MCH 29.7 26.0 - 34.0 PG    MCHC 32.8 30.0 - 36.5 g/dL    RDW 13.6 11.5 - 14.5 %    PLATELET 725 646 - 696 K/uL    MPV 9.7 8.9 - 12.9 FL    NRBC 0.0 0  WBC    ABSOLUTE NRBC 0.00 0.00 - 0.01 K/uL    NEUTROPHILS 89 (H) 32 - 75 %    LYMPHOCYTES 4 (L) 12 - 49 %    MONOCYTES 5 5 - 13 %    EOSINOPHILS 0 0 - 7 %    BASOPHILS 0 0 - 1 %    IMMATURE GRANULOCYTES 1 (H) 0.0 - 0.5 %    ABS. NEUTROPHILS 17.7 (H) 1.8 - 8.0 K/UL    ABS. LYMPHOCYTES 0.9 0.8 - 3.5 K/UL    ABS. MONOCYTES 1.1 (H) 0.0 - 1.0 K/UL    ABS. EOSINOPHILS 0.0 0.0 - 0.4 K/UL    ABS. BASOPHILS 0.1 0.0 - 0.1 K/UL    ABS. IMM.  GRANS. 0.2 (H) 0.00 - 0.04 K/UL    DF AUTOMATED     VENOUS BLOOD GAS    Collection Time: 12/17/18  3:15 PM   Result Value Ref Range    VENOUS PH 7.44 (H) 7.32 - 7.42      VENOUS PCO2 31 (L) 41 - 51 mmHg    VENOUS PO2 53 (H) 25 - 40 mmHg    VENOUS O2 SATURATION 89 (H) 65 - 88 %    VENOUS BICARBONATE 21 (L) 23 - 28 mmol/L VENOUS BASE DEFICIT 2.1 mmol/L    O2 METHOD NASAL O2      O2 FLOW RATE 4.00 L/min    Sample source VENOUS      SITE OTHER     LACTIC ACID    Collection Time: 12/17/18  3:15 PM   Result Value Ref Range    Lactic acid 1.3 0.4 - 2.0 MMOL/L   PROTHROMBIN TIME + INR    Collection Time: 12/17/18  3:57 PM   Result Value Ref Range    INR 1.1 0.9 - 1.1      Prothrombin time 11.2 (H) 9.0 - 11.1 sec   NT-PRO BNP    Collection Time: 12/17/18  3:57 PM   Result Value Ref Range    NT pro- (H) 0 - 450 PG/ML   TROPONIN I    Collection Time: 12/17/18  3:57 PM   Result Value Ref Range    Troponin-I, Qt. <0.05 <1.06 ng/mL   METABOLIC PANEL, COMPREHENSIVE    Collection Time: 12/17/18  3:57 PM   Result Value Ref Range    Sodium 138 136 - 145 mmol/L    Potassium 3.6 3.5 - 5.1 mmol/L    Chloride 101 97 - 108 mmol/L    CO2 30 21 - 32 mmol/L    Anion gap 7 5 - 15 mmol/L    Glucose 157 (H) 65 - 100 mg/dL    BUN 12 6 - 20 MG/DL    Creatinine 0.88 0.55 - 1.02 MG/DL    BUN/Creatinine ratio 14 12 - 20      GFR est AA >60 >60 ml/min/1.73m2    GFR est non-AA >60 >60 ml/min/1.73m2    Calcium 9.2 8.5 - 10.1 MG/DL    Bilirubin, total 0.9 0.2 - 1.0 MG/DL    ALT (SGPT) 16 12 - 78 U/L    AST (SGOT) 11 (L) 15 - 37 U/L    Alk. phosphatase 73 45 - 117 U/L    Protein, total 7.5 6.4 - 8.2 g/dL    Albumin 3.2 (L) 3.5 - 5.0 g/dL    Globulin 4.3 (H) 2.0 - 4.0 g/dL    A-G Ratio 0.7 (L) 1.1 - 2.2     INFLUENZA A & B AG (RAPID TEST)    Collection Time: 12/17/18  4:36 PM   Result Value Ref Range    Influenza A Antigen NEGATIVE  NEG      Influenza B Antigen NEGATIVE  NEG     GLUCOSE, POC    Collection Time: 12/17/18 10:04 PM   Result Value Ref Range    Glucose (POC) 220 (H) 65 - 100 mg/dL    Performed by Francheska Nguyen        Radiologic Studies -   XR CHEST PORT   Final Result   Impression:   Patchy opacities in the lung bases which could be related to a combination of   atelectasis and alveolar edema, but other airspace disease cannot be excluded. CT Results  (Last 48 hours)    None        CXR Results  (Last 48 hours)               12/17/18 1520  XR CHEST PORT Final result    Impression:  Impression:   Patchy opacities in the lung bases which could be related to a combination of   atelectasis and alveolar edema, but other airspace disease cannot be excluded. Narrative:  Chest portable AP       History: COPD exacerbation. Comparison: 11/15/2018       Findings:  A left subclavian pacemaker is in place. The heart is mildly   enlarged, but unchanged. The lungs are slightly less well-expanded. Patchy   opacities are seen in both lung bases. Mild edema has slightly worsened. No   pleural effusion or pneumothorax. Medical Decision Making   I am the first provider for this patient. I reviewed the vital signs, available nursing notes, past medical history, past surgical history, family history and social history. Vital Signs-Reviewed the patient's vital signs.   Patient Vitals for the past 12 hrs:   Temp Pulse Resp BP SpO2   12/18/18 0047     97 %   12/17/18 2245  85 24 131/55 99 %   12/17/18 2227     98 %   12/17/18 2215 98.9 °F (37.2 °C) 84 25 144/52 98 %   12/17/18 2202  86  154/57    12/17/18 2200     97 %   12/17/18 2130  86 27 138/50 97 %   12/17/18 2115  86 27 135/56 98 %   12/17/18 2100  88 26 131/69 99 %   12/17/18 2045  86 29 143/61 99 %   12/17/18 2030  86 29 147/58 99 %   12/17/18 2015  90 26 166/63 99 %   12/17/18 2000  87 23 141/51 99 %   12/17/18 1945  89 27 159/63 100 %   12/17/18 1915  89 25 147/57 99 %   12/17/18 1900  90 22 168/67 98 %   12/17/18 1859  86 24  98 %   12/17/18 1742  94 27  98 %   12/17/18 1730  96 30 153/61 99 %   12/17/18 1637  (!) 101  174/61    12/17/18 1633  (!) 103 (!) 31 174/61 95 %   12/17/18 1631  (!) 103 (!) 31  96 %   12/17/18 1630 (!) 100.6 °F (38.1 °C) (!) 102 (!) 33 186/58 97 %   12/17/18 1627  (!) 102 28  98 %   12/17/18 1622  100 (!) 32  96 % 12/17/18 1600  (!) 104 (!) 32 172/60 97 %   12/17/18 1521     97 %   12/17/18 1506  (!) 118 (!) 33 183/79 96 %     EKG interpretation: (Preliminary)1504  Rhythm: ventricular paced rhythm; and regular . Rate (approx.): 121; Axis: normal; QRS interval: 150 ms; ST/T wave: 352/499 ms. Written by Alain Sandifer. [facility] ED Scribe, as dictated by Naren Hernández DO    Records Reviewed: Nursing Notes, Old Medical Records, Previous electrocardiograms, Ambulance Run Sheet, Previous Radiology Studies and Previous Laboratory Studies    Provider Notes (Medical Decision Making):   Pt presents in respiratory distress likely secondary to severe COPD. Will place pt on positive pressure ventilation and will obtain VBG, labs, EKG, and CXR. ED Course:   Initial assessment performed. The patients presenting problems have been discussed, and they are in agreement with the care plan formulated and outlined with them. I have encouraged them to ask questions as they arise throughout their visit. 3:15 PM  Chart review shows that pt was admitted 11/11/2018 for the same. CONSULT NOTE:   5:29 PM  Naren Hernández DO spoke with Heaven Griggs MD,   Specialty: Hospitalist  Discussed pt's hx, disposition, and available diagnostic and imaging results. Reviewed care plans. Consultant will evaluate pt for admission. Written by Alain Sandifer.  [facility] ED Scribe, as dictated by Naren Hernández DO    Critical Care Time:   CRITICAL CARE NOTE :  3:15 PM    IMPENDING DETERIORATION -Airway and Respiratory  ASSOCIATED RISK FACTORS - Hypoxia and Metabolic changes  MANAGEMENT- Bedside Assessment and Supervision of Care  INTERPRETATION -  Xrays, Blood Gases, ECG and Blood Pressure  INTERVENTIONS - vent mngmt and Metobolic interventions  CASE REVIEW - Hospitalist, Nursing and Family  TREATMENT RESPONSE -Improved and Stable  PERFORMED BY - Self    NOTES   :  I have spent 73 minutes of critical care time involved in lab review, consultations with specialist, family decision- making, bedside attention and documentation. During this entire length of time I was immediately available to the patient . Cierra Muro DO    Disposition:  Admit Note:  5:29 PM  Pt is being admitted by Dr. Kiarra Gunter. The results of their tests and reason(s) for their admission have been discussed with pt and/or available family. They convey agreement and understanding for the need to be admitted and for admission diagnosis. PLAN:  1. Admit to Hospitalist  Diagnosis     Clinical Impression:   1. Pneumonia of both lower lobes due to infectious organism ([address]    2. Acute on chronic respiratory failure with hypoxia (HCC)    3. Severe sepsis ([address]        This note is prepared by Sheyla Arreaga. Caryn Burch, acting as Scribe for Cierra Muro DO,    Cierra Muro DO: The scribe's documentation has been prepared under my direction and personally reviewed by me in its entirety. I confirm that the note above accurately reflects all work, treatment, procedures, and medical decision making performed by me. This note will not be viewable in [address]

## 2018-12-18 LAB
ALBUMIN SERPL-MCNC: 2.9 G/DL (ref 3.5–5)
ALBUMIN/GLOB SERPL: 0.7 {RATIO} (ref 1.1–2.2)
ALP SERPL-CCNC: 66 U/L (ref 45–117)
ALT SERPL-CCNC: 16 U/L (ref 12–78)
ANION GAP SERPL CALC-SCNC: 9 MMOL/L (ref 5–15)
AST SERPL-CCNC: 11 U/L (ref 15–37)
ATRIAL RATE: 119 BPM
BASOPHILS # BLD: 0 K/UL (ref 0–0.1)
BASOPHILS NFR BLD: 0 % (ref 0–1)
BILIRUB SERPL-MCNC: 0.7 MG/DL (ref 0.2–1)
BUN SERPL-MCNC: 17 MG/DL (ref 6–20)
BUN/CREAT SERPL: 13 (ref 12–20)
CALCIUM SERPL-MCNC: 8.3 MG/DL (ref 8.5–10.1)
CALCULATED R AXIS, ECG10: -85 DEGREES
CALCULATED T AXIS, ECG11: 97 DEGREES
CHLORIDE SERPL-SCNC: 106 MMOL/L (ref 97–108)
CO2 SERPL-SCNC: 23 MMOL/L (ref 21–32)
CREAT SERPL-MCNC: 1.28 MG/DL (ref 0.55–1.02)
DIAGNOSIS, 93000: NORMAL
DIFFERENTIAL METHOD BLD: ABNORMAL
EOSINOPHIL # BLD: 0 K/UL (ref 0–0.4)
EOSINOPHIL NFR BLD: 0 % (ref 0–7)
ERYTHROCYTE [DISTWIDTH] IN BLOOD BY AUTOMATED COUNT: 13.5 % (ref 11.5–14.5)
GLOBULIN SER CALC-MCNC: 4 G/DL (ref 2–4)
GLUCOSE BLD STRIP.AUTO-MCNC: 148 MG/DL (ref 65–100)
GLUCOSE BLD STRIP.AUTO-MCNC: 209 MG/DL (ref 65–100)
GLUCOSE BLD STRIP.AUTO-MCNC: 216 MG/DL (ref 65–100)
GLUCOSE SERPL-MCNC: 228 MG/DL (ref 65–100)
HCT VFR BLD AUTO: 35.4 % (ref 35–47)
HGB BLD-MCNC: 11.4 G/DL (ref 11.5–16)
IMM GRANULOCYTES # BLD: 0.2 K/UL (ref 0–0.04)
IMM GRANULOCYTES NFR BLD AUTO: 1 % (ref 0–0.5)
LYMPHOCYTES # BLD: 0.4 K/UL (ref 0.8–3.5)
LYMPHOCYTES NFR BLD: 2 % (ref 12–49)
MCH RBC QN AUTO: 29.5 PG (ref 26–34)
MCHC RBC AUTO-ENTMCNC: 32.2 G/DL (ref 30–36.5)
MCV RBC AUTO: 91.7 FL (ref 80–99)
MONOCYTES # BLD: 0.4 K/UL (ref 0–1)
MONOCYTES NFR BLD: 2 % (ref 5–13)
NEUTS SEG # BLD: 17.3 K/UL (ref 1.8–8)
NEUTS SEG NFR BLD: 95 % (ref 32–75)
NRBC # BLD: 0 K/UL (ref 0–0.01)
NRBC BLD-RTO: 0 PER 100 WBC
PLATELET # BLD AUTO: 229 K/UL (ref 150–400)
PMV BLD AUTO: 8.7 FL (ref 8.9–12.9)
POTASSIUM SERPL-SCNC: 3.7 MMOL/L (ref 3.5–5.1)
PROT SERPL-MCNC: 6.9 G/DL (ref 6.4–8.2)
Q-T INTERVAL, ECG07: 352 MS
QRS DURATION, ECG06: 150 MS
QTC CALCULATION (BEZET), ECG08: 499 MS
RBC # BLD AUTO: 3.86 M/UL (ref 3.8–5.2)
RBC MORPH BLD: ABNORMAL
SERVICE CMNT-IMP: ABNORMAL
SODIUM SERPL-SCNC: 138 MMOL/L (ref 136–145)
VENTRICULAR RATE, ECG03: 121 BPM
WBC # BLD AUTO: 18.3 K/UL (ref 3.6–11)

## 2018-12-18 PROCEDURE — 74011000250 HC RX REV CODE- 250: Performed by: INTERNAL MEDICINE

## 2018-12-18 PROCEDURE — 94640 AIRWAY INHALATION TREATMENT: CPT

## 2018-12-18 PROCEDURE — 74011250636 HC RX REV CODE- 250/636: Performed by: INTERNAL MEDICINE

## 2018-12-18 PROCEDURE — 87077 CULTURE AEROBIC IDENTIFY: CPT

## 2018-12-18 PROCEDURE — 65660000000 HC RM CCU STEPDOWN

## 2018-12-18 PROCEDURE — 82962 GLUCOSE BLOOD TEST: CPT

## 2018-12-18 PROCEDURE — 94660 CPAP INITIATION&MGMT: CPT

## 2018-12-18 PROCEDURE — 74011250637 HC RX REV CODE- 250/637: Performed by: INTERNAL MEDICINE

## 2018-12-18 PROCEDURE — 80053 COMPREHEN METABOLIC PANEL: CPT

## 2018-12-18 PROCEDURE — 74011636637 HC RX REV CODE- 636/637: Performed by: INTERNAL MEDICINE

## 2018-12-18 PROCEDURE — 85025 COMPLETE CBC W/AUTO DIFF WBC: CPT

## 2018-12-18 PROCEDURE — 74011000258 HC RX REV CODE- 258: Performed by: INTERNAL MEDICINE

## 2018-12-18 PROCEDURE — 87070 CULTURE OTHR SPECIMN AEROBIC: CPT

## 2018-12-18 PROCEDURE — 36415 COLL VENOUS BLD VENIPUNCTURE: CPT

## 2018-12-18 RX ORDER — FUROSEMIDE 10 MG/ML
40 INJECTION INTRAMUSCULAR; INTRAVENOUS EVERY 12 HOURS
Status: DISCONTINUED | OUTPATIENT
Start: 2018-12-18 | End: 2018-12-19

## 2018-12-18 RX ADMIN — METHYLPREDNISOLONE SODIUM SUCCINATE 40 MG: 40 INJECTION, POWDER, FOR SOLUTION INTRAMUSCULAR; INTRAVENOUS at 09:16

## 2018-12-18 RX ADMIN — IPRATROPIUM BROMIDE AND ALBUTEROL SULFATE 3 ML: .5; 3 SOLUTION RESPIRATORY (INHALATION) at 09:22

## 2018-12-18 RX ADMIN — IPRATROPIUM BROMIDE AND ALBUTEROL SULFATE 3 ML: .5; 3 SOLUTION RESPIRATORY (INHALATION) at 23:46

## 2018-12-18 RX ADMIN — PIPERACILLIN SODIUM,TAZOBACTAM SODIUM 3.38 G: 3; .375 INJECTION, POWDER, FOR SOLUTION INTRAVENOUS at 07:53

## 2018-12-18 RX ADMIN — AMLODIPINE BESYLATE 5 MG: 5 TABLET ORAL at 09:16

## 2018-12-18 RX ADMIN — Medication 10 ML: at 15:25

## 2018-12-18 RX ADMIN — VANCOMYCIN HYDROCHLORIDE 1000 MG: 1 INJECTION, POWDER, LYOPHILIZED, FOR SOLUTION INTRAVENOUS at 11:32

## 2018-12-18 RX ADMIN — PIPERACILLIN SODIUM,TAZOBACTAM SODIUM 3.38 G: 3; .375 INJECTION, POWDER, FOR SOLUTION INTRAVENOUS at 16:00

## 2018-12-18 RX ADMIN — IPRATROPIUM BROMIDE AND ALBUTEROL SULFATE 3 ML: .5; 3 SOLUTION RESPIRATORY (INHALATION) at 04:00

## 2018-12-18 RX ADMIN — IPRATROPIUM BROMIDE AND ALBUTEROL SULFATE 3 ML: .5; 3 SOLUTION RESPIRATORY (INHALATION) at 11:33

## 2018-12-18 RX ADMIN — ASPIRIN 325 MG: 325 TABLET ORAL at 09:16

## 2018-12-18 RX ADMIN — IPRATROPIUM BROMIDE AND ALBUTEROL SULFATE 3 ML: .5; 3 SOLUTION RESPIRATORY (INHALATION) at 15:07

## 2018-12-18 RX ADMIN — FUROSEMIDE 40 MG: 10 INJECTION, SOLUTION INTRAMUSCULAR; INTRAVENOUS at 13:13

## 2018-12-18 RX ADMIN — FLUTICASONE FUROATE AND VILANTEROL TRIFENATATE 2 PUFF: 200; 25 POWDER RESPIRATORY (INHALATION) at 12:50

## 2018-12-18 RX ADMIN — FUROSEMIDE 20 MG: 40 TABLET ORAL at 09:16

## 2018-12-18 RX ADMIN — IPRATROPIUM BROMIDE AND ALBUTEROL SULFATE 3 ML: .5; 3 SOLUTION RESPIRATORY (INHALATION) at 20:09

## 2018-12-18 RX ADMIN — METHYLPREDNISOLONE SODIUM SUCCINATE 40 MG: 40 INJECTION, POWDER, FOR SOLUTION INTRAMUSCULAR; INTRAVENOUS at 04:07

## 2018-12-18 RX ADMIN — HEPARIN SODIUM 5000 UNITS: 5000 INJECTION INTRAVENOUS; SUBCUTANEOUS at 04:07

## 2018-12-18 RX ADMIN — INSULIN LISPRO 3 UNITS: 100 INJECTION, SOLUTION INTRAVENOUS; SUBCUTANEOUS at 11:50

## 2018-12-18 RX ADMIN — METHYLPREDNISOLONE SODIUM SUCCINATE 40 MG: 40 INJECTION, POWDER, FOR SOLUTION INTRAMUSCULAR; INTRAVENOUS at 15:25

## 2018-12-18 RX ADMIN — HEPARIN SODIUM 5000 UNITS: 5000 INJECTION INTRAVENOUS; SUBCUTANEOUS at 11:33

## 2018-12-18 RX ADMIN — DILTIAZEM HYDROCHLORIDE 90 MG: 90 CAPSULE, EXTENDED RELEASE ORAL at 09:16

## 2018-12-18 RX ADMIN — INSULIN LISPRO 3 UNITS: 100 INJECTION, SOLUTION INTRAVENOUS; SUBCUTANEOUS at 07:54

## 2018-12-18 NOTE — PROGRESS NOTES
Pharmacy Automatic Renal Dosing Protocol - Antimicrobials    Indication for Antimicrobials: HAP     Current Regimen of Each Antimicrobial:  Zosyn 3.375g IV q6h (Start Date ; Day # 1)  Vancomycin 1750 mg x 1 then 1000 mg Q16H (Start Date , Day 1)    Previous Antimicrobial Therapy:  Vanc 1750 x 1 in ED   Tobramycin 5mg/kg x 1 in ED     Goal Level:     Date Dose & Interval Measured (mcg/mL) Extrapolated (mcg/mL)                       Date & time of next level:     Significant Cultures:    Blood: pending    Radiology / Imaging results: (X-ray, CT scan or MRI):    Chest XR: Patchy opacities in the lung bases which could be related to a combination of  atelectasis and alveolar edema, but other airspace disease cannot be excluded. Paralysis, amputations, malnutrition:     Labs:  Recent Labs     18  1557 18  1515   CREA 0.88  --    BUN 12  --    WBC  --  19.9*     Temp (24hrs), Av.6 °F (38.1 °C), Min:100.6 °F (38.1 °C), Max:100.6 °F (38.1 °C)    Creatinine Clearance (mL/min) or Dialysis: ~44 ml/min    Impression/Plan:   · Vancomycin 1750 mg x 1 then 1000 mg Q16H with anticipated trough of 17 mcg/ml ()  · Zosyn adjusted to 3.375g IV q8h extended dosing interval per protocol  · Antimicrobial stop date 7 days     Pharmacy will follow daily and adjust medications as appropriate for renal function and/or serum levels. Thank you,  Ana Velazco, [facility]    Recommended duration of therapy  http://[facility]/[place]/[facility]/Pharmacy/Clinical%20Companion/[email or web address]. docx    Renal Dosing  http://[facility]/[place]/[facility]/Pharmacy/Clinical%20Companion/Renal%[id number]. pdf

## 2018-12-18 NOTE — PROGRESS NOTES
Problem: Falls - Risk of  Goal: *Absence of Falls  Document Marie Fall Risk and appropriate interventions in the flowsheet.   Outcome: Progressing Towards Goal  Fall Risk Interventions:  Mobility Interventions: Patient to call before getting OOB         Medication Interventions: Patient to call before getting OOB    Elimination Interventions: Call light in reach, Patient to call for help with toileting needs

## 2018-12-18 NOTE — PROGRESS NOTES
TRANSFER - IN REPORT:    Verbal report received from Jaquelin Donovan  (name) on Trang Schmidt  being received from ED(unit) for routine progression of care      Report consisted of patients Situation, Background, Assessment and   Recommendations(SBAR). Information from the following report(s) SBAR, Kardex, ED Summary and MAR was reviewed with the receiving nurse. Opportunity for questions and clarification was provided. Assessment completed upon patients arrival to unit and care assumed.

## 2018-12-18 NOTE — ED NOTES
Bedside and Verbal shift change report given to Sindy (oncoming nurse) by Tuan Austin (offgoing nurse). Report included the following information SBAR, Kardex, ED Summary, MAR, Recent Results and Cardiac Rhythm NSR. Pending Respiratory Therapy to help assist with moving patient with BIPAP.

## 2018-12-18 NOTE — PROGRESS NOTES
Pharmacy Automatic Renal Dosing Protocol - Antimicrobials    Indication for Antimicrobials: HAP     Current Regimen of Each Antimicrobial:  Zosyn 3.375g IV q8h (Start Date  Day 2)  Vancomycin 1750 mg x 1 then 1000 mg Q16H (Start Date  Day 2)    Previous Antimicrobial Therapy:  Vanc 1750 x 1 in ED   Tobramycin 5mg/kg x 1 in ED       Significant Cultures:    Blood: pending   Influenza - negative   RCx Sputum - pending    Radiology / Imaging results: (X-ray, CT scan or MRI):    Chest XR: Patchy opacities in the lung bases which could be related to a combination of  atelectasis and alveolar edema, but other airspace disease cannot be excluded. Paralysis, amputations, malnutrition:   none    Labs:  Recent Labs     18  0401 18  1557 18  1515   CREA 1.28* 0.88  --    BUN 17 12  --    WBC 18.3*  --  19.9*     Temp (24hrs), Av.7 °F (37.1 °C), Min:97.6 °F (36.4 °C), Max:100.6 °F (38.1 °C)    Creatinine Clearance (mL/min) or Dialysis:  Estimated Creatinine Clearance: 36.9 mL/min (A) (based on SCr of 1.28 mg/dL (H)). Estimated Creatinine Clearance (using IBW):30.4 mL/min    Impression/Plan:  · SCr rise , persistent leukocytosis,   · Adjusted Vancomycin to 750mg (8.7mg/kg) IV q18h and will monitor SCr closely given SCr rise with combination Zosyn therapy. Projected trough at Css 16.2. · Vancomycin trough ordered  before noon dose to confirm new regimen; may need to convert to daily dosing is SCr remains unpredictable  · Zosyn adjusted to 3.375g IV q8h extended dosing interval per protocol  · Antimicrobial stop date 7 days  · BMP and CBC already ordered     Pharmacy will follow daily and adjust medications as appropriate for renal function and/or serum levels.     Thank you,  Marie Brito, [facility]

## 2018-12-18 NOTE — ED NOTES
Pending Cardizem PO from Pharmacy at this time. Will give when receive medication. Pharmacy notified.

## 2018-12-18 NOTE — ED NOTES
Assumed care of patient at this time. Family at bedside. 2nd liter of fluid infusing at this time. Pending Vancomycin from Pharmacy. Dr. Liya Abarca at bedside for admission evaluation.

## 2018-12-18 NOTE — PROGRESS NOTES
Received OT eval orders. Chart reviewed. Pt continues in ED awaiting IP bed availability. Still on bipap and too acute to tolerate OT eval at this time. Will follow and see for eval as available and appropriate.

## 2018-12-18 NOTE — ED NOTES
Bedside shift change report given to [address] (oncoming nurse) by Fide Morales RN (offgoing nurse). Report included the following information SBAR, Kardex, ED Summary and MAR.

## 2018-12-18 NOTE — H&P
Hospitalist Admission Note    NAME:  Silvia Serrano   :   1935   MRN:   [id number]     Date of admit: 2018    PCP: Haley Marcano MD    Assessment/Plan:     Acute on chronic respiratory failure with hypoxia POA   Acute COPD exacerbation POA  Sepsis POA WBC 19.9, heart rate 118, normal lactate  Bilateral pneumonia likely healthcare associated POA  Recently admitted mid 2018 with acute respiratory failure and COPD  Discharged on baseline 4 L  Increasing shortness of breath past 2 days, no increased cough or fevers  Acutely short of breath today, arrived in ED in respiratory distress  ABG on baseline 4 L showed pH 7.44, PCO2 31, PO2 53  Required BiPAP in the ED, but now less short of breath  Admit to stepdown  IV steroids, nebulizers, continue inhalers  7 day course of IV vancomycin and Zosyn  Check procalcitonin  Pulmonary consult as patient on BiPAP  Continue p.o. Lasix, proBNP only 666, clinically I do not think she is in failure  Wean BiPAP as tolerated to nasal cannula O2  Continue to honor her DNR DNI    Diabetes mellitus type 2 POA last hemoglobin A1c 6.6  Not currently on treatment  Watch blood sugars on steroids  POC and sliding scale insulin  NPH with steroid doses as needed    Essential hypertension POA  Severe pulmonary hypertension PA pressure 81 POA mild aortic stenosis POA  Continue home diltiazem SR, unclear if she still on Norvasc  Continue Lasix  PRN hydralazine    History of PAF  History of bradycardia status post pacemaker placement 2018  Continue aspirin  Continue diltiazem SR  No longer takes Multaq    Obesity POA Body mass index is 30.42 kg/m². Given the patient's current clinical presentation, I have a high level of concern for decompensation if discharged from the emergency department. My assessment of this patient's clinical condition and my plan of care is as noted above.     DVT prophylaxis with SQ heparin    Code status: DNR  NOK:     History     CHIEF COMPLAINT:Started getting short of breath last night and could not catch my breath this am    HISTORY OF PRESENT ILLNESS:  82-year-old white female  Known COPD and severe pulmonary hypertension  Last echo 11/2018 LVEF 55-60%  Chronic respiratory failure on 4 L nasal cannula oxygen  Currently DNR/DNI per patient and her     Recently admitted 11/11 to 11/16/2018 with respiratory failure and COPD   she initially required BiPAP, was treated for COPD. Had Haemophilus in her sputum but no airspace disease, treated with a 5-day course of Levaquin  Had bradycardia and a pacemaker was placed  Eventually weaned back to her baseline oxygen, discharged home  Baseline she is always short of breath with exertion  starting yesterday she began to notice increasing shortness of breath when she took a shower and with any minor exertion   no change in her baseline cough  No fevers or chills, sore throat, headache, chest pain  Was getting more short of breath when she laid down and  better when she sat up overnight  This morning, she was acutely short of breath, could not catch her breath even at rest   EMS was called and she was brought to the emergency room    Acute respiratory distress in the emergency room  PO2 53 on her 4 L nasal cannula oxygen. Was not hypercarbic.   Placed on BiPAP  Given COPD treatments  Chest x-ray bibasilar airspace disease  Septic with a white blood cell count of 19.9 and heart rate of 118 with a normal lactate  Given IV vancomycin and Zosyn  We were called to admit the patient    Past Medical History:   Diagnosis Date    COPD     Hypertension         Past Surgical History:   Procedure Laterality Date    HX GYN      overy removed/ 2 c-sections       Social History     Tobacco Use    Smoking status: Current Every Day Smoker     Packs/day: 0.25    Smokeless tobacco: Never Used   Substance Use Topics    Alcohol use: No        Family History   Problem Relation Age of Onset    Cancer Brother         lung     Cancer Brother         lung     Heart Disease Mother         Allergies   Allergen Reactions    Keflex [Cephalexin] Itching        Prior to Admission medications    Medication Sig Start Date End Date Taking? Authorizing Provider   amLODIPine (NORVASC) 5 mg tablet Take 5 mg by mouth daily. Yes Provider, Historical   fluticasone-vilanterol (BREO ELLIPTA) 200-25 mcg/dose inhaler Take 2 Puffs by inhalation daily. Yes Provider, Historical   furosemide (LASIX) 20 mg tablet Take 20 mg by mouth daily. Yes Provider, Historical   ergocalciferol (ERGOCALCIFEROL) 50,000 unit capsule Take 50,000 Units by mouth every fourteen (14) days. \"takes every other Monday\"   Yes Provider, Historical   ibuprofen (MOTRIN) 200 mg tablet Take 400 mg by mouth daily as needed for Pain. Yes Provider, Historical   dilTIAZem SR (CARDIZEM SR) 90 mg SR capsule Take 1 Cap by mouth every twelve (12) hours. 11/16/18  Yes Holden Lucas NP   albuterol (PROVENTIL, VENTOLIN) 90 mcg/Actuation inhaler Take 2 Puffs by inhalation every six (6) hours as needed. Yes Martha, MD Mckayla   albuterol (PROVENTIL VENTOLIN) 2.5 mg /3 mL (0.083 %) nebulizer solution by Nebulization route every six (6) hours as needed. Yes Martha, MD Mckayla   dextromethorphan-guaiFENesin (MUCINEX DM) 60-1,200 mg Tb12 Take 1 Tab by mouth daily. Yes Martha, MD Mckayla   aspirin (ASPIRIN) 325 mg tablet Take 325 mg by mouth daily.    Yes Martha, MD Mckayla       Review of symptoms:     POSITIVE= Bold  Negative = not bold  General:  fever, chills, sweats  Eyes:    blurred vision, eye pain, double vision  ENT:    Coryza, sore throat, trouble swallowing  Respiratory:   cough, sputum, SOB  Cardiology:   chest pain, orthopnea, PND, edema  Gastrointestinal:  abdominal pain , N/V, diarrhea, constipation, melena or BRBPR  Genitourinary:  Urgency, dysuria, hematuria  Muskuloskeletal :  Joint redness, swelling or acute joint pain, myalgias  Hematology:  easy bruising, nose or gum bleeding  Dermatological: rash, ulceration  Endocrine:   Polyuria or polydipsia, heat or hold intolerance  Neurological:  Headache, focal motor or sensory changes     Speech difficulties, memory loss  Psychological: depression, agitation      Objective:   VITALS:    Patient Vitals for the past 24 hrs:   Temp Pulse Resp BP SpO2   18 1900  90 22 168/67 98 %   18 1859  86 24  98 %   18 1742  94 27  98 %   18 1730  96 30 153/61 99 %   18 1637  (!) 101  174/61    18 1633  (!) 103 (!) 31 174/61 95 %   18 1631  (!) 103 (!) 31  96 %   18 1630 (!) 100.6 °F (38.1 °C) (!) 102 (!) 33 186/58 97 %   18 1627  (!) 102 28  98 %   18 1622  100 (!) 32  96 %   18 1600  (!) 104 (!) 32 172/60 97 %   18 1521     97 %   18 1506  (!) 118 (!) 33 183/79 96 %     Temp (24hrs), Av.6 °F (38.1 °C), Min:100.6 °F (38.1 °C), Max:100.6 °F (38.1 °C)      O2 Device: BIPAP    Wt Readings from Last 12 Encounters:   18 81.6 kg (180 lb)   18 78.5 kg (173 lb 1 oz)   18 83.5 kg (184 lb)   10/01/17 87.1 kg (192 lb 0.3 oz)   10/13/13 83.5 kg (184 lb)   13 77.1 kg (169 lb 14.4 oz)   11 84.5 kg (186 lb 4.6 oz)         PHYSICAL EXAM:   General:    Alert, cooperative on BIPAP in no distress     HEENT: Normocephalic, atraumatic    PERRL, Sclera no icterus    Nasal mucosa without masses or discharge  Hearing intact to voice    Oropharynx without erythema or exudate  No thrush  Pink MM  Neck:  No meningismus, trachea midline, no carotid bruits     Thyroid not enlarged, no nodules or tenderness  Lungs:   Prolonged expiration bilaterally, crackles at bases. No wheezing    No accessory muscle use or retractions. Heart:   Regular rate and rhythm,  no murmur or gallop. No LE edema  Abdomen:   Soft, non-tender. Not distended.   Bowel sounds normal.     No masses, No Hepatosplenomegaly, No Rebound or guarding  Lymph nodes: No cervical or inguinal PACO  Musculoskeletal:  No Joint swelling, erythema, warmth. No Cyanosis or clubbing  Skin:      No rashes      Not Jaundiced   No nodules or thickening  Neurologic: Alert and oriented X 3 on BIPAP, follows commands     Cranial nerves 2 to 12 intact    Symmetric motor strength bilaterally       LAB DATA REVIEWED:    Recent Results (from the past 12 hour(s))   CBC WITH AUTOMATED DIFF    Collection Time: 12/17/18  3:15 PM   Result Value Ref Range    WBC 19.9 (H) 3.6 - 11.0 K/uL    RBC 4.48 3.80 - 5.20 M/uL    HGB 13.3 11.5 - 16.0 g/dL    HCT 40.6 35.0 - 47.0 %    MCV 90.6 80.0 - 99.0 FL    MCH 29.7 26.0 - 34.0 PG    MCHC 32.8 30.0 - 36.5 g/dL    RDW 13.6 11.5 - 14.5 %    PLATELET 921 053 - 661 K/uL    MPV 9.7 8.9 - 12.9 FL    NRBC 0.0 0  WBC    ABSOLUTE NRBC 0.00 0.00 - 0.01 K/uL    NEUTROPHILS 89 (H) 32 - 75 %    LYMPHOCYTES 4 (L) 12 - 49 %    MONOCYTES 5 5 - 13 %    EOSINOPHILS 0 0 - 7 %    BASOPHILS 0 0 - 1 %    IMMATURE GRANULOCYTES 1 (H) 0.0 - 0.5 %    ABS. NEUTROPHILS 17.7 (H) 1.8 - 8.0 K/UL    ABS. LYMPHOCYTES 0.9 0.8 - 3.5 K/UL    ABS. MONOCYTES 1.1 (H) 0.0 - 1.0 K/UL    ABS. EOSINOPHILS 0.0 0.0 - 0.4 K/UL    ABS. BASOPHILS 0.1 0.0 - 0.1 K/UL    ABS. IMM.  GRANS. 0.2 (H) 0.00 - 0.04 K/UL    DF AUTOMATED     VENOUS BLOOD GAS    Collection Time: 12/17/18  3:15 PM   Result Value Ref Range    VENOUS PH 7.44 (H) 7.32 - 7.42      VENOUS PCO2 31 (L) 41 - 51 mmHg    VENOUS PO2 53 (H) 25 - 40 mmHg    VENOUS O2 SATURATION 89 (H) 65 - 88 %    VENOUS BICARBONATE 21 (L) 23 - 28 mmol/L    VENOUS BASE DEFICIT 2.1 mmol/L    O2 METHOD NASAL O2      O2 FLOW RATE 4.00 L/min    Sample source VENOUS      SITE OTHER     LACTIC ACID    Collection Time: 12/17/18  3:15 PM   Result Value Ref Range    Lactic acid 1.3 0.4 - 2.0 MMOL/L   PROTHROMBIN TIME + INR    Collection Time: 12/17/18  3:57 PM   Result Value Ref Range    INR 1.1 0.9 - 1.1      Prothrombin time 11.2 (H) 9.0 - 11.1 sec   NT-PRO BNP    Collection Time: 12/17/18  3:57 PM   Result Value Ref Range    NT pro- (H) 0 - 450 PG/ML   TROPONIN I    Collection Time: 12/17/18  3:57 PM   Result Value Ref Range    Troponin-I, Qt. <0.05 <8.24 ng/mL   METABOLIC PANEL, COMPREHENSIVE    Collection Time: 12/17/18  3:57 PM   Result Value Ref Range    Sodium 138 136 - 145 mmol/L    Potassium 3.6 3.5 - 5.1 mmol/L    Chloride 101 97 - 108 mmol/L    CO2 30 21 - 32 mmol/L    Anion gap 7 5 - 15 mmol/L    Glucose 157 (H) 65 - 100 mg/dL    BUN 12 6 - 20 MG/DL    Creatinine 0.88 0.55 - 1.02 MG/DL    BUN/Creatinine ratio 14 12 - 20      GFR est AA >60 >60 ml/min/1.73m2    GFR est non-AA >60 >60 ml/min/1.73m2    Calcium 9.2 8.5 - 10.1 MG/DL    Bilirubin, total 0.9 0.2 - 1.0 MG/DL    ALT (SGPT) 16 12 - 78 U/L    AST (SGOT) 11 (L) 15 - 37 U/L    Alk. phosphatase 73 45 - 117 U/L    Protein, total 7.5 6.4 - 8.2 g/dL    Albumin 3.2 (L) 3.5 - 5.0 g/dL    Globulin 4.3 (H) 2.0 - 4.0 g/dL    A-G Ratio 0.7 (L) 1.1 - 2.2     INFLUENZA A & B AG (RAPID TEST)    Collection Time: 12/17/18  4:36 PM   Result Value Ref Range    Influenza A Antigen NEGATIVE  NEG      Influenza B Antigen NEGATIVE  NEG         CXR read by radiology and reviewed by myself shows  Findings:    A left subclavian pacemaker is in place. The heart is mildly  enlarged, but unchanged. The lungs are slightly less well-expanded. Patchy  opacities are seen in both lung bases. Mild edema has slightly worsened. No  pleural effusion or pneumothorax. Impression:  Patchy opacities in the lung bases which could be related to a combination of  atelectasis and alveolar edema, but other airspace disease cannot be excluded. I saw the patient personally, took a history and did a complete physical exam at the bedside. I performed complex decision making in coming up with a diagnostic and treatment plan for the patient.  I reviewed the patient's past medical records, current laboratory and radiology results, and actual Xray films/EKG. I have also discussed this case with the involved ED physician.     Care Plan discussed with:    Patient, Family, ED Doc    Risk of deterioration:  High    Total Time Coordinating Admission: 65    minutes    Total Critical Care Time:         Cassandra Burkitt, MD

## 2018-12-18 NOTE — PROGRESS NOTES
Hospitalist Progress Note    NAME: Simeon Leslie   :  1935   MRN:  [id number]       Assessment / Plan:  Acute on chronic respiratory failure with hypoxia POA c/w BiPAP wean down as tolerated  Acute COPD exacerbation POA: c/w ABX, bronchodilators, monitor, Pulmonology consult requested. Sepsis POA WBC 19.9, heart rate 118, normal lactate, so far WBC trending down  Bilateral pneumonia likely healthcare associated POA c/w Zosyn/LVQ, Vancomycin, check sputum, monitor  Severe Pulmonary HTN/Acute Diastolic Heart Failure/COR Pulmonale?: Ef 60% with severe pulmonary HTN, will start IV diuretics, monitor I/O and weight. Check new CXR and ABG on aM, get Cardiology evaluation  Diabetes mellitus type 2 POA last hemoglobin A1c 6.6, c/w SSI. Essential hypertension POA c/w Cardizem, D/c Norvasc (wouldn't use 2 Ca channel blockers) use hydralazine prn  Severe pulmonary hypertension PA pressure 81 POA mild aortic stenosis POA  Continue home diltiazem SR, Continue Lasix, PRN hydralazine  History of PAF  History of bradycardia status post pacemaker placement 2018  Obesity POA Body mass index is 30.42 kg/m². NOK:   Code status: DNR  Prophylaxis: Hep SQ  Recommended Disposition:  PT, OT, RN     Subjective:     Chief Complaint / Reason for Physician Visit  \"I feel SOB\". Discussed with RN events overnight. Review of Systems:  Symptom Y/N Comments  Symptom Y/N Comments   Fever/Chills y   Chest Pain     Poor Appetite    Edema     Cough y   Abdominal Pain     Sputum y   Joint Pain     SOB/LUDWIG y   Pruritis/Rash     Nausea/vomit    Tolerating PT/OT     Diarrhea    Tolerating Diet y    Constipation    Other       Could NOT obtain due to:      Objective:     VITALS:   Last 24hrs VS reviewed since prior progress note.  Most recent are:  Patient Vitals for the past 24 hrs:   Temp Pulse Resp BP SpO2   18 1225     96 %   18 1006  84 24 138/62 95 %   18 0940     96 %   18 0721 97.8 °F (36.6 °C) 70 16 129/71 93 %   12/18/18 0402     97 %   12/18/18 0400  91 25 123/48 97 %   12/18/18 0330  92 27 155/60 94 %   12/18/18 0315  81 19  94 %   12/18/18 0300  78 18 117/54 94 %   12/18/18 0230  83 19 139/48 97 %   12/18/18 0200  87 24 135/47 98 %   12/18/18 0100  80 20 144/58 97 %   12/18/18 0047     97 %   12/18/18 0030  85 25 145/57 98 %   12/17/18 2245  85 24 131/55 99 %   12/17/18 2227     98 %   12/17/18 2215 98.9 °F (37.2 °C) 84 25 144/52 98 %   12/17/18 2202  86  154/57    12/17/18 2200     97 %   12/17/18 2130  86 27 138/50 97 %   12/17/18 2115  86 27 135/56 98 %   12/17/18 2100  88 26 131/69 99 %   12/17/18 2045  86 29 143/61 99 %   12/17/18 2030  86 29 147/58 99 %   12/17/18 2015  90 26 166/63 99 %   12/17/18 2000  87 23 141/51 99 %   12/17/18 1945  89 27 159/63 100 %   12/17/18 1915  89 25 147/57 99 %   12/17/18 1900  90 22 168/67 98 %   12/17/18 1859  86 24  98 %   12/17/18 1742  94 27  98 %   12/17/18 1730  96 30 153/61 99 %   12/17/18 1637  (!) 101  174/61    12/17/18 1633  (!) 103 (!) 31 174/61 95 %   12/17/18 1631  (!) 103 (!) 31  96 %   12/17/18 1630 (!) 100.6 °F (38.1 °C) (!) 102 (!) 33 186/58 97 %   12/17/18 1627  (!) 102 28  98 %   12/17/18 1622  100 (!) 32  96 %   12/17/18 1600  (!) 104 (!) 32 172/60 97 %   12/17/18 1521     97 %   12/17/18 1506  (!) 118 (!) 33 183/79 96 %       Intake/Output Summary (Last 24 hours) at 12/18/2018 1316  Last data filed at 12/18/2018 1153  Gross per 24 hour   Intake 3370 ml   Output    Net 3370 ml        PHYSICAL EXAM:  General: WD, WN. Alert, cooperative, SOB   EENT:  EOMI. Anicteric sclerae. MMM  Resp:  Coarse BS, mild crackles in both sides  CV:  Regular  rhythm,  No edema  GI:  Soft, Non distended, Non tender.  +Bowel sounds  Neurologic:  Alert and oriented X 3, normal speech,   Psych:   Good insight. Not anxious nor agitated  Skin:  No rashes.   No jaundice    Reviewed most current lab test results and cultures  YES  Reviewed most current radiology test results   YES  Review and summation of old records today    NO  Reviewed patient's current orders and MAR    YES  PMH/SH reviewed - no change compared to H&P  ________________________________________________________________________  Care Plan discussed with:    Comments   Patient y    Family  y DTR   RN y    Care Manager     Consultant                        Multidiciplinary team rounds were held today with , nursing, pharmacist and clinical coordinator. Patient's plan of care was discussed; medications were reviewed and discharge planning was addressed. ________________________________________________________________________  Total NON critical care TIME:  40   Minutes    Total CRITICAL CARE TIME Spent:   Minutes non procedure based      Comments   >50% of visit spent in counseling and coordination of care y    ________________________________________________________________________  Myrna Estrada MD     Procedures: see electronic medical records for all procedures/Xrays and details which were not copied into this note but were reviewed prior to creation of Plan. LABS:  I reviewed today's most current labs and imaging studies.   Pertinent labs include:  Recent Labs     12/18/18  0401 12/17/18  1515   WBC 18.3* 19.9*   HGB 11.4* 13.3   HCT 35.4 40.6    333     Recent Labs     12/18/18  0401 12/17/18  1557    138   K 3.7 3.6    101   CO2 23 30   * 157*   BUN 17 12   CREA 1.28* 0.88   CA 8.3* 9.2   ALB 2.9* 3.2*   TBILI 0.7 0.9   SGOT 11* 11*   ALT 16 16   INR  --  1.1       Signed: Myrna Estrada MD

## 2018-12-18 NOTE — CONSULTS
[facility] Consult Service Note  Pulmonary, Critical Care, and Sleep Medicine    Name: Nazia Head MRN: [id number]   : 1935 Hospital: [facility]   Date: 2018  Admission date: 2018 Hospital Day: 2       Subjective/Interval History:   Seen earlier today on rounds. Pt is unstable and acutely ill. Medical records and data reviewed. Hospital Problems  Date Reviewed: 2018          Codes Class Noted POA    Acute on chronic respiratory failure with hypoxia and hypercapnia (HCC) ICD-10-CM: J96.21, J96.22  ICD-9-CM: 518.84, 786.09, 799.02  2018 Unknown              IMPRESSION:   1. Acute on chronic respiratory failure with Hypoxia POA c/w BiPAP  2. Acute COPD exacerbation POA: poor clearance , no tcoughing  3. Sepsis POA WBC 19.9,   4. Bilateral pneumonia likely healthcare associated POA   5. Tobacco use  6. Severe Pulmonary HTN PASP 81  7. Acute Diastolic Heart Failure  8. pulmonary HTN, will start IV diuretics  9. Diabetes mellitus type 2 POA   10. Essential hypertension POA   11. History of PAF  12. History of bradycardia status post pacemaker placement 2018 per   13. Obesity POA Body mass index is 30.42 kg/m². 14. Body mass index is 32.04 kg/m². 13. Multiorgan dysfunction as outlined above: Pt has one or more acute or chronic illnesses with severe exacerbation with progression or side effects of treatment that poses a threat to life or bodily function  16. Additional workup outlined below  17. Pt is requiring Drug therapy requiring intensive monitoring for toxicity  18. Pt is unstable, unpredictable needing inpatient monitoring; is acutely ill and at high risk of sudden decline and decompensation with severe consequenses and continued end organ dysfunction and failure  19. Prognosis guarded       RECOMMENDATIONS/PLAN:   1.  BIPAP for non invasive ventilatory life support to prevent respiratory acidosis  2. Agree with Empiric IV antibiotics pending culture results   3. Follow culture results  4. Supplemental O2 to keep sats > 93%  5. Labs to follow electrolytes, renal function and and blood counts  6. Glucose monitoring and SSI  7. Bronchial hygiene with respiratory therapy techniques, bronchodilators  8. DVT, SUP prophylaxis  9. Smoking cessation counseling done  10. Vaccination history and schedule reviewed with pt  11. Pt needs IV fluids with additives and Drug therapy requiring intensive monitoring for toxicity  12. Prescription drug management with home med reconciliation reviewed          [x] High complexity decision making was performed  [x] See my orders for details      Subjective/Initial History:     I was asked by Trenton Jimenez MD to see Saul Reese  a 82 y.o.  female in consultation for a chief complaint of pneumonia and respiratory failure requiring BIPAP/ NIV    Excerpts from admission 12/17/2018 or consult notes as follows:     \"  Recently admitted 11/11 to 11/16/2018 with respiratory failure and COPD. she initially required BiPAP, was treated for COPD. Had Haemophilus in her sputum but no airspace disease, treated with a 5-day course of Levaquin. Had bradycardia and a pacemaker was placed. Eventually weaned back to her baseline oxygen, discharged home. Baseline she is always short of breath with exertion. starting yesterday she began to notice increasing shortness of breath when she took a shower and with any minor exertion . no change in her baseline cough. No fevers or chills, sore throat, headache, chest pain. Was getting more short of breath when she laid down and  better when she sat up overnight. This morning, she was acutely short of breath, could not catch her breath even at rest .EMS was called and she was brought to the emergency room     Acute respiratory distress in the emergency room. PO2 53 on her 4 L nasal cannula oxygen. Was not hypercarbic.  Placed on BiPAP. Recently admitted mid November 2018 with acute respiratory failure and COPD. Discharged on baseline 4 L . Increasing shortness of breath past 2 days, no increased cough or fevers. Acutely short of breath today, arrived in ED in respiratory distress. ABG on baseline 4 L showed pH 7.44, PCO2 31, PO2 53\"    Pt congested at times. Not active at all. Tachypneic on BIPAP. Daughter at bedside. No ill contacts at home. Upset that pacemaker did not fix her SOB. Tried to explain this to her. Sees my partner Dr. Dorothea Mock. No GI sx. No edema. Allergies   Allergen Reactions    Keflex [Cephalexin] Itching        MAR reviewed and pertinent medications noted or modified as needed     Current Facility-Administered Medications   Medication    furosemide (LASIX) injection 40 mg    [START ON 12/19/2018] vancomycin (VANCOCIN) 750 mg in 0.9% sodium chloride 250 mL ([id number])    [START ON 12/19/2018] Vancomycin trough draw reminder note    nitroglycerin (NITROSTAT) tablet 0.4 mg    sodium chloride (NS) flush 5-10 mL    sodium chloride (NS) flush 5-10 mL    acetaminophen (TYLENOL) tablet 650 mg    naloxone (NARCAN) injection 0.4 mg    ondansetron (ZOFRAN) injection 4 mg    bisacodyl (DULCOLAX) suppository 10 mg    heparin (porcine) injection 5,000 Units    albuterol-ipratropium (DUO-NEB) 2.5 MG-0.5 MG/3 ML    insulin lispro (HUMALOG) injection    glucose chewable tablet 16 g    dextrose (D50W) injection syrg 12.5-25 g    glucagon (GLUCAGEN) injection 1 mg    methylPREDNISolone (PF) (SOLU-MEDROL) injection 40 mg    aspirin tablet 325 mg    dilTIAZem SR (CARDIZEM SR) capsule 90 mg    fluticasone-vilanterol (BREO ELLIPTA) 200mcg-25mcg/puff    piperacillin-tazobactam (ZOSYN) 3.375 g in 0.9% sodium chloride (MBP/ADV) 100 mL    hydrALAZINE (APRESOLINE) 20 mg/mL injection 20 mg      Patient PCP: Freddy Harman MD  PMH:  has a past medical history of COPD and Hypertension.   PSH:   has a past surgical history that includes hx gyn. FHX: family history includes Cancer in her brother and brother; Heart Disease in her mother. SHX:  reports that she has been smoking. She has been smoking about 0.25 packs per day. she has never used smokeless tobacco. She reports that she does not drink alcohol or use drugs. ROS:A comprehensive review of systems was negative except for that written in the HPI. Objective:     Vital Signs: Telemetry:    normal sinus rhythm Intake/Output:   Visit Vitals  /63   Pulse 70   Temp 98.1 °F (36.7 °C)   Resp 19   Ht 5' 4.5\" (1.638 m)   Wt 86 kg (189 lb 9.5 oz)   SpO2 100%   BMI 32.04 kg/m²       Temp (24hrs), Av.1 °F (36.7 °C), Min:97.6 °F (36.4 °C), Max:98.9 °F (37.2 °C)        O2 Device: BIPAP O2 Flow Rate (L/min): 40 l/min       Wt Readings from Last 4 Encounters:   18 86 kg (189 lb 9.5 oz)   18 78.5 kg (173 lb 1 oz)   18 83.5 kg (184 lb)   10/01/17 87.1 kg (192 lb 0.3 oz)          Intake/Output Summary (Last 24 hours) at 2018 1709  Last data filed at 2018 1153  Gross per 24 hour   Intake 3200 ml   Output    Net 3200 ml       Last shift:       07 - 1900  In: 100 [I.V.:100]  Out: -   Last 3 shifts: 1901 -  07  In: 6483 [P.O.:120; I.V.:3150]  Out: -        Physical Exam:     General:  female; severely ill; BIPAP/NIV;   Eyes: anicteric;  HEENT: NC/AT,no nasal flaring or drainage; no thrush  Neck: No goiter; no stridor;  no accessory muscle use  Lymph nodes: No abnormally enlarged cervical or supraclavicular nodes  Chest: increased AP diameter  Lungs: scattered wheezes bilaterally  Heart: Regular rate and rhythm; NO edema  Abdomen: soft, protuberant, nontender, obese  : No Tamayo;    Ext: no cyanosis, no clubbing; no joint swelling or erythema  Skin: warm and dry; no clubbing  Musculoskeletal: no gross deformities  Neuro: speech fluent;  moves all fours, generalized weakness; withdraws to pain; unable to check gait and station  Psych: no agitation; follows commands;  oriented to time, place and person   Devices:per sepsis flow sheet   Skin: Skin unremarkable;   Pulses:Bilateral, Radial, 2+  Capillary refill: normal; warm, brisk capillary refill    Data:     All lab results for the last 24 hours reviewed. Labs:    Recent Labs     12/18/18  0401 12/17/18  1557 12/17/18  1515   WBC 18.3*  --  19.9*   HGB 11.4*  --  13.3     --  333   INR  --  1.1  --      Recent Labs     12/18/18  0401 12/17/18  1557 12/17/18  1515    138  --    K 3.7 3.6  --     101  --    CO2 23 30  --    * 157*  --    BUN 17 12  --    CREA 1.28* 0.88  --    CA 8.3* 9.2  --    LAC  --   --  1.3   ALB 2.9* 3.2*  --    SGOT 11* 11*  --    ALT 16 16  --      No results for input(s): PH, PCO2, PO2, HCO3, FIO2 in the last 72 hours. Recent Labs     12/17/18  1557   TROIQ <0.05     No results found for: BNPP, BNP   Lab Results   Component Value Date/Time    Culture result: NO GROWTH AFTER 16 HOURS 12/17/2018 03:15 PM    Culture result: HEAVY NORMAL RESPIRATORY MEGHANA 11/13/2018 03:12 PM    Culture result: (A) 11/13/2018 03:12 PM     HEAVY HAEMOPHILUS INFLUENZAE BETA LACTAMASE POSITIVE   No results found for: TSH, TSHEXT    Imaging:          Results from Hospital Encounter encounter on 12/17/18   XR CHEST PORT    Narrative Chest portable AP    History: COPD exacerbation. Comparison: 11/15/2018    Findings:  A left subclavian pacemaker is in place. The heart is mildly  enlarged, but unchanged. The lungs are slightly less well-expanded. Patchy  opacities are seen in both lung bases. Mild edema has slightly worsened. No  pleural effusion or pneumothorax. Impression Impression:  Patchy opacities in the lung bases which could be related to a combination of  atelectasis and alveolar edema, but other airspace disease cannot be excluded.        Results from [place] encounter on 10/01/17   CTA CHEST W OR W WO CONT    Narrative Clinical indication: Hypoxia, elevated d-dimer. Localizer obtained without contrast at the level of the pulmonary arteries. Fast  injection rate of 80 cc of Isovue-370 with review of the raw data and MIP  reconstructions. No prior. CT dose reduction was achieved through the use of a  standardized protocol tailored for this examination and automatic exposure  control for dose modulation . Ledell Anant The right thyroid lobe is enlarged. There is no evidence for pulmonary emboli. There is no pericardial pleural effusion the heart size is normal. There is no  shift or pneumothorax. Calcification and mild ectasia of the thoracic aorta. There is a left lower lobe infiltrate. Stable nodules      Impression  impression: Left lower lobe infiltrate.         This care involved high complexity medical decision making: I personally:  · Reviewed the flowsheet and previous days notes  · Reviewed and summarized records or history from previous days note or discussions with staff, family  · High Risk Drug therapy requiring intensive monitoring for toxicity: eg steroids, pressors, antibiotics  · Reviewed and/or ordered Clinical lab tests  · Reviewed and/or ordered Radiology tests  · Reviewed and/or ordered of Medicine tests  · Independently visualized radiologic Images  · Reviewed the patients ECG / Telemetry  · Reviewed and/or adjusted NiPPV settings  ·  discussed my assessment/management with : Nursing, Respiratory Therapy, Family for coordination of care    David Okeefe MD

## 2018-12-18 NOTE — PROGRESS NOTES
Pharmacy Automatic Renal Dosing Protocol - Antimicrobials    Indication for Antimicrobials: HAP     Current Regimen of Each Antimicrobial:  Zosyn 3.375g IV q6h (Start Date ; Day # 1)    Previous Antimicrobial Therapy:  Vanc 1750 x 1 in ED   Tobramycin 5mg/kg x 1 in ED     Goal Level:     Date Dose & Interval Measured (mcg/mL) Extrapolated (mcg/mL)                       Date & time of next level:     Significant Cultures:    Blood: pending    Radiology / Imaging results: (X-ray, CT scan or MRI):    Chest XR: Patchy opacities in the lung bases which could be related to a combination of  atelectasis and alveolar edema, but other airspace disease cannot be excluded. Paralysis, amputations, malnutrition:     Labs:  Recent Labs     18  1557 18  1515   CREA 0.88  --    BUN 12  --    WBC  --  19.9*     Temp (24hrs), Av.6 °F (38.1 °C), Min:100.6 °F (38.1 °C), Max:100.6 °F (38.1 °C)    Creatinine Clearance (mL/min) or Dialysis: ~44 ml/min    Impression/Plan:   · Zosyn adjusted to 3.375g IV q8h extended dosing interval per protocol  · Antimicrobial stop date 7 days     Pharmacy will follow daily and adjust medications as appropriate for renal function and/or serum levels. Thank you,  Mandi Hobbs, PHARMD    Recommended duration of therapy  [email or web address]/Pharmacy/Clinical%[email or web address]. docx    Renal Dosing  [email or web address]/Pharmacy/[email or web address]/Renal%[email or web address]. pdf

## 2018-12-18 NOTE — PROGRESS NOTES
1.  Acute on chronic respiratory failure. 2.  Acute diastolic heart failure. 3.  Chronic lung disease requiring supplemental oxygen. Severe pulmonary hypertension. 4.  Recent dual chamber pacemaker due to AV conduction disease. May be undersensing on ventricular channel since there is pacing artifact shortly after the QRS on tele. Pocket site looks good. 5.  Paroxysmal atrial arrhythmias. Will check the pacemaker tomorrow and make program adjustments if needed. Continues with diuretic. Should let Dr. Radha Cox know she's here. Full EP/arrhythmia note to follow.     Signed By: Dutch Blandon MD     December 18, 2018

## 2018-12-18 NOTE — PROGRESS NOTES
Physical Therapy    Received PT eval orders. Chart reviewed. Pt continues in ED awaiting IP bed availability. Still on bipap and too acute to tolerate PT eval at this time. Will follow and see for eval as available and appropriate.     Collette Hamburg, PT

## 2018-12-19 ENCOUNTER — APPOINTMENT (OUTPATIENT)
Dept: GENERAL RADIOLOGY | Age: 83
DRG: 871 | End: 2018-12-19
Attending: INTERNAL MEDICINE
Payer: MEDICARE

## 2018-12-19 LAB
ALBUMIN SERPL-MCNC: 2.6 G/DL (ref 3.5–5)
ALBUMIN/GLOB SERPL: 0.7 {RATIO} (ref 1.1–2.2)
ALP SERPL-CCNC: 53 U/L (ref 45–117)
ALT SERPL-CCNC: 19 U/L (ref 12–78)
ANION GAP SERPL CALC-SCNC: 10 MMOL/L (ref 5–15)
ARTERIAL PATENCY WRIST A: YES
AST SERPL-CCNC: 17 U/L (ref 15–37)
BASE EXCESS BLDA CALC-SCNC: 0.2 MMOL/L
BASOPHILS # BLD: 0 K/UL (ref 0–0.1)
BASOPHILS NFR BLD: 0 % (ref 0–1)
BDY SITE: ABNORMAL
BILIRUB SERPL-MCNC: 0.3 MG/DL (ref 0.2–1)
BUN SERPL-MCNC: 42 MG/DL (ref 6–20)
BUN/CREAT SERPL: 17 (ref 12–20)
CALCIUM SERPL-MCNC: 8.9 MG/DL (ref 8.5–10.1)
CHLORIDE SERPL-SCNC: 107 MMOL/L (ref 97–108)
CO2 SERPL-SCNC: 22 MMOL/L (ref 21–32)
CREAT SERPL-MCNC: 2.51 MG/DL (ref 0.55–1.02)
DATE LAST DOSE: ABNORMAL
DIFFERENTIAL METHOD BLD: ABNORMAL
EOSINOPHIL # BLD: 0 K/UL (ref 0–0.4)
EOSINOPHIL NFR BLD: 0 % (ref 0–7)
EPAP/CPAP/PEEP, PAPEEP: 6
ERYTHROCYTE [DISTWIDTH] IN BLOOD BY AUTOMATED COUNT: 13.9 % (ref 11.5–14.5)
FIO2 ON VENT: 40 %
GLOBULIN SER CALC-MCNC: 3.7 G/DL (ref 2–4)
GLUCOSE BLD STRIP.AUTO-MCNC: 177 MG/DL (ref 65–100)
GLUCOSE BLD STRIP.AUTO-MCNC: 205 MG/DL (ref 65–100)
GLUCOSE BLD STRIP.AUTO-MCNC: 206 MG/DL (ref 65–100)
GLUCOSE BLD STRIP.AUTO-MCNC: 209 MG/DL (ref 65–100)
GLUCOSE BLD STRIP.AUTO-MCNC: 241 MG/DL (ref 65–100)
GLUCOSE SERPL-MCNC: 187 MG/DL (ref 65–100)
HCO3 BLDA-SCNC: 25 MMOL/L (ref 22–26)
HCT VFR BLD AUTO: 30.9 % (ref 35–47)
HGB BLD-MCNC: 10 G/DL (ref 11.5–16)
IMM GRANULOCYTES # BLD: 0.2 K/UL (ref 0–0.04)
IMM GRANULOCYTES NFR BLD AUTO: 1 % (ref 0–0.5)
IPAP/PIP, IPAPIP: 12
LYMPHOCYTES # BLD: 0.4 K/UL (ref 0.8–3.5)
LYMPHOCYTES NFR BLD: 2 % (ref 12–49)
MAGNESIUM SERPL-MCNC: 2.3 MG/DL (ref 1.6–2.4)
MCH RBC QN AUTO: 29.2 PG (ref 26–34)
MCHC RBC AUTO-ENTMCNC: 32.4 G/DL (ref 30–36.5)
MCV RBC AUTO: 90.4 FL (ref 80–99)
MONOCYTES # BLD: 0.9 K/UL (ref 0–1)
MONOCYTES NFR BLD: 6 % (ref 5–13)
NEUTS SEG # BLD: 15.2 K/UL (ref 1.8–8)
NEUTS SEG NFR BLD: 91 % (ref 32–75)
NRBC # BLD: 0 K/UL (ref 0–0.01)
NRBC BLD-RTO: 0 PER 100 WBC
PCO2 BLDA: 39 MMHG (ref 35–45)
PH BLDA: 7.41 [PH] (ref 7.35–7.45)
PLATELET # BLD AUTO: 236 K/UL (ref 150–400)
PMV BLD AUTO: 9.6 FL (ref 8.9–12.9)
PO2 BLDA: 98 MMHG (ref 80–100)
POTASSIUM SERPL-SCNC: 3.4 MMOL/L (ref 3.5–5.1)
PROT SERPL-MCNC: 6.3 G/DL (ref 6.4–8.2)
RBC # BLD AUTO: 3.42 M/UL (ref 3.8–5.2)
REPORTED DOSE,DOSE: ABNORMAL UNITS
REPORTED DOSE/TIME,TMG: 1200
SAO2 % BLD: 98 % (ref 92–97)
SAO2% DEVICE SAO2% SENSOR NAME: ABNORMAL
SERVICE CMNT-IMP: ABNORMAL
SODIUM SERPL-SCNC: 139 MMOL/L (ref 136–145)
SPECIMEN SITE: ABNORMAL
VANCOMYCIN TROUGH SERPL-MCNC: 21.4 UG/ML (ref 5–10)
WBC # BLD AUTO: 16.7 K/UL (ref 3.6–11)

## 2018-12-19 PROCEDURE — 74011250636 HC RX REV CODE- 250/636: Performed by: INTERNAL MEDICINE

## 2018-12-19 PROCEDURE — 65660000000 HC RM CCU STEPDOWN

## 2018-12-19 PROCEDURE — 82962 GLUCOSE BLOOD TEST: CPT

## 2018-12-19 PROCEDURE — 82803 BLOOD GASES ANY COMBINATION: CPT

## 2018-12-19 PROCEDURE — 94640 AIRWAY INHALATION TREATMENT: CPT

## 2018-12-19 PROCEDURE — 97530 THERAPEUTIC ACTIVITIES: CPT

## 2018-12-19 PROCEDURE — 74011250637 HC RX REV CODE- 250/637: Performed by: HOSPITALIST

## 2018-12-19 PROCEDURE — 97116 GAIT TRAINING THERAPY: CPT

## 2018-12-19 PROCEDURE — 97162 PT EVAL MOD COMPLEX 30 MIN: CPT

## 2018-12-19 PROCEDURE — 74011636637 HC RX REV CODE- 636/637: Performed by: HOSPITALIST

## 2018-12-19 PROCEDURE — 74011000250 HC RX REV CODE- 250: Performed by: INTERNAL MEDICINE

## 2018-12-19 PROCEDURE — 83735 ASSAY OF MAGNESIUM: CPT

## 2018-12-19 PROCEDURE — G8979 MOBILITY GOAL STATUS: HCPCS

## 2018-12-19 PROCEDURE — 74011000258 HC RX REV CODE- 258: Performed by: INTERNAL MEDICINE

## 2018-12-19 PROCEDURE — 74011250637 HC RX REV CODE- 250/637: Performed by: INTERNAL MEDICINE

## 2018-12-19 PROCEDURE — G8978 MOBILITY CURRENT STATUS: HCPCS

## 2018-12-19 PROCEDURE — 71045 X-RAY EXAM CHEST 1 VIEW: CPT

## 2018-12-19 PROCEDURE — 74011636637 HC RX REV CODE- 636/637: Performed by: INTERNAL MEDICINE

## 2018-12-19 PROCEDURE — 94660 CPAP INITIATION&MGMT: CPT

## 2018-12-19 PROCEDURE — 85025 COMPLETE CBC W/AUTO DIFF WBC: CPT

## 2018-12-19 PROCEDURE — 80202 ASSAY OF VANCOMYCIN: CPT

## 2018-12-19 PROCEDURE — 36600 WITHDRAWAL OF ARTERIAL BLOOD: CPT

## 2018-12-19 PROCEDURE — 36415 COLL VENOUS BLD VENIPUNCTURE: CPT

## 2018-12-19 PROCEDURE — 77010033678 HC OXYGEN DAILY

## 2018-12-19 PROCEDURE — 97166 OT EVAL MOD COMPLEX 45 MIN: CPT | Performed by: OCCUPATIONAL THERAPIST

## 2018-12-19 PROCEDURE — 80053 COMPREHEN METABOLIC PANEL: CPT

## 2018-12-19 RX ORDER — DILTIAZEM HYDROCHLORIDE 5 MG/ML
15 INJECTION INTRAVENOUS ONCE
Status: COMPLETED | OUTPATIENT
Start: 2018-12-19 | End: 2018-12-19

## 2018-12-19 RX ORDER — SODIUM CHLORIDE 9 MG/ML
75 INJECTION, SOLUTION INTRAVENOUS CONTINUOUS
Status: DISCONTINUED | OUTPATIENT
Start: 2018-12-19 | End: 2018-12-22

## 2018-12-19 RX ORDER — POTASSIUM CHLORIDE 750 MG/1
20 TABLET, FILM COATED, EXTENDED RELEASE ORAL
Status: COMPLETED | OUTPATIENT
Start: 2018-12-19 | End: 2018-12-19

## 2018-12-19 RX ADMIN — FUROSEMIDE 40 MG: 10 INJECTION, SOLUTION INTRAMUSCULAR; INTRAVENOUS at 08:12

## 2018-12-19 RX ADMIN — FUROSEMIDE 40 MG: 10 INJECTION, SOLUTION INTRAMUSCULAR; INTRAVENOUS at 01:45

## 2018-12-19 RX ADMIN — Medication 10 ML: at 01:52

## 2018-12-19 RX ADMIN — INSULIN LISPRO 3 UNITS: 100 INJECTION, SOLUTION INTRAVENOUS; SUBCUTANEOUS at 11:47

## 2018-12-19 RX ADMIN — IPRATROPIUM BROMIDE AND ALBUTEROL SULFATE 3 ML: .5; 3 SOLUTION RESPIRATORY (INHALATION) at 19:32

## 2018-12-19 RX ADMIN — PIPERACILLIN SODIUM,TAZOBACTAM SODIUM 3.38 G: 3; .375 INJECTION, POWDER, FOR SOLUTION INTRAVENOUS at 23:57

## 2018-12-19 RX ADMIN — IPRATROPIUM BROMIDE AND ALBUTEROL SULFATE 3 ML: .5; 3 SOLUTION RESPIRATORY (INHALATION) at 03:48

## 2018-12-19 RX ADMIN — ASPIRIN 325 MG: 325 TABLET ORAL at 08:12

## 2018-12-19 RX ADMIN — METHYLPREDNISOLONE SODIUM SUCCINATE 40 MG: 40 INJECTION, POWDER, FOR SOLUTION INTRAMUSCULAR; INTRAVENOUS at 14:49

## 2018-12-19 RX ADMIN — PIPERACILLIN SODIUM,TAZOBACTAM SODIUM 3.38 G: 3; .375 INJECTION, POWDER, FOR SOLUTION INTRAVENOUS at 01:43

## 2018-12-19 RX ADMIN — IPRATROPIUM BROMIDE AND ALBUTEROL SULFATE 3 ML: .5; 3 SOLUTION RESPIRATORY (INHALATION) at 08:16

## 2018-12-19 RX ADMIN — PIPERACILLIN SODIUM,TAZOBACTAM SODIUM 3.38 G: 3; .375 INJECTION, POWDER, FOR SOLUTION INTRAVENOUS at 16:26

## 2018-12-19 RX ADMIN — FLUTICASONE FUROATE AND VILANTEROL TRIFENATATE 2 PUFF: 200; 25 POWDER RESPIRATORY (INHALATION) at 08:13

## 2018-12-19 RX ADMIN — DEXTROSE MONOHYDRATE 10 MG/HR: 50 INJECTION, SOLUTION INTRAVENOUS at 22:18

## 2018-12-19 RX ADMIN — INSULIN LISPRO 2 UNITS: 100 INJECTION, SOLUTION INTRAVENOUS; SUBCUTANEOUS at 01:43

## 2018-12-19 RX ADMIN — METHYLPREDNISOLONE SODIUM SUCCINATE 40 MG: 40 INJECTION, POWDER, FOR SOLUTION INTRAMUSCULAR; INTRAVENOUS at 01:45

## 2018-12-19 RX ADMIN — METHYLPREDNISOLONE SODIUM SUCCINATE 40 MG: 40 INJECTION, POWDER, FOR SOLUTION INTRAMUSCULAR; INTRAVENOUS at 22:07

## 2018-12-19 RX ADMIN — METHYLPREDNISOLONE SODIUM SUCCINATE 40 MG: 40 INJECTION, POWDER, FOR SOLUTION INTRAMUSCULAR; INTRAVENOUS at 04:52

## 2018-12-19 RX ADMIN — INSULIN HUMAN 15 UNITS: 100 INJECTION, SUSPENSION SUBCUTANEOUS at 18:03

## 2018-12-19 RX ADMIN — METHYLPREDNISOLONE SODIUM SUCCINATE 40 MG: 40 INJECTION, POWDER, FOR SOLUTION INTRAMUSCULAR; INTRAVENOUS at 08:12

## 2018-12-19 RX ADMIN — DILTIAZEM HYDROCHLORIDE 15 MG: 5 INJECTION INTRAVENOUS at 22:07

## 2018-12-19 RX ADMIN — IPRATROPIUM BROMIDE AND ALBUTEROL SULFATE 3 ML: .5; 3 SOLUTION RESPIRATORY (INHALATION) at 15:21

## 2018-12-19 RX ADMIN — INSULIN LISPRO 3 UNITS: 100 INJECTION, SOLUTION INTRAVENOUS; SUBCUTANEOUS at 08:12

## 2018-12-19 RX ADMIN — DILTIAZEM HYDROCHLORIDE 90 MG: 90 CAPSULE, EXTENDED RELEASE ORAL at 21:00

## 2018-12-19 RX ADMIN — HEPARIN SODIUM 5000 UNITS: 5000 INJECTION INTRAVENOUS; SUBCUTANEOUS at 11:47

## 2018-12-19 RX ADMIN — HEPARIN SODIUM 5000 UNITS: 5000 INJECTION INTRAVENOUS; SUBCUTANEOUS at 22:06

## 2018-12-19 RX ADMIN — DILTIAZEM HYDROCHLORIDE 90 MG: 90 CAPSULE, EXTENDED RELEASE ORAL at 08:12

## 2018-12-19 RX ADMIN — DILTIAZEM HYDROCHLORIDE 90 MG: 90 CAPSULE, EXTENDED RELEASE ORAL at 01:45

## 2018-12-19 RX ADMIN — IPRATROPIUM BROMIDE AND ALBUTEROL SULFATE 3 ML: .5; 3 SOLUTION RESPIRATORY (INHALATION) at 23:21

## 2018-12-19 RX ADMIN — INSULIN LISPRO 3 UNITS: 100 INJECTION, SOLUTION INTRAVENOUS; SUBCUTANEOUS at 16:26

## 2018-12-19 RX ADMIN — HEPARIN SODIUM 5000 UNITS: 5000 INJECTION INTRAVENOUS; SUBCUTANEOUS at 04:53

## 2018-12-19 RX ADMIN — VANCOMYCIN HYDROCHLORIDE 750 MG: 750 INJECTION, POWDER, LYOPHILIZED, FOR SOLUTION INTRAVENOUS at 12:20

## 2018-12-19 RX ADMIN — POTASSIUM CHLORIDE 20 MEQ: 750 TABLET, EXTENDED RELEASE ORAL at 18:03

## 2018-12-19 RX ADMIN — Medication 10 ML: at 22:23

## 2018-12-19 RX ADMIN — IPRATROPIUM BROMIDE AND ALBUTEROL SULFATE 3 ML: .5; 3 SOLUTION RESPIRATORY (INHALATION) at 11:53

## 2018-12-19 RX ADMIN — Medication 10 ML: at 14:50

## 2018-12-19 RX ADMIN — PIPERACILLIN SODIUM,TAZOBACTAM SODIUM 3.38 G: 3; .375 INJECTION, POWDER, FOR SOLUTION INTRAVENOUS at 08:12

## 2018-12-19 NOTE — PROGRESS NOTES
Pharmacy Automatic Renal Dosing Protocol - Antimicrobials    Indication for Antimicrobials: HAP     Current Regimen of Each Antimicrobial:  Zosyn 3.375g IV q8h (Start Date  Day 3/)  Vancomycin IV (Start Date  Day 2) - end     Previous Antimicrobial Therapy:  Vanc 1750 x 1 in ED   Tobramycin 5mg/kg x 1 in ED     Vancomycin trough goal level:  15-20    Date Dose & Interval Measured (mcg/mL) Extrapolated (mcg/mL)   19 at 1151 Regimen changed; not at steady state 21.4 (~ 24 hours after 1000 mg dose) FLACA mariee clarified this level was drawn prior to hanging dose (he will correct administration time)                 Significant Cultures:    Blood: ng x 2 day - pending   Influenza - negative   RCx Sputum - pending    Radiology / Imaging results: (X-ray, CT scan or MRI):    Chest XR: Patchy opacities in the lung bases which could be related to a combination of  atelectasis and alveolar edema, but other airspace disease cannot be excluded. Paralysis, amputations, malnutrition:     Labs:  Recent Labs     18  0513 18  0401 18  1557 18  1515   CREA 2.51* 1.28* 0.88  --    BUN 42* 17 12  --    WBC 16.7* 18.3*  --  19.9*     Temp (24hrs), Av.1 °F (36.7 °C), Min:97.1 °F (36.2 °C), Max:98.8 °F (37.1 °C)    Creatinine Clearance (mL/min) or Dialysis:  Estimated Creatinine Clearance: 18.8 mL/min (A) (based on SCr of 2.51 mg/dL (H)). Estimated Creatinine Clearance (using IBW):15.5 mL/min    Impression/Plan:  · Vancomycin level on  is not at steady state; Scr continues to rise and a dose was just given after that level was drawn.   · Will hold vancomycin for now and order another vancomycin level for 12 pm   · BMP with am Labs ordered  · Last day of vancomycin is scheduled for   · Zosyn dose is borderline for dose change  · Antimicrobial stop date 7 days     Pharmacy will follow daily and adjust medications as appropriate for renal function and/or serum levels.     Thank you,  John Mendez, PHARMD

## 2018-12-19 NOTE — PROGRESS NOTES
Problem: Mobility Impaired (Adult and Pediatric)  Goal: *Acute Goals and Plan of Care (Insert Text)  Physical Therapy Goals  Initiated 12/19/2018  1. Patient will move from supine to sit and sit to supine , scoot up and down and roll side to side in bed with independence within 7 day(s). 2.  Patient will transfer from bed to chair and chair to bed with independence using the least restrictive device within 7 day(s). 3.  Patient will perform sit to stand with independence within 7 day(s). 4.  Patient will ambulate with independence for 100 feet with the least restrictive device within 7 day(s). 5.  Patient will ascend/descend 4 stairs with one sided handrail(s) with modified independence within 7 day(s). physical Therapy EVALUATION  Patient: Tyrone Newton (89 y.o. female)  Date: 12/19/2018  Primary Diagnosis: Acute on chronic respiratory failure with hypoxia and hypercapnia (HCC)       Precautions:   DNR    ASSESSMENT :  Based on the objective data described below, the patient presents with decreased strength, decreased functional mobility, impaired balance, unsteady gait, and decreased endurance following admission for acute on chronic respiratory failure. PTA patient lives with her . She is independent with all aspects of functional mobility and ADLS. She reports her  occasionally assists with LB dressing. She wears 4L O2 at home recently. She denies any falls although reports her endurance has decreased rapidly, making ADLS/showering very difficult recently. Currently, patient received resting in bed, agreeable to PT with encouragement. Patient initially very anxious, requiring increased encouragement and verbal cues for calming. VSS on 4L O2 at rest. Patient required CGA for bed mobility. Patient required CGA for sit <> stand. Patient ambulated 20 feet with CGA without AD. Patient with unsteady gait with decreased step length and shuffled steps.  Patient's HR elevated to 120's with activity. Patient would benefit from education on energy conservation with ADLs (OT notified). Patient left sitting in the chair at the conclusion of the PT evaluation with all needs met. Encouraged patient to sit up in the chair for all meals due to diagnosis of pneumonia and to also improve strength and endurance. Patient will benefit from [facility] PT with 24/7 assist at discharge. Patient will benefit from skilled intervention to address the above impairments. Patients rehabilitation potential is considered to be Good  Factors which may influence rehabilitation potential include:   []         None noted  []         Mental ability/status  [x]         Medical condition  []         Home/family situation and support systems  []         Safety awareness  []         Pain tolerance/management  []         Other:      PLAN :  Recommendations and Planned Interventions:  [x]           Bed Mobility Training             []    Neuromuscular Re-Education  [x]           Transfer Training                   []    Orthotic/Prosthetic Training  [x]           Gait Training                         []    Modalities  [x]           Therapeutic Exercises           []    Edema Management/Control  [x]           Therapeutic Activities            [x]    Patient and Family Training/Education  []           Other (comment):    Frequency/Duration: Patient will be followed by physical therapy  4 times a week to address goals. Discharge Recommendations: Home Health with 24/7 support  Further Equipment Recommendations for Discharge: none     SUBJECTIVE:   Patient stated Suzi Stagger are you going to do to me?     OBJECTIVE DATA SUMMARY:   HISTORY:    Past Medical History:   Diagnosis Date    COPD     Hypertension      Past Surgical History:   Procedure Laterality Date    HX GYN      overy removed/ 2 c-sections     Prior Level of Function/Home Situation: patient lives with her . She is independent with all aspects of functional mobility and ADLS. She reports her  occasionally assists with LB dressing. She wears 4L O2 at home recently. She denies any falls although reports her endurance has decreased rapidly, making ADLS/showering very difficult recently. Personal factors and/or comorbidities impacting plan of care: fall risk, endurance, COPD, pneumonia    Home Situation  Home Environment: Private residence  # Steps to Enter: 2  Rails to Enter: Yes  Hand Rails : Bilateral(too wide)  One/Two Story Residence: One story  Living Alone: No  Support Systems: Spouse/Significant Other/Partner, Family member(s)  Patient Expects to be Discharged to[de-identified] Unknown  Current DME Used/Available at Home: Oxygen, portable, Wheelchair(transport chair )  Tub or Shower Type: Tub/Shower combination    EXAMINATION/PRESENTATION/DECISION MAKING:   Critical Behavior:  Neurologic State: Alert  Orientation Level: Oriented X4  Cognition: Appropriate decision making, Appropriate for age attention/concentration, Appropriate safety awareness, Follows commands     Hearing: Auditory  Auditory Impairment: None  Skin:    Edema:   Range Of Motion:  AROM: Within functional limits           PROM: Within functional limits           Strength:    Strength: Generally decreased, functional                    Tone & Sensation:   Tone: Normal              Sensation: Intact               Coordination:  Coordination: Within functional limits  Vision:      Functional Mobility:  Bed Mobility:  Rolling: Stand-by assistance  Supine to Sit: Stand-by assistance     Scooting: Stand-by assistance  Transfers:  Sit to Stand: Contact guard assistance  Stand to Sit: Contact guard assistance        Bed to Chair: Contact guard assistance              Balance:   Sitting: Intact; Without support  Standing: Impaired; Without support  Standing - Static: Good  Standing - Dynamic : Fair  Ambulation/Gait Training:  Distance (ft): 20 Feet (ft)  Assistive Device: Gait belt  Ambulation - Level of Assistance: Contact guard assistance     Gait Description (WDL): Exceptions to WDL  Gait Abnormalities: Decreased step clearance;Trunk sway increased; Shuffling gait        Base of Support: Widened     Speed/Kimberlyn: Pace decreased (<100 feet/min)  Step Length: Right shortened;Left shortened                     Stairs: Therapeutic Exercises:       Functional Measure:  Barthel Index:    Bathin  Bladder: 5  Bowels: 10  Groomin  Dressin  Feeding: 10  Mobility: 0  Stairs: 0  Toilet Use: 5  Transfer (Bed to Chair and Back): 10  Total: 50       Barthel and G-code impairment scale:  Percentage of impairment CH  0% CI  1-19% CJ  20-39% CK  40-59% CL  60-79% CM  80-99% CN  100%   Barthel Score 0-100 100 99-80 79-60 59-40 20-39 1-19   0   Barthel Score 0-20 20 17-19 13-16 9-12 5-8 1-4 0      The Barthel ADL Index: Guidelines  1. The index should be used as a record of what a patient does, not as a record of what a patient could do. 2. The main aim is to establish degree of independence from any help, physical or verbal, however minor and for whatever reason. 3. The need for supervision renders the patient not independent. 4. A patient's performance should be established using the best available evidence. Asking the patient, friends/relatives and nurses are the usual sources, but direct observation and common sense are also important. However direct testing is not needed. 5. Usually the patient's performance over the preceding 24-48 hours is important, but occasionally longer periods will be relevant. 6. Middle categories imply that the patient supplies over 50 per cent of the effort. 7. Use of aids to be independent is allowed. Rubie Osler., Barthel, D.W. (2325). Functional evaluation: the Barthel Index. [address] [facility] (14)2. Cherise Silva minda SHOBHA Coyle, Ester Clancy., Ana James., Zhang, [address] [place] [address] Ave ().  Measuring the change indisability after inpatient rehabilitation; comparison of the responsiveness of the Barthel Index and Functional [place] Measure. Journal of Neurology, Neurosurgery, and Psychiatry, 66(4), 621-727. NITZA Rose.ARIANNA, JOSUE Dawkins, & Damaris Tillman M.A. (2004.) Assessment of post-stroke quality of life in cost-effectiveness studies: The usefulness of the Barthel Index and the EuroQoL-5D. Quality of Life Research, 13, 112-59         G codes: In compliance with CMSs Claims Based Outcome Reporting, the following G-code set was chosen for this patient based on their primary functional limitation being treated: The outcome measure chosen to determine the severity of the functional limitation was the Barthel with a score of 50/100 which was correlated with the impairment scale. ? Mobility - Walking and Moving Around:     - CURRENT STATUS: CK - 40%-59% impaired, limited or restricted    - GOAL STATUS: CJ - 20%-39% impaired, limited or restricted    - D/C STATUS:  ---------------To be determined---------------      Physical Therapy Evaluation Charge Determination   History Examination Presentation Decision-Making   MEDIUM  Complexity : 1-2 comorbidities / personal factors will impact the outcome/ POC  MEDIUM Complexity : 3 Standardized tests and measures addressing body structure, function, activity limitation and / or participation in recreation  MEDIUM Complexity : Evolving with changing characteristics  Other outcome measures BArthel   MEDIUM      Based on the above components, the patient evaluation is determined to be of the following complexity level: MEDIUM    Pain:  Pain Scale 1: Numeric (0 - 10)  Pain Intensity 1: 0              Activity Tolerance:   Fair, VSS on 4L O2. HR elevated with activity. Please refer to the flowsheet for vital signs taken during this treatment.   After treatment:   [x]         Patient left in no apparent distress sitting up in chair  []         Patient left in no apparent distress in bed  [x]         Call bell left within reach  [x] Nursing notified  [x]         Caregiver present  []         Bed alarm activated    COMMUNICATION/EDUCATION:   The patients plan of care was discussed with: Occupational Therapist, Registered Nurse and . [x]         Fall prevention education was provided and the patient/caregiver indicated understanding. [x]         Patient/family have participated as able in goal setting and plan of care. [x]         Patient/family agree to work toward stated goals and plan of care. []         Patient understands intent and goals of therapy, but is neutral about his/her participation. []         Patient is unable to participate in goal setting and plan of care.     Thank you for this referral.  Chris Botello, PT, DPT   Time Calculation: 31 mins

## 2018-12-19 NOTE — CONSULTS
EP/ ARRHYTHMIA/ CARDIOLOGY CONSULT    Patient ID:  Patient: Tanya Luna  MRN: [id number]  Age: 82 y.o.  : 1935    Date of  Admission: 2018  3:00 PM   PCP:  Shea Severe, MD   Primary cardiologist:  Jatinder Poole MD    Assessment: 1. Acute diastolic heart failure with possible pulmonary edema on admission though bilateral pneumonia is also a possibility. EF 55-60% on echo 2018.  2. Acute on chronic hypoxic respiratory failure. 3. Paroxysmal atrial fibrillation. Has refused anticoagulation in the past, on chronic ASA therapy. In sinus rhythm currently. 4. 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 Currently 1:1 AV conduction. 5. Mild aortic valve stenosis. 6. Hypertensive heart disease with heart failure. 7. Hyperglycemia with steroid. 8. Chronic obstructive pulmonary disease with acute exacerbation. Recent H. Influenzae positive in 2018.  9. Oxygen dependent with chronic supplementation. 10. Cor pulmonale with severe pulmonary hypertension. 11. History of tobacco smoking. Plan:     1. Continue IV diuretic. 2. No pacemaker changes needed today, but will check pacemaker function given pacing spike on tele falling in the QRS. Possible undersensing. 3. Regarding anticoagulation, will approach her about this again. She has refused in the past.  Algorithmic stroke risk suggests chronic anticoagulation weighing risk:benefit. Pacemaker check will tell us of any Afib recently. 4. Agree with antibiotic. Will see how she does with diuresis and pulmonary therapy. Will notify Dr. Micky Wilson of her admission. [x]       High complexity decision making was performed in this patient at high risk for decompensation with multiple organ involvement. Tanya Luna is a 82 y.o. female with a history of the above admitted with acute on chronic respiratory failure.   Suspected to have bilateral pneumonia but also acute diastolic heart failure. Treated for her pulmonary issues and seen by pulmonary consultation (Dr. Fritzi Aase). She reports breathing better at this time, though on BIPAP. IV diuretic on board. She denies chest pain. No syncope, dizziness, or palpitations. She had a pacemaker implanted due to AV conduction disease . Per her report, normal function on last pacemaker check in our office.   She follows with Dr. Aliza Rae, her general cardiologist.      Past Medical History:   Diagnosis Date    COPD     Hypertension         Past Surgical History:   Procedure Laterality Date    HX GYN      overy removed/ 2 c-sections       Social History     Tobacco Use    Smoking status: Current Every Day Smoker     Packs/day: 0.25    Smokeless tobacco: Never Used   Substance Use Topics    Alcohol use: No        Family History   Problem Relation Age of Onset    Cancer Brother         lung     Cancer Brother         lung     Heart Disease Mother         Allergies   Allergen Reactions    Keflex [Cephalexin] Itching          Current Facility-Administered Medications   Medication Dose Route Frequency    furosemide (LASIX) injection 40 mg  40 mg IntraVENous Q12H    [START ON 12/19/2018] vancomycin (VANCOCIN) 750 mg in 0.9% sodium chloride 250 mL ([id number])  750 mg IntraVENous Q18H    [START ON 12/19/2018] Vancomycin trough draw reminder note  1 Each Other ONCE    nitroglycerin (NITROSTAT) tablet 0.4 mg  0.4 mg SubLINGual Q5MIN PRN    sodium chloride (NS) flush 5-10 mL  5-10 mL IntraVENous Q8H    sodium chloride (NS) flush 5-10 mL  5-10 mL IntraVENous PRN    acetaminophen (TYLENOL) tablet 650 mg  650 mg Oral Q6H PRN    naloxone (NARCAN) injection 0.4 mg  0.4 mg IntraVENous PRN    ondansetron (ZOFRAN) injection 4 mg  4 mg IntraVENous Q4H PRN    bisacodyl (DULCOLAX) suppository 10 mg  10 mg Rectal DAILY PRN    heparin (porcine) injection 5,000 Units  5,000 Units SubCUTAneous Q8H    albuterol-ipratropium (DUO-NEB) 2.5 MG-0.5 MG/3 ML  3 mL Nebulization Q4H RT    insulin lispro (HUMALOG) injection   SubCUTAneous AC&HS    glucose chewable tablet 16 g  4 Tab Oral PRN    dextrose (D50W) injection syrg 12.5-25 g  12.5-25 g IntraVENous PRN    glucagon (GLUCAGEN) injection 1 mg  1 mg IntraMUSCular PRN    methylPREDNISolone (PF) (SOLU-MEDROL) injection 40 mg  40 mg IntraVENous Q6H    aspirin tablet 325 mg  325 mg Oral DAILY    dilTIAZem SR (CARDIZEM SR) capsule 90 mg  90 mg Oral Q12H    fluticasone-vilanterol (BREO ELLIPTA) 200mcg-25mcg/puff  2 Puff Inhalation DAILY    piperacillin-tazobactam (ZOSYN) 3.375 g in 0.9% sodium chloride (MBP/ADV) 100 mL  3.375 g IntraVENous Q8H    hydrALAZINE (APRESOLINE) 20 mg/mL injection 20 mg  20 mg IntraVENous Q6H PRN       Review of Symptoms:  See HPI as well.   General: negative for fever, chills, sweats, weakness, weight loss   Eyes: negative for blurred vision, eye pain, loss of vision, diplopia   Ear Nose and Throat: negative for rhinorrhea, pharyngitis, otalgia, tinnitus, speech or swallowing difficulties   Respiratory: +SOB, cough, sputum production, wheezing, LUDWIG, negative for pleuritic pain   Cardiology: negative for chest pain, palpitations, syncope   Gastrointestinal: negative for abdominal pain, N/V, dysphagia, change in bowel habits, bleeding   Genitourinary: negative for frequency, urgency, dysuria, hematuria, incontinence   Muskuloskeletal : negative for arthralgia, myalgia   Hematology: negative for easy bruising, bleeding, lymphadenopathy   Dermatological: negative for rash, ulceration, mole change, new lesion   Endocrine: negative for hot flashes or polydipsia   Neurological: negative for headache, dizziness, confusion, focal weakness, paresthesia, memory loss, gait disturbance   Psychological: negative for anxiety, depression, agitation       Objective:      Physical Exam:  Temp (24hrs), Av.1 °F (36.7 °C), Min:97.6 °F (36.4 °C), Max:98.9 °F (37.2 °C)    Patient Vitals for the past 8 hrs:   Pulse   12/18/18 1500 70   12/18/18 1422 70    Patient Vitals for the past 8 hrs:   Resp   12/18/18 1500 19   12/18/18 1422 18    Patient Vitals for the past 8 hrs:   BP   12/18/18 1500 125/63   12/18/18 1422 126/54          Intake/Output Summary (Last 24 hours) at 12/18/2018 2158  Last data filed at 12/18/2018 1153  Gross per 24 hour   Intake 600 ml   Output    Net 600 ml       Nondiaphoretic, not in acute distress. Supple, no palpable thyromegaly. No scleral icterus, mucous membranes moist, conjuctivae pink, no xanthelasma. Unlabored, decreased BS on auscultation bilaterally, symmetric air movement. Regular rate and rhythm, no murmur, pericardial rub, knock, or gallop. No JVD or peripheral edema. No carotid bruit. Palpable radial pulses bilaterally. Abdomen, soft, nontender, nondistended. No abdominal bruit or pulstatile masses. Extremities without cyanosis or clubbing. Muscle tone and bulk normal.  Skin warm and dry. No rashes or ulcers. Neuro grossly nonfocal.  No tremor. Awake and appropriate. CARDIOGRAPHICS and STUDIES, I reviewed:    Telemetry:  SR with intrinsic AV conduction to RBBB, pacing spike apparently in QRS. ECG in ER:  Sinus tachycardia with sequential V pacing. Echo 11/2018:  LEFT VENTRICLE: Size was normal. Systolic function was normal. Ejection  fraction was estimated in the range of 55 % to 60 %. There were no  regional wall motion abnormalities. Wall thickness was normal.    RIGHT VENTRICLE: The size was at the upper limits of normal. Systolic  function was normal. Wall thickness was normal.    LEFT ATRIUM: The atrium was dilated. RIGHT ATRIUM: The atrium was mildly to moderately dilated. MITRAL VALVE: Normal valve structure. There was normal leaflet separation. DOPPLER: The transmitral velocity was within the normal range. There was  no evidence for stenosis. There was mild regurgitation.     AORTIC VALVE: The valve was trileaflet. Leaflets exhibited mildly reduced  cuspal separation. DOPPLER: There was mild stenosis. There was no  regurgitation. TRICUSPID VALVE: Normal valve structure. There was normal leaflet  separation. DOPPLER: The transtricuspid velocity was within the normal  range. There was no evidence for tricuspid stenosis. There was moderate  regurgitation. Pulmonary artery systolic pressure: 81 mmHg. There was  severe pulmonary hypertension. PULMONIC VALVE: Not well visualized, but normal Doppler findings. DOPPLER:  There was no regurgitation. AORTA: The root exhibited normal size. SYSTEMIC VEINS: IVC: The inferior vena cava was normal in size. PERICARDIUM: There was no pericardial effusion. MEASUREMENT TABLES    Doppler measurements  Aortic valve   (Reference normals)  Peak roshni   2.3 cm/s   (--)  Mean gradient   10 mmHg   (--)  Valve area, cont   1.2 cmï¾²   (--)    CXR in ER:  Bilateral airspace disease probably pulmonary edema. Labs:  Recent Labs     12/17/18  1557   TROIQ <0.05     No results found for: CHOL, CHOLX, CHLST, CHOLV, HDL, LDL, LDLC, DLDLP, Vara Distance, CHHD, CHHDX  Recent Labs     12/17/18  1557   INR 1.1   PTP 11.2*      Recent Labs     12/18/18  0401 12/17/18  1557 12/17/18  1515    138  --    K 3.7 3.6  --     101  --    CO2 23 30  --    BUN 17 12  --    CREA 1.28* 0.88  --    * 157*  --    CA 8.3* 9.2  --    ALB 2.9* 3.2*  --    WBC 18.3*  --  19.9*   HGB 11.4*  --  13.3   HCT 35.4  --  40.6     --  333     Recent Labs     12/18/18  0401 12/17/18  1557   SGOT 11* 11*   AP 66 73   TP 6.9 7.5   ALB 2.9* 3.2*   GLOB 4.0 4.3*     No components found for: GLPOC  No results for input(s): PH, PCO2, PO2 in the last 72 hours.         Johnathan Larkin MD  12/18/2018

## 2018-12-19 NOTE — PROGRESS NOTES
[facility] Consult Service Note  Pulmonary, Critical Care, and Sleep Medicine    Name: Jazz Payne MRN: [id number]   : 1935 Hospital: [facility]    Date: 2018  Admission date: 2018 Hospital Day: 3       Subjective/Interval History:   Seen earlier today on rounds. Pt is unstable and acutely ill. Medical records and data reviewed. Still with moderate dyspnea and increased work of breathing, used bipap last pm.  NO chest pain. Tolerating po intake well. Hospital Problems  Date Reviewed: 2018          Codes Class Noted POA    Acute on chronic respiratory failure with hypoxia and hypercapnia (HCC) ICD-10-CM: J96.21, J96.22  ICD-9-CM: 518.84, 786.09, 799.02  2018 Unknown              IMPRESSION:   1. Acute on chronic respiratory failure with Hypoxia POA c/w BiPAP, has still be needing the bipap, will use at night and as needed during the day. 2. Acute COPD exacerbation POA: poor clearance , weak coughing, still with moderate dyspnea at rest and increased work of breathing. 3. Sepsis POA WBC 19.9,   4. Bilateral pneumonia likely healthcare associated POA   5. Tobacco use  6. Severe Pulmonary HTN PASP 81  7. Acute Diastolic Heart Failure  8. pulmonary HTN, will start IV diuretics  9. Diabetes mellitus type 2 POA   10. Essential hypertension POA   11. History of PAF  12. History of bradycardia status post pacemaker placement 2018 per   13. Obesity POA Body mass index is 30.42 kg/m². Body mass index is 32.04 kg/m². 15. Multiorgan dysfunction as outlined above: Pt has one or more acute or chronic illnesses with severe exacerbation with progression or side effects of treatment that poses a threat to life or bodily function  15. Additional workup outlined below  16. Pt is requiring Drug therapy requiring intensive monitoring for toxicity  17.  Pt is unstable, unpredictable needing inpatient monitoring; is acutely ill and at high risk of sudden decline and decompensation with severe consequenses and continued end organ dysfunction and failure  18. Prognosis guarded       RECOMMENDATIONS/PLAN:   1. BIPAP for non invasive ventilatory life support to prevent respiratory acidosis  2. Agree with Empiric IV antibiotics pending culture results   3. Follow culture results, Gram positive cocci so far. 4. Supplemental O2 to keep sats > 93%  5. Labs to follow electrolytes, renal function and and blood counts  6. Glucose monitoring and SSI  7. Bronchial hygiene with respiratory therapy techniques, bronchodilators  8. DVT, SUP prophylaxis  9. Smoking cessation counseling done  10. Prescription drug management with home med reconciliation reviewed          [x] High complexity decision making was performed  [x] See my orders for details      Subjective/Initial History:     I was asked by aSrika Cantrell MD to see Jo Rankin  a 82 y.o.  female in consultation for a chief complaint of pneumonia and respiratory failure requiring BIPAP/ NIV    Excerpts from admission 12/17/2018 or consult notes as follows:     \"  Recently admitted 11/11 to 11/16/2018 with respiratory failure and COPD. she initially required BiPAP, was treated for COPD. Had Haemophilus in her sputum but no airspace disease, treated with a 5-day course of Levaquin. Had bradycardia and a pacemaker was placed. Eventually weaned back to her baseline oxygen, discharged home. Baseline she is always short of breath with exertion. starting yesterday she began to notice increasing shortness of breath when she took a shower and with any minor exertion . no change in her baseline cough. No fevers or chills, sore throat, headache, chest pain. Was getting more short of breath when she laid down and  better when she sat up overnight. This morning, she was acutely short of breath, could not catch her breath even at rest .EMS was called and she was brought to the emergency room     Acute respiratory distress in the emergency room. PO2 53 on her 4 L nasal cannula oxygen. Was not hypercarbic. Placed on BiPAP. Recently admitted mid November 2018 with acute respiratory failure and COPD. Discharged on baseline 4 L . Increasing shortness of breath past 2 days, no increased cough or fevers. Acutely short of breath today, arrived in ED in respiratory distress. ABG on baseline 4 L showed pH 7.44, PCO2 31, PO2 53\"    Pt congested at times. Not active at all. Tachypneic on BIPAP. Daughter at bedside. No ill contacts at home. Upset that pacemaker did not fix her SOB. Tried to explain this to her. Sees my partner Dr. Kaykay Parry. No GI sx. No edema.          Allergies   Allergen Reactions    Keflex [Cephalexin] Itching        MAR reviewed and pertinent medications noted or modified as needed     Current Facility-Administered Medications   Medication    [START ON 12/20/2018] VANCOMYCIN INFORMATION NOTE    furosemide (LASIX) injection 40 mg    nitroglycerin (NITROSTAT) tablet 0.4 mg    sodium chloride (NS) flush 5-10 mL    sodium chloride (NS) flush 5-10 mL    acetaminophen (TYLENOL) tablet 650 mg    naloxone (NARCAN) injection 0.4 mg    ondansetron (ZOFRAN) injection 4 mg    bisacodyl (DULCOLAX) suppository 10 mg    heparin (porcine) injection 5,000 Units    albuterol-ipratropium (DUO-NEB) 2.5 MG-0.5 MG/3 ML    insulin lispro (HUMALOG) injection    glucose chewable tablet 16 g    dextrose (D50W) injection syrg 12.5-25 g    glucagon (GLUCAGEN) injection 1 mg    methylPREDNISolone (PF) (SOLU-MEDROL) injection 40 mg    aspirin tablet 325 mg    dilTIAZem SR (CARDIZEM SR) capsule 90 mg    fluticasone-vilanterol (BREO ELLIPTA) 200mcg-25mcg/puff    piperacillin-tazobactam (ZOSYN) 3.375 g in 0.9% sodium chloride (MBP/ADV) 100 mL    hydrALAZINE (APRESOLINE) 20 mg/mL injection 20 mg      Patient PCP: Guanaco Freire MD  PMH:  has a past medical history of COPD and Hypertension. PSH:   has a past surgical history that includes hx gyn. FHX: family history includes Cancer in her brother and brother; Heart Disease in her mother. SHX:  reports that she has been smoking. She has been smoking about 0.25 packs per day. she has never used smokeless tobacco. She reports that she does not drink alcohol or use drugs. ROS:A comprehensive review of systems was negative except for that written in the HPI. Objective:     Vital Signs: Telemetry:    normal sinus rhythm Intake/Output:   Visit Vitals  /52 (BP 1 Location: Left arm, BP Patient Position: At rest)   Pulse 76   Temp 98.8 °F (37.1 °C)   Resp 20   Ht 5' 4.5\" (1.638 m)   Wt 86 kg (189 lb 9.5 oz)   SpO2 95%   BMI 32.04 kg/m²       Temp (24hrs), Av.1 °F (36.7 °C), Min:97.1 °F (36.2 °C), Max:98.8 °F (37.1 °C)        O2 Device: Nasal cannula O2 Flow Rate (L/min): 4 l/min       Wt Readings from Last 4 Encounters:   18 86 kg (189 lb 9.5 oz)   18 78.5 kg (173 lb 1 oz)   18 83.5 kg (184 lb)   10/01/17 87.1 kg (192 lb 0.3 oz)          Intake/Output Summary (Last 24 hours) at 2018 1646  Last data filed at 2018 1448  Gross per 24 hour   Intake 1150 ml   Output 1000 ml   Net 150 ml       Last shift:      701 - 1900  In: 950 [P.O.:600; I.V.:350]  Out: -   Last 3 shifts: 1901 -  07  In: 2300 [I.V.:2300]  Out: 1000 [Urine:1000]       Physical Exam:     General:  female; severely ill; was on NC when   Eyes: anicteric;  HEENT: NC/AT,no nasal flaring or drainage; no thrush  Neck: No goiter; no stridor;  no accessory muscle use  Lymph nodes: No abnormally enlarged cervical or supraclavicular nodes  Chest: increased AP diameter  Lungs: scattered wheezes bilaterally  Heart: Regular rate and rhythm; NO edema  Abdomen: soft, protuberant, nontender, obese  : No Tamayo;    Ext: no cyanosis, no clubbing; no joint swelling or erythema  Skin: warm and dry; no clubbing  Musculoskeletal: no gross deformities  Neuro: speech fluent;  moves all fours, generalized weakness; withdraws to pain; unable to check gait and station  Psych: no agitation; follows commands;  oriented to time, place and person   Devices:per sepsis flow sheet   Skin: Skin unremarkable;   Pulses:Bilateral, Radial, 2+  Capillary refill: normal; warm, brisk capillary refill    Data:     All lab results for the last 24 hours reviewed. Labs:    Recent Labs     12/19/18  0513 12/18/18  0401 12/17/18  1557 12/17/18  1515   WBC 16.7* 18.3*  --  19.9*   HGB 10.0* 11.4*  --  13.3    229  --  333   INR  --   --  1.1  --      Recent Labs     12/19/18  0513 12/18/18  0401 12/17/18  1557 12/17/18  1515    138 138  --    K 3.4* 3.7 3.6  --     106 101  --    CO2 22 23 30  --    * 228* 157*  --    BUN 42* 17 12  --    CREA 2.51* 1.28* 0.88  --    CA 8.9 8.3* 9.2  --    MG 2.3  --   --   --    LAC  --   --   --  1.3   ALB 2.6* 2.9* 3.2*  --    SGOT 17 11* 11*  --    ALT 19 16 16  --      Recent Labs     12/19/18  0520   PH 7.41   PCO2 39   PO2 98   HCO3 25   FIO2 40     Recent Labs     12/17/18  1557   TROIQ <0.05     No results found for: BNPP, BNP   Lab Results   Component Value Date/Time    Culture result: FEW NORMAL RESPIRATORY MEGHANA SO FAR 12/18/2018 06:35 PM    Culture result: NO GROWTH 2 DAYS 12/17/2018 03:15 PM    Culture result: HEAVY NORMAL RESPIRATORY MEGHANA 11/13/2018 03:12 PM    Culture result: (A) 11/13/2018 03:12 PM     HEAVY HAEMOPHILUS INFLUENZAE BETA LACTAMASE POSITIVE   No results found for: TSH, TSHEXT, TSHEXT    Imaging:          Results from Hospital Encounter encounter on 12/17/18   XR CHEST PORT    Narrative INDICATION:  COPD exacerbation. Follow-up. EXAM: CXR Portable. FINDINGS: Portable chest shows improved aeration with resolution of bibasilar  haziness /presumed atelectasis since December 17. There is no pulmonary  consolidation.     There is no apparent pneumothorax. Heart size is top normal. There is no overt  pulmonary edema. Pacemaker remains. Impression IMPRESSION: No pulmonary consolidation. Improved aeration since December 17. Results from [place] encounter on 10/01/17   CTA CHEST W OR W WO CONT    Narrative Clinical indication: Hypoxia, elevated d-dimer. Localizer obtained without contrast at the level of the pulmonary arteries. Fast  injection rate of 80 cc of Isovue-370 with review of the raw data and MIP  reconstructions. No prior. CT dose reduction was achieved through the use of a  standardized protocol tailored for this examination and automatic exposure  control for dose modulation . Ledell Anant The right thyroid lobe is enlarged. There is no evidence for pulmonary emboli. There is no pericardial pleural effusion the heart size is normal. There is no  shift or pneumothorax. Calcification and mild ectasia of the thoracic aorta. There is a left lower lobe infiltrate. Stable nodules      Impression  impression: Left lower lobe infiltrate.         This care involved high complexity medical decision making: I personally:  · Reviewed the flowsheet and previous days notes  · Reviewed and summarized records or history from previous days note or discussions with staff, family  · High Risk Drug therapy requiring intensive monitoring for toxicity: eg steroids, pressors, antibiotics  · Reviewed and/or ordered Clinical lab tests  · Reviewed and/or ordered Radiology tests  · Reviewed and/or ordered of Medicine tests  · Independently visualized radiologic Images  · Reviewed the patients ECG / Telemetry  · Reviewed and/or adjusted NiPPV settings  ·  discussed my assessment/management with : Nursing, Respiratory Therapy, Family for coordination of care    Jacinto Mccall MD

## 2018-12-19 NOTE — PROGRESS NOTES
Cardiology Progress Note  12/19/2018 9:11 AM  Admit Date: 12/17/2018  Admit Diagnosis/CC: Acute on chronic respiratory failure with hypoxia and hypercapnia (HCC)  Subjective:   Patient reports:  Chest Pain:  [x] none,  consistent with [] non-cardiac  [] atypical  []  anginal chest pain             [x] none now    []  on-going  Dyspnea: [x] none  [] at rest  [] with exertion  [] improved  [] unchanged [] worsening  PND:       [x] none  [] overnight   [] current  Orthopnea: [x] none  [] improved  [] unchanged  [] worsening  Presyncope: [x] none  [] improved  [] unchanged  [] worsening  Ambulated in hallway without symptoms  [] Yes  Ambulated in room without symptoms  [x] Yes  ROS(2+other systems)   Hematuria: [] Yes  [x] No.   Dysuria: [] Yes  [x] No                                           Cough:       [] Yes  [x] No.   Sputum: [] Yes  [x] No                                            Hematochezia: [] Yes  [x] No.   Melena: [] Yes  [x] No                                            No change in family and social history from H&P/Consult note.     Current Facility-Administered Medications   Medication Dose Route Frequency    furosemide (LASIX) injection 40 mg  40 mg IntraVENous Q12H    vancomycin (VANCOCIN) 750 mg in 0.9% sodium chloride 250 mL ([id number])  750 mg IntraVENous Q18H    Vancomycin trough draw reminder note  1 Each Other ONCE    nitroglycerin (NITROSTAT) tablet 0.4 mg  0.4 mg SubLINGual Q5MIN PRN    sodium chloride (NS) flush 5-10 mL  5-10 mL IntraVENous Q8H    sodium chloride (NS) flush 5-10 mL  5-10 mL IntraVENous PRN    acetaminophen (TYLENOL) tablet 650 mg  650 mg Oral Q6H PRN    naloxone (NARCAN) injection 0.4 mg  0.4 mg IntraVENous PRN    ondansetron (ZOFRAN) injection 4 mg  4 mg IntraVENous Q4H PRN    bisacodyl (DULCOLAX) suppository 10 mg  10 mg Rectal DAILY PRN    heparin (porcine) injection 5,000 Units  5,000 Units SubCUTAneous Q8H    albuterol-ipratropium (DUO-NEB) 2.5 MG-0.5 MG/3 ML 3 mL Nebulization Q4H RT    insulin lispro (HUMALOG) injection   SubCUTAneous AC&HS    glucose chewable tablet 16 g  4 Tab Oral PRN    dextrose (D50W) injection syrg 12.5-25 g  12.5-25 g IntraVENous PRN    glucagon (GLUCAGEN) injection 1 mg  1 mg IntraMUSCular PRN    methylPREDNISolone (PF) (SOLU-MEDROL) injection 40 mg  40 mg IntraVENous Q6H    aspirin tablet 325 mg  325 mg Oral DAILY    dilTIAZem SR (CARDIZEM SR) capsule 90 mg  90 mg Oral Q12H    fluticasone-vilanterol (BREO ELLIPTA) 200mcg-25mcg/puff  2 Puff Inhalation DAILY    piperacillin-tazobactam (ZOSYN) 3.375 g in 0.9% sodium chloride (MBP/ADV) 100 mL  3.375 g IntraVENous Q8H    hydrALAZINE (APRESOLINE) 20 mg/mL injection 20 mg  20 mg IntraVENous Q6H PRN       Objective:    Physical Exam:  Last VS:   Visit Vitals  /54 (BP 1 Location: Left arm, BP Patient Position: At rest)   Pulse 89   Temp 97.1 °F (36.2 °C)   Resp 24   Ht 5' 4.5\" (1.638 m)   Wt 86 kg (189 lb 9.5 oz)   SpO2 94%   BMI 32.04 kg/m²    Temp (24hrs), Av.9 °F (36.6 °C), Min:97.1 °F (36.2 °C), Max:98.6 °F (37 °C)    24 hr VS reviewed. General Appearance:   [x] well developed, well nourished,  [x] NAD. [] agitated   [] lethargic but arousable  [] obtunded   ENT, Palate:    [x] WNL   [] dry palate/MM        Respiratory:    [x] CTA bilateral  [] rales  [] rhonchi  [] normal resp effort      [] similar to yesterday   [] worse    [] improved   Cardiovascular:   [x] RRR   [] Irregular rate and rhythm   [x] Normal S1, S2   [x] No gallop or rub.    [] no murmur   [x] no new murmur  [] murmur c/w:   [x] no edema  RLE: []1+ []2+ [] 3+;  LLE: []1+ []2+ []3+      [] edema similar to yesterday   [] worse    [] improved   [x] normal JVP   [] Elevated JVP    [x] JVP similar to yesterday   [] worse    [] improved   [x] carotid upstroke unchanged   [x] abd aorta not palpated   [x] no stigmata of peripheral emboli   GI:    [x] abd soft, non-distented,bowel sounds present, no organomegaly appreciated   Skin:  Neuro:    Cath Site:  [x] warm and dry [] cold extremities   [x] A/O x 3, grossly non-focal      [] intact w/o hematoma or bruit; distal pulse unchanged. Data Review:   Labs:    Recent Results (from the past 24 hour(s))   GLUCOSE, POC    Collection Time: 12/18/18 11:44 AM   Result Value Ref Range    Glucose (POC) 209 (H) 65 - 100 mg/dL    Performed by Krystyna Avalos    GLUCOSE, POC    Collection Time: 12/18/18  4:24 PM   Result Value Ref Range    Glucose (POC) 148 (H) 65 - 100 mg/dL    Performed by Tamra Sanders)    CULTURE, RESPIRATORY/SPUTUM/BRONCH [place] STAIN    Collection Time: 12/18/18  6:35 PM   Result Value Ref Range    Special Requests: NO SPECIAL REQUESTS      GRAM STAIN RARE WBCS SEEN      GRAM STAIN FEW EPITHELIAL CELLS SEEN      GRAM STAIN RARE GRAM POSITIVE COCCI IN CLUSTERS      Culture result: PENDING    GLUCOSE, POC    Collection Time: 12/19/18 12:55 AM   Result Value Ref Range    Glucose (POC) 241 (H) 65 - 100 mg/dL    Performed by Geovani Barraza    CBC WITH AUTOMATED DIFF    Collection Time: 12/19/18  5:13 AM   Result Value Ref Range    WBC 16.7 (H) 3.6 - 11.0 K/uL    RBC 3.42 (L) 3.80 - 5.20 M/uL    HGB 10.0 (L) 11.5 - 16.0 g/dL    HCT 30.9 (L) 35.0 - 47.0 %    MCV 90.4 80.0 - 99.0 FL    MCH 29.2 26.0 - 34.0 PG    MCHC 32.4 30.0 - 36.5 g/dL    RDW 13.9 11.5 - 14.5 %    PLATELET 318 796 - 629 K/uL    MPV 9.6 8.9 - 12.9 FL    NRBC 0.0 0  WBC    ABSOLUTE NRBC 0.00 0.00 - 0.01 K/uL    NEUTROPHILS 91 (H) 32 - 75 %    LYMPHOCYTES 2 (L) 12 - 49 %    MONOCYTES 6 5 - 13 %    EOSINOPHILS 0 0 - 7 %    BASOPHILS 0 0 - 1 %    IMMATURE GRANULOCYTES 1 (H) 0.0 - 0.5 %    ABS. NEUTROPHILS 15.2 (H) 1.8 - 8.0 K/UL    ABS. LYMPHOCYTES 0.4 (L) 0.8 - 3.5 K/UL    ABS. MONOCYTES 0.9 0.0 - 1.0 K/UL    ABS. EOSINOPHILS 0.0 0.0 - 0.4 K/UL    ABS. BASOPHILS 0.0 0.0 - 0.1 K/UL    ABS. IMM.  GRANS. 0.2 (H) 0.00 - 0.04 K/UL    DF AUTOMATED     MAGNESIUM Collection Time: 12/19/18  5:13 AM   Result Value Ref Range    Magnesium 2.3 1.6 - 2.4 mg/dL   METABOLIC PANEL, COMPREHENSIVE    Collection Time: 12/19/18  5:13 AM   Result Value Ref Range    Sodium 139 136 - 145 mmol/L    Potassium 3.4 (L) 3.5 - 5.1 mmol/L    Chloride 107 97 - 108 mmol/L    CO2 22 21 - 32 mmol/L    Anion gap 10 5 - 15 mmol/L    Glucose 187 (H) 65 - 100 mg/dL    BUN 42 (H) 6 - 20 MG/DL    Creatinine 2.51 (H) 0.55 - 1.02 MG/DL    BUN/Creatinine ratio 17 12 - 20      GFR est AA 22 (L) >60 ml/min/1.73m2    GFR est non-AA 18 (L) >60 ml/min/1.73m2    Calcium 8.9 8.5 - 10.1 MG/DL    Bilirubin, total 0.3 0.2 - 1.0 MG/DL    ALT (SGPT) 19 12 - 78 U/L    AST (SGOT) 17 15 - 37 U/L    Alk. phosphatase 53 45 - 117 U/L    Protein, total 6.3 (L) 6.4 - 8.2 g/dL    Albumin 2.6 (L) 3.5 - 5.0 g/dL    Globulin 3.7 2.0 - 4.0 g/dL    A-G Ratio 0.7 (L) 1.1 - 2.2     BLOOD GAS, ARTERIAL    Collection Time: 12/19/18  5:20 AM   Result Value Ref Range    pH 7.41 7.35 - 7.45      PCO2 39 35.0 - 45.0 mmHg    PO2 98 80 - 100 mmHg    O2 SAT 98 (H) 92 - 97 %    BICARBONATE 25 22 - 26 mmol/L    BASE EXCESS 0.2 mmol/L    O2 METHOD BIPAP      FIO2 40 %    IPAP/PIP 12.0      EPAP/CPAP/PEEP 6.0      Sample source ARTERIAL      SITE RIGHT RADIAL      EDER'S TEST YES     GLUCOSE, POC    Collection Time: 12/19/18  7:20 AM   Result Value Ref Range    Glucose (POC) 206 (H) 65 - 100 mg/dL    Performed by Jose Carlos Riley        Provided Telemetry: [x] sinus  [] chronic afib   [] paroxysmal afib  [] NSVT      Assessment:     Active Problems:    Acute on chronic respiratory failure with hypoxia and hypercapnia ([address] (12/17/2018)        Plan:     Atrial Fibrillation - Flutter  improved   Continue current meds  In RSR now chest: decreased BS. Cor: RR. For all other plans, see orders.    [x] High complexity decision making was performed

## 2018-12-19 NOTE — PROGRESS NOTES
ADULT PROTOCOL: JET AEROSOL ASSESSMENT    Patient  Chun Ayon     82 y.o.   female     12/19/2018  9:48 AM    Breath Sounds Pre Procedure: Right Breath Sounds: Diminished                               Left Breath Sounds: Diminished    Breath Sounds Post Procedure: Right Breath Sounds: Diminished                                 Left Breath Sounds: Diminished    Breathing pattern: Pre procedure Breathing Pattern: Dyspnea at rest          Post procedure Breathing Pattern: Dyspnea at rest    Heart Rate: Pre procedure Pulse: 76           Post procedure Pulse: 87    Resp Rate: Pre procedure Respirations: 30           Post procedure Respirations: 28            Cough: Pre procedure Cough: Non-productive               Post procedure Cough: Non-productive, Congested      Oxygen: O2 Device: Nasal cannula   4L         SpO2: Pre procedure SpO2: 94 %                Post procedure SpO2: 93 %     Nebulizer Therapy: Current medications Aerosolized Medications: DuoNeb         Smoking History: Current    Problem List:   Patient Active Problem List   Diagnosis Code    Acute on chronic respiratory failure ([facility]) J96.20    COPD (chronic obstructive pulmonary disease) with acute bronchitis ([facility]) J44.0, J20.9    Lung nodule R91.1    Abnormal ECG R94.31    Hyperglycemia R73.9    Tobacco abuse Z72.0    Pulmonary hypertension, moderate to severe I27.20    Sepsis ([facility]) A41.9    COPD exacerbation ([facility]) J44.1    Respiratory failure ([facility]) J96.90    Second degree heart block I44.1    S/P placement of cardiac pacemaker Z95.0    Acute on chronic respiratory failure with hypoxia and hypercapnia ([facility]) J96.21, J96.22       Respiratory Therapist: RT Kingsley

## 2018-12-19 NOTE — PROGRESS NOTES
Spiritual Care Partner Volunteer visited patient in PCU on December 19, 2016.   Documented by:      GABRIEL Henderson, [facility], jessenia SANDERS [facility] Paging Service  [phone])

## 2018-12-19 NOTE — PROGRESS NOTES
Patient ID:  Patient: Silvia Serrano  MRN: [id number]  Age: 82 y.o.  : 1935  Gender: female    Device interrogation:  The Medtronic pacemaker was interrogated revealing adequate battery, sensing, capture thresholds and lead impedances. Sinus rhythm with V pacing on check (pseudofusion). Noted that there was V pacing at MARVIN 150 msec. R waves were in the 4-5 mV range, the device can \"see\" ventricular activity. The underlying rhythm with pacing inhibited is sinus with intact AV conduction. Impression:  Normal device function though ventricular pseudofusion is noted due to timing of AV delay with RBBB. This can be adjusted. Recommendations and Programming changes:  I changed the PAV to 230 msec and MARVIN to 200 msec and this resolved the pseudofusion. No other changes recommended.       Signed By: Gail Pierre MD     2018

## 2018-12-19 NOTE — PROGRESS NOTES
Bedside shift change report given to Wellington Booker (oncoming nurse) by Farideh Ellis (offgoing nurse). Report included the following information SBAR, Kardex, ED Summary, Procedure Summary, Intake/Output, MAR, Recent Results and Cardiac Rhythm NSR/PACED. 56- Pt assisted to recliner with PT/OT. 1230- Pt assisted back to bed.

## 2018-12-19 NOTE — PROGRESS NOTES
Problem: Self Care Deficits Care Plan (Adult)  Goal: *Acute Goals and Plan of Care (Insert Text)  Occupational Therapy Goals:  Initiated 12/19/2018  1. Patient will perform grooming standing with rest breaks as needed with independence within 7 days. 2. Patient will perform upper body dressing and lower body dressing with modified independence within 7 days. 3. Patient will perform toileting with independence within 7 days. 4. Patient will utilize energy conservation techniques during functional activities with no cues within 7 day(s). 5. Patient will participate in ADL's and functional mobility with 1 rest breaks for 5 minutes within 7 day(s). 6.  Patient will independently identify 3 basic energy conservation techniques that can be incorporated during ADLs within 7 day(s). 7. Patient will transfer from toilet with independence within 7 days. Occupational Therapy EVALUATION  Patient: Saul Reese (37 y.o. female)  Date: 12/19/2018  Primary Diagnosis: Acute on chronic respiratory failure with hypoxia and hypercapnia (HCC)       Precautions:  DNR    ASSESSMENT :  Based on the objective data described below, the patient presents with SOB and LUDWIG on exertion with O2 sat of 90% on NC. Pt is not familiar with energy conservation and reports having signficant fatigue recently with all ADLS (unable to perform IADLs). Pt requires CGA for ADLs overall but would benefit from energy conservation. Educated pt verbally (briefly) and issued handout. Recommend home health at discharge. Patient will benefit from skilled intervention to address the above impairments.   Patients rehabilitation potential is considered to be Good  Factors which may influence rehabilitation potential include:   [x]             None noted  []             Mental ability/status  []             Medical condition  []             Home/family situation and support systems  []             Safety awareness  []             Pain tolerance/management  []             Other:      PLAN :  Recommendations and Planned Interventions:  [x]               Self Care Training                  [x]        Therapeutic Activities  [x]               Functional Mobility Training    []        Cognitive Retraining  [x]               Therapeutic Exercises           []        Endurance Activities  [x]               Balance Training                   []        Neuromuscular Re-Education  []               Visual/Perceptual Training     [x]   Home Safety Training  [x]               Patient Education                 [x]        Family Training/Education  []               Other (comment):    Frequency/Duration: Patient will be followed by occupational therapy 4 times a week to address goals. Discharge Recommendations: Home Health with 24/7 assist  Further Equipment Recommendations for Discharge: TBD     SUBJECTIVE:   Patient stated I am tired I have been up all day.     OBJECTIVE DATA SUMMARY:   HISTORY:   Past Medical History:   Diagnosis Date    COPD     Hypertension      Past Surgical History:   Procedure Laterality Date    HX GYN      overy removed/ 2 c-sections       Prior Level of Function/Environment/Context: patient lives with her . She is independent with all aspects of functional mobility and ADLS. She reports her  occasionally assists with LB dressing. She wears 4L O2 at home recently. She denies any falls although reports her endurance has decreased rapidly, making ADLS/showering very difficult recently.  Has a shower chair but has not been using it  Expanded or extensive additional review of patient history:     Home Situation  Home Environment: Private residence  # Steps to Enter: 2  Rails to Enter: Yes  Hand Rails : Bilateral(too wide)  One/Two Story Residence: One story  Living Alone: No  Support Systems: Spouse/Significant Other/Partner, Family member(s)  Patient Expects to be Discharged to[de-identified] Unknown  Current DME Used/Available at Home: Oxygen, portable, Wheelchair(transport chair )  Tub or Shower Type: Tub/Shower combination    Hand dominance: Right    EXAMINATION OF PERFORMANCE DEFICITS:  Cognitive/Behavioral Status:  Neurologic State: Alert  Orientation Level: Oriented X4  Cognition: Appropriate decision making; Appropriate for age attention/concentration; Appropriate safety awareness; Follows commands  Perception: Appears intact  Perseveration: No perseveration noted  Safety/Judgement: Awareness of environment; Fall prevention;Home safety        Hearing: Auditory  Auditory Impairment: None    Vision/Perceptual:                           Acuity: Within Defined Limits         Range of Motion:    AROM: Within functional limits  PROM: Within functional limits                      Strength:    Strength: Generally decreased, functional                Coordination:  Coordination: Within functional limits  Fine Motor Skills-Upper: Left Intact; Right Intact    Gross Motor Skills-Upper: Left Intact; Right Intact    Tone & Sensation:    Tone: Normal  Sensation: Intact                      Balance:  Sitting: Intact; Without support  Standing: Impaired; Without support  Standing - Static: Good  Standing - Dynamic : Fair    Functional Mobility and Transfers for ADLs:  Bed Mobility:  Rolling: Stand-by assistance  Supine to Sit: Stand-by assistance  Scooting: Stand-by assistance    Transfers:  Sit to Stand: Contact guard assistance  Stand to Sit: Contact guard assistance  Bed to Chair: Contact guard assistance  Bathroom Mobility: Contact guard assistance  Toilet Transfer : Contact guard assistance    ADL Assessment:  Feeding: Independent    Oral Facial Hygiene/Grooming: Contact guard assistance    Bathing: Contact guard assistance    Upper Body Dressing: Contact guard assistance    Lower Body Dressing: Contact guard assistance    Toileting: Contact guard assistance                ADL Intervention and task modifications:     dunia talked about what ramon conservation means and basics on energy conservation, issued handout for pt to review  Cognitive Retraining  Safety/Judgement: Awareness of environment; Fall prevention;Home safety      Functional Measure:  Barthel Index:    Bathin  Bladder: 5  Bowels: 10  Groomin  Dressin  Feeding: 10  Mobility: 0  Stairs: 0  Toilet Use: 5  Transfer (Bed to Chair and Back): 10  Total: 50       Barthel and G-code impairment scale:  Percentage of impairment CH  0% CI  1-19% CJ  20-39% CK  40-59% CL  60-79% CM  80-99% CN  100%   Barthel Score 0-100 100 99-80 79-60 59-40 20-39 1-19   0   Barthel Score 0-20 20 17-19 13-16 9-12 5-8 1-4 0      The Barthel ADL Index: Guidelines  1. The index should be used as a record of what a patient does, not as a record of what a patient could do. 2. The main aim is to establish degree of independence from any help, physical or verbal, however minor and for whatever reason. 3. The need for supervision renders the patient not independent. 4. A patient's performance should be established using the best available evidence. Asking the patient, friends/relatives and nurses are the usual sources, but direct observation and common sense are also important. However direct testing is not needed. 5. Usually the patient's performance over the preceding 24-48 hours is important, but occasionally longer periods will be relevant. 6. Middle categories imply that the patient supplies over 50 per cent of the effort. 7. Use of aids to be independent is allowed. Rin Miranda., Barthel, D.W. ([id number]). Functional evaluation: the Barthel Index. [address] [facility] (14)2. SHOBHA Dorsey, Maury Yu., Vincenzo Martell., Zhang, [address] [place] [address] (). Measuring the change indisability after inpatient rehabilitation; comparison of the responsiveness of the Barthel Index and Functional [place] Measure. Journal of Neurology, Neurosurgery, and Psychiatry, 66(4), 625-533.   Remedios Kaminski, NMEENA, Ethan Nassar, JOSUE, & Sarah Reynoso M.A. (2004.) Assessment of post-stroke quality of life in cost-effectiveness studies: The usefulness of the Barthel Index and the EuroQoL-5D. Quality of Life Research, 13, 496-04         G codes: In compliance with CMSs Claims Based Outcome Reporting, the following G-code set was chosen for this patient based on their primary functional limitation being treated: The outcome measure chosen to determine the severity of the functional limitation was the barthel with a score of 50/100 which was correlated with the impairment scale. ? Self Care:     - CURRENT STATUS: CK - 40%-59% impaired, limited or restricted    - GOAL STATUS: CJ - 20%-39% impaired, limited or restricted    - D/C STATUS:  ---------------To be determined---------------     Occupational Therapy Evaluation Charge Determination   History Examination Decision-Making   MEDIUM Complexity : Expanded review of history including physical, cognitive and psychosocial  history  MEDIUM Complexity : 3-5 performance deficits relating to physical, cognitive , or psychosocial skils that result in activity limitations and / or participation restrictions MEDIUM Complexity : Patient may present with comorbidities that affect occupational performnce. Miniml to moderate modification of tasks or assistance (eg, physical or verbal ) with assesment(s) is necessary to enable patient to complete evaluation       Based on the above components, the patient evaluation is determined to be of the following complexity level: MEDIUM  Pain:  Pain Scale 1: Numeric (0 - 10)  Pain Intensity 1: 0              Activity Tolerance:     Please refer to the flowsheet for vital signs taken during this treatment.   After treatment:   [] Patient left in no apparent distress sitting up in chair  [x] Patient left in no apparent distress in bed  [x] Call bell left within reach  [] Nursing notified  [x] Caregiver present  [] Bed alarm activated    COMMUNICATION/EDUCATION:   The patients plan of care was discussed with: Physical Therapist, Registered Nurse and patient and her daughter. [x] Home safety education was provided and the patient/caregiver indicated understanding. [x] Patient/family have participated as able in goal setting and plan of care. [x] Patient/family agree to work toward stated goals and plan of care. [] Patient understands intent and goals of therapy, but is neutral about his/her participation. [] Patient is unable to participate in goal setting and plan of care. This patients plan of care is appropriate for delegation to [facility].     Thank you for this referral.  Libby Bhatt OTR/L  Time Calculation: 13 mins

## 2018-12-19 NOTE — PROGRESS NOTES
Hospitalist Progress Note    NAME: Yoselin Wall   :  1935   MRN:  [id number]       Assessment / Plan:  Acute on chronic respiratory failure POA with baseline 2-4L O2  Acute COPD exacerbation  Sepsis due to Bilateral HCAP POA   Underlying severe pulmonary hypertension PADP 81   -clinically: very little improvement ; still requiring bipap, poor clearance/ weak coughing   Significant dyspnea with minimal exertion   Leukocytosis is slowly improving, no fever   -cont bipap at night and prn during daytime  -cont aggressive IV steroids and jet nebs   -Empiric IV AB: vanco + zosyn   BC NTD, follow resp cultures   -keep O2 sats > 92%   -cxray repeated : No pulmonary consolidation. Improved aeration since      ALMAZ not POA  -Cr today jumped up to 2.5, was normal on admission at 0.8   liekly due to over diuresing --> stop lasix   Gentle hydration overnight  Monitor closely on vanco  If cr cont to go up, will change vanco and ask renal to see     Acute on chronic diastolic HF EF 56%   HTN / PAfib   H/o bradycardia s/p PPM 2018 per   -HR/BP stable   -cont home Cardizem/ holding Norvasc    -on lasix 20 mg daily at home   -CHADS-Vasc 4 --> need to be on a/coag ( pt declined in the past) --> cont ASA    -ECHO: EF 55%, mild AS, severe pulmonary HTN      DM type II  -BS > 200 due to steroids  -adding NPH with steroids + SS   -hba1c 6.6 2018     Hypokalemia  -supplement as needed  -follow in am with Mg      Tobacco use   Obesity. Body mass index is 32.04 kg/m². Code status: DNR per h/p on admission   NOK:    Prophylaxis: heparin     Baseline: functional, ; home O2 3.5-4 L   Recommended Disposition: HHC vs SNF     Subjective:     Chief Complaint / Reason for Physician Visit: following acute respiratory failure / afib/ htn   \" I didn't sleep last night\"  \" not getting better\"     Discussed with RN events overnight.      Review of Systems:  Symptom Y/N Comments  Symptom Y/N Comments   Fever/Chills n   Chest Pain n    Poor Appetite    Edema     Cough y   Abdominal Pain n    Sputum    Joint Pain     SOB/LUDWIG y   Pruritis/Rash     Nausea/vomit    Tolerating PT/OT     Diarrhea    Tolerating Diet     Constipation    Other       Could NOT obtain due to:      Objective:     VITALS:   Last 24hrs VS reviewed since prior progress note. Most recent are:  Patient Vitals for the past 24 hrs:   Temp Pulse Resp BP SpO2   12/19/18 1521     95 %   12/19/18 1448 98.8 °F (37.1 °C) 76 20 139/52 95 %   12/19/18 1153     96 %   12/19/18 1048 98 °F (36.7 °C) 74 20 118/43 96 %   12/19/18 0816     94 %   12/19/18 0727 97.1 °F (36.2 °C) 89 24 130/54 98 %   12/19/18 0519  74 19  99 %   12/19/18 0518    133/62    12/19/18 0348     100 %   12/19/18 0147 98.6 °F (37 °C) 95 (!) 31 127/87 92 %   12/18/18 2346     99 %   12/18/18 2026    131/46    12/18/18 2025  72 26  99 %   12/18/18 2009     100 %       Intake/Output Summary (Last 24 hours) at 12/19/2018 1723  Last data filed at 12/19/2018 1448  Gross per 24 hour   Intake 1150 ml   Output 1000 ml   Net 150 ml        PHYSICAL EXAM:  General: WD, WN. Alert, cooperative, no acute distress. Mild/mod dyspnea with conversation     EENT:  EOMI. Anicteric sclerae. MMM  Resp:  diminished BS bilaterally, no wheezing or rales. No accessory muscle use  CV:  Regular  rhythm,  No edema  GI:  Soft, Non distended, Non tender.  +Bowel sounds  Neurologic:  Alert and oriented X 3, normal speech,   Psych:   Good insight. Not anxious nor agitated  Skin:  No rashes.   No jaundice    Reviewed most current lab test results and cultures  YES  Reviewed most current radiology test results   YES  Review and summation of old records today    NO  Reviewed patient's current orders and MAR    YES  PMH/SH reviewed - no change compared to H&P  ________________________________________________________________________  Care Plan discussed with:    Comments   Patient y Family  y Multiple family at bedside    RN y    Care Manager     Consultant                        Multidiciplinary team rounds were held today with , nursing, pharmacist and clinical coordinator. Patient's plan of care was discussed; medications were reviewed and discharge planning was addressed. ________________________________________________________________________  Total NON critical care TIME:  35  Minutes    Total CRITICAL CARE TIME Spent:   Minutes non procedure based      Comments   >50% of visit spent in counseling and coordination of care     ________________________________________________________________________  Reinaldo Toro MD     Procedures: see electronic medical records for all procedures/Xrays and details which were not copied into this note but were reviewed prior to creation of Plan. LABS:  I reviewed today's most current labs and imaging studies.   Pertinent labs include:  Recent Labs     12/19/18  0513 12/18/18  0401 12/17/18  1515   WBC 16.7* 18.3* 19.9*   HGB 10.0* 11.4* 13.3   HCT 30.9* 35.4 40.6    229 333     Recent Labs     12/19/18  0513 12/18/18  0401 12/17/18  1557    138 138   K 3.4* 3.7 3.6    106 101   CO2 22 23 30   * 228* 157*   BUN 42* 17 12   CREA 2.51* 1.28* 0.88   CA 8.9 8.3* 9.2   MG 2.3  --   --    ALB 2.6* 2.9* 3.2*   TBILI 0.3 0.7 0.9   SGOT 17 11* 11*   ALT 19 16 16   INR  --   --  1.1       Signed: Reinaldo Toro MD

## 2018-12-20 ENCOUNTER — APPOINTMENT (OUTPATIENT)
Dept: ULTRASOUND IMAGING | Age: 83
DRG: 871 | End: 2018-12-20
Attending: INTERNAL MEDICINE
Payer: MEDICARE

## 2018-12-20 LAB
ANION GAP SERPL CALC-SCNC: 13 MMOL/L (ref 5–15)
ATRIAL RATE: 44 BPM
BUN SERPL-MCNC: 57 MG/DL (ref 6–20)
BUN/CREAT SERPL: 19 (ref 12–20)
CALCIUM SERPL-MCNC: 9 MG/DL (ref 8.5–10.1)
CALCULATED R AXIS, ECG10: 43 DEGREES
CALCULATED T AXIS, ECG11: 3 DEGREES
CHLORIDE SERPL-SCNC: 105 MMOL/L (ref 97–108)
CO2 SERPL-SCNC: 22 MMOL/L (ref 21–32)
CREAT SERPL-MCNC: 3.06 MG/DL (ref 0.55–1.02)
CREAT UR-MCNC: 99.6 MG/DL
DIAGNOSIS, 93000: NORMAL
EOSINOPHIL #/AREA URNS HPF: NEGATIVE /[HPF]
ERYTHROCYTE [DISTWIDTH] IN BLOOD BY AUTOMATED COUNT: 14.1 % (ref 11.5–14.5)
GLUCOSE BLD STRIP.AUTO-MCNC: 170 MG/DL (ref 65–100)
GLUCOSE BLD STRIP.AUTO-MCNC: 187 MG/DL (ref 65–100)
GLUCOSE BLD STRIP.AUTO-MCNC: 196 MG/DL (ref 65–100)
GLUCOSE BLD STRIP.AUTO-MCNC: 204 MG/DL (ref 65–100)
GLUCOSE SERPL-MCNC: 186 MG/DL (ref 65–100)
HCT VFR BLD AUTO: 33.6 % (ref 35–47)
HGB BLD-MCNC: 10.8 G/DL (ref 11.5–16)
MAGNESIUM SERPL-MCNC: 2.4 MG/DL (ref 1.6–2.4)
MCH RBC QN AUTO: 29 PG (ref 26–34)
MCHC RBC AUTO-ENTMCNC: 32.1 G/DL (ref 30–36.5)
MCV RBC AUTO: 90.1 FL (ref 80–99)
NRBC # BLD: 0 K/UL (ref 0–0.01)
NRBC BLD-RTO: 0 PER 100 WBC
PHOSPHATE SERPL-MCNC: 5.8 MG/DL (ref 2.6–4.7)
PLATELET # BLD AUTO: 307 K/UL (ref 150–400)
PMV BLD AUTO: 9.7 FL (ref 8.9–12.9)
POTASSIUM SERPL-SCNC: 3.4 MMOL/L (ref 3.5–5.1)
Q-T INTERVAL, ECG07: 384 MS
QRS DURATION, ECG06: 150 MS
QTC CALCULATION (BEZET), ECG08: 500 MS
RBC # BLD AUTO: 3.73 M/UL (ref 3.8–5.2)
SERVICE CMNT-IMP: ABNORMAL
SODIUM SERPL-SCNC: 140 MMOL/L (ref 136–145)
SODIUM UR-SCNC: <5 MMOL/L
VANCOMYCIN SERPL-MCNC: 19.7 UG/ML
VENTRICULAR RATE, ECG03: 102 BPM
WBC # BLD AUTO: 15.3 K/UL (ref 3.6–11)

## 2018-12-20 PROCEDURE — 74011000258 HC RX REV CODE- 258: Performed by: HOSPITALIST

## 2018-12-20 PROCEDURE — 97535 SELF CARE MNGMENT TRAINING: CPT | Performed by: OCCUPATIONAL THERAPIST

## 2018-12-20 PROCEDURE — 74011250636 HC RX REV CODE- 250/636: Performed by: INTERNAL MEDICINE

## 2018-12-20 PROCEDURE — 80202 ASSAY OF VANCOMYCIN: CPT

## 2018-12-20 PROCEDURE — 80048 BASIC METABOLIC PNL TOTAL CA: CPT

## 2018-12-20 PROCEDURE — 36415 COLL VENOUS BLD VENIPUNCTURE: CPT

## 2018-12-20 PROCEDURE — 83735 ASSAY OF MAGNESIUM: CPT

## 2018-12-20 PROCEDURE — 84100 ASSAY OF PHOSPHORUS: CPT

## 2018-12-20 PROCEDURE — 74011636637 HC RX REV CODE- 636/637: Performed by: INTERNAL MEDICINE

## 2018-12-20 PROCEDURE — 74011250637 HC RX REV CODE- 250/637: Performed by: HOSPITALIST

## 2018-12-20 PROCEDURE — 97116 GAIT TRAINING THERAPY: CPT

## 2018-12-20 PROCEDURE — 87205 SMEAR GRAM STAIN: CPT

## 2018-12-20 PROCEDURE — 77010033678 HC OXYGEN DAILY

## 2018-12-20 PROCEDURE — 74011250637 HC RX REV CODE- 250/637: Performed by: INTERNAL MEDICINE

## 2018-12-20 PROCEDURE — 93041 RHYTHM ECG TRACING: CPT

## 2018-12-20 PROCEDURE — 74011000258 HC RX REV CODE- 258: Performed by: INTERNAL MEDICINE

## 2018-12-20 PROCEDURE — 94640 AIRWAY INHALATION TREATMENT: CPT

## 2018-12-20 PROCEDURE — 82570 ASSAY OF URINE CREATININE: CPT

## 2018-12-20 PROCEDURE — 85027 COMPLETE CBC AUTOMATED: CPT

## 2018-12-20 PROCEDURE — 94660 CPAP INITIATION&MGMT: CPT

## 2018-12-20 PROCEDURE — 76770 US EXAM ABDO BACK WALL COMP: CPT

## 2018-12-20 PROCEDURE — 74011636637 HC RX REV CODE- 636/637: Performed by: HOSPITALIST

## 2018-12-20 PROCEDURE — 74011000250 HC RX REV CODE- 250: Performed by: INTERNAL MEDICINE

## 2018-12-20 PROCEDURE — 84300 ASSAY OF URINE SODIUM: CPT

## 2018-12-20 PROCEDURE — 82962 GLUCOSE BLOOD TEST: CPT

## 2018-12-20 PROCEDURE — 74011250636 HC RX REV CODE- 250/636: Performed by: HOSPITALIST

## 2018-12-20 PROCEDURE — 65660000000 HC RM CCU STEPDOWN

## 2018-12-20 RX ORDER — POTASSIUM CHLORIDE 750 MG/1
20 TABLET, FILM COATED, EXTENDED RELEASE ORAL
Status: COMPLETED | OUTPATIENT
Start: 2018-12-20 | End: 2018-12-20

## 2018-12-20 RX ADMIN — POTASSIUM CHLORIDE 20 MEQ: 750 TABLET, EXTENDED RELEASE ORAL at 14:59

## 2018-12-20 RX ADMIN — PIPERACILLIN SODIUM,TAZOBACTAM SODIUM 3.38 G: 3; .375 INJECTION, POWDER, FOR SOLUTION INTRAVENOUS at 08:07

## 2018-12-20 RX ADMIN — DRONEDARONE 400 MG: 400 TABLET, FILM COATED ORAL at 17:07

## 2018-12-20 RX ADMIN — Medication 10 ML: at 22:42

## 2018-12-20 RX ADMIN — PIPERACILLIN SODIUM,TAZOBACTAM SODIUM 3.38 G: 3; .375 INJECTION, POWDER, FOR SOLUTION INTRAVENOUS at 22:37

## 2018-12-20 RX ADMIN — SODIUM CHLORIDE 50 ML/HR: 900 INJECTION, SOLUTION INTRAVENOUS at 10:54

## 2018-12-20 RX ADMIN — METHYLPREDNISOLONE SODIUM SUCCINATE 40 MG: 40 INJECTION, POWDER, FOR SOLUTION INTRAMUSCULAR; INTRAVENOUS at 05:02

## 2018-12-20 RX ADMIN — INSULIN HUMAN 15 UNITS: 100 INJECTION, SUSPENSION SUBCUTANEOUS at 08:00

## 2018-12-20 RX ADMIN — DILTIAZEM HYDROCHLORIDE 90 MG: 90 CAPSULE, EXTENDED RELEASE ORAL at 08:00

## 2018-12-20 RX ADMIN — INSULIN LISPRO 2 UNITS: 100 INJECTION, SOLUTION INTRAVENOUS; SUBCUTANEOUS at 11:37

## 2018-12-20 RX ADMIN — INSULIN LISPRO 2 UNITS: 100 INJECTION, SOLUTION INTRAVENOUS; SUBCUTANEOUS at 17:06

## 2018-12-20 RX ADMIN — IPRATROPIUM BROMIDE AND ALBUTEROL SULFATE 3 ML: .5; 3 SOLUTION RESPIRATORY (INHALATION) at 08:30

## 2018-12-20 RX ADMIN — SODIUM CHLORIDE 50 ML/HR: 900 INJECTION, SOLUTION INTRAVENOUS at 00:05

## 2018-12-20 RX ADMIN — METHYLPREDNISOLONE SODIUM SUCCINATE 40 MG: 40 INJECTION, POWDER, FOR SOLUTION INTRAMUSCULAR; INTRAVENOUS at 14:59

## 2018-12-20 RX ADMIN — IPRATROPIUM BROMIDE AND ALBUTEROL SULFATE 3 ML: .5; 3 SOLUTION RESPIRATORY (INHALATION) at 23:36

## 2018-12-20 RX ADMIN — INSULIN LISPRO 3 UNITS: 100 INJECTION, SOLUTION INTRAVENOUS; SUBCUTANEOUS at 07:59

## 2018-12-20 RX ADMIN — HEPARIN SODIUM 5000 UNITS: 5000 INJECTION INTRAVENOUS; SUBCUTANEOUS at 05:02

## 2018-12-20 RX ADMIN — ASPIRIN 325 MG: 325 TABLET ORAL at 08:00

## 2018-12-20 RX ADMIN — IPRATROPIUM BROMIDE AND ALBUTEROL SULFATE 3 ML: .5; 3 SOLUTION RESPIRATORY (INHALATION) at 19:48

## 2018-12-20 RX ADMIN — APIXABAN 2.5 MG: 2.5 TABLET, FILM COATED ORAL at 11:37

## 2018-12-20 RX ADMIN — DRONEDARONE 400 MG: 400 TABLET, FILM COATED ORAL at 10:04

## 2018-12-20 RX ADMIN — Medication 10 ML: at 05:08

## 2018-12-20 RX ADMIN — METHYLPREDNISOLONE SODIUM SUCCINATE 40 MG: 40 INJECTION, POWDER, FOR SOLUTION INTRAMUSCULAR; INTRAVENOUS at 08:00

## 2018-12-20 RX ADMIN — IPRATROPIUM BROMIDE AND ALBUTEROL SULFATE 3 ML: .5; 3 SOLUTION RESPIRATORY (INHALATION) at 03:45

## 2018-12-20 RX ADMIN — APIXABAN 2.5 MG: 2.5 TABLET, FILM COATED ORAL at 22:37

## 2018-12-20 RX ADMIN — INSULIN HUMAN 25 UNITS: 100 INJECTION, SUSPENSION SUBCUTANEOUS at 22:37

## 2018-12-20 RX ADMIN — Medication 10 ML: at 14:59

## 2018-12-20 RX ADMIN — METHYLPREDNISOLONE SODIUM SUCCINATE 40 MG: 40 INJECTION, POWDER, FOR SOLUTION INTRAMUSCULAR; INTRAVENOUS at 22:36

## 2018-12-20 RX ADMIN — FLUTICASONE FUROATE AND VILANTEROL TRIFENATATE 2 PUFF: 200; 25 POWDER RESPIRATORY (INHALATION) at 08:01

## 2018-12-20 RX ADMIN — DEXTROSE MONOHYDRATE 15 MG/HR: 50 INJECTION, SOLUTION INTRAVENOUS at 05:02

## 2018-12-20 NOTE — PROGRESS NOTES
Problem: Self Care Deficits Care Plan (Adult)  Goal: *Acute Goals and Plan of Care (Insert Text)  Occupational Therapy Goals:  Initiated 12/19/2018  1. Patient will perform grooming standing with rest breaks as needed with independence within 7 days. 2. Patient will perform upper body dressing and lower body dressing with modified independence within 7 days. 3. Patient will perform toileting with independence within 7 days. 4. Patient will utilize energy conservation techniques during functional activities with no cues within 7 day(s). 5. Patient will participate in ADL's and functional mobility with 1 rest breaks for 5 minutes within 7 day(s). 6.  Patient will independently identify 3 basic energy conservation techniques that can be incorporated during ADLs within 7 day(s). 7. Patient will transfer from toilet with independence within 7 days. Occupational Therapy TREATMENT  Patient: Jad Dumont (39 y.o. female)  Date: 12/20/2018  Diagnosis: Acute on chronic respiratory failure with hypoxia and hypercapnia (HCC) <principal problem not specified>      Precautions: DNR  Chart, occupational therapy assessment, plan of care, and goals were reviewed. ASSESSMENT:  Pt was on 4 liters NC and after initial mobility around bed to chair (pt was rushing) O2 sat decreased to 85%. Pt was educated on pacing, PLB and energy conservation. Pt reported that she knows what to do but does not always do it. She reports that she always moves fast.  O2 sat improved to 93% over time with seated rest break. Pt reports that her  reviewed the energy conservation handout given and she looked over it as well. Some of the items she has been doing at home (using robe to dry herself, not taking her O2 of to bathe and using shower chair). Pt mobilized to bathroom with slower rate of mobility with supervision and performing toileting and grooming at sink (needed to brace on sink for engery conservation).   O2 sat was 90% after this tasks returned to bedside chair. Pt was educated on the difference in her SOB and endurance putting energy conservation into practice. Progression toward goals:  []       Improving appropriately and progressing toward goals  [x]       Improving slowly and progressing toward goals  []       Not making progress toward goals and plan of care will be adjusted     PLAN:  Patient continues to benefit from skilled intervention to address the above impairments. Continue treatment per established plan of care. Discharge Recommendations:  Home Health  Further Equipment Recommendations for Discharge:  none     SUBJECTIVE:   Patient stated Why do I have to do that?     OBJECTIVE DATA SUMMARY:   Cognitive/Behavioral Status:  Neurologic State: Alert  Orientation Level: Oriented X4  Cognition: Appropriate decision making; Appropriate for age attention/concentration; Appropriate safety awareness; Follows commands             Functional Mobility and Transfers for ADLs:  Bed Mobility:  Rolling: Independent  Supine to Sit: Independent  Scooting: Independent    Transfers:  Sit to Stand: Supervision  Functional Transfers  Bathroom Mobility: Supervision/set up  Toilet Transfer : Supervision  Bed to Chair: Stand-by assistance    Balance:  Sitting: Intact  Standing: Impaired  Standing - Static: Good  Standing - Dynamic : Good    ADL Intervention:       Grooming  Washing Hands: Supervision/set-up                        Toileting  Bladder Hygiene: Supervision/set-up  Clothing Management: Supervision/set-up         Pain:  Pain Scale 1: Numeric (0 - 10)  Pain Intensity 1: 0              Activity Tolerance:     Please refer to the flowsheet for vital signs taken during this treatment.   After treatment:   [x] Patient left in no apparent distress sitting up in chair  [] Patient left in no apparent distress in bed  [x] Call bell left within reach  [x] Nursing notified  [x] Caregiver present  [] Bed alarm activated    COMMUNICATION/COLLABORATION:   The patients plan of care was discussed with: Physical Therapist, Registered Nurse and patient    MARCIA Benjamin/L  Time Calculation: 21 mins

## 2018-12-20 NOTE — PROGRESS NOTES
Cardiology Progress Note  12/20/2018 8:58 AM  Admit Date: 12/17/2018  Admit Diagnosis/CC: Acute on chronic respiratory failure with hypoxia and hypercapnia (HCC)  Subjective:   Patient reports:  Chest Pain:  [x] none,  consistent with [] non-cardiac  [] atypical  []  anginal chest pain             [x] none now    []  on-going  Dyspnea: [x] none  [] at rest  [] with exertion  [] improved  [] unchanged [] worsening  PND:       [x] none  [] overnight   [] current  Orthopnea: [x] none  [] improved  [] unchanged  [] worsening  Presyncope: [x] none  [] improved  [] unchanged  [] worsening  Ambulated in hallway without symptoms  [] Yes  Ambulated in room without symptoms  [x] Yes  ROS(2+other systems)   Hematuria: [] Yes  [x] No.   Dysuria: [] Yes  [x] No                                           Cough:       [] Yes  [x] No.   Sputum: [] Yes  [x] No                                            Hematochezia: [] Yes  [x] No.   Melena: [] Yes  [x] No                                            No change in family and social history from H&P/Consult note.     Current Facility-Administered Medications   Medication Dose Route Frequency    dronedarone (MULTAQ) tablet tab 400 mg  400 mg Oral BID WITH MEALS    apixaban (ELIQUIS) tablet 2.5 mg  2.5 mg Oral BID    VANCOMYCIN INFORMATION NOTE   Other ONCE    0.9% sodium chloride infusion  50 mL/hr IntraVENous CONTINUOUS    insulin NPH (NOVOLIN N, HUMULIN N) injection 15 Units  15 Units SubCUTAneous Q12H    nitroglycerin (NITROSTAT) tablet 0.4 mg  0.4 mg SubLINGual Q5MIN PRN    sodium chloride (NS) flush 5-10 mL  5-10 mL IntraVENous Q8H    sodium chloride (NS) flush 5-10 mL  5-10 mL IntraVENous PRN    acetaminophen (TYLENOL) tablet 650 mg  650 mg Oral Q6H PRN    naloxone (NARCAN) injection 0.4 mg  0.4 mg IntraVENous PRN    bisacodyl (DULCOLAX) suppository 10 mg  10 mg Rectal DAILY PRN    albuterol-ipratropium (DUO-NEB) 2.5 MG-0.5 MG/3 ML  3 mL Nebulization Q4H RT    insulin lispro (HUMALOG) injection   SubCUTAneous AC&HS    glucose chewable tablet 16 g  4 Tab Oral PRN    dextrose (D50W) injection syrg 12.5-25 g  12.5-25 g IntraVENous PRN    glucagon (GLUCAGEN) injection 1 mg  1 mg IntraMUSCular PRN    methylPREDNISolone (PF) (SOLU-MEDROL) injection 40 mg  40 mg IntraVENous Q6H    fluticasone-vilanterol (BREO ELLIPTA) 200mcg-25mcg/puff  2 Puff Inhalation DAILY    piperacillin-tazobactam (ZOSYN) 3.375 g in 0.9% sodium chloride (MBP/ADV) 100 mL  3.375 g IntraVENous Q8H    hydrALAZINE (APRESOLINE) 20 mg/mL injection 20 mg  20 mg IntraVENous Q6H PRN       Objective:    Physical Exam:  Last VS:   Visit Vitals  /56 (BP 1 Location: Right arm, BP Patient Position: At rest)   Pulse 98   Temp 97.4 °F (36.3 °C)   Resp 22   Ht 5' 4.5\" (1.638 m)   Wt 80.1 kg (176 lb 9.4 oz)   SpO2 93%   BMI 29.84 kg/m²    Temp (24hrs), Av.4 °F (36.9 °C), Min:97.4 °F (36.3 °C), Max:99 °F (37.2 °C)    24 hr VS reviewed. General Appearance:   [x] well developed, well nourished,  [x] NAD. [] agitated   [] lethargic but arousable  [] obtunded   ENT, Palate:    [x] WNL   [] dry palate/MM        Respiratory:    [x] CTA bilateral  [] rales  [] rhonchi  [] normal resp effort      [] similar to yesterday   [] worse    [] improved   Cardiovascular:   [x] RRR   [] Irregular rate and rhythm   [x] Normal S1, S2   [x] No gallop or rub.    [] no murmur   [x] no new murmur  [] murmur c/w:   [x] no edema  RLE: []1+ []2+ [] 3+;  LLE: []1+ []2+ []3+      [] edema similar to yesterday   [] worse    [] improved   [x] normal JVP   [] Elevated JVP    [x] JVP similar to yesterday   [] worse    [] improved   [x] carotid upstroke unchanged   [x] abd aorta not palpated   [x] no stigmata of peripheral emboli   GI:    [x] abd soft, non-distented,bowel sounds present, no                     organomegaly appreciated   Skin:  Neuro:    Cath Site:  [x] warm and dry [] cold extremities   [x] A/O x 3, grossly non-focal      [] intact w/o hematoma or bruit; distal pulse unchanged.               Data Review:   Labs:    Recent Results (from the past 24 hour(s))   GLUCOSE, POC    Collection Time: 12/19/18 11:45 AM   Result Value Ref Range    Glucose (POC) 205 (H) 65 - 100 mg/dL    Performed by Fabian Raymond, TROUGH    Collection Time: 12/19/18 11:51 AM   Result Value Ref Range    Vancomycin,trough 21.4 (HH) 5.0 - 10.0 ug/mL    Reported dose date: 20181218      Reported dose time: 1200      Reported dose: 1000MG UNITS   GLUCOSE, POC    Collection Time: 12/19/18  4:24 PM   Result Value Ref Range    Glucose (POC) 209 (H) 65 - 100 mg/dL    Performed by DMITRY BAUM    GLUCOSE, POC    Collection Time: 12/19/18  8:46 PM   Result Value Ref Range    Glucose (POC) 177 (H) 65 - 100 mg/dL    Performed by Hoang Camacho (PCT)    CBC W/O DIFF    Collection Time: 12/20/18  4:47 AM   Result Value Ref Range    WBC 15.3 (H) 3.6 - 11.0 K/uL    RBC 3.73 (L) 3.80 - 5.20 M/uL    HGB 10.8 (L) 11.5 - 16.0 g/dL    HCT 33.6 (L) 35.0 - 47.0 %    MCV 90.1 80.0 - 99.0 FL    MCH 29.0 26.0 - 34.0 PG    MCHC 32.1 30.0 - 36.5 g/dL    RDW 14.1 11.5 - 14.5 %    PLATELET 888 844 - 961 K/uL    MPV 9.7 8.9 - 12.9 FL    NRBC 0.0 0  WBC    ABSOLUTE NRBC 0.00 0.00 - 7.04 K/uL   METABOLIC PANEL, BASIC    Collection Time: 12/20/18  5:51 AM   Result Value Ref Range    Sodium 140 136 - 145 mmol/L    Potassium 3.4 (L) 3.5 - 5.1 mmol/L    Chloride 105 97 - 108 mmol/L    CO2 22 21 - 32 mmol/L    Anion gap 13 5 - 15 mmol/L    Glucose 186 (H) 65 - 100 mg/dL    BUN 57 (H) 6 - 20 MG/DL    Creatinine 3.06 (H) 0.55 - 1.02 MG/DL    BUN/Creatinine ratio 19 12 - 20      GFR est AA 18 (L) >60 ml/min/1.73m2    GFR est non-AA 15 (L) >60 ml/min/1.73m2    Calcium 9.0 8.5 - 10.1 MG/DL   MAGNESIUM    Collection Time: 12/20/18  5:51 AM   Result Value Ref Range    Magnesium 2.4 1.6 - 2.4 mg/dL   PHOSPHORUS    Collection Time: 12/20/18  5:51 AM   Result Value Ref Range    Phosphorus 5.8 (H) 2.6 - 4.7 MG/DL   GLUCOSE, POC    Collection Time: 12/20/18  7:24 AM   Result Value Ref Range    Glucose (POC) 204 (H) 65 - 100 mg/dL    Performed by KAYLEE QUINTEROS (PCT) CON        Provided Telemetry: [x] sinus  [] chronic afib   [] paroxysmal afib  [] NSVT      Assessment:     Active Problems:    Acute on chronic respiratory failure with hypoxia and hypercapnia (HCC) (12/17/2018)        Plan:     Atrial Fibrillation - Flutter  unchanged   Anticoagulate  eliquis  She was well controlled with multaq in the past. Will restart. Give eliquis. For all other plans, see orders.    [x] High complexity decision making was performed

## 2018-12-20 NOTE — PROGRESS NOTES
Hospitalist Progress Note    NAME: Chun Ayon   :  1935   MRN:  [id number]       Assessment / Plan:  Acute on chronic respiratory failure POA with baseline 2-4L O2  Acute COPD exacerbation  Sepsis due to Bilateral HCAP POA   Underlying severe pulmonary hypertension PADP 81   -clinically: very slowly improving. BIPAP at night and pr during daytime   poor clearance/ weak coughing   Significant dyspnea with minimal exertion   Leukocytosis is slowly improving, no fever   -cont bipap at night and prn during daytime  -cont aggressive IV steroids and jet nebs   -Empiric IV AB: vanco - will dc with ALMAZ, no signs of gram + infection  Cont zosyn   BC NTD, follow resp cultures - gram neg rods   -keep O2 sats > 92%   -cxray repeated : No pulmonary consolidation. Improved aeration since      ALMAZ not POA  -Cr up again - 3.06, baseline normal at 0.8   Lasix + vanco ( trough at 21.4 )   Dc vanco   -will ask renal to help with management   -cont gentle hydration with close watch of fluid status with h/o HF   Daily weight    Acute on chronic diastolic HF EF 64%   HTN / PAfib   H/o bradycardia s/p PPM 2018 per   -HR/BP stable   Had episode of afib rvr last night   -cont home Cardizem/ holding Norvasc    -on lasix 20 mg daily at home ( on hold now for ALMAZ)   -CHADS-Vasc 4 --> need to be on a/coag  Last admission pt declined AC and wanted to be on ASA alone   Noted today Dr Lj Dejesus started on Eliquis ( will leave it to Dr Lj Dejesus to discuss it with pt)   Now off ASA   -started back on Multaq by Dr Lj Dejesus   -ECHO: EF 55%, mild AS, severe pulmonary HTN      DM type II  --200 due to steroids  -inc NPH with steroids + SS   -hba1c 6.6 2018     Hypokalemia  -supplement as needed  -follow in am with Mg      Tobacco use   Obesity. Body mass index is 32.04 kg/m².           Code status: DNR per h/p on admission   NOK:    Prophylaxis: heparin     Baseline: functional, ; home O2 3.5-4 L   Recommended Disposition: HHC vs SNF pending clinical improvement      Subjective:     Chief Complaint / Reason for Physician Visit: following acute respiratory failure / afib/ htn   I think I am getting better now  Had episode of afib rvr last night     Discussed with RN events overnight. Review of Systems:  Symptom Y/N Comments  Symptom Y/N Comments   Fever/Chills n   Chest Pain n    Poor Appetite    Edema     Cough y   Abdominal Pain n    Sputum    Joint Pain     SOB/LUDWIG y   Pruritis/Rash     Nausea/vomit    Tolerating PT/OT     Diarrhea    Tolerating Diet     Constipation    Other       Could NOT obtain due to:      Objective:     VITALS:   Last 24hrs VS reviewed since prior progress note. Most recent are:  Patient Vitals for the past 24 hrs:   Temp Pulse Resp BP SpO2   12/20/18 1054  73 20 127/76 93 %   12/20/18 0830     93 %   12/20/18 0713 97.4 °F (36.3 °C) 98 22 135/56 91 %   12/20/18 0508 98.6 °F (37 °C) (!) 109 20 (!) 137/97 99 %   12/20/18 0345     97 %   12/19/18 2356 99 °F (37.2 °C) (!) 115 20 112/61 98 %   12/19/18 2321     96 %   12/19/18 2206  (!) 133  134/54    12/19/18 2205  (!) 139   99 %   12/19/18 2100  (!) 101  134/54    12/19/18 1941  (!) 138   92 %   12/19/18 1932     96 %   12/19/18 1905 98.3 °F (36.8 °C) (!) 129 22 133/72 92 %   12/19/18 1521     95 %   12/19/18 1448 98.8 °F (37.1 °C) 76 20 139/52 95 %       Intake/Output Summary (Last 24 hours) at 12/20/2018 1332  Last data filed at 12/20/2018 0839  Gross per 24 hour   Intake 1765.34 ml   Output 1150 ml   Net 615.34 ml        PHYSICAL EXAM:  General: WD, WN. Alert, cooperative, no acute distress. Mild/mod dyspnea with conversation     EENT:  EOMI. Anicteric sclerae. MMM  Resp:  diminished BS bilaterally, no wheezing or rales.   No accessory muscle use  CV:  Regular  rhythm,  No edema  GI:  Soft, Non distended, Non tender.  +Bowel sounds  Neurologic:  Alert and oriented X 3, normal speech, Psych:   Good insight. Not anxious nor agitated  Skin:  No rashes. No jaundice    Reviewed most current lab test results and cultures  YES  Reviewed most current radiology test results   YES  Review and summation of old records today    NO  Reviewed patient's current orders and MAR    YES  PMH/SH reviewed - no change compared to H&P  ________________________________________________________________________  Care Plan discussed with:    Comments   Patient y    Family  y dtr at bedside    RN y    Care Manager     Consultant  y Renal                      Multidiciplinary team rounds were held today with , nursing, pharmacist and clinical coordinator. Patient's plan of care was discussed; medications were reviewed and discharge planning was addressed. ________________________________________________________________________  Total NON critical care TIME:  35  Minutes    Total CRITICAL CARE TIME Spent:   Minutes non procedure based      Comments   >50% of visit spent in counseling and coordination of care     ________________________________________________________________________  Leroy Villar MD     Procedures: see electronic medical records for all procedures/Xrays and details which were not copied into this note but were reviewed prior to creation of Plan. LABS:  I reviewed today's most current labs and imaging studies.   Pertinent labs include:  Recent Labs     12/20/18  0447 12/19/18  0513 12/18/18  0401   WBC 15.3* 16.7* 18.3*   HGB 10.8* 10.0* 11.4*   HCT 33.6* 30.9* 35.4    236 229     Recent Labs     12/20/18  0551 12/19/18  0513 12/18/18  0401 12/17/18  1557    139 138 138   K 3.4* 3.4* 3.7 3.6    107 106 101   CO2 22 22 23 30   * 187* 228* 157*   BUN 57* 42* 17 12   CREA 3.06* 2.51* 1.28* 0.88   CA 9.0 8.9 8.3* 9.2   MG 2.4 2.3  --   --    PHOS 5.8*  --   --   --    ALB  --  2.6* 2.9* 3.2*   TBILI  --  0.3 0.7 0.9   SGOT  --  17 11* 11*   ALT  --  19 16 16   INR  --   --   --  1.1       Signed: My Resendiz MD

## 2018-12-20 NOTE — CONSULTS
Acute Kidney Insufficiency Consult    Subjective:     Orvis Osgood is a 82 y.o.  female who has been admitted to the hospital for SOB  . Patient was referred for acute renal disease as evidenced by an elevated creatinine. The patient has severe COPD and pulmonary hypertension. She was admitted with a COPD exacerbation and bilateral pneumonia. She also has PAF and developed A-fib the other night with a RVR. In addition, she was felt to have diastolic heart failure and so was placed on Lasix. She has not been on an ACE-I or ARB. Her Bun/creat was 12/0.8 on admission, 12/17. It was 17/1.3 on 12/18, 42/12.5 on 12/19, and is 57/3.1 today. The patient says she was extremely SOB on admission but is essentially back to her baseline \"usual miserable\" SOB now. She denies chest pain or any other complaints. She was on Lasix, but this was d/c'd today and an IV of NS at 50 cc/hr ordered. She has been on Vancomycin with a trough of 21. Vanc was d/c'd today. ROS as above, plus: no fever/chills today. Pt had a temp of 100.6 on admission and 99.0 yesterday. No HEEENT complaints. abd pain. No N/V/D. No joint pain. No skin rashes, no urinary complaints.           Patient Active Problem List    Diagnosis Date Noted    Acute on chronic respiratory failure with hypoxia and hypercapnia ([address] 12/17/2018    Second degree heart block 11/16/2018    S/P placement of cardiac pacemaker 11/16/2018    Respiratory failure ([address] 01/02/2018    Sepsis ([address] 10/02/2017    COPD exacerbation ([address] 10/02/2017    Hyperglycemia 11/19/2011    Tobacco abuse 11/19/2011    Pulmonary hypertension, moderate to severe 11/19/2011    Acute on chronic respiratory failure ([address] 11/17/2011    COPD (chronic obstructive pulmonary disease) with acute bronchitis ([address] 11/17/2011    Lung nodule 11/17/2011    Abnormal ECG 11/17/2011     Past Medical History:   Diagnosis Date    COPD     Hypertension Past Surgical History:   Procedure Laterality Date    HX GYN      overy removed/ 2 c-sections     Family History   Problem Relation Age of Onset    Cancer Brother         lung     Cancer Brother         lung     Heart Disease Mother       Social History     Tobacco Use    Smoking status: Current Every Day Smoker     Packs/day: 0.25    Smokeless tobacco: Never Used   Substance Use Topics    Alcohol use: No      Allergies   Allergen Reactions    Keflex [Cephalexin] Itching      Prior to Admission medications    Medication Sig Start Date End Date Taking? Authorizing Provider   amLODIPine (NORVASC) 5 mg tablet Take 5 mg by mouth daily. Yes Provider, Historical   fluticasone-vilanterol (BREO ELLIPTA) 200-25 mcg/dose inhaler Take 2 Puffs by inhalation daily. Yes Provider, Historical   furosemide (LASIX) 20 mg tablet Take 20 mg by mouth daily. Yes Provider, Historical   ergocalciferol (ERGOCALCIFEROL) 50,000 unit capsule Take 50,000 Units by mouth every fourteen (14) days. \"takes every other Monday\"   Yes Provider, Historical   ibuprofen (MOTRIN) 200 mg tablet Take 400 mg by mouth daily as needed for Pain. Yes Provider, Historical   dilTIAZem SR (CARDIZEM SR) 90 mg SR capsule Take 1 Cap by mouth every twelve (12) hours. 11/16/18  Yes Holden Lucas NP   albuterol (PROVENTIL, VENTOLIN) 90 mcg/Actuation inhaler Take 2 Puffs by inhalation every six (6) hours as needed. Yes Other, MD Mckayla   albuterol (PROVENTIL VENTOLIN) 2.5 mg /3 mL (0.083 %) nebulizer solution by Nebulization route every six (6) hours as needed. Yes Other, MD Mckayla   dextromethorphan-guaiFENesin (MUCINEX DM) 60-1,200 mg Tb12 Take 1 Tab by mouth daily. Yes Martha, MD Mckayla   aspirin (ASPIRIN) 325 mg tablet Take 325 mg by mouth daily.    Yes Martha, MD Mckayla     Current Facility-Administered Medications   Medication Dose Route Frequency    dronedarone (MULTAQ) tablet tab 400 mg  400 mg Oral BID WITH MEALS    apixaban (ELIQUIS) tablet 2.5 mg  2.5 mg Oral BID    VANCOMYCIN INFORMATION NOTE   Other ONCE    0.9% sodium chloride infusion  50 mL/hr IntraVENous CONTINUOUS    insulin NPH (NOVOLIN N, HUMULIN N) injection 15 Units  15 Units SubCUTAneous Q12H    nitroglycerin (NITROSTAT) tablet 0.4 mg  0.4 mg SubLINGual Q5MIN PRN    sodium chloride (NS) flush 5-10 mL  5-10 mL IntraVENous Q8H    sodium chloride (NS) flush 5-10 mL  5-10 mL IntraVENous PRN    acetaminophen (TYLENOL) tablet 650 mg  650 mg Oral Q6H PRN    naloxone (NARCAN) injection 0.4 mg  0.4 mg IntraVENous PRN    bisacodyl (DULCOLAX) suppository 10 mg  10 mg Rectal DAILY PRN    albuterol-ipratropium (DUO-NEB) 2.5 MG-0.5 MG/3 ML  3 mL Nebulization Q4H RT    insulin lispro (HUMALOG) injection   SubCUTAneous AC&HS    glucose chewable tablet 16 g  4 Tab Oral PRN    dextrose (D50W) injection syrg 12.5-25 g  12.5-25 g IntraVENous PRN    glucagon (GLUCAGEN) injection 1 mg  1 mg IntraMUSCular PRN    methylPREDNISolone (PF) (SOLU-MEDROL) injection 40 mg  40 mg IntraVENous Q6H    fluticasone-vilanterol (BREO ELLIPTA) 200mcg-25mcg/puff  2 Puff Inhalation DAILY    piperacillin-tazobactam (ZOSYN) 3.375 g in 0.9% sodium chloride (MBP/ADV) 100 mL  3.375 g IntraVENous Q8H    hydrALAZINE (APRESOLINE) 20 mg/mL injection 20 mg  20 mg IntraVENous Q6H PRN       Review of Systems:  A comprehensive review of systems was negative except for that written in the HPI. Objective:     Patient Vitals for the past 8 hrs:   BP Temp Pulse Resp SpO2   18 1054 127/76  73 20 93 %   18 0830     93 %   18 0713 135/56 97.4 °F (36.3 °C) 98 22 91 %      Temp (24hrs), Av.4 °F (36.9 °C), Min:97.4 °F (36.3 °C), Max:99 °F (37.2 °C)    Intake and Output:  1901 -  07  In: 2115.3 [P.O.:900;  I.V.:1215.3]  Out: 2150 [Urine:2150]  701 - 1900  In: 450 [P.O.:450]  Out: -     Physical Exam:  Visit Vitals  /76 (BP 1 Location: Right arm, BP Patient Position: At rest) Pulse 73   Temp 97.4 °F (36.3 °C)   Resp 20   Ht 5' 4.5\" (1.638 m)   Wt 80.1 kg (176 lb 9.4 oz)   SpO2 93%   BMI 29.84 kg/m²     General appearance: alert, cooperative, no distress. Up in a chair. On nasal O2. Head: Normocephalic, without obvious abnormality, atraumatic  Eyes: conjunctivae/corneas clear. EOM's intact. Neck: no JVD and supple  Lungs: diminished breath sounds but no rales, rhonchi, or wheezes; normal resp effort at rest  Heart: regular rate and rhythm; no gallop or rub  Abdomen: soft, non-tender. No masses,  no organomegaly  Extremities: no edema  Skin: No rashes or lesions  Neurologic: Grossly non-focal; alert and appropriate; normal speech; no tremor  Musculoskeletal: grossly unremarkable    Data Review:   CBC:   Lab Results   Component Value Date/Time    WBC 15.3 (H) 12/20/2018 04:47 AM    RBC 3.73 (L) 12/20/2018 04:47 AM    HGB 10.8 (L) 12/20/2018 04:47 AM    HCT 33.6 (L) 12/20/2018 04:47 AM    PLATELET 304 95/56/5012 04:47 AM   , CMP:   Lab Results   Component Value Date/Time    Glucose 186 (H) 12/20/2018 05:51 AM    Sodium 140 12/20/2018 05:51 AM    Potassium 3.4 (L) 12/20/2018 05:51 AM    Chloride 105 12/20/2018 05:51 AM    CO2 22 12/20/2018 05:51 AM    BUN 57 (H) 12/20/2018 05:51 AM    Creatinine 3.06 (H) 12/20/2018 05:51 AM    Calcium 9.0 12/20/2018 05:51 AM    Anion gap 13 12/20/2018 05:51 AM    BUN/Creatinine ratio 19 12/20/2018 05:51 AM    AST (SGOT) 17 12/19/2018 05:13 AM    Alk. phosphatase 53 12/19/2018 05:13 AM    Protein, total 6.3 (L) 12/19/2018 05:13 AM    Albumin 2.6 (L) 12/19/2018 05:13 AM    Globulin 3.7 12/19/2018 05:13 AM    A-G Ratio 0.7 (L) 12/19/2018 05:13 AM       Reviewed: x-rays and labs    Assessment:     Acute renal failure. Most likely pre-renal azotemia from diuresis, but ATN is quite possible. She has had a-fib with a RVR but no hypotension. She has been on Vancomycin with a trough of 21. It was d/c'd today.   Her Bun/creat was 12/0.8 on admission, 12/17. It was 17/1.3 on 12/18, 42/12.5 on 12/19, and is 57/3.1 today. Acute on chronic respiratory failure with hypoxia and hypercapnia  Diastolic heart failure on admission. Hypokalemia. Severe COPD with an exacerbation. Bilateral pneumonia. Severe pulmonary hypertension. PAF --A-fib the other night with a RVR. S/p pacemaker placement in 11/18. Diabetes Mellitus, type II. Hypertension. Obesity. Plan: Will get urine lytes and a renal ultrasound. Agree with d/c'ing the Lasix. Agree with gentle NS. Daily labs. Avoid hypotension. Vancomycin has been d/c'd today. Will check a level in AM.      Above d/w the patient and her family at some length. Chart reviewed. Pertinent Notes Reviewed. Medications list Personally Reviewed. Georg Nageotte, [facility]  [facility] [address]  [place], [address] [place]  Phone - [phone]    Fax - [phone]  [email or web address]                                         [facility]   [address] [facility] [address] [place] [address], [facility]  Phone - [phone]  Fax - [phone]. [email or web address]

## 2018-12-20 NOTE — PROGRESS NOTES
Problem: Mobility Impaired (Adult and Pediatric)  Goal: *Acute Goals and Plan of Care (Insert Text)  Physical Therapy Goals  Initiated 12/19/2018  1. Patient will move from supine to sit and sit to supine , scoot up and down and roll side to side in bed with independence within 7 day(s). 2.  Patient will transfer from bed to chair and chair to bed with independence using the least restrictive device within 7 day(s). 3.  Patient will perform sit to stand with independence within 7 day(s). 4.  Patient will ambulate with independence for 100 feet with the least restrictive device within 7 day(s). 5.  Patient will ascend/descend 4 stairs with one sided handrail(s) with modified independence within 7 day(s). physical Therapy TREATMENT  Patient: Tanya Luna (40 y.o. female)  Date: 12/20/2018  Diagnosis: Acute on chronic respiratory failure with hypoxia and hypercapnia (HCC) <principal problem not specified>      Precautions: DNR  Chart, physical therapy assessment, plan of care and goals were reviewed. ASSESSMENT:  Pt received supine in bed on 4 LPM O2, agreeable to participation with therapy. Pt with improved strength, balance, and gait stability however remains limited by poor activity tolerance/endurance and respiratory status. Pt continues to require frequent, prolonged seated rest breaks following short ambulation trials secondary to LUDWIG. Pt performed 3 ambulation trials of 15ft, 10ft, and 10ft, respectively, with standbyA only, and seated rest breaks b/t. Gait speed initially accelerated however improved following verbal cueing and education regarding activity pacing. Pt experienced x1 minor LOB while navigating bathroom environment however was able to self-correct. All mobility occurred on 4 LPM O2 with O2 sats 88% post activity however recovering with seated rest. [facility] w/ 24hr supervision/assist of family at discharge.     Progression toward goals:  []    Improving appropriately and progressing toward goals  []    Improving slowly and progressing toward goals  []    Not making progress toward goals and plan of care will be adjusted     PLAN:  Patient continues to benefit from skilled intervention to address the above impairments. Continue treatment per established plan of care. Discharge Recommendations:  Home Health w/ 24hr supervision of family  Further Equipment Recommendations for Discharge:  None     SUBJECTIVE:   Patient stated Topher Fernandez you believe I gave up smoking for THIS?!.    OBJECTIVE DATA SUMMARY:   Critical Behavior:  Neurologic State: Alert  Orientation Level: Oriented X4  Cognition: Appropriate decision making, Appropriate for age attention/concentration, Appropriate safety awareness, Follows commands  Safety/Judgement: Awareness of environment, Fall prevention, Home safety  Functional Mobility Training:  Bed Mobility:  Rolling: Independent  Supine to Sit: Independent     Scooting: Independent        Transfers:  Sit to Stand: Supervision  Stand to Sit: Supervision        Bed to Chair: Stand-by assistance                    Balance:  Sitting: Intact  Standing: Impaired  Standing - Static: Good  Standing - Dynamic : Good  Ambulation/Gait Training:  Distance (ft): 45 Feet (ft)(15ft, 10ft, 10ft w/ seated rest breaks b/t)  Assistive Device: Gait belt  Ambulation - Level of Assistance: Stand-by assistance        Gait Abnormalities: Decreased step clearance;Trunk sway increased        Base of Support: Widened     Speed/Kimberlyn: Accelerated  Step Length: Left shortened;Right shortened                    Pain:  Pain Scale 1: Numeric (0 - 10)  Pain Intensity 1: 0              Activity Tolerance:   Improving however remains impaired overall  Please refer to the flowsheet for vital signs taken during this treatment.   After treatment:   [x]    Patient left in no apparent distress sitting up in chair  []    Patient left in no apparent distress in bed  [x]    Call bell left within reach  [x]    Nursing notified  [x]    Caregiver present  []    Bed alarm activated    COMMUNICATION/COLLABORATION:   The patients plan of care was discussed with: Occupational Therapist and Registered Nurse    Davia Gaucher, PT, DPT   Time Calculation: 19 mins

## 2018-12-20 NOTE — PROGRESS NOTES
[facility] Pulmonary Consult Service Note  Pulmonary, Critical Care, and Sleep Medicine    Name: Julianne Hernandez MRN: [id number]   : 1935 Hospital: [facility]   Date: 2018  Admission date: 2018 Hospital Day: 4       Subjective/Interval History: On bipap when seen this am. Had Afib with RVR last pm. Has been on dilt drip at 15mg. NO acute complaints when seen this am.  No chest pain, no back pain. No leg pain. Hospital Problems  Date Reviewed: 2018          Codes Class Noted POA    Acute on chronic respiratory failure with hypoxia and hypercapnia (HCC) ICD-10-CM: J96.21, J96.22  ICD-9-CM: 518.84, 786.09, 799.02  2018 Unknown              IMPRESSION:   1. Acute on chronic respiratory failure with Hypoxia POA c/w BiPAP, has still be needing the bipap, will use at night and as needed during the day. 2. Acute COPD exacerbation POA: poor clearance , weak coughing, still with moderate dyspnea at rest and increased work of breathing. 3. Sepsis POA WBC 19.9,   4. Bilateral pneumonia likely healthcare associated POA   5. Tobacco use  6. Severe Pulmonary HTN PASP 81  7. Acute Diastolic Heart Failure  8. pulmonary HTN, will start IV diuretics  9. Diabetes mellitus type 2 POA   10. Essential hypertension POA   11. History of PAF  12. History of bradycardia status post pacemaker placement 2018 per   13. Obesity POA Body mass index is 30.42 kg/m². Body mass index is 32.04 kg/m². 15. Multiorgan dysfunction as outlined above: Pt has one or more acute or chronic illnesses with severe exacerbation with progression or side effects of treatment that poses a threat to life or bodily function  15. Additional workup outlined below  16. Pt is requiring Drug therapy requiring intensive monitoring for toxicity  17.  Pt is unstable, unpredictable needing inpatient monitoring; is acutely ill and at high risk of sudden decline and decompensation with severe consequenses and continued end organ dysfunction and failure  18. Prognosis guarded       RECOMMENDATIONS/PLAN:   1. BIPAP for non invasive ventilatory life support to prevent respiratory acidosis  2. Agree with Empiric IV antibiotics pending culture results   3. Follow culture results, Gram positive cocci so far. 4. Supplemental O2 to keep sats > 93%  5. Labs to follow electrolytes, renal function and and blood counts  6. Glucose monitoring and SSI  7. Bronchial hygiene with respiratory therapy techniques, bronchodilators  8. DVT, SUP prophylaxis  9. Smoking cessation counseling done  10. Prescription drug management with home med reconciliation reviewed          [x] High complexity decision making was performed  [x] See my orders for details      Subjective/Initial History:     I was asked by Marva Mcneil MD to see Jazz Payne  a 82 y.o.  female in consultation for a chief complaint of pneumonia and respiratory failure requiring BIPAP/ NIV    Excerpts from admission 12/17/2018 or consult notes as follows:     \"  Recently admitted 11/11 to 11/16/2018 with respiratory failure and COPD. she initially required BiPAP, was treated for COPD. Had Haemophilus in her sputum but no airspace disease, treated with a 5-day course of Levaquin. Had bradycardia and a pacemaker was placed. Eventually weaned back to her baseline oxygen, discharged home. Baseline she is always short of breath with exertion. starting yesterday she began to notice increasing shortness of breath when she took a shower and with any minor exertion . no change in her baseline cough. No fevers or chills, sore throat, headache, chest pain. Was getting more short of breath when she laid down and  better when she sat up overnight. This morning, she was acutely short of breath, could not catch her breath even at rest .EMS was called and she was brought to the emergency room     Acute respiratory distress in the emergency room. PO2 53 on her 4 L nasal cannula oxygen. Was not hypercarbic. Placed on BiPAP. Recently admitted mid November 2018 with acute respiratory failure and COPD. Discharged on baseline 4 L . Increasing shortness of breath past 2 days, no increased cough or fevers. Acutely short of breath today, arrived in ED in respiratory distress. ABG on baseline 4 L showed pH 7.44, PCO2 31, PO2 53\"    Pt congested at times. Not active at all. Tachypneic on BIPAP. Daughter at bedside. No ill contacts at home. Upset that pacemaker did not fix her SOB. Tried to explain this to her. Sees my partner Dr. Yulia Altamirano. No GI sx. No edema.          Allergies   Allergen Reactions    Keflex [Cephalexin] Itching        MAR reviewed and pertinent medications noted or modified as needed     Current Facility-Administered Medications   Medication    VANCOMYCIN INFORMATION NOTE    0.9% sodium chloride infusion    insulin NPH (NOVOLIN N, HUMULIN N) injection 15 Units    dilTIAZem (CARDIZEM) 100 mg in dextrose 5% (MBP/ADV) 100 mL infusion    nitroglycerin (NITROSTAT) tablet 0.4 mg    sodium chloride (NS) flush 5-10 mL    sodium chloride (NS) flush 5-10 mL    acetaminophen (TYLENOL) tablet 650 mg    naloxone (NARCAN) injection 0.4 mg    ondansetron (ZOFRAN) injection 4 mg    bisacodyl (DULCOLAX) suppository 10 mg    heparin (porcine) injection 5,000 Units    albuterol-ipratropium (DUO-NEB) 2.5 MG-0.5 MG/3 ML    insulin lispro (HUMALOG) injection    glucose chewable tablet 16 g    dextrose (D50W) injection syrg 12.5-25 g    glucagon (GLUCAGEN) injection 1 mg    methylPREDNISolone (PF) (SOLU-MEDROL) injection 40 mg    aspirin tablet 325 mg    dilTIAZem SR (CARDIZEM SR) capsule 90 mg    fluticasone-vilanterol (BREO ELLIPTA) 200mcg-25mcg/puff    piperacillin-tazobactam (ZOSYN) 3.375 g in 0.9% sodium chloride (MBP/ADV) 100 mL    hydrALAZINE (APRESOLINE) 20 mg/mL injection 20 mg      Patient PCP: Balwinder Walls MD  PMH:  has a past medical history of COPD and Hypertension. PSH:   has a past surgical history that includes hx gyn. FHX: family history includes Cancer in her brother and brother; Heart Disease in her mother. SHX:  reports that she has been smoking. She has been smoking about 0.25 packs per day. she has never used smokeless tobacco. She reports that she does not drink alcohol or use drugs. ROS:A comprehensive review of systems was negative except for that written in the HPI. Objective:     Vital Signs: Telemetry:    normal sinus rhythm Intake/Output:   Visit Vitals  /61   Pulse (!) 115   Temp 99 °F (37.2 °C)   Resp 20   Ht 5' 4.5\" (1.638 m)   Wt 86 kg (189 lb 9.5 oz)   SpO2 97%   BMI 32.04 kg/m²       Temp (24hrs), Av.2 °F (36.8 °C), Min:97.1 °F (36.2 °C), Max:99 °F (37.2 °C)        O2 Device: BIPAP O2 Flow Rate (L/min): 4 l/min       Wt Readings from Last 4 Encounters:   18 86 kg (189 lb 9.5 oz)   18 78.5 kg (173 lb 1 oz)   18 83.5 kg (184 lb)   10/01/17 87.1 kg (192 lb 0.3 oz)          Intake/Output Summary (Last 24 hours) at 2018 0431  Last data filed at 2018 2357  Gross per 24 hour   Intake 1653 ml   Output 1950 ml   Net -297 ml       Last shift:      1901 -  0700  In: 103 [I.V.:103]  Out: -   Last 3 shifts: 701 - 1900  In: 9720 [P.O.:900; I.V.:750]  Out: 1950 [Urine:1950]       Physical Exam:     General:  female; severely ill; On bipap when seen, has dilt drip in place. Eyes: anicteric;  HEENT: NC/AT,no nasal flaring or drainage; no thrush  Neck: No goiter; no stridor;  no accessory muscle use  Lymph nodes: No abnormally enlarged cervical or supraclavicular nodes  Chest: increased AP diameter  Lungs: scattered wheezes bilaterally but clear and comfortable on the bipap. Heart: Regular rate and rhythm; NO edema  Abdomen: soft, protuberant, nontender, obese  : No Tamayo;    Ext: no cyanosis, no clubbing; no joint swelling or erythema  Skin: warm and dry; no clubbing  Musculoskeletal: no gross deformities  Neuro: speech fluent;  moves all fours, generalized weakness; withdraws to pain; unable to check gait and station  Psych: no agitation; follows commands;  oriented to time, place and person, very conversant despite the bipap. Devices:per sepsis flow sheet   Skin: Skin unremarkable;   Pulses:Bilateral, Radial, 2+  Capillary refill: normal; warm, brisk capillary refill    Data:     All lab results for the last 24 hours reviewed. Labs:    Recent Labs     12/19/18  0513 12/18/18  0401 12/17/18  1557 12/17/18  1515   WBC 16.7* 18.3*  --  19.9*   HGB 10.0* 11.4*  --  13.3    229  --  333   INR  --   --  1.1  --      Recent Labs     12/19/18  0513 12/18/18  0401 12/17/18  1557 12/17/18  1515    138 138  --    K 3.4* 3.7 3.6  --     106 101  --    CO2 22 23 30  --    * 228* 157*  --    BUN 42* 17 12  --    CREA 2.51* 1.28* 0.88  --    CA 8.9 8.3* 9.2  --    MG 2.3  --   --   --    LAC  --   --   --  1.3   ALB 2.6* 2.9* 3.2*  --    SGOT 17 11* 11*  --    ALT 19 16 16  --      Recent Labs     12/19/18  0520   PH 7.41   PCO2 39   PO2 98   HCO3 25   FIO2 40     Recent Labs     12/17/18  1557   TROIQ <0.05     No results found for: BNPP, BNP   Lab Results   Component Value Date/Time    Culture result: FEW NORMAL RESPIRATORY MEGHANA SO FAR 12/18/2018 06:35 PM    Culture result: NO GROWTH 2 DAYS 12/17/2018 03:15 PM    Culture result: HEAVY NORMAL RESPIRATORY MEGHANA 11/13/2018 03:12 PM    Culture result: (A) 11/13/2018 03:12 PM     HEAVY HAEMOPHILUS INFLUENZAE BETA LACTAMASE POSITIVE   No results found for: TSH, TSHEXT, TSHEXT    Imaging:          Results from Hospital Encounter encounter on 12/17/18   XR CHEST PORT    Narrative INDICATION:  COPD exacerbation. Follow-up. EXAM: CXR Portable.     FINDINGS: Portable chest shows improved aeration with resolution of bibasilar  haziness /presumed atelectasis since December 17. There is no pulmonary  consolidation. There is no apparent pneumothorax. Heart size is top normal. There is no overt  pulmonary edema. Pacemaker remains. Impression IMPRESSION: No pulmonary consolidation. Improved aeration since December 17. Results from [place] encounter on 10/01/17   CTA CHEST W OR W WO CONT    Narrative Clinical indication: Hypoxia, elevated d-dimer. Localizer obtained without contrast at the level of the pulmonary arteries. Fast  injection rate of 80 cc of Isovue-370 with review of the raw data and MIP  reconstructions. No prior. CT dose reduction was achieved through the use of a  standardized protocol tailored for this examination and automatic exposure  control for dose modulation . Kemar Toribio The right thyroid lobe is enlarged. There is no evidence for pulmonary emboli. There is no pericardial pleural effusion the heart size is normal. There is no  shift or pneumothorax. Calcification and mild ectasia of the thoracic aorta. There is a left lower lobe infiltrate. Stable nodules      Impression  impression: Left lower lobe infiltrate.         This care involved high complexity medical decision making: I personally:  · Reviewed the flowsheet and previous days notes  · Reviewed and summarized records or history from previous days note or discussions with staff, family  · High Risk Drug therapy requiring intensive monitoring for toxicity: eg steroids, pressors, antibiotics  · Reviewed and/or ordered Clinical lab tests  · Reviewed and/or ordered Radiology tests  · Reviewed and/or ordered of Medicine tests  · Independently visualized radiologic Images  · Reviewed the patients ECG / Telemetry  · Reviewed and/or adjusted NiPPV settings  ·  discussed my assessment/management with : Nursing, Respiratory Therapy, Family for coordination of care    Jamar Bronson MD

## 2018-12-20 NOTE — PROGRESS NOTES
Bedside shift change report given to Elizabeth Diaz (oncoming nurse) by Juan Han (offgoing nurse). Report included the following information SBAR, Kardex, ED Summary, Procedure Summary, Intake/Output, MAR, Recent Results and Cardiac Rhythm Afib. 1042- Pt back in NSR/Paced rhythm. HR 70's.    1100- Pt assisted to recliner with PT/OT. 1330- Pt assisted back to bed. 1535- Pt taken to ultrasound via stretcher. 1615- Pt back to room. 1925- Bedside shift change report given to Juan Han (oncoming nurse) by Elizabeth Diaz (offgoing nurse). Report included the following information SBAR, Kardex, ED Summary, Procedure Summary, Intake/Output, MAR, Recent Results and Cardiac Rhythm NSR.

## 2018-12-20 NOTE — PROGRESS NOTES
Pharmacy Automatic Renal Dosing Protocol - Antimicrobials    Indication for Antimicrobials: HAP     Current Regimen of Each Antimicrobial:  Zosyn 3.375g IV q8h (Start Date  Day 4/7)      Previous Antimicrobial Therapy:  Vanc 1750 x 1 in ED   Tobramycin 5mg/kg x 1 in ED   Vancomycin IV (Start Date  Day 2) - end   Vancomycin trough goal level:  15-20    Date Dose & Interval Measured (mcg/mL) Extrapolated (mcg/mL)   19 at 1151 Regimen changed; not at steady state 21.4 (~ 24 hours after 1000 mg dose) FLACA mariee clarified this level was drawn prior to hanging dose (he will correct administration time)                 Significant Cultures:    Blood: ng x 3 day - pending   Influenza - negative   RCx Sputum - pending    Radiology / Imaging results: (X-ray, CT scan or MRI):    Chest XR: Patchy opacities in the lung bases which could be related to a combination of  atelectasis and alveolar edema, but other airspace disease cannot be excluded. Paralysis, amputations, malnutrition:     Labs:  Recent Labs     18  0551 18  0447 18  0513 18  0401   CREA 3.06*  --  2.51* 1.28*   BUN 57*  --  42* 17   WBC  --  15.3* 16.7* 18.3*     Temp (24hrs), Av.4 °F (36.9 °C), Min:97.4 °F (36.3 °C), Max:99 °F (37.2 °C)    Creatinine Clearance (mL/min) or Dialysis:  Estimated Creatinine Clearance: 14.9 mL/min (A) (based on SCr of 3.06 mg/dL (H)). Estimated Creatinine Clearance (using IBW):12.7 mL/min    Impression/Plan:  · Vancomycin discontinued  · Will adjust zosyn to 3.375 gm IV q12h for patient renal function  · Antimicrobial stop date 7 days     Pharmacy will follow daily and adjust medications as appropriate for renal function and/or serum levels.     Thank you,  Annel Muller, PHARMD

## 2018-12-20 NOTE — PROGRESS NOTES
**Consult Information**  Member Facility: [facility] Allie Awan MRN: [id number]  Consult ID: [id number]  Facility Time Zone: ET  Date and Time of Consult: 12/19/2018 08:24:18 PM  Requesting Clinician: Donald Kwong  Patient Name: Mahsa Leiva  YOB: 1935  Gender: Female    **Clinical Note**  Clinical Note: Nurse called fo 's 140's. Pt has h/o Paroxysmal atrial fibrillation, was in sinus rhythm as per cardiology note. 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 will order EKG  on po cardize, referred to primary cardiology.

## 2018-12-21 LAB
ALBUMIN SERPL-MCNC: 2.9 G/DL (ref 3.5–5)
ANION GAP SERPL CALC-SCNC: 10 MMOL/L (ref 5–15)
BACTERIA SPEC CULT: ABNORMAL
BACTERIA SPEC CULT: ABNORMAL
BUN SERPL-MCNC: 75 MG/DL (ref 6–20)
BUN/CREAT SERPL: 23 (ref 12–20)
CALCIUM SERPL-MCNC: 8.9 MG/DL (ref 8.5–10.1)
CHLORIDE SERPL-SCNC: 106 MMOL/L (ref 97–108)
CK SERPL-CCNC: 26 U/L (ref 26–192)
CO2 SERPL-SCNC: 23 MMOL/L (ref 21–32)
CREAT SERPL-MCNC: 3.29 MG/DL (ref 0.55–1.02)
ERYTHROCYTE [DISTWIDTH] IN BLOOD BY AUTOMATED COUNT: 14 % (ref 11.5–14.5)
GLUCOSE BLD STRIP.AUTO-MCNC: 147 MG/DL (ref 65–100)
GLUCOSE BLD STRIP.AUTO-MCNC: 175 MG/DL (ref 65–100)
GLUCOSE BLD STRIP.AUTO-MCNC: 175 MG/DL (ref 65–100)
GLUCOSE BLD STRIP.AUTO-MCNC: 224 MG/DL (ref 65–100)
GLUCOSE SERPL-MCNC: 171 MG/DL (ref 65–100)
GRAM STN SPEC: ABNORMAL
HCT VFR BLD AUTO: 30.9 % (ref 35–47)
HGB BLD-MCNC: 10.2 G/DL (ref 11.5–16)
MAGNESIUM SERPL-MCNC: 2.6 MG/DL (ref 1.6–2.4)
MCH RBC QN AUTO: 29.7 PG (ref 26–34)
MCHC RBC AUTO-ENTMCNC: 33 G/DL (ref 30–36.5)
MCV RBC AUTO: 89.8 FL (ref 80–99)
NRBC # BLD: 0 K/UL (ref 0–0.01)
NRBC BLD-RTO: 0 PER 100 WBC
PHOSPHATE SERPL-MCNC: 5.9 MG/DL (ref 2.6–4.7)
PLATELET # BLD AUTO: 245 K/UL (ref 150–400)
PMV BLD AUTO: 9.4 FL (ref 8.9–12.9)
POTASSIUM SERPL-SCNC: 3.5 MMOL/L (ref 3.5–5.1)
RBC # BLD AUTO: 3.44 M/UL (ref 3.8–5.2)
SERVICE CMNT-IMP: ABNORMAL
SODIUM SERPL-SCNC: 139 MMOL/L (ref 136–145)
VANCOMYCIN SERPL-MCNC: 17.3 UG/ML
WBC # BLD AUTO: 10.4 K/UL (ref 3.6–11)

## 2018-12-21 PROCEDURE — 77030038269 HC DRN EXT URIN PURWCK BARD -A

## 2018-12-21 PROCEDURE — 36415 COLL VENOUS BLD VENIPUNCTURE: CPT

## 2018-12-21 PROCEDURE — 80069 RENAL FUNCTION PANEL: CPT

## 2018-12-21 PROCEDURE — 74011000250 HC RX REV CODE- 250: Performed by: INTERNAL MEDICINE

## 2018-12-21 PROCEDURE — 83735 ASSAY OF MAGNESIUM: CPT

## 2018-12-21 PROCEDURE — 74011250637 HC RX REV CODE- 250/637: Performed by: INTERNAL MEDICINE

## 2018-12-21 PROCEDURE — 80202 ASSAY OF VANCOMYCIN: CPT

## 2018-12-21 PROCEDURE — 97116 GAIT TRAINING THERAPY: CPT

## 2018-12-21 PROCEDURE — 65660000000 HC RM CCU STEPDOWN

## 2018-12-21 PROCEDURE — 94660 CPAP INITIATION&MGMT: CPT

## 2018-12-21 PROCEDURE — 82550 ASSAY OF CK (CPK): CPT

## 2018-12-21 PROCEDURE — 74011250636 HC RX REV CODE- 250/636: Performed by: HOSPITALIST

## 2018-12-21 PROCEDURE — 74011636637 HC RX REV CODE- 636/637: Performed by: HOSPITALIST

## 2018-12-21 PROCEDURE — 74011636637 HC RX REV CODE- 636/637: Performed by: INTERNAL MEDICINE

## 2018-12-21 PROCEDURE — 82962 GLUCOSE BLOOD TEST: CPT

## 2018-12-21 PROCEDURE — 74011000258 HC RX REV CODE- 258: Performed by: HOSPITALIST

## 2018-12-21 PROCEDURE — 77010033678 HC OXYGEN DAILY

## 2018-12-21 PROCEDURE — 94640 AIRWAY INHALATION TREATMENT: CPT

## 2018-12-21 PROCEDURE — 85027 COMPLETE CBC AUTOMATED: CPT

## 2018-12-21 PROCEDURE — 74011250636 HC RX REV CODE- 250/636: Performed by: INTERNAL MEDICINE

## 2018-12-21 RX ORDER — IPRATROPIUM BROMIDE AND ALBUTEROL SULFATE 2.5; .5 MG/3ML; MG/3ML
3 SOLUTION RESPIRATORY (INHALATION)
Status: DISCONTINUED | OUTPATIENT
Start: 2018-12-21 | End: 2019-01-03

## 2018-12-21 RX ADMIN — PIPERACILLIN SODIUM,TAZOBACTAM SODIUM 3.38 G: 3; .375 INJECTION, POWDER, FOR SOLUTION INTRAVENOUS at 22:09

## 2018-12-21 RX ADMIN — INSULIN LISPRO 2 UNITS: 100 INJECTION, SOLUTION INTRAVENOUS; SUBCUTANEOUS at 12:27

## 2018-12-21 RX ADMIN — IPRATROPIUM BROMIDE AND ALBUTEROL SULFATE 3 ML: .5; 3 SOLUTION RESPIRATORY (INHALATION) at 03:31

## 2018-12-21 RX ADMIN — Medication 10 ML: at 22:11

## 2018-12-21 RX ADMIN — PIPERACILLIN SODIUM,TAZOBACTAM SODIUM 3.38 G: 3; .375 INJECTION, POWDER, FOR SOLUTION INTRAVENOUS at 09:17

## 2018-12-21 RX ADMIN — Medication 10 ML: at 09:16

## 2018-12-21 RX ADMIN — SODIUM CHLORIDE 50 ML/HR: 900 INJECTION, SOLUTION INTRAVENOUS at 09:16

## 2018-12-21 RX ADMIN — APIXABAN 2.5 MG: 2.5 TABLET, FILM COATED ORAL at 22:10

## 2018-12-21 RX ADMIN — METHYLPREDNISOLONE SODIUM SUCCINATE 40 MG: 40 INJECTION, POWDER, FOR SOLUTION INTRAMUSCULAR; INTRAVENOUS at 04:24

## 2018-12-21 RX ADMIN — INSULIN LISPRO 3 UNITS: 100 INJECTION, SOLUTION INTRAVENOUS; SUBCUTANEOUS at 09:14

## 2018-12-21 RX ADMIN — IPRATROPIUM BROMIDE AND ALBUTEROL SULFATE 3 ML: .5; 3 SOLUTION RESPIRATORY (INHALATION) at 14:22

## 2018-12-21 RX ADMIN — METHYLPREDNISOLONE SODIUM SUCCINATE 40 MG: 40 INJECTION, POWDER, FOR SOLUTION INTRAMUSCULAR; INTRAVENOUS at 22:09

## 2018-12-21 RX ADMIN — METHYLPREDNISOLONE SODIUM SUCCINATE 40 MG: 40 INJECTION, POWDER, FOR SOLUTION INTRAMUSCULAR; INTRAVENOUS at 16:57

## 2018-12-21 RX ADMIN — HUMAN INSULIN 35 UNITS: 100 INJECTION, SUSPENSION SUBCUTANEOUS at 22:11

## 2018-12-21 RX ADMIN — APIXABAN 2.5 MG: 2.5 TABLET, FILM COATED ORAL at 09:15

## 2018-12-21 RX ADMIN — METHYLPREDNISOLONE SODIUM SUCCINATE 40 MG: 40 INJECTION, POWDER, FOR SOLUTION INTRAMUSCULAR; INTRAVENOUS at 09:14

## 2018-12-21 RX ADMIN — INSULIN HUMAN 25 UNITS: 100 INJECTION, SUSPENSION SUBCUTANEOUS at 09:15

## 2018-12-21 RX ADMIN — IPRATROPIUM BROMIDE AND ALBUTEROL SULFATE 3 ML: .5; 3 SOLUTION RESPIRATORY (INHALATION) at 08:15

## 2018-12-21 RX ADMIN — DRONEDARONE 400 MG: 400 TABLET, FILM COATED ORAL at 16:58

## 2018-12-21 RX ADMIN — IPRATROPIUM BROMIDE AND ALBUTEROL SULFATE 3 ML: .5; 3 SOLUTION RESPIRATORY (INHALATION) at 19:38

## 2018-12-21 RX ADMIN — INSULIN LISPRO 2 UNITS: 100 INJECTION, SOLUTION INTRAVENOUS; SUBCUTANEOUS at 17:32

## 2018-12-21 RX ADMIN — DRONEDARONE 400 MG: 400 TABLET, FILM COATED ORAL at 09:15

## 2018-12-21 RX ADMIN — FLUTICASONE FUROATE AND VILANTEROL TRIFENATATE 2 PUFF: 200; 25 POWDER RESPIRATORY (INHALATION) at 09:17

## 2018-12-21 NOTE — PROGRESS NOTES
Hospitalist Progress Note    NAME: Mikey Patino   :  1935   MRN:  [id number]       Assessment / Plan:  Acute on chronic respiratory failure POA with baseline 2-4L O2  Acute COPD exacerbation  Sepsis due to Bilateral HCAP POA   Underlying severe pulmonary hypertension PADP 81   -clinically: having ups and downs   bipap using prn but cannot be wo it yet   poor clearance/ weak coughing   Significant dyspnea with minimal exertion persist   Leukocytosis resolved    -cont bipap at night and prn during daytime  -cont aggressive IV steroids and jet nebs   -Empiric IV AB: vanco - will dc with ALMAZ, no signs of gram + infection  Cont zosyn   BC NTD, follow resp cultures - gram neg rods   -keep O2 sats > 92%   -cxray repeated : No pulmonary consolidation. Improved aeration since      ALMAZ not POA  -Cr up again - 3.29, baseline normal at 0.8   Likely due to Lasix + vanco ( trough 21.4 )    -Dr Cárdenas  help was greatly appreciated   -IVF: per renal   -Daily weight    Acute on chronic diastolic HF EF 24%   HTN / PAfib   H/o bradycardia s/p PPM 2018 per   -HR/BP stable   Had episode of afib rvr   -cont home Cardizem/ holding Norvasc    -on lasix 20 mg daily at home ( on hold now for ALMAZ)   -CHADS-Vasc 4 --> need to be on a/coag  Last admission pt declined AC and wanted to be on ASA alone   Noted today Dr Lj Montesinos started on Eliquis ( will leave it to Dr Lj Montesinos to discuss it with pt)   Now off ASA   -started back on Multaq by Dr Lj Montesinos   -ECHO: EF 55%, mild AS, severe pulmonary HTN      DM type II  --200 due to steroids  -inc NPH with steroids + SS   -hba1c 6.6 2018     Hypokalemia  -supplement as needed  -follow in am with Mg      Tobacco use   Obesity. Body mass index is 32.04 kg/m².           Code status: DNR per h/p on admission   NOK:    Prophylaxis: heparin     Baseline: functional, ; home O2 3.5-4 L   Recommended Disposition: HHC vs SNF pending clinical improvement      Subjective:     Chief Complaint / Reason for Physician Visit: following acute respiratory failure / afib/ htn   OOB to chair  dtr bedside  Just got back to chair and significant dyspnea noted after minimal exertion     Discussed with RN events overnight. Review of Systems:  Symptom Y/N Comments  Symptom Y/N Comments   Fever/Chills n   Chest Pain n    Poor Appetite    Edema     Cough y   Abdominal Pain n    Sputum    Joint Pain     SOB/LUDWIG y   Pruritis/Rash     Nausea/vomit    Tolerating PT/OT     Diarrhea    Tolerating Diet     Constipation    Other       Could NOT obtain due to:      Objective:     VITALS:   Last 24hrs VS reviewed since prior progress note. Most recent are:  Patient Vitals for the past 24 hrs:   Temp Pulse Resp BP SpO2   12/21/18 1542 97.5 °F (36.4 °C)       12/21/18 1539 97.5 °F (36.4 °C) 76 18 149/68 94 %   12/21/18 1422     97 %   12/21/18 1108 97.8 °F (36.6 °C) 75 18 154/58 96 %   12/21/18 0815  78   95 %   12/21/18 0809  81   100 %   12/21/18 0735 97.5 °F (36.4 °C) 98 20 143/64 95 %   12/21/18 0424  75  137/62 100 %   12/21/18 0423  75      12/21/18 0331     100 %   12/20/18 2355  72  (!) 149/94 97 %   12/20/18 2336     96 %   12/20/18 2151     97 %   12/20/18 1949     93 %   12/20/18 1944  71   90 %   12/20/18 1943 99 °F (37.2 °C) 71 22 136/86 93 %   12/20/18 1707  66  135/60        Intake/Output Summary (Last 24 hours) at 12/21/2018 1636  Last data filed at 12/21/2018 1422  Gross per 24 hour   Intake 649 ml   Output    Net 649 ml        PHYSICAL EXAM:  General: WD, WN. Alert, cooperative, no acute distress. Mild dyspnea with conversation     EENT:  EOMI. Anicteric sclerae. MMM  Resp:  Bronchial sounds bilaterally, no wheezing or rales.   No accessory muscle use  CV:  Regular  rhythm,  No edema  GI:  Soft, Non distended, Non tender.  +Bowel sounds  Neurologic:  Alert and oriented X 3, normal speech,   Psych:   Good insight. Not anxious nor agitated  Skin:  No rashes. No jaundice    Reviewed most current lab test results and cultures  YES  Reviewed most current radiology test results   YES  Review and summation of old records today    NO  Reviewed patient's current orders and MAR    YES  PMH/SH reviewed - no change compared to H&P  ________________________________________________________________________  Care Plan discussed with:    Comments   Patient y    Family  y dtr at bedside    RN y    Care Manager     Consultant                        Multidiciplinary team rounds were held today with , nursing, pharmacist and clinical coordinator. Patient's plan of care was discussed; medications were reviewed and discharge planning was addressed. ________________________________________________________________________  Total NON critical care TIME:  35  Minutes    Total CRITICAL CARE TIME Spent:   Minutes non procedure based      Comments   >50% of visit spent in counseling and coordination of care     ________________________________________________________________________  Jason Ro MD     Procedures: see electronic medical records for all procedures/Xrays and details which were not copied into this note but were reviewed prior to creation of Plan. LABS:  I reviewed today's most current labs and imaging studies.   Pertinent labs include:  Recent Labs     12/21/18 0440 12/20/18 0447 12/19/18  0513   WBC 10.4 15.3* 16.7*   HGB 10.2* 10.8* 10.0*   HCT 30.9* 33.6* 30.9*    307 236     Recent Labs     12/21/18 0440 12/20/18  0551 12/19/18  0513    140 139   K 3.5 3.4* 3.4*    105 107   CO2 23 22 22   * 186* 187*   BUN 75* 57* 42*   CREA 3.29* 3.06* 2.51*   CA 8.9 9.0 8.9   MG 2.6* 2.4 2.3   PHOS 5.9* 5.8*  --    ALB 2.9*  --  2.6*   TBILI  --   --  0.3   SGOT  --   --  17   ALT  --   --  19       Signed: Jason Ro MD

## 2018-12-21 NOTE — PROGRESS NOTES
[facility] Pulmonary Consult Service Note  Pulmonary, Critical Care, and Sleep Medicine    Name: Ankit Lee MRN: [id number]   : 1935 Hospital: [facility]   Date: 2018  Admission date: 2018 Hospital Day: 5       Subjective/Interval History:   No events. Resting comfortably on BiPAP this morning. She still becomes very short of breath even with light exertion    IMPRESSION:   1. Acute on chronic respiratory failure with Hypoxia POA    2. Acute COPD exacerbation POA   3. Sepsis POA WBC 19.9,   4. Bilateral pneumonia likely healthcare associated POA   5. Tobacco use  6. Severe Pulmonary HTN PASP 81  7. Acute Diastolic Heart Failure  8. Pulmonary HTN   9. Diabetes mellitus type 2 POA   10. Essential hypertension POA   11. History of PAF  12. History of bradycardia status post pacemaker placement 2018 per       RECOMMENDATIONS/PLAN:   1. BIPAP support for now with breaks as tolerated  2. Agree with Empiric IV antibiotics pending culture results   3. Follow culture results, await ID/sens of GNR  4. Supplemental O2 to keep sats > 93%  5. Bronchodilators  6. Systemic corticosteroids  7. Glucose monitoring and SSI  8. Cardiology following  9. DVT prophylaxis  10. Smoking cessation counseling done          [x] High complexity decision making was performed  [x] See my orders for details      Subjective/Initial History:     I was asked by Kali Baez MD to see Ankit Lee  a 82 y.o.  female in consultation for a chief complaint of pneumonia and respiratory failure requiring BIPAP/ NIV    Excerpts from admission 2018 or consult notes as follows:     \"  Recently admitted  to 2018 with respiratory failure and COPD. she initially required BiPAP, was treated for COPD. Had Haemophilus in her sputum but no airspace disease, treated with a 5-day course of Levaquin.  Had bradycardia and a pacemaker was placed. Eventually weaned back to her baseline oxygen, discharged home. Baseline she is always short of breath with exertion. starting yesterday she began to notice increasing shortness of breath when she took a shower and with any minor exertion . no change in her baseline cough. No fevers or chills, sore throat, headache, chest pain. Was getting more short of breath when she laid down and  better when she sat up overnight. This morning, she was acutely short of breath, could not catch her breath even at rest .EMS was called and she was brought to the emergency room     Acute respiratory distress in the emergency room. PO2 53 on her 4 L nasal cannula oxygen. Was not hypercarbic. Placed on BiPAP. Recently admitted mid November 2018 with acute respiratory failure and COPD. Discharged on baseline 4 L . Increasing shortness of breath past 2 days, no increased cough or fevers. Acutely short of breath today, arrived in ED in respiratory distress. ABG on baseline 4 L showed pH 7.44, PCO2 31, PO2 53\"    Pt congested at times. Not active at all. Tachypneic on BIPAP. Daughter at bedside. No ill contacts at home. Upset that pacemaker did not fix her SOB. Tried to explain this to her. Sees my partner Dr. Omar Menezes. No GI sx. No edema.          Allergies   Allergen Reactions    Keflex [Cephalexin] Itching        MAR reviewed and pertinent medications noted or modified as needed     Current Facility-Administered Medications   Medication    albuterol-ipratropium (DUO-NEB) 2.5 MG-0.5 MG/3 ML    dronedarone (MULTAQ) tablet tab 400 mg    apixaban (ELIQUIS) tablet 2.5 mg    insulin NPH (NOVOLIN N, HUMULIN N) injection 25 Units    piperacillin-tazobactam (ZOSYN) 3.375 g in 0.9% sodium chloride (MBP/ADV) 100 mL    0.9% sodium chloride infusion    nitroglycerin (NITROSTAT) tablet 0.4 mg    sodium chloride (NS) flush 5-10 mL    sodium chloride (NS) flush 5-10 mL    acetaminophen (TYLENOL) tablet 650 mg    naloxone (NARCAN) injection 0.4 mg    bisacodyl (DULCOLAX) suppository 10 mg    insulin lispro (HUMALOG) injection    glucose chewable tablet 16 g    dextrose (D50W) injection syrg 12.5-25 g    glucagon (GLUCAGEN) injection 1 mg    methylPREDNISolone (PF) (SOLU-MEDROL) injection 40 mg    fluticasone-vilanterol (BREO ELLIPTA) 200mcg-25mcg/puff    hydrALAZINE (APRESOLINE) 20 mg/mL injection 20 mg      Patient PCP: Farida Camacho MD  PMH:  has a past medical history of COPD and Hypertension. PSH:   has a past surgical history that includes hx gyn. FHX: family history includes Cancer in her brother and brother; Heart Disease in her mother. SHX:  reports that she has been smoking. She has been smoking about 0.25 packs per day. she has never used smokeless tobacco. She reports that she does not drink alcohol or use drugs. ROS:A comprehensive review of systems was negative except for that written in the HPI. Objective:     Vital Signs: Telemetry:    normal sinus rhythm Intake/Output:   Visit Vitals  /62   Pulse 75   Temp 99 °F (37.2 °C)   Resp 22   Ht 5' 4.5\" (1.638 m)   Wt 82 kg (180 lb 12.4 oz)   SpO2 100%   BMI 30.55 kg/m²       Temp (24hrs), Av.5 °F (36.9 °C), Min:98.2 °F (36.8 °C), Max:99 °F (37.2 °C)        O2 Device: BIPAP O2 Flow Rate (L/min): 4 l/min       Wt Readings from Last 4 Encounters:   18 82 kg (180 lb 12.4 oz)   18 78.5 kg (173 lb 1 oz)   18 83.5 kg (184 lb)   10/01/17 87.1 kg (192 lb 0.3 oz)          Intake/Output Summary (Last 24 hours) at 2018 0737  Last data filed at 2018 1458  Gross per 24 hour   Intake 795 ml   Output    Net 795 ml       Last shift:      No intake/output data recorded. Last 3 shifts:  1901 -  0700  In: 1360.3 [P.O.:650;  I.V.:710.3]  Out: 200 [Urine:200]       Physical Exam:     General: alert, on BiPAP  Eyes: anicteric;  HEENT: NC/AT,no nasal flaring or drainage   Neck: No goiter; no stridor;  no accessory muscle use  Lymph nodes: No abnormally enlarged cervical or supraclavicular nodes  Chest: increased AP diameter  Lungs: poor bilateral air movement, no current wheezing   Heart: Regular rate and rhythm   Abdomen: soft, protuberant, nontender, obese  : No Tamayo;    Ext: no cyanosis, no clubbing; no joint swelling or erythema  Skin: warm and dry; no clubbing  Musculoskeletal: no gross deformities  Neuro: speech fluent;  moves all fours, generalized weakness   Psych: no agitation     Data:   Labs:  Recent Labs     12/21/18 0440 12/20/18 0447 12/19/18  0513   WBC 10.4 15.3* 16.7*   HGB 10.2* 10.8* 10.0*   HCT 30.9* 33.6* 30.9*    307 236     Recent Labs     12/21/18 0440 12/20/18  0551 12/19/18  0513    140 139   K 3.5 3.4* 3.4*    105 107   CO2 23 22 22   * 186* 187*   BUN 75* 57* 42*   CREA 3.29* 3.06* 2.51*   CA 8.9 9.0 8.9   MG 2.6* 2.4 2.3   PHOS 5.9* 5.8*  --    ALB 2.9*  --  2.6*   TBILI  --   --  0.3   SGOT  --   --  17   ALT  --   --  19     Recent Labs     12/19/18  0520   PH 7.41   PCO2 39   PO2 98   HCO3 25   FIO2 40       Imaging:  I have personally reviewed the patients radiographs:  None today        Sruthi Rajan MD

## 2018-12-21 NOTE — PROGRESS NOTES
Problem: Mobility Impaired (Adult and Pediatric)  Goal: *Acute Goals and Plan of Care (Insert Text)  Physical Therapy Goals  Initiated 12/19/2018  1. Patient will move from supine to sit and sit to supine , scoot up and down and roll side to side in bed with independence within 7 day(s). 2.  Patient will transfer from bed to chair and chair to bed with independence using the least restrictive device within 7 day(s). 3.  Patient will perform sit to stand with independence within 7 day(s). 4.  Patient will ambulate with independence for 100 feet with the least restrictive device within 7 day(s). 5.  Patient will ascend/descend 4 stairs with one sided handrail(s) with modified independence within 7 day(s). physical Therapy TREATMENT  Patient: Adwoa Montanez (79 y.o. female)  Date: 12/21/2018  Diagnosis: Acute on chronic respiratory failure with hypoxia and hypercapnia (HCC) <principal problem not specified>      Precautions: DNR  Chart, physical therapy assessment, plan of care and goals were reviewed. ASSESSMENT:  Patient received in bed in NAD and watching TV. Initially resistant to intervention and expressed frustration with physical therapists in general. Agreeable to ambulate in the room and to sit for breakfast when encouraged. Gait with an accelerated bill and high guard but no LOB and SBA-supervision. Encouraged LAQs and ankle pumps in the chair. Spo2 remained >89% during her brief gait session on 4L O2 via nasal canula and HR increased from 86 resting to 101 BPM with activity. Anticipate discharge home with family and potentially HHPT if she agrees. Do not anticipate her agreeing to anything more intense based on her reception to therapy today.    Progression toward goals:  []    Improving appropriately and progressing toward goals  [x]    Improving slowly and progressing toward goals  []    Not making progress toward goals and plan of care will be adjusted     PLAN:  Patient continues to benefit from skilled intervention to address the above impairments. Continue treatment per established plan of care. Discharge Recommendations:  Home Health  Further Equipment Recommendations for Discharge:  None     SUBJECTIVE:   Patient stated I don't mean you but physical therapists are the most persistent people in the hospital. Everyone comes in here and interrupts whatever I am doing and thinks that their way is the best or most important way.  Patient agreed to get up to a bedside chair after 10 minutes of \"discussion\" and a phone call from family. .... Once in a chair she stated, \"Is that all? \" She deferred further attempts at ambulation when encouraged. OBJECTIVE DATA SUMMARY:   Critical Behavior:  Neurologic State: Alert  Orientation Level: Oriented X4  Cognition: Appropriate decision making, Appropriate for age attention/concentration, Appropriate safety awareness, Follows commands  Safety/Judgement: Awareness of environment, Fall prevention, Home safety  Functional Mobility Training:  Bed Mobility:  Rolling: Independent  Supine to Sit: Independent     Scooting: Independent        Transfers:  Sit to Stand: Supervision  Stand to Sit: Supervision        Bed to Chair: Stand-by assistance                    Balance:  Sitting: Intact  Standing: Impaired  Standing - Static: Good  Ambulation/Gait Training:  Distance (ft): 15 Feet (ft) deferred 2nd attempt  Assistive Device: Gait belt(4L O2)  Ambulation - Level of Assistance: Stand-by assistance(for line management)        Gait Abnormalities: ()        Base of Support: Widened     Speed/Kimberlyn: Accelerated        Therapeutic Exercises:   Encouraged LAQs, ankle pumps, and marching  Pain:  Pain Scale 1: Numeric (0 - 10)  Pain Intensity 1: 0              Activity Tolerance:     Please refer to the flowsheet for vital signs taken during this treatment.   After treatment:   [x]    Patient left in no apparent distress sitting up in chair  [] Patient left in no apparent distress in bed  []    Call bell left within reach  []    Nursing notified  []    Caregiver present  []    Bed alarm activated    COMMUNICATION/COLLABORATION:   The patients plan of care was discussed with: Registered Nurse    Eliud Garcia PT, DPT   Time Calculation: 25 mins

## 2018-12-21 NOTE — PROGRESS NOTES
Cardiology Progress Note  12/21/2018 9:39 AM  Admit Date: 12/17/2018  Admit Diagnosis/CC: Acute on chronic respiratory failure with hypoxia and hypercapnia (HCC)  Subjective:   Patient reports:  Chest Pain:  [x] none,  consistent with [] non-cardiac  [] atypical  []  anginal chest pain             [x] none now    []  on-going  Dyspnea: [x] none  [] at rest  [] with exertion  [] improved  [] unchanged [] worsening  PND:       [x] none  [] overnight   [] current  Orthopnea: [x] none  [] improved  [] unchanged  [] worsening  Presyncope: [x] none  [] improved  [] unchanged  [] worsening  Ambulated in hallway without symptoms  [] Yes  Ambulated in room without symptoms  [x] Yes  ROS(2+other systems)   Hematuria: [] Yes  [x] No.   Dysuria: [] Yes  [x] No                                           Cough:       [] Yes  [x] No.   Sputum: [] Yes  [x] No                                            Hematochezia: [] Yes  [x] No.   Melena: [] Yes  [x] No                                            No change in family and social history from H&P/Consult note.     Current Facility-Administered Medications   Medication Dose Route Frequency    albuterol-ipratropium (DUO-NEB) 2.5 MG-0.5 MG/3 ML  3 mL Nebulization Q6H RT    dronedarone (MULTAQ) tablet tab 400 mg  400 mg Oral BID WITH MEALS    apixaban (ELIQUIS) tablet 2.5 mg  2.5 mg Oral BID    insulin NPH (NOVOLIN N, HUMULIN N) injection 25 Units  25 Units SubCUTAneous Q12H    piperacillin-tazobactam (ZOSYN) 3.375 g in 0.9% sodium chloride (MBP/ADV) 100 mL  3.375 g IntraVENous Q12H    0.9% sodium chloride infusion  75 mL/hr IntraVENous CONTINUOUS    nitroglycerin (NITROSTAT) tablet 0.4 mg  0.4 mg SubLINGual Q5MIN PRN    sodium chloride (NS) flush 5-10 mL  5-10 mL IntraVENous Q8H    sodium chloride (NS) flush 5-10 mL  5-10 mL IntraVENous PRN    acetaminophen (TYLENOL) tablet 650 mg  650 mg Oral Q6H PRN    naloxone (NARCAN) injection 0.4 mg  0.4 mg IntraVENous PRN    bisacodyl (DULCOLAX) suppository 10 mg  10 mg Rectal DAILY PRN    insulin lispro (HUMALOG) injection   SubCUTAneous AC&HS    glucose chewable tablet 16 g  4 Tab Oral PRN    dextrose (D50W) injection syrg 12.5-25 g  12.5-25 g IntraVENous PRN    glucagon (GLUCAGEN) injection 1 mg  1 mg IntraMUSCular PRN    methylPREDNISolone (PF) (SOLU-MEDROL) injection 40 mg  40 mg IntraVENous Q6H    fluticasone-vilanterol (BREO ELLIPTA) 200mcg-25mcg/puff  2 Puff Inhalation DAILY    hydrALAZINE (APRESOLINE) 20 mg/mL injection 20 mg  20 mg IntraVENous Q6H PRN       Objective:    Physical Exam:  Last VS:   Visit Vitals  /64   Pulse 78   Temp 97.5 °F (36.4 °C)   Resp 20   Ht 5' 4.5\" (1.638 m)   Wt 82 kg (180 lb 12.4 oz)   SpO2 95% Comment: Simultaneous filing. User may not have seen previous data. BMI 30.55 kg/m²    Temp (24hrs), Av.3 °F (36.8 °C), Min:97.5 °F (36.4 °C), Max:99 °F (37.2 °C)    24 hr VS reviewed. General Appearance:   [x] well developed, well nourished,  [x] NAD. [] agitated   [] lethargic but arousable  [] obtunded   ENT, Palate:    [x] WNL   [] dry palate/MM        Respiratory:    [x] CTA bilateral  [] rales  [] rhonchi  [] normal resp effort      [] similar to yesterday   [] worse    [] improved   Cardiovascular:   [x] RRR   [] Irregular rate and rhythm   [x] Normal S1, S2   [x] No gallop or rub.    [] no murmur   [x] no new murmur  [] murmur c/w:   [x] no edema  RLE: []1+ []2+ [] 3+;  LLE: []1+ []2+ []3+      [] edema similar to yesterday   [] worse    [] improved   [x] normal JVP   [] Elevated JVP    [x] JVP similar to yesterday   [] worse    [] improved   [x] carotid upstroke unchanged   [x] abd aorta not palpated   [x] no stigmata of peripheral emboli   GI:    [x] abd soft, non-distented,bowel sounds present, no                     organomegaly appreciated   Skin:  Neuro:    Cath Site:  [x] warm and dry [] cold extremities   [x] A/O x 3, grossly non-focal      [] intact w/o hematoma or bruit; distal pulse unchanged.               Data Review:   Labs:    Recent Results (from the past 24 hour(s))   ECG RHYTHM ANALYSIS ADULT    Collection Time: 12/20/18  9:46 AM   Result Value Ref Range    Ventricular Rate 102 BPM    Atrial Rate 44 BPM    QRS Duration 150 ms    Q-T Interval 384 ms    QTC Calculation (Bezet) 500 ms    Calculated R Axis 43 degrees    Calculated T Axis 3 degrees    Diagnosis       Demand pacemaker, interpretation is based on intrinsic rhythm  Atrial fibrillation with rapid ventricular response with premature   ventricular or aberrantly conducted complexes  Right bundle branch block  When compared with ECG of 17-DEC-2018 15:04,  Atrial fibrillation has replaced Electronic ventricular pacemaker  Confirmed by Lena Wall ([id number]) on 12/20/2018 4:40:06 PM     GLUCOSE, POC    Collection Time: 12/20/18 11:18 AM   Result Value Ref Range    Glucose (POC) 187 (H) 65 - 100 mg/dL    Performed by KAYLEE QUINTEROS ([facility]) CON    VANCOMYCIN, RANDOM    Collection Time: 12/20/18 12:16 PM   Result Value Ref Range    Vancomycin, random 19.7 UG/ML   GLUCOSE, POC    Collection Time: 12/20/18  4:37 PM   Result Value Ref Range    Glucose (POC) 170 (H) 65 - 100 mg/dL    Performed by KAYLEE QUINTEROS ([facility]) CON    SODIUM, UR, RANDOM    Collection Time: 12/20/18  6:50 PM   Result Value Ref Range    Sodium,urine random <5 MMOL/L   CREATININE, UR, RANDOM    Collection Time: 12/20/18  6:50 PM   Result Value Ref Range    Creatinine, urine 99.60 mg/dL   EOSINOPHILS, URINE    Collection Time: 12/20/18  6:50 PM   Result Value Ref Range    Eosinophils,urine NEGATIVE      GLUCOSE, POC    Collection Time: 12/20/18  9:04 PM   Result Value Ref Range    Glucose (POC) 196 (H) 65 - 100 mg/dL    Performed by Hoang Camacho ([facility])    RENAL FUNCTION PANEL    Collection Time: 12/21/18  4:40 AM   Result Value Ref Range    Sodium 139 136 - 145 mmol/L    Potassium 3.5 3.5 - 5.1 mmol/L    Chloride 106 97 - 108 mmol/L    CO2 23 21 - 32 mmol/L    Anion gap 10 5 - 15 mmol/L    Glucose 171 (H) 65 - 100 mg/dL    BUN 75 (H) 6 - 20 MG/DL    Creatinine 3.29 (H) 0.55 - 1.02 MG/DL    BUN/Creatinine ratio 23 (H) 12 - 20      GFR est AA 16 (L) >60 ml/min/1.73m2    GFR est non-AA 13 (L) >60 ml/min/1.73m2    Calcium 8.9 8.5 - 10.1 MG/DL    Phosphorus 5.9 (H) 2.6 - 4.7 MG/DL    Albumin 2.9 (L) 3.5 - 5.0 g/dL   CK    Collection Time: 12/21/18  4:40 AM   Result Value Ref Range    CK 26 26 - 192 U/L   MAGNESIUM    Collection Time: 12/21/18  4:40 AM   Result Value Ref Range    Magnesium 2.6 (H) 1.6 - 2.4 mg/dL   CBC W/O DIFF    Collection Time: 12/21/18  4:40 AM   Result Value Ref Range    WBC 10.4 3.6 - 11.0 K/uL    RBC 3.44 (L) 3.80 - 5.20 M/uL    HGB 10.2 (L) 11.5 - 16.0 g/dL    HCT 30.9 (L) 35.0 - 47.0 %    MCV 89.8 80.0 - 99.0 FL    MCH 29.7 26.0 - 34.0 PG    MCHC 33.0 30.0 - 36.5 g/dL    RDW 14.0 11.5 - 14.5 %    PLATELET 370 699 - 965 K/uL    MPV 9.4 8.9 - 12.9 FL    NRBC 0.0 0  WBC    ABSOLUTE NRBC 0.00 0.00 - 0.01 K/uL   VANCOMYCIN, RANDOM    Collection Time: 12/21/18  4:40 AM   Result Value Ref Range    Vancomycin, random 17.3 UG/ML   GLUCOSE, POC    Collection Time: 12/21/18  7:29 AM   Result Value Ref Range    Glucose (POC) 224 (H) 65 - 100 mg/dL    Performed by IRVING FERNANDEZ(JUANI)        Provided Telemetry: [x] sinus  [] chronic afib   [] paroxysmal afib  [] NSVT      Assessment:     Active Problems:    Acute on chronic respiratory failure with hypoxia and hypercapnia ([address] (12/17/2018)    She has resumed RSR. ADF is main concern. Plan:     Atrial Fibrillation - Flutter  improved   Continue current meds  For all other plans, see orders.    [x] High complexity decision making was performed

## 2018-12-21 NOTE — PROGRESS NOTES
[email or web address]                  Phone - [phone]   Renal Progress Note    Subjective:   Patient: Alyssa Correa                    YOB: 1935               Date- 12/21/2018          Reason for visit: ARF    Admission Date: 12/17/2018       Assessment:      Acute renal failure. Most likely pre-renal azotemia from diuresis, but ATN is possible. She has had a-fib with a RVR but no hypotension. She has been on Vancomycin with a trough of 21. It was d/c'd on 12/20. Her Bun/creat was 12/0.8 on admission, 12/17. It was 17/1.3 on 12/18, 42/12.5 on 12/19, and 57/3.1 on 12/20, and 75/3.3 today. Her urinary Na was <5 !!   IV Lasix was d/c'd and NS started on 12/20, but the renal function is still worse.     Acute on chronic respiratory failure with hypoxia and hypercapnia  Diastolic heart failure on admission.     Hypokalemia.     Severe COPD with an exacerbation. Bilateral pneumonia.      Severe pulmonary hypertension.        PAF --A-fib the other night with a RVR. S/p pacemaker placement in 11/18.     Diabetes Mellitus, type II. Hypertension. Obesity.           Plan:          Continue a low rate of NS.     Daily labs. Avoid hypotension. Vancomycin has been d/c'd. Will check a level in AM.        Above d/w the patient and her . Interim History:    On 12/20: The patient has severe COPD and pulmonary hypertension. She was admitted with a COPD exacerbation and bilateral pneumonia. She also has PAF and developed A-fib the other night with a RVR. In addition, she was felt to have diastolic heart failure and so was placed on Lasix. She has not been on an ACE-I or ARB. Her Bun/creat was 12/0.8 on admission, 12/17. It was 17/1.3 on 12/18, 42/12.5 on 12/19, and is 57/3.1 today. The patient says she was extremely SOB on admission but is essentially back to her baseline \"usual miserable\" SOB now. She denies chest pain or any other complaints.    She was on Lasix, but this was d/c'd today and an IV of NS at 50 cc/hr ordered. She has been on Vancomycin with a trough of 21. Vanc was d/c'd today.        On :  The patient says she her breathing is at baseline. She is relatively comfortable if not moving. ROS as above, plus: no fever/chills today. Pt had a temp of 100.6 on admission and 99.0 yesterday. No HEEENT complaints. abd pain. No N/V/D. No joint pain. No skin rashes, no urinary complaints.       Review of Systems  A comprehensive review of systems was negative except for that written in the HPI. Objective:     Visit Vitals  /68   Pulse 76   Temp 97.5 °F (36.4 °C)   Resp 18   Ht 5' 4.5\" (1.638 m)   Wt 82 kg (180 lb 12.4 oz)   SpO2 94%   BMI 30.55 kg/m²     Temp (24hrs), Av.9 °F (36.6 °C), Min:97.5 °F (36.4 °C), Max:99 °F (37.2 °C)      701 - 1900  In: 342 [P.O.:449; I.V.:200]  Out: -   1901 -  0700  In: 1360.3 [P.O.:650; I.V.:710.3]  Out: 200 [Urine:200]    Physical Exam:  General:  alert, cooperative, mild resp distress but eating a large meal  Eye:  conjunctivae/corneas clear. PERRL, EOM's intact. Fundi benign  Neurologic:  grossly non-focal; alert and appropriate; normal speech  Neck:  supple; no JVD  Lungs:  diminished breath sounds but no rales or rhonchi  Heart:  regular rate and rhythm; no gallop or rub  Skin:  no rash or lesions  Abdomen:  soft, non-tender.   No masses,  no organomegaly   Extremities: +/- trace edema      Data Review:     Recent Labs     18  0440 18  0551 18  0447 18  0513   WBC 10.4  --  15.3* 16.7*   HGB 10.2*  --  10.8* 10.0*    140  --  139   K 3.5 3.4*  --  3.4*    105  --  107   CO2 23 22  --  22   BUN 75* 57*  --  42*   CREA 3.29* 3.06*  --  2.51*   CA 8.9 9.0  --  8.9   ALB 2.9*  --   --  2.6*   PHOS 5.9* 5.8*  --   --    MG 2.6* 2.4  --  2.3       Assessment:     Active Problems:    Acute on chronic respiratory failure with hypoxia and hypercapnia (HCC) (12/17/2018)      Chart reviewed. Pertinent Notes Reviewed. Medications list Personally Reviewed. Julio Ashton, [address] [facility]  [facility] [address]  [place], [address] [place]  Phone - [phone]    Fax - [phone]  [employer]ww[email or web address]                                         [facility]   [address] [address]  Phone - [phone]  Fax - [phone]. [employer]

## 2018-12-21 NOTE — PROGRESS NOTES
ADULT PROTOCOL: JET AEROSOL  REASSESSMENT    Patient  Gunner Yin     82 y.o.   female     12/21/2018  5:42 AM    Breath Sounds Pre Procedure: Right Breath Sounds: Diminished                               Left Breath Sounds: Diminished    Breath Sounds Post Procedure: Right Breath Sounds: Diminished                                 Left Breath Sounds: Diminished    Breathing pattern: Pre procedure Breathing Pattern: Regular          Post procedure Breathing Pattern: Regular    Heart Rate: Pre procedure Pulse: 71           Post procedure Pulse: 106    Resp Rate: Pre procedure Respirations: 20           Post procedure Respirations: 16      Cough: Pre procedure Cough: Non-productive               Post procedure Cough: Non-productive         Oxygen: 4LNC     Changed: NO    SpO2: Pre procedure SpO2: 93 %   with oxygen              Post procedure SpO2: 92 %  with oxygen    Nebulizer Therapy: Current medications Aerosolized Medications: DuoNeb      Changed: YES Q6      Problem List:   Patient Active Problem List   Diagnosis Code    Acute on chronic respiratory failure ([facility]) J96.20    COPD (chronic obstructive pulmonary disease) with acute bronchitis ([facility]) J44.0, J20.9    Lung nodule R91.1    Abnormal ECG R94.31    Hyperglycemia R73.9    Tobacco abuse Z72.0    Pulmonary hypertension, moderate to severe I27.20    Sepsis ([facility]) A41.9    COPD exacerbation ([facility]) J44.1    Respiratory failure ([facility]) J96.90    Second degree heart block I44.1    S/P placement of cardiac pacemaker Z95.0    Acute on chronic respiratory failure with hypoxia and hypercapnia ([facility]) J96.21, J96.22       Respiratory Therapist: Ofelia Bobo, RT

## 2018-12-22 LAB
ALBUMIN SERPL-MCNC: 2.9 G/DL (ref 3.5–5)
ANION GAP SERPL CALC-SCNC: 9 MMOL/L (ref 5–15)
BACTERIA SPEC CULT: NORMAL
BUN SERPL-MCNC: 81 MG/DL (ref 6–20)
BUN/CREAT SERPL: 25 (ref 12–20)
CALCIUM SERPL-MCNC: 8.7 MG/DL (ref 8.5–10.1)
CHLORIDE SERPL-SCNC: 107 MMOL/L (ref 97–108)
CK SERPL-CCNC: 28 U/L (ref 26–192)
CO2 SERPL-SCNC: 23 MMOL/L (ref 21–32)
CREAT SERPL-MCNC: 3.23 MG/DL (ref 0.55–1.02)
GLUCOSE BLD STRIP.AUTO-MCNC: 118 MG/DL (ref 65–100)
GLUCOSE BLD STRIP.AUTO-MCNC: 153 MG/DL (ref 65–100)
GLUCOSE BLD STRIP.AUTO-MCNC: 172 MG/DL (ref 65–100)
GLUCOSE BLD STRIP.AUTO-MCNC: 97 MG/DL (ref 65–100)
GLUCOSE SERPL-MCNC: 160 MG/DL (ref 65–100)
PHOSPHATE SERPL-MCNC: 6 MG/DL (ref 2.6–4.7)
POTASSIUM SERPL-SCNC: 3.2 MMOL/L (ref 3.5–5.1)
SERVICE CMNT-IMP: ABNORMAL
SERVICE CMNT-IMP: NORMAL
SERVICE CMNT-IMP: NORMAL
SODIUM SERPL-SCNC: 139 MMOL/L (ref 136–145)

## 2018-12-22 PROCEDURE — 77030038269 HC DRN EXT URIN PURWCK BARD -A

## 2018-12-22 PROCEDURE — 82550 ASSAY OF CK (CPK): CPT

## 2018-12-22 PROCEDURE — 80069 RENAL FUNCTION PANEL: CPT

## 2018-12-22 PROCEDURE — 94660 CPAP INITIATION&MGMT: CPT

## 2018-12-22 PROCEDURE — 94640 AIRWAY INHALATION TREATMENT: CPT

## 2018-12-22 PROCEDURE — 74011250636 HC RX REV CODE- 250/636: Performed by: INTERNAL MEDICINE

## 2018-12-22 PROCEDURE — 77010033678 HC OXYGEN DAILY

## 2018-12-22 PROCEDURE — 74011636637 HC RX REV CODE- 636/637: Performed by: INTERNAL MEDICINE

## 2018-12-22 PROCEDURE — 74011636637 HC RX REV CODE- 636/637: Performed by: HOSPITALIST

## 2018-12-22 PROCEDURE — 36415 COLL VENOUS BLD VENIPUNCTURE: CPT

## 2018-12-22 PROCEDURE — 74011250637 HC RX REV CODE- 250/637: Performed by: HOSPITALIST

## 2018-12-22 PROCEDURE — 74011250637 HC RX REV CODE- 250/637: Performed by: INTERNAL MEDICINE

## 2018-12-22 PROCEDURE — 74011000250 HC RX REV CODE- 250: Performed by: INTERNAL MEDICINE

## 2018-12-22 PROCEDURE — 74011250636 HC RX REV CODE- 250/636: Performed by: HOSPITALIST

## 2018-12-22 PROCEDURE — 82962 GLUCOSE BLOOD TEST: CPT

## 2018-12-22 PROCEDURE — 65660000000 HC RM CCU STEPDOWN

## 2018-12-22 PROCEDURE — 74011000258 HC RX REV CODE- 258: Performed by: HOSPITALIST

## 2018-12-22 RX ORDER — FUROSEMIDE 10 MG/ML
40 INJECTION INTRAMUSCULAR; INTRAVENOUS ONCE
Status: COMPLETED | OUTPATIENT
Start: 2018-12-22 | End: 2018-12-22

## 2018-12-22 RX ORDER — DILTIAZEM HYDROCHLORIDE 120 MG/1
120 CAPSULE, COATED, EXTENDED RELEASE ORAL DAILY
Status: DISCONTINUED | OUTPATIENT
Start: 2018-12-23 | End: 2018-12-26

## 2018-12-22 RX ORDER — AMLODIPINE BESYLATE 5 MG/1
5 TABLET ORAL DAILY
Status: DISCONTINUED | OUTPATIENT
Start: 2018-12-22 | End: 2018-12-22

## 2018-12-22 RX ORDER — POTASSIUM CHLORIDE 750 MG/1
20 TABLET, FILM COATED, EXTENDED RELEASE ORAL
Status: COMPLETED | OUTPATIENT
Start: 2018-12-22 | End: 2018-12-22

## 2018-12-22 RX ORDER — ALPRAZOLAM 0.5 MG/1
0.5 TABLET ORAL
Status: DISCONTINUED | OUTPATIENT
Start: 2018-12-22 | End: 2018-12-31

## 2018-12-22 RX ADMIN — FLUTICASONE FUROATE AND VILANTEROL TRIFENATATE 2 PUFF: 200; 25 POWDER RESPIRATORY (INHALATION) at 08:45

## 2018-12-22 RX ADMIN — INSULIN LISPRO 2 UNITS: 100 INJECTION, SOLUTION INTRAVENOUS; SUBCUTANEOUS at 11:30

## 2018-12-22 RX ADMIN — FUROSEMIDE 40 MG: 10 INJECTION, SOLUTION INTRAMUSCULAR; INTRAVENOUS at 12:20

## 2018-12-22 RX ADMIN — AMLODIPINE BESYLATE 5 MG: 5 TABLET ORAL at 12:20

## 2018-12-22 RX ADMIN — POTASSIUM CHLORIDE 20 MEQ: 750 TABLET, EXTENDED RELEASE ORAL at 12:20

## 2018-12-22 RX ADMIN — IPRATROPIUM BROMIDE AND ALBUTEROL SULFATE 3 ML: .5; 3 SOLUTION RESPIRATORY (INHALATION) at 20:30

## 2018-12-22 RX ADMIN — IPRATROPIUM BROMIDE AND ALBUTEROL SULFATE 3 ML: .5; 3 SOLUTION RESPIRATORY (INHALATION) at 14:23

## 2018-12-22 RX ADMIN — PIPERACILLIN SODIUM,TAZOBACTAM SODIUM 3.38 G: 3; .375 INJECTION, POWDER, FOR SOLUTION INTRAVENOUS at 08:35

## 2018-12-22 RX ADMIN — PIPERACILLIN SODIUM,TAZOBACTAM SODIUM 3.38 G: 3; .375 INJECTION, POWDER, FOR SOLUTION INTRAVENOUS at 21:37

## 2018-12-22 RX ADMIN — METHYLPREDNISOLONE SODIUM SUCCINATE 40 MG: 40 INJECTION, POWDER, FOR SOLUTION INTRAMUSCULAR; INTRAVENOUS at 14:08

## 2018-12-22 RX ADMIN — METHYLPREDNISOLONE SODIUM SUCCINATE 40 MG: 40 INJECTION, POWDER, FOR SOLUTION INTRAMUSCULAR; INTRAVENOUS at 08:39

## 2018-12-22 RX ADMIN — APIXABAN 2.5 MG: 2.5 TABLET, FILM COATED ORAL at 21:38

## 2018-12-22 RX ADMIN — HUMAN INSULIN 35 UNITS: 100 INJECTION, SUSPENSION SUBCUTANEOUS at 21:38

## 2018-12-22 RX ADMIN — IPRATROPIUM BROMIDE AND ALBUTEROL SULFATE 3 ML: .5; 3 SOLUTION RESPIRATORY (INHALATION) at 02:08

## 2018-12-22 RX ADMIN — IPRATROPIUM BROMIDE AND ALBUTEROL SULFATE 3 ML: .5; 3 SOLUTION RESPIRATORY (INHALATION) at 07:56

## 2018-12-22 RX ADMIN — ALPRAZOLAM 0.5 MG: 0.5 TABLET ORAL at 16:32

## 2018-12-22 RX ADMIN — Medication 10 ML: at 05:19

## 2018-12-22 RX ADMIN — HYDRALAZINE HYDROCHLORIDE 20 MG: 20 INJECTION INTRAMUSCULAR; INTRAVENOUS at 04:07

## 2018-12-22 RX ADMIN — SODIUM CHLORIDE 75 ML/HR: 900 INJECTION, SOLUTION INTRAVENOUS at 04:09

## 2018-12-22 RX ADMIN — METHYLPREDNISOLONE SODIUM SUCCINATE 40 MG: 40 INJECTION, POWDER, FOR SOLUTION INTRAMUSCULAR; INTRAVENOUS at 21:38

## 2018-12-22 RX ADMIN — METHYLPREDNISOLONE SODIUM SUCCINATE 40 MG: 40 INJECTION, POWDER, FOR SOLUTION INTRAMUSCULAR; INTRAVENOUS at 04:06

## 2018-12-22 RX ADMIN — DRONEDARONE 400 MG: 400 TABLET, FILM COATED ORAL at 08:45

## 2018-12-22 RX ADMIN — INSULIN LISPRO 2 UNITS: 100 INJECTION, SOLUTION INTRAVENOUS; SUBCUTANEOUS at 07:30

## 2018-12-22 RX ADMIN — APIXABAN 2.5 MG: 2.5 TABLET, FILM COATED ORAL at 08:38

## 2018-12-22 RX ADMIN — HUMAN INSULIN 35 UNITS: 100 INJECTION, SUSPENSION SUBCUTANEOUS at 08:39

## 2018-12-22 RX ADMIN — Medication 10 ML: at 21:39

## 2018-12-22 NOTE — PROGRESS NOTES
Bedside shift change report given to Elton Epperson (oncoming nurse) by Rosina Martinez (offgoing nurse). Report included the following information SBAR, Kardex, MAR and Recent Results.

## 2018-12-22 NOTE — PROGRESS NOTES
ADULT PROTOCOL: JET AEROSOL  REASSESSMENT    Patient  Maryuri Oro     82 y.o.   female     12/21/2018  11:42 PM    Breath Sounds Pre Procedure: Right Breath Sounds: Diminished                               Left Breath Sounds: Diminished    Breath Sounds Post Procedure: Right Breath Sounds: Diminished                                 Left Breath Sounds: Diminished    Breathing pattern: Pre procedure Breathing Pattern: Tachypneic          Post procedure Breathing Pattern: Tachypneic    Heart Rate: Pre procedure Pulse: 81           Post procedure Pulse: 83    Resp Rate: Pre procedure Respirations: 26           Post procedure Respirations: 26           Cough: Pre procedure Cough: Non-productive               Post procedure Cough: Non-productive      Oxygen: O2 Device: Nasal cannula   Flow rate (L/min) 4     Changed: NO    SpO2: Pre procedure SpO2: 93 %   with oxygen              Post procedure SpO2: 96 %  with oxygen    Nebulizer Therapy: Current medications Aerosolized Medications: DuoNeb      Changed: NO    Smoking History:   Tobacco Use    Smoking status: Current Every Day Smoker       Packs/day: 0.25           Problem List:   Patient Active Problem List   Diagnosis Code    Acute on chronic respiratory failure ([facility]) J96.20    COPD (chronic obstructive pulmonary disease) with acute bronchitis ([facility]) J44.0, J20.9    Lung nodule R91.1    Abnormal ECG R94.31    Hyperglycemia R73.9    Tobacco abuse Z72.0    Pulmonary hypertension, moderate to severe I27.20    Sepsis ([facility]) A41.9    COPD exacerbation ([facility]) J44.1    Respiratory failure ([facility]) J96.90    Second degree heart block I44.1    S/P placement of cardiac pacemaker Z95.0    Acute on chronic respiratory failure with hypoxia and hypercapnia ([facility]) J96.21, J96.22       Respiratory Therapist: Conrado Romero RT

## 2018-12-22 NOTE — PROGRESS NOTES
[email or web address]                  Phone - [phone]   Renal Progress Note    Subjective:   Patient: Mark Berger                    YOB: 1935               Date- 12/22/2018          Reason for visit: ARF    Admission Date: 12/17/2018       Assessment:      Acute renal failure. Most likely pre-renal azotemia from diuresis, but ATN is possible. She has had a-fib with a RVR but no hypotension. She has been on Vancomycin with a trough of 21. It was d/c'd on 12/20. Her Bun/creat was 12/0.8 on admission, 12/17. It was 17/1.3 on 12/18, 42/12.5 on 12/19, and 57/3.1 on 12/20, and 75/3.3 today. Her urinary Na was <5 !!   IV Lasix was d/c'd and NS started on 12/20, but the renal function got worse. ON 12/22 she is even more SOB, so the NS has been d/c'd, and Lasix ordered again  Bun/creat 81/3.2.     Acute on chronic respiratory failure with hypoxia and hypercapnia  Diastolic heart failure on admission.     Hypokalemia.     Severe COPD with an exacerbation. Bilateral pneumonia.      Severe pulmonary hypertension.        PAF --A-fib the other night with a RVR. S/p pacemaker placement in 11/18.     Diabetes Mellitus, type II. Hypertension. Obesity.           Plan:          Agree with the use of Lasix.   Daily labs. Avoid hypotension. Vancomycin has been d/c'd. Prognosis grim. Interim History:    On 12/20: The patient has severe COPD and pulmonary hypertension. She was admitted with a COPD exacerbation and bilateral pneumonia. She also has PAF and developed A-fib the other night with a RVR. In addition, she was felt to have diastolic heart failure and so was placed on Lasix. She has not been on an ACE-I or ARB. Her Bun/creat was 12/0.8 on admission, 12/17. It was 17/1.3 on 12/18, 42/12.5 on 12/19, and is 57/3.1 today. The patient says she was extremely SOB on admission but is essentially back to her baseline \"usual miserable\" SOB now.   She denies chest pain or any other complaints. She was on Lasix, but this was d/c'd today and an IV of NS at 50 cc/hr ordered. She has been on Vancomycin with a trough of 21. Vanc was d/c'd today.        On :  The patient says she her breathing is at baseline. She is relatively comfortable if not moving. On :  Pt feeling sig more SOB. NS d/c'd. Pt denies any complaints except for the SOB. ROS as above, plus: no fever/chills today. . No HEEENT complaints. abd pain. No N/V/D. No joint pain. No skin rashes, no urinary complaints.       Review of Systems  A comprehensive review of systems was negative except for that written in the HPI. Objective:     Visit Vitals  /79 (BP 1 Location: Left arm, BP Patient Position: At rest)   Pulse 88   Temp 98 °F (36.7 °C)   Resp 22   Ht 5' 4.5\" (1.638 m)   Wt 81.6 kg (179 lb 14.3 oz)   SpO2 94%   BMI 30.40 kg/m²     Temp (24hrs), Av.7 °F (36.5 °C), Min:97.5 °F (36.4 °C), Max:98 °F (36.7 °C)      No intake/output data recorded.  1901 -  0700  In: 1285 [P.O.:849; I.V.:775]  Out: 600 [Urine:600]    Physical Exam:  General:  alert, cooperative, mod resp distress   Eye:  conjunctivae/corneas clear. EOM's intact. Neurologic:  grossly non-focal; alert and appropriate; normal speech  Neck:  supple; no JVD  Lungs:  diminished breath sounds but no rales or rhonchi; ++ accessory muscle use  Heart:  regular rate and rhythm; no gallop or rub  Skin:  no rash or lesions  Abdomen:  soft, non-tender.   No masses,  no organomegaly   Extremities: no edema      Data Review:     Recent Labs     18  0411 18  0440 18  0551 18  0447   WBC  --  10.4  --  15.3*   HGB  --  10.2*  --  10.8*    139 140  --    K 3.2* 3.5 3.4*  --     106 105  --    CO2 23 23 22  --    BUN 81* 75* 57*  --    CREA 3.23* 3.29* 3.06*  --    CA 8.7 8.9 9.0  --    ALB 2.9* 2.9*  --   --    PHOS 6.0* 5.9* 5.8*  --    MG  --  2.6* 2.4  --        Assessment:     Active Problems: Acute on chronic respiratory failure with hypoxia and hypercapnia ([address] (12/17/2018)      Chart reviewed. Pertinent Notes Reviewed. Medications list Personally Reviewed. Carmen Prado, [address] [facility]  [facility]  [address], [address]  [place], [address]  Phone - [phone]    Fax - [phone]  W[email or web address]                                         [facility]   [address] [address]  Phone - [phone]  Fax - [phone]. [employer]

## 2018-12-22 NOTE — PROGRESS NOTES
PCU SHIFT NURSING NOTE      1900: The bedside shift change report given to Letty (oncoming nurse) by oSnia Perdomo (offgoing nurse). Report included the following information SBAR, Kardex, Intake/Output, MAR, Recent Results, Med Rec Status, Cardiac Rhythm Paced , Alarm Parameters  and Quality Measures. 12:30 AM  The patient is resting in bed with eyes open. With family at the bedside. No complains of pain or signs of distress noted. All night meds administered as required. To continue monitoring. 2533: The patient had a calm night. With some periods of anxiety when on BIPAP. The Blood pressure was 167/77 and hydralazine administered. No signs of respiratory distress noted. All night meds administered. Bed linen changed due on urine incontinence. To continue to monitor. Admission Date 12/17/2018   Admission Diagnosis Acute on chronic respiratory failure with hypoxia and hypercapnia ([facility] [address]   Consults IP CONSULT TO INTENSIVIST  IP CONSULT TO NEPHROLOGY  IP CONSULT TO CARDIOLOGY        Consults   []PT   []OT   []Speech   []Case Management      [] Palliative      Cardiac Monitoring Order   [x]Yes   []No     IV drips   []Yes    Drip:                            Dose:  Drip:                            Dose:  Drip:                            Dose:   [x]No     GI Prophylaxis   [x]Yes   []No         DVT Prophylaxis   SCDs:             David stockings:         [x] Medication   []Contraindicated   []None      Activity Level Activity Level: Bath Room Privileges     Activity Assistance: Partial (one person)   Purposeful Rounding every 1-2 hour?    []Yes   Gonzalez Score  Total Score: 2   Bed Alarm (If score 3 or >)   [x]Yes   [] Refused (See signed refusal form in chart)   Niraj Score  Niraj Score: 19   Niraj Score (if score 14 or less)   []PMT consult   []Wound Care consult      []Specialty bed   [] Nutrition consult          Needs prior to discharge:   Home O2 required:    [x]Yes   []No    If yes, how much O2 required? Other:    Last Bowel Movement: Last Bowel Movement Date: 12/17/18      Influenza Vaccine Received Flu Vaccine for Current Season (usually Sept-March): Yes        Pneumonia Vaccine           Diet Active Orders   Diet    DIET DIABETIC CONSISTENT CARB Regular      LDAs               Peripheral IV 12/18/18 Right Forearm (Active)   Site Assessment Clean, dry, & intact 12/21/2018  7:39 PM   Phlebitis Assessment 0 12/21/2018  7:39 PM   Infiltration Assessment 0 12/21/2018  7:39 PM   Dressing Status Clean, dry, & intact 12/21/2018  7:39 PM   Dressing Type Transparent 12/21/2018  7:39 PM   Hub Color/Line Status Pink;End cap changed 12/21/2018  7:39 PM   Action Taken Open ports on tubing capped 12/21/2018  7:39 PM   Alcohol Cap Used Yes 12/21/2018  7:39 PM          External Female Catheter 12/20/18 (Active)   Site Assessment Clean, dry, & intact 12/20/2018  7:43 PM   Repositioned No 12/20/2018  7:43 PM   Perineal Care Yes 12/20/2018  7:43 PM   Wick Changed No 12/20/2018  7:43 PM   Suction Canister/Tubing Changed No 12/20/2018  7:43 PM                Urinary Catheter      Intake & Output Date 12/21/18 0700 - 12/22/18 0659 12/22/18 0700 - 12/23/18 0659   Shift 9929-9574 3370-5807 24 Hour Total 5450-5547 9980-1492 24 Hour Total   INTAKE   P.O. 849  849        P. O. 849  849      I.V.(mL/kg/hr) 775(0.8)  775        Volume (0.9% sodium chloride infusion) 575  575        Volume (piperacillin-tazobactam (ZOSYN) 3.375 g in 0.9% sodium chloride (MBP/ADV) 100 mL) 200  200      Shift Total(mL/kg) 6413(19.7)  5442(79.7)      OUTPUT   Urine(mL/kg/hr) 300(0.3)  300        Urine Voided 300  300        Urine Occurrence(s) 2 x  2 x      Shift Total(mL/kg) 300(3.7)  300(3.7)      NET 1325  1324      Weight (kg) 82 82 82 82 82 82         Readmission Risk Assessment Tool Score Medium Risk            19       Total Score        3 Has Seen PCP in Last 6 Months (Yes=3, No=0)    2 . Living with Significant Other. Assisted Living. LTAC. SNF. or   Rehab    4 IP Visits Last 12 Months (1-3=4, 4=9, >4=11)    5 Pt.  Coverage (Medicare=5 , Medicaid, or Self-Pay=4)    5 Charlson Comorbidity Score (Age + Comorbid Conditions)        Criteria that do not apply:    Patient Length of Stay (>5 days = 3)       Expected Length of Stay 4d 19h   Actual Length of Stay 5

## 2018-12-22 NOTE — PROGRESS NOTES
CARDIOLOGY Progress Note (covering for Dr. Yanni Joy)    Patient ID:  Patient: Amrita Acharya  MRN: [id number]  Age: 82 y.o.  : 1935    Date of  Admission: 2018  3:00 PM   PCP:  Ailyn Bloom MD   Primary cardiologist:  Lindy Robledo MD    Assessment: 1. Acute diastolic heart failure with possible pulmonary edema on admission though bilateral pneumonia diagnosed as well. EF 55-60% on echo 2018.  2. Acute on chronic hypoxic respiratory failure. 3. Paroxysmal atrial fibrillation. Has refused anticoagulation in the past, on chronic ASA therapy PTA. In sinus rhythm currently. 4. 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 Currently 1:1 AV conduction. 5. Mild aortic valve stenosis. 6. Hypertensive heart disease with heart failure. 7. Hyperglycemia with steroid. 8. Chronic obstructive pulmonary disease with acute exacerbation. Recent H. Influenzae positive in 2018.  9. Oxygen dependent with chronic supplementation. 10. Cor pulmonale with severe pulmonary hypertension. 11. History of tobacco smoking. Plan:     1. Continue IV diuretic. 2. Stop IVF. 3. Na restricted diet. 4. Stop Multaq, I think it may exacerbate her heart failure and/or lung disease. Anything with beta-blocker activity (Multaq, sotalol, propafenone, amiodarone) will be problematic to start. Flecainide, dofetilide--would have to weigh the risk:benefit of using antiarrhythmic drugs in her. I'd focus on rate control and anticoagulation. 5. Stop amlodipine and change to diltiazem  daily tomorrow AM.  6. Pacemaker program change made early in admission to minimize ventricular pacing. 7. Continue Eliquis. 8. Agree with antibiotic. Will see if stopping IVF, continued IV diuretic, Na restriction, and stopping Multaq helps her breathing. Doesn't have much room to get worse.       [x]       High complexity decision making was performed in this patient at high risk for decompensation with multiple organ involvement. Jo Rankin is a 82 y.o. female with a history of the above admitted with acute on chronic respiratory failure. Suspected to have bilateral pneumonia but also acute diastolic heart failure. Treated for her pulmonary issues and seen by pulmonary consultation (Dr. Anisa Chilel). She reports breathing better with BIPAP, IV diuretic. Got off BIPAP. Multaq was started on 12/20. TODAY:  Was feeling better but today has more issues with dyspnea. No chest pain, palpitations, syncope. Getting IVF. Remains on Multaq.        Allergies   Allergen Reactions    Keflex [Cephalexin] Itching          Current Facility-Administered Medications   Medication Dose Route Frequency    amLODIPine (NORVASC) tablet 5 mg  5 mg Oral DAILY    albuterol-ipratropium (DUO-NEB) 2.5 MG-0.5 MG/3 ML  3 mL Nebulization Q6H RT    insulin NPH (NOVOLIN N, HUMULIN N) injection 35 Units  35 Units SubCUTAneous Q12H    dronedarone (MULTAQ) tablet tab 400 mg  400 mg Oral BID WITH MEALS    apixaban (ELIQUIS) tablet 2.5 mg  2.5 mg Oral BID    piperacillin-tazobactam (ZOSYN) 3.375 g in 0.9% sodium chloride (MBP/ADV) 100 mL  3.375 g IntraVENous Q12H    0.9% sodium chloride infusion  75 mL/hr IntraVENous CONTINUOUS    nitroglycerin (NITROSTAT) tablet 0.4 mg  0.4 mg SubLINGual Q5MIN PRN    sodium chloride (NS) flush 5-10 mL  5-10 mL IntraVENous Q8H    sodium chloride (NS) flush 5-10 mL  5-10 mL IntraVENous PRN    acetaminophen (TYLENOL) tablet 650 mg  650 mg Oral Q6H PRN    naloxone (NARCAN) injection 0.4 mg  0.4 mg IntraVENous PRN    bisacodyl (DULCOLAX) suppository 10 mg  10 mg Rectal DAILY PRN    insulin lispro (HUMALOG) injection   SubCUTAneous AC&HS    glucose chewable tablet 16 g  4 Tab Oral PRN    dextrose (D50W) injection syrg 12.5-25 g  12.5-25 g IntraVENous PRN    glucagon (GLUCAGEN) injection 1 mg  1 mg IntraMUSCular PRN    methylPREDNISolone (PF) (SOLU-MEDROL) injection 40 mg  40 mg IntraVENous Q6H    fluticasone-vilanterol (BREO ELLIPTA) 200mcg-25mcg/puff  2 Puff Inhalation DAILY    hydrALAZINE (APRESOLINE) 20 mg/mL injection 20 mg  20 mg IntraVENous Q6H PRN       Review of Symptoms:    Respiratory: +SOB, cough, sputum production, wheezing, LUDWIG, negative for pleuritic pain   Gastrointestinal: negative for abdominal pain, N/V, dysphagia, change in bowel habits, bleeding        Objective:      Physical Exam:  Temp (24hrs), Av.7 °F (36.5 °C), Min:97.5 °F (36.4 °C), Max:98 °F (36.7 °C)    Patient Vitals for the past 8 hrs:   Pulse   18 1116 88   18 0757 87    Patient Vitals for the past 8 hrs:   Resp   18 1116 22   18 0757 20    Patient Vitals for the past 8 hrs:   BP   18 1116 160/79   18 0757 155/86          Intake/Output Summary (Last 24 hours) at 2018 1432  Last data filed at 2018 0409  Gross per 24 hour   Intake 975 ml   Output 600 ml   Net 375 ml       Nondiaphoretic, not in acute distress. Supple, no palpable thyromegaly. No scleral icterus, mucous membranes moist, conjuctivae pink, no xanthelasma. Some accessory muscle use today, decreased BS on auscultation bilaterally, symmetric air movement. Regular rate and rhythm, no murmur, pericardial rub, knock, or gallop. No JVD or peripheral edema. No carotid bruit. Palpable radial pulses bilaterally. Abdomen, soft, nontender, nondistended. No abdominal bruit or pulstatile masses. Extremities without cyanosis or clubbing. Muscle tone and bulk normal.  Skin warm and dry. No rashes or ulcers. Neuro grossly nonfocal.  No tremor. Awake and appropriate. CARDIOGRAPHICS and STUDIES, I reviewed:    Telemetry:  SR with intrinsic AV conduction to RBBB, pacing spike apparently in QRS. ECG in ER:  Sinus tachycardia with sequential V pacing.     Echo 2018:  LEFT VENTRICLE: Size was normal. Systolic function was normal. Ejection  fraction was estimated in the range of 55 % to 60 %. There were no  regional wall motion abnormalities. Wall thickness was normal.    RIGHT VENTRICLE: The size was at the upper limits of normal. Systolic  function was normal. Wall thickness was normal.    LEFT ATRIUM: The atrium was dilated. RIGHT ATRIUM: The atrium was mildly to moderately dilated. MITRAL VALVE: Normal valve structure. There was normal leaflet separation. DOPPLER: The transmitral velocity was within the normal range. There was  no evidence for stenosis. There was mild regurgitation. AORTIC VALVE: The valve was trileaflet. Leaflets exhibited mildly reduced  cuspal separation. DOPPLER: There was mild stenosis. There was no  regurgitation. TRICUSPID VALVE: Normal valve structure. There was normal leaflet  separation. DOPPLER: The transtricuspid velocity was within the normal  range. There was no evidence for tricuspid stenosis. There was moderate  regurgitation. Pulmonary artery systolic pressure: 81 mmHg. There was  severe pulmonary hypertension. PULMONIC VALVE: Not well visualized, but normal Doppler findings. DOPPLER:  There was no regurgitation. AORTA: The root exhibited normal size. SYSTEMIC VEINS: IVC: The inferior vena cava was normal in size. PERICARDIUM: There was no pericardial effusion. MEASUREMENT TABLES    Doppler measurements  Aortic valve   (Reference normals)  Peak roshni   2.3 cm/s   (--)  Mean gradient   10 mmHg   (--)  Valve area, cont   1.2 cmï¾²   (--)    CXR in ER:  Bilateral airspace disease probably pulmonary edema. Labs:  Recent Labs     12/22/18 0411 12/21/18 0440   CPK 28 26     No results found for: CHOL, CHOLX, CHLST, CHOLV, HDL, LDL, LDLC, DLDLP, TGLX, TRIGL, TRIGP, CHHD, CHHDX  No results for input(s): INR, PTP, APTT in the last 72 hours.     No lab exists for component: Abida Scruggs   Recent Labs     12/22/18 0411 12/21/18  0440 12/20/18  0551 12/20/18 0447    139 140  --    K 3.2* 3.5 3.4*  --     106 105  --    CO2 23 23 22  --    BUN 81* 75* 57*  --    CREA 3.23* 3.29* 3.06*  --    * 171* 186*  --    PHOS 6.0* 5.9* 5.8*  --    CA 8.7 8.9 9.0  --    ALB 2.9* 2.9*  --   --    WBC  --  10.4  --  15.3*   HGB  --  10.2*  --  10.8*   HCT  --  30.9*  --  33.6*   PLT  --  245  --  307     Recent Labs     12/22/18  0411 12/21/18  0440   ALB 2.9* 2.9*     No components found for: GLPOC  No results for input(s): PH, PCO2, PO2 in the last 72 hours.         Luz Montejo MD  12/22/2018

## 2018-12-22 NOTE — PROGRESS NOTES
Problem: Falls - Risk of  Goal: *Absence of Falls  Document Marie Fall Risk and appropriate interventions in the flowsheet.   Outcome: Progressing Towards Goal  Fall Risk Interventions:  Mobility Interventions: Bed/chair exit alarm, Patient to call before getting OOB         Medication Interventions: Assess postural VS orthostatic hypotension    Elimination Interventions: Bed/chair exit alarm

## 2018-12-22 NOTE — PROGRESS NOTES
Hospitalist Progress Note    NAME: Simeon Leslie   :  1935   MRN:  [id number]       Assessment / Plan:  Acute on chronic respiratory failure POA with baseline 2-4L O2  Acute COPD exacerbation  Sepsis due to Bilateral HCAP POA   Underlying severe pulmonary hypertension PADP 81   -clinically: more dyspneic today; o2 at 5 L   bipap using prn but cannot be wo it yet   poor clearance/ weak coughing   Significant dyspnea with minimal exertion persist   Leukocytosis resolved    -cont bipap at night and prn during daytime  -cont aggressive IV steroids and jet nebs   -Empiric IV AB: vanco - was dc with ALMAZ, no signs of gram + infection  Cont zosyn  ( last dose )   BC NTD  resp cultures: rare S. MALTOPHILIA. DOC - bactrim but cannot use with current cr   She was improving clinically till today, so will continue with zosyn   -keep O2 sats > 92%   -cxray repeated : No pulmonary consolidation. Improved aeration since      ALMAZ not POA  -Cr 3.29-2. 23 hopefully stabilized, baseline normal at 0.8   Likely due to Lasix + possible ATN with  vanco ( trough 21.4 )    Clinically: May be getting fluid overloaded with IVF, will d/w renal lasix today   -Dr Inna Jay help was greatly appreciated   -IVF: per renal   -Daily weight    Acute on chronic diastolic HF EF 14%   HTN / PAfib   H/o bradycardia s/p PPM 2018 per   -HR/BP stable   May be getting fluid overloaded with IVF, will d/w renal lasix today   Had episode of afib rvr   -cont home Cardizem/ Norvasc    -on lasix 20 mg daily at home ( on hold now for ALMAZ)   -CHADS-Vasc 4 --> need to be on a/coag  Last admission pt declined AC and wanted to be on ASA alone   Dr Massiel Schulz started on Eliquis ( will leave it to Dr Massiel Schulz to discuss it with pt)   Now off ASA   -started back on Multaq by Dr Massiel Schulz   -ECHO: EF 55%, mild AS, severe pulmonary HTN    Addendum: d/w renal, ok for lasix dose now; overall CKD prognosis doesn't look good     DM type II  -BS better at 150s  -cont NPH with steroids + SS   -hba1c 6.6 11/2018     Hypokalemia  -supplement as needed  -follow in am with Mg      Tobacco use   Obesity. Body mass index is 32.04 kg/m². Code status: DNR per h/p on admission   NOK:    Prophylaxis: heparin     Baseline: functional, ; home O2 3.5-4 L   Recommended Disposition: HHC vs SNF pending clinical improvement      Subjective:     Chief Complaint / Reason for Physician Visit: following acute respiratory failure / afib/ htn   In bed  C/o feeling more dyspneic this am and getting \" puffy\"   Noted to be mod dyspneic at rest   O2 5 L     Discussed with RN events overnight. Review of Systems:  Symptom Y/N Comments  Symptom Y/N Comments   Fever/Chills n   Chest Pain n    Poor Appetite    Edema     Cough y   Abdominal Pain n    Sputum    Joint Pain     SOB/LUDWIG y More   Pruritis/Rash     Nausea/vomit    Tolerating PT/OT     Diarrhea    Tolerating Diet     Constipation    Other       Could NOT obtain due to:      Objective:     VITALS:   Last 24hrs VS reviewed since prior progress note. Most recent are:  Patient Vitals for the past 24 hrs:   Temp Pulse Resp BP SpO2   12/22/18 1116 98 °F (36.7 °C) 88 22 160/79 95 %   12/22/18 0757 97.7 °F (36.5 °C) 87 20 155/86 95 %   12/22/18 0756     95 %   12/22/18 0408 97.7 °F (36.5 °C) 78 20 164/62 100 %   12/22/18 0208     100 %   12/21/18 2306 97.7 °F (36.5 °C) 84 18 168/69 94 %   12/21/18 2027     95 %   12/21/18 1913 97.9 °F (36.6 °C) 85 18 140/90 97 %   12/21/18 1542 97.5 °F (36.4 °C)       12/21/18 1539 97.5 °F (36.4 °C) 76 18 149/68 94 %   12/21/18 1422     97 %       Intake/Output Summary (Last 24 hours) at 12/22/2018 1157  Last data filed at 12/22/2018 0409  Gross per 24 hour   Intake 1224 ml   Output 600 ml   Net 624 ml        PHYSICAL EXAM:  General: WD, WN. Alert, cooperative, no acute distress. Mod dyspnea at rest with lip pursed breathing      EENT:  EOMI. Anicteric sclerae. MMM  Resp:  Diminished BS bilaterally, no wheezing or rales. No accessory muscle use  CV:  Regular  rhythm,  + edema  GI:  Soft, Non distended, Non tender.  +Bowel sounds  Neurologic:  Alert and oriented X 3, normal speech,   Psych:   Good insight. Not anxious nor agitated  Skin:  No rashes. No jaundice    Reviewed most current lab test results and cultures  YES  Reviewed most current radiology test results   YES  Review and summation of old records today    NO  Reviewed patient's current orders and MAR    YES  PMH/SH reviewed - no change compared to H&P  ________________________________________________________________________  Care Plan discussed with:    Comments   Patient y    Family      RN y    Care Manager     Consultant  y Renal                      Multidiciplinary team rounds were held today with , nursing, pharmacist and clinical coordinator. Patient's plan of care was discussed; medications were reviewed and discharge planning was addressed. ________________________________________________________________________  Total NON critical care TIME:  35  Minutes    Total CRITICAL CARE TIME Spent:   Minutes non procedure based      Comments   >50% of visit spent in counseling and coordination of care     ________________________________________________________________________  Lady Gonzalez MD     Procedures: see electronic medical records for all procedures/Xrays and details which were not copied into this note but were reviewed prior to creation of Plan. LABS:  I reviewed today's most current labs and imaging studies.   Pertinent labs include:  Recent Labs     12/21/18  0440 12/20/18  0447   WBC 10.4 15.3*   HGB 10.2* 10.8*   HCT 30.9* 33.6*    307     Recent Labs     12/22/18  0411 12/21/18  0440 12/20/18  0551    139 140   K 3.2* 3.5 3.4*    106 105   CO2 23 23 22   * 171* 186*   BUN 81* 75* 57*   CREA 3.23* 3.29* 3.06*   CA 8.7 8.9 9.0   MG  --  2.6* 2.4   PHOS 6.0* 5.9* 5.8*   ALB 2.9* 2.9*  --        Signed: Lady Gonzalez MD

## 2018-12-23 LAB
ALBUMIN SERPL-MCNC: 2.9 G/DL (ref 3.5–5)
ANION GAP SERPL CALC-SCNC: 9 MMOL/L (ref 5–15)
BUN SERPL-MCNC: 91 MG/DL (ref 6–20)
BUN/CREAT SERPL: 25 (ref 12–20)
CALCIUM SERPL-MCNC: 9 MG/DL (ref 8.5–10.1)
CHLORIDE SERPL-SCNC: 109 MMOL/L (ref 97–108)
CK SERPL-CCNC: 22 U/L (ref 26–192)
CO2 SERPL-SCNC: 24 MMOL/L (ref 21–32)
CREAT SERPL-MCNC: 3.58 MG/DL (ref 0.55–1.02)
GLUCOSE BLD STRIP.AUTO-MCNC: 112 MG/DL (ref 65–100)
GLUCOSE BLD STRIP.AUTO-MCNC: 112 MG/DL (ref 65–100)
GLUCOSE BLD STRIP.AUTO-MCNC: 119 MG/DL (ref 65–100)
GLUCOSE BLD STRIP.AUTO-MCNC: 159 MG/DL (ref 65–100)
GLUCOSE SERPL-MCNC: 106 MG/DL (ref 65–100)
PHOSPHATE SERPL-MCNC: 6.2 MG/DL (ref 2.6–4.7)
POTASSIUM SERPL-SCNC: 3.7 MMOL/L (ref 3.5–5.1)
SERVICE CMNT-IMP: ABNORMAL
SODIUM SERPL-SCNC: 142 MMOL/L (ref 136–145)

## 2018-12-23 PROCEDURE — 74011000258 HC RX REV CODE- 258: Performed by: HOSPITALIST

## 2018-12-23 PROCEDURE — 74011250636 HC RX REV CODE- 250/636: Performed by: HOSPITALIST

## 2018-12-23 PROCEDURE — 77030038269 HC DRN EXT URIN PURWCK BARD -A

## 2018-12-23 PROCEDURE — 80069 RENAL FUNCTION PANEL: CPT

## 2018-12-23 PROCEDURE — 82550 ASSAY OF CK (CPK): CPT

## 2018-12-23 PROCEDURE — 74011000250 HC RX REV CODE- 250: Performed by: INTERNAL MEDICINE

## 2018-12-23 PROCEDURE — 74011250636 HC RX REV CODE- 250/636: Performed by: INTERNAL MEDICINE

## 2018-12-23 PROCEDURE — 74011636637 HC RX REV CODE- 636/637: Performed by: INTERNAL MEDICINE

## 2018-12-23 PROCEDURE — 77010033678 HC OXYGEN DAILY

## 2018-12-23 PROCEDURE — 94640 AIRWAY INHALATION TREATMENT: CPT

## 2018-12-23 PROCEDURE — 74011250637 HC RX REV CODE- 250/637: Performed by: INTERNAL MEDICINE

## 2018-12-23 PROCEDURE — 65660000000 HC RM CCU STEPDOWN

## 2018-12-23 PROCEDURE — 94660 CPAP INITIATION&MGMT: CPT

## 2018-12-23 PROCEDURE — 77030029684 HC NEB SM VOL KT MONA -A

## 2018-12-23 PROCEDURE — 74011636637 HC RX REV CODE- 636/637: Performed by: HOSPITALIST

## 2018-12-23 PROCEDURE — 82962 GLUCOSE BLOOD TEST: CPT

## 2018-12-23 PROCEDURE — 36415 COLL VENOUS BLD VENIPUNCTURE: CPT

## 2018-12-23 RX ADMIN — HUMAN INSULIN 35 UNITS: 100 INJECTION, SUSPENSION SUBCUTANEOUS at 08:36

## 2018-12-23 RX ADMIN — METHYLPREDNISOLONE SODIUM SUCCINATE 40 MG: 40 INJECTION, POWDER, FOR SOLUTION INTRAMUSCULAR; INTRAVENOUS at 03:37

## 2018-12-23 RX ADMIN — DILTIAZEM HYDROCHLORIDE 120 MG: 120 CAPSULE, COATED, EXTENDED RELEASE ORAL at 08:36

## 2018-12-23 RX ADMIN — APIXABAN 2.5 MG: 2.5 TABLET, FILM COATED ORAL at 08:37

## 2018-12-23 RX ADMIN — METHYLPREDNISOLONE SODIUM SUCCINATE 40 MG: 40 INJECTION, POWDER, FOR SOLUTION INTRAMUSCULAR; INTRAVENOUS at 20:29

## 2018-12-23 RX ADMIN — IPRATROPIUM BROMIDE AND ALBUTEROL SULFATE 3 ML: .5; 3 SOLUTION RESPIRATORY (INHALATION) at 20:11

## 2018-12-23 RX ADMIN — PIPERACILLIN SODIUM,TAZOBACTAM SODIUM 3.38 G: 3; .375 INJECTION, POWDER, FOR SOLUTION INTRAVENOUS at 20:29

## 2018-12-23 RX ADMIN — IPRATROPIUM BROMIDE AND ALBUTEROL SULFATE 3 ML: .5; 3 SOLUTION RESPIRATORY (INHALATION) at 07:41

## 2018-12-23 RX ADMIN — IPRATROPIUM BROMIDE AND ALBUTEROL SULFATE 3 ML: .5; 3 SOLUTION RESPIRATORY (INHALATION) at 03:15

## 2018-12-23 RX ADMIN — HUMAN INSULIN 35 UNITS: 100 INJECTION, SUSPENSION SUBCUTANEOUS at 20:29

## 2018-12-23 RX ADMIN — Medication 10 ML: at 15:06

## 2018-12-23 RX ADMIN — APIXABAN 2.5 MG: 2.5 TABLET, FILM COATED ORAL at 20:29

## 2018-12-23 RX ADMIN — INSULIN LISPRO 2 UNITS: 100 INJECTION, SOLUTION INTRAVENOUS; SUBCUTANEOUS at 11:30

## 2018-12-23 RX ADMIN — Medication 10 ML: at 12:56

## 2018-12-23 RX ADMIN — Medication 10 ML: at 12:22

## 2018-12-23 RX ADMIN — Medication 10 ML: at 05:27

## 2018-12-23 RX ADMIN — PIPERACILLIN SODIUM,TAZOBACTAM SODIUM 3.38 G: 3; .375 INJECTION, POWDER, FOR SOLUTION INTRAVENOUS at 08:36

## 2018-12-23 RX ADMIN — METHYLPREDNISOLONE SODIUM SUCCINATE 40 MG: 40 INJECTION, POWDER, FOR SOLUTION INTRAMUSCULAR; INTRAVENOUS at 15:06

## 2018-12-23 RX ADMIN — IPRATROPIUM BROMIDE AND ALBUTEROL SULFATE 3 ML: .5; 3 SOLUTION RESPIRATORY (INHALATION) at 14:35

## 2018-12-23 RX ADMIN — Medication 5 ML: at 22:00

## 2018-12-23 RX ADMIN — FLUTICASONE FUROATE AND VILANTEROL TRIFENATATE 2 PUFF: 200; 25 POWDER RESPIRATORY (INHALATION) at 08:40

## 2018-12-23 RX ADMIN — METHYLPREDNISOLONE SODIUM SUCCINATE 40 MG: 40 INJECTION, POWDER, FOR SOLUTION INTRAMUSCULAR; INTRAVENOUS at 08:36

## 2018-12-23 NOTE — PROGRESS NOTES
Hospitalist Progress Note    NAME: Nazia Head   :  1935   MRN:  [id number]       Assessment / Plan:  Acute on chronic respiratory failure POA with baseline 2-4L O2  Acute COPD exacerbation  Sepsis due to Bilateral HCAP POA   Underlying severe pulmonary hypertension PADP 81   -clinically: went back on bipap yesterday, bipap overnight   Appears very weak/debilitated; dyspneic with conversation   Significant dyspnea with minimal exertion persist   O2: 4-5 L   -cont bipap at night and prn during daytime  -cont aggressive IV steroids and jet nebs   -Empiric IV AB: vanco - was dc with ALMAZ, no signs of gram + infection  Cont zosyn  ( last dose )   BC NTD  resp cultures: rare S. MALTOPHILIA. DOC - bactrim but cannot use with current cr   She was improved since admission, so will complete zosyn   -keep O2 sats > 92%   -cxray repeated : No pulmonary consolidation.  Improved aeration since      ALMAZ not POA  -Cr 3.58 today - up, baseline normal at 0.8   S/p lasix  for worsening dyspnea with exertion   Likely due to Lasix + possible ATN with  vanco ( trough 21.4 )    -Dr Ana Reardon help was greatly appreciated: lasxi prn; off IVF     -IVF: per renal   -Daily weight    Acute on chronic diastolic HF EF 64%   HTN / PAfib   H/o bradycardia s/p PPM 2018 per   -HR/BP stable today   -SDr Reinier Dominguez help was greatly appreciated   Diurese only prn due to ALMAZ ( on lasix 20 mg daily at home )   Stopped Multaq started earlier by Dr Maura Victoria ( may exacerbate her heart failure and/or lung disease)   Stopped amlodipine and changed to diltiazem  daily  PPM program changed early in admission to minimize ventr pacing   -AC: CHADS-Vasc 4 --> need to be on a/coag  Last admission pt declined AC and wanted to be on ASA alone   Dr Maura Victoria started on Eliquis ( reduced dose with ALMAZ)   Now off ASA   -ECHO: EF 55%, mild AS, severe pulmonary HTN       DM type II  -BS controlled   -cont NPH with steroids + SS   -hba1c 6.6 11/2018     Hypokalemia  -supplement as needed  -follow in am with Mg      Tobacco use   Obesity. Body mass index is 32.04 kg/m². 12/23: attempted to discuss poor prognosis with pt today. I have impression that she is scared of this conversation and doesn't want to hear about it. She keep asking for regular pepci during our conversation       Code status: DNR per h/p on admission   NOK:    Prophylaxis: heparin     Baseline: functional, ; home O2 3.5-4 L   Recommended Disposition: HHC vs SNF pending clinical improvement      Subjective:     Chief Complaint / Reason for Physician Visit: following acute respiratory failure / afib/ htn   In bed  O2 4-5 L   Appears very weak and debilitated     Discussed with RN events overnight. Review of Systems:  Symptom Y/N Comments  Symptom Y/N Comments   Fever/Chills n   Chest Pain n    Poor Appetite    Edema     Cough y   Abdominal Pain n    Sputum    Joint Pain     SOB/LUDWIG y More   Pruritis/Rash     Nausea/vomit    Tolerating PT/OT     Diarrhea    Tolerating Diet     Constipation    Other       Could NOT obtain due to:      Objective:     VITALS:   Last 24hrs VS reviewed since prior progress note.  Most recent are:  Patient Vitals for the past 24 hrs:   Temp Pulse Resp BP SpO2   12/23/18 1500  78  170/68 96 %   12/23/18 1435     97 %   12/23/18 1049 98.3 °F (36.8 °C) 88 20 185/81 96 %   12/23/18 0900     94 %   12/23/18 0747 97.7 °F (36.5 °C) 83 20 153/68 97 %   12/23/18 0741     96 %   12/23/18 0312     95 %   12/23/18 0305 98 °F (36.7 °C) 73 20 157/63 95 %   12/22/18 2320 97.8 °F (36.6 °C) 96 21 145/65 95 %   12/22/18 2023     96 %   12/22/18 1905 97.7 °F (36.5 °C) 79 23 156/68 97 %   12/22/18 1600     99 %   12/22/18 1542 98 °F (36.7 °C) 87 (!) 31 169/75 95 %       Intake/Output Summary (Last 24 hours) at 12/23/2018 1508  Last data filed at 12/23/2018 1256  Gross per 24 hour   Intake 170 ml   Output 2150 ml   Net -1980 ml        PHYSICAL EXAM:  General: WD, WN. Alert, cooperative, no acute distress. + dyspnea at rest with lip pursed breathing      EENT:  EOMI. Anicteric sclerae. MMM  Resp:  Diminished BS bilaterally, no wheezing or rales. No accessory muscle use  CV:  Regular  rhythm,  + edema  GI:  Soft, Non distended, Non tender.  +Bowel sounds  Neurologic:  Alert and oriented X 3, normal speech,   Psych:   Good insight. Not anxious nor agitated  Skin:  No rashes. No jaundice    Reviewed most current lab test results and cultures  YES  Reviewed most current radiology test results   YES  Review and summation of old records today    NO  Reviewed patient's current orders and MAR    YES  PMH/SH reviewed - no change compared to H&P  ________________________________________________________________________  Care Plan discussed with:    Comments   Patient y    Family      RN y    Care Manager     Consultant  y Renal                      Multidiciplinary team rounds were held today with , nursing, pharmacist and clinical coordinator. Patient's plan of care was discussed; medications were reviewed and discharge planning was addressed. ________________________________________________________________________  Total NON critical care TIME:  35  Minutes    Total CRITICAL CARE TIME Spent:   Minutes non procedure based      Comments   >50% of visit spent in counseling and coordination of care     ________________________________________________________________________  Claudio Hi MD     Procedures: see electronic medical records for all procedures/Xrays and details which were not copied into this note but were reviewed prior to creation of Plan. LABS:  I reviewed today's most current labs and imaging studies.   Pertinent labs include:  Recent Labs     12/21/18  0440   WBC 10.4   HGB 10.2*   HCT 30.9*        Recent Labs     12/23/18  0340 12/22/18  0411 12/21/18  0440    139 139   K 3.7 3.2* 3.5 * 107 106   CO2 24 23 23   * 160* 171*   BUN 91* 81* 75*   CREA 3.58* 3.23* 3.29*   CA 9.0 8.7 8.9   MG  --   --  2.6*   PHOS 6.2* 6.0* 5.9*   ALB 2.9* 2.9* 2.9*       Signed: Bolivar Ahn MD

## 2018-12-23 NOTE — PROGRESS NOTES
PCU SHIFT NURSING NOTE      1900: The bedside shift change report given to RADHA RIOJAS (oncoming nurse) by Puneet Haynes  (offgoing nurse). Report included the following information SBAR, Kardex, Intake/Output, MAR, Recent Results, Med Rec Status, Cardiac Rhythm Paced and Alarm Parameters . 1930: The patient resting in bed with family members in the room. Alert and oriented X4. Reported some dyspnea with talking or exertion. Encouraged to minimize talking to ease her breathing. To continue to monitor     6:28 AM  The patient had a calm night, slept most of the night. No signs of pain or signs of distress noted. Blood collected as per the orders. To continue to monitor. Admission Date 12/17/2018   Admission Diagnosis Acute on chronic respiratory failure with hypoxia and hypercapnia (HCC)   Consults IP CONSULT TO INTENSIVIST  IP CONSULT TO NEPHROLOGY  IP CONSULT TO CARDIOLOGY        Consults   [x]PT   [x]OT   []Speech   []Case Management      [] Palliative      Cardiac Monitoring Order   [x]Yes   []No     IV drips   []Yes    Drip:                            Dose:  Drip:                            Dose:  Drip:                            Dose:   [x]No     GI Prophylaxis   []Yes   []No         DVT Prophylaxis   SCDs:             David stockings:         [x] Medication   []Contraindicated   []None      Activity Level Activity Level: Up with Assistance     Activity Assistance: Partial (one person)   Purposeful Rounding every 1-2 hour? [x]Yes   Gonzalez Score  Total Score: 3   Bed Alarm (If score 3 or >)   [x]Yes   [] Refused (See signed refusal form in chart)   Niraj Score  Niraj Score: 18   Niraj Score (if score 14 or less)   []PMT consult   []Wound Care consult      []Specialty bed   [] Nutrition consult          Needs prior to discharge:   Home O2 required:    [x]Yes   []No    If yes, how much O2 required?     Other:    Last Bowel Movement: Last Bowel Movement Date: 12/22/18      Influenza Vaccine Received Flu Vaccine for Current Season (usually Sept-March): Yes        Pneumonia Vaccine           Diet Active Orders   Diet    DIET DIABETIC CONSISTENT CARB Regular; 2 GM NA (House Low NA)      LDAs               Peripheral IV 12/18/18 Right Forearm (Active)   Site Assessment Clean, dry, & intact 12/22/2018  7:05 PM   Phlebitis Assessment 0 12/22/2018  7:05 PM   Infiltration Assessment 0 12/22/2018  7:05 PM   Dressing Status Clean, dry, & intact 12/22/2018  7:05 PM   Dressing Type Tape;Transparent 12/22/2018  7:05 PM   Hub Color/Line Status Pink;End cap changed 12/22/2018  7:05 PM   Action Taken Open ports on tubing capped 12/22/2018  7:05 PM   Alcohol Cap Used Yes 12/22/2018  7:05 PM          External Female Catheter 12/20/18 (Active)   Site Assessment Clean, dry, & intact 12/22/2018  7:05 PM   Repositioned Yes 12/22/2018  7:05 PM   Perineal Care Yes 12/22/2018  7:05 PM   Wick Changed Yes 12/22/2018  7:05 PM   Suction Canister/Tubing Changed Yes 12/22/2018  7:05 PM   Urine Output (mL) 300 ml 12/22/2018  4:09 AM                Urinary Catheter      Intake & Output Date 12/21/18 1900 - 12/22/18 0659 12/22/18 0700 - 12/23/18 0659   Shift 4567-4044 24 Hour Total 0415-1391 7872-7687 24 Hour Total   INTAKE   P.O.  849 290  290     P. O.  849 290  290   I. V.(mL/kg/hr)  775        Volume (0.9% sodium chloride infusion)  575        Volume (piperacillin-tazobactam (ZOSYN) 3.375 g in 0.9% sodium chloride (MBP/ADV) 100 mL)  200      Shift Total(mL/kg)  1624(19.9) 290(3.6)  290(3.6)   OUTPUT   Urine(mL/kg/hr) 300 600 750(0.8)  750     Urine Voided  300 750  750     Urine Occurrence(s)  2 x        Urine Output (mL) (External Female Catheter 12/20/18) 300 300      Stool          Stool Occurrence(s)   3 x  3 x   Shift Total(mL/kg) 300(3.7) 600(7.4) 750(9.2)  750(9.2)   NET -300 3349 -460  -925   Weight (kg) 81.6 81.6 81.6 81.6 81.6         Readmission Risk Assessment Tool Score High Risk            22       Total Score 3 Has Seen PCP in Last 6 Months (Yes=3, No=0)    2 . Living with Significant Other. Assisted Living. LTAC. SNF. or   Rehab    3 Patient Length of Stay (>5 days = 3)    4 IP Visits Last 12 Months (1-3=4, 4=9, >4=11)    5 Pt.  Coverage (Medicare=5 , Medicaid, or Self-Pay=4)    5 Charlson Comorbidity Score (Age + Comorbid Conditions)       Expected Length of Stay 4d 19h   Actual Length of Stay 5

## 2018-12-23 NOTE — PROGRESS NOTES
PCU SHIFT NURSING NOTE      Bedside shift change report given to Toña Cheung RN (oncoming nurse) Rinku (offgoing nurse). Report included the following information SBAR and Kardex. Shift Summary:  1200- Assessment complete. Patient had large soft BM. Incontince care complete. Pericare complete. EPC cream applied to bottom. New pure wick placed. Family at bedisde. Call "[employer] pari. Admission Date 12/17/2018   Admission Diagnosis Acute on chronic respiratory failure with hypoxia and hypercapnia ([address]   Consults IP CONSULT TO INTENSIVIST  IP CONSULT TO NEPHROLOGY  IP CONSULT TO CARDIOLOGY        Consults   []PT   []OT   []Speech   []Case Management      [] Palliative      Cardiac Monitoring Order   []Yes   []No     IV drips   []Yes    Drip:                            Dose:  Drip:                            Dose:  Drip:                            Dose:   []No     GI Prophylaxis   []Yes   []No         DVT Prophylaxis   SCDs:             David stockings:         [] Medication   []Contraindicated   []None      Activity Level Activity Level: Up with Assistance     Activity Assistance: Partial (one person)   Purposeful Rounding every 1-2 hour? []Yes   Gonzalez Score  Total Score: 3   Bed Alarm (If score 3 or >)   []Yes   [] Refused (See signed refusal form in chart)   Niraj Score  Niraj Score: 17   Niraj Score (if score 14 or less)   []PMT consult   []Wound Care consult      []Specialty bed   [] Nutrition consult          Needs prior to discharge:   Home O2 required:    []Yes   []No    If yes, how much O2 required?     Other:    Last Bowel Movement: Last Bowel Movement Date: 12/22/18      Influenza Vaccine Received Flu Vaccine for Current Season (usually Sept-March): Yes        Pneumonia Vaccine           Diet Active Orders   Diet    DIET DIABETIC CONSISTENT CARB Regular; 2 GM NA (House Low NA)      LDAs               Peripheral IV 12/18/18 Right Forearm (Active)   Site Assessment Clean, dry, & intact 12/23/2018 3: 23 AM   Phlebitis Assessment 0 12/23/2018  3:23 AM   Infiltration Assessment 0 12/23/2018  3:23 AM   Dressing Status Clean, dry, & intact 12/23/2018  3:23 AM   Dressing Type Tape;Transparent 12/23/2018  3:23 AM   Hub Color/Line Status Pink 12/23/2018  3:23 AM   Action Taken Open ports on tubing capped 12/23/2018  3:23 AM   Alcohol Cap Used Yes 12/23/2018  3:23 AM          External Female Catheter 12/20/18 (Active)   Site Assessment Clean, dry, & intact 12/23/2018  3:23 AM   Repositioned Yes 12/23/2018  3:23 AM   Perineal Care Yes 12/23/2018  3:23 AM   Wick Changed Yes 12/23/2018  3:23 AM   Suction Canister/Tubing Changed No 12/23/2018  3:23 AM   Urine Output (mL) 700 ml 12/23/2018  2:16 AM                Urinary Catheter      Intake & Output Date 12/22/18 0700 - 12/23/18 0659 12/23/18 0700 - 12/24/18 0659   Shift 3755-9111 7212-2982 24 Hour Total 6507-4842 2975-5244 24 Hour Total   INTAKE   P.O. 290  290        P.O. 290  290      Shift Total(mL/kg) 290(3.6)  290(3.5)      OUTPUT   Urine(mL/kg/hr) 750(0.8) 700(0.7) 1450(0.7)        Urine Voided 750  750        Urine Output (mL) (External Female Catheter 12/20/18)  700 700      Stool           Stool Occurrence(s) 3 x  3 x      Shift Total(mL/kg) 750(9.2) 700(8.5) 1450(17.6)      NET -      Weight (kg) 81.6 82.6 82.6 82.6 82.6 82.6         Readmission Risk Assessment Tool Score High Risk            22       Total Score        3 Has Seen PCP in Last 6 Months (Yes=3, No=0)    2 . Living with Significant Other. Assisted Living. LTAC. SNF. or   Rehab    3 Patient Length of Stay (>5 days = 3)    4 IP Visits Last 12 Months (1-3=4, 4=9, >4=11)    5 Pt.  Coverage (Medicare=5 , Medicaid, or Self-Pay=4)    5 Charlson Comorbidity Score (Age + Comorbid Conditions)       Expected Length of Stay 4d 19h   Actual Length of Stay 6

## 2018-12-23 NOTE — PROGRESS NOTES
CARDIOLOGY Progress Note (covering for Dr. Ayaz Block)    Patient ID:  Patient: Concha Tejada  MRN: [id number]  Age: 82 y.o.  : 1935    Date of  Admission: 2018  3:00 PM   PCP:  Nano Bucio MD   Primary cardiologist:  Abbie Rider MD    Assessment: 1. Acute diastolic heart failure with possible pulmonary edema on admission though bilateral pneumonia diagnosed as well. EF 55-60% on echo 2018.  2. Acute on chronic hypoxic respiratory failure. 3. Paroxysmal atrial fibrillation. Has refused anticoagulation in the past, on chronic ASA therapy PTA. In sinus rhythm currently. 4. 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 Currently 1:1 AV conduction. 5. Mild aortic valve stenosis. 6. Hypertensive heart disease with heart failure. 7. Acute kidney injury. 8. Hyperglycemia with steroid. 9. Chronic obstructive pulmonary disease with acute exacerbation. Recent H. Influenzae positive in 2018. 10. Oxygen dependent with chronic supplementation. 11. Cor pulmonale with severe pulmonary hypertension. 12. History of tobacco smoking. Plan:     1. Only use diuretic PRN. Increasingly prerenal.  Walking the fence with giving volume and diuresing. Agree with Dr. Chris Shah assessment of renal function trend, will follow her recommendations. 2. Na restricted diet. 3. Stopped Multaq, I think it may exacerbate her heart failure and/or lung disease. Anything with beta-blocker activity (Multaq, sotalol, propafenone, amiodarone) will be problematic to start. Remaining choices, flecainide, dofetilide can't be used with renal failure. I'd focus on rate control prophylaxis and anticoagulation. 4. Stopped amlodipine and changed to diltiazem  daily. 5. Pacemaker program change made early in admission to minimize ventricular pacing that is not needed. 6. Continue reduced dose Eliquis for anticoagulation.     All questions answered for her and family. [x]       High complexity decision making was performed in this patient at high risk for decompensation with multiple organ involvement. Maryuri Oro is a 82 y.o. female with a history of the above admitted with acute on chronic respiratory failure. Suspected to have bilateral pneumonia but also acute diastolic heart failure. Treated for her pulmonary issues and seen by pulmonary consultation (Dr. Queenie Montez). She reports breathing better with BIPAP, IV diuretic. Got off continuous BIPAP, now using at night. Multaq was started on 12/20, stopped 12/22. She's remained in sinus predominantly. TODAY:  Stable dyspnea, pursed lips with breathing, can't lay back though hasn't in a long time. No chest pain, palpitations, syncope. Family in the room.        Allergies   Allergen Reactions    Keflex [Cephalexin] Itching          Current Facility-Administered Medications   Medication Dose Route Frequency    dilTIAZem CD (CARDIZEM CD) capsule 120 mg  120 mg Oral DAILY    ALPRAZolam (XANAX) tablet 0.5 mg  0.5 mg Oral Q8H PRN    albuterol-ipratropium (DUO-NEB) 2.5 MG-0.5 MG/3 ML  3 mL Nebulization Q6H RT    insulin NPH (NOVOLIN N, HUMULIN N) injection 35 Units  35 Units SubCUTAneous Q12H    apixaban (ELIQUIS) tablet 2.5 mg  2.5 mg Oral BID    piperacillin-tazobactam (ZOSYN) 3.375 g in 0.9% sodium chloride (MBP/ADV) 100 mL  3.375 g IntraVENous Q12H    nitroglycerin (NITROSTAT) tablet 0.4 mg  0.4 mg SubLINGual Q5MIN PRN    sodium chloride (NS) flush 5-10 mL  5-10 mL IntraVENous Q8H    sodium chloride (NS) flush 5-10 mL  5-10 mL IntraVENous PRN    acetaminophen (TYLENOL) tablet 650 mg  650 mg Oral Q6H PRN    naloxone (NARCAN) injection 0.4 mg  0.4 mg IntraVENous PRN    bisacodyl (DULCOLAX) suppository 10 mg  10 mg Rectal DAILY PRN    insulin lispro (HUMALOG) injection   SubCUTAneous AC&HS    glucose chewable tablet 16 g  4 Tab Oral PRN    dextrose (D50W) injection syrg 12.5-25 g  12.5-25 g IntraVENous PRN    glucagon (GLUCAGEN) injection 1 mg  1 mg IntraMUSCular PRN    methylPREDNISolone (PF) (SOLU-MEDROL) injection 40 mg  40 mg IntraVENous Q6H    fluticasone-vilanterol (BREO ELLIPTA) 200mcg-25mcg/puff  2 Puff Inhalation DAILY    hydrALAZINE (APRESOLINE) 20 mg/mL injection 20 mg  20 mg IntraVENous Q6H PRN       Review of Symptoms:    Respiratory: +SOB, cough, sputum production, wheezing, LUDWIG, negative for pleuritic pain or hemoptysis   Gastrointestinal: negative for abdominal pain, N/V, dysphagia, change in bowel habits, visible bleeding        Objective:      Physical Exam:  Temp (24hrs), Av.9 °F (36.6 °C), Min:97.7 °F (36.5 °C), Max:98.3 °F (36.8 °C)    Patient Vitals for the past 8 hrs:   Pulse   18 1049 88   18 0747 83    Patient Vitals for the past 8 hrs:   Resp   18 1049 20   18 0747 20    Patient Vitals for the past 8 hrs:   BP   18 1049 185/81   18 0747 153/68          Intake/Output Summary (Last 24 hours) at 2018 1346  Last data filed at 2018 1256  Gross per 24 hour   Intake 170 ml   Output 2150 ml   Net -1980 ml       Nondiaphoretic, not in acute distress. Supple, no palpable thyromegaly. No scleral icterus, mucous membranes moist, conjuctivae pink, no xanthelasma. Continued accessory muscle use today, pursed lips with breathing, decreased BS on auscultation bilaterally, low but symmetric air movement. Regular rate and rhythm, no murmur, pericardial rub, knock, or gallop. No JVD or peripheral edema. No carotid bruit. Palpable radial pulses bilaterally. Abdomen, soft, nontender, nondistended. Extremities without cyanosis or clubbing. Muscle tone and bulk normal.  Skin warm and dry. No rashes or ulcers. Neuro grossly nonfocal.  No tremor. Awake and appropriate. CARDIOGRAPHICS and STUDIES, I reviewed:    Telemetry:  SR with intrinsic AV conduction to RBBB.     ECG in ER:  Sinus tachycardia with sequential V pacing. Echo 11/2018:  LEFT VENTRICLE: Size was normal. Systolic function was normal. Ejection  fraction was estimated in the range of 55 % to 60 %. There were no  regional wall motion abnormalities. Wall thickness was normal.    RIGHT VENTRICLE: The size was at the upper limits of normal. Systolic  function was normal. Wall thickness was normal.    LEFT ATRIUM: The atrium was dilated. RIGHT ATRIUM: The atrium was mildly to moderately dilated. MITRAL VALVE: Normal valve structure. There was normal leaflet separation. DOPPLER: The transmitral velocity was within the normal range. There was  no evidence for stenosis. There was mild regurgitation. AORTIC VALVE: The valve was trileaflet. Leaflets exhibited mildly reduced  cuspal separation. DOPPLER: There was mild stenosis. There was no  regurgitation. TRICUSPID VALVE: Normal valve structure. There was normal leaflet  separation. DOPPLER: The transtricuspid velocity was within the normal  range. There was no evidence for tricuspid stenosis. There was moderate  regurgitation. Pulmonary artery systolic pressure: 81 mmHg. There was  severe pulmonary hypertension. PULMONIC VALVE: Not well visualized, but normal Doppler findings. DOPPLER:  There was no regurgitation. AORTA: The root exhibited normal size. SYSTEMIC VEINS: IVC: The inferior vena cava was normal in size. PERICARDIUM: There was no pericardial effusion. MEASUREMENT TABLES    Doppler measurements  Aortic valve   (Reference normals)  Peak roshni   2.3 cm/s   (--)  Mean gradient   10 mmHg   (--)  Valve area, cont   1.2 cmï¾²   (--)    CXR in ER:  Bilateral airspace disease probably pulmonary edema. Labs:  Recent Labs     12/23/18  0340 12/22/18  0411 12/21/18  0440   CPK 22* 28 26     No results found for: CHOL, CHOLX, CHLST, CHOLV, HDL, LDL, LDLC, DLDLP, TGLX, TRIGL, TRIGP, CHHD, CHHDX  No results for input(s): INR, PTP, APTT in the last 72 hours.     No lab exists for Parisgisela Dc   Recent Labs     12/23/18  0340 12/22/18 0411 12/21/18  0440    139 139   K 3.7 3.2* 3.5   * 107 106   CO2 24 23 23   BUN 91* 81* 75*   CREA 3.58* 3.23* 3.29*   * 160* 171*   PHOS 6.2* 6.0* 5.9*   CA 9.0 8.7 8.9   ALB 2.9* 2.9* 2.9*   WBC  --   --  10.4   HGB  --   --  10.2*   HCT  --   --  30.9*   PLT  --   --  245     Recent Labs     12/23/18 0340 12/22/18 0411 12/21/18  0440   ALB 2.9* 2.9* 2.9*     No components found for: GLPOC  No results for input(s): PH, PCO2, PO2 in the last 72 hours.         Del Arenas MD  12/23/2018

## 2018-12-23 NOTE — PROGRESS NOTES
[email or web address]                  Phone - [phone]   Renal Progress Note    Subjective:   Patient: Amrita Acharya                    YOB: 1935               Date- 12/23/2018          Reason for visit: ARF    Admission Date: 12/17/2018       Assessment:      Acute renal failure. Most likely pre-renal azotemia from diuresis, but ATN is possible. She has had a-fib with a RVR but no hypotension. She has been on Vancomycin with a trough of 21. It was d/c'd on 12/20. Her Bun/creat was 12/0.8 on admission, 12/17. It was 17/1.3 on 12/18, 42/12.5 on 12/19, and 57/3.1 on 12/20, and 75/3.3 today. Her urinary Na was <5 !!   IV Lasix was d/c'd and NS started on 12/20, but the renal function got worse. ON 12/22 she is even more SOB, so the NS has been d/c'd, and Lasix ordered again  Bun/creat up to 91/3.6 on 12/23.     Acute on chronic respiratory failure with hypoxia and hypercapnia  Diastolic heart failure on admission.     Hypokalemia.     Severe COPD with an exacerbation. Bilateral pneumonia.      Severe pulmonary hypertension.        PAF --A-fib the other night with a RVR. S/p pacemaker placement in 11/18.     Diabetes Mellitus, type II. Hypertension. Obesity.           Plan:          Lasix prn.   Daily labs. Avoid hypotension. Vancomycin has been d/c'd. Prognosis grim. Discussed the renal disease with the patient and her daughter. Did not broach the subject of the resp failure and extremely poor prognosis, as I'm told the patient \"does not want to hear it. \"  D/w Dr. Magaña Roots: pt already a DNR and we want to respect her wishes to focus on the positive. However, I hope her family is prepared. Interim History:    On 12/20: The patient has severe COPD and pulmonary hypertension. She was admitted with a COPD exacerbation and bilateral pneumonia. She also has PAF and developed A-fib the other night with a RVR.   In addition, she was felt to have diastolic heart failure and so was placed on Lasix. She has not been on an ACE-I or ARB. Her Bun/creat was 12/0.8 on admission, . It was 17/1.3 on , 42/12.5 on , and is 57/3.1 today. The patient says she was extremely SOB on admission but is essentially back to her baseline \"usual miserable\" SOB now. She denies chest pain or any other complaints. She was on Lasix, but this was d/c'd today and an IV of NS at 50 cc/hr ordered. She has been on Vancomycin with a trough of 21. Vanc was d/c'd today.        On :  The patient says she her breathing is at baseline. She is relatively comfortable if not moving. On :  Pt still feels very SOB, \"about the same\" as yesterday. Pt denies any complaints except for the SOB. NS d/c'd yesterday and Lasix 40 mg IV given. Input not complete, but UO 1450 cc. ROS as above, plus: no fever/chills today. . No HEEENT complaints. abd pain. No N/V/D. No joint pain. No skin rashes, no urinary complaints.       Review of Systems  A comprehensive review of systems was negative except for that written in the HPI. Objective:     Visit Vitals  /81   Pulse 88   Temp 98.3 °F (36.8 °C)   Resp 20   Ht 5' 4.5\" (1.638 m)   Wt 82.6 kg (182 lb 1.6 oz)   SpO2 96%   BMI 30.77 kg/m²     Temp (24hrs), Av.9 °F (36.6 °C), Min:97.7 °F (36.5 °C), Max:98.3 °F (36.8 °C)      No intake/output data recorded.  1901 -  0700  In: 290 [P.O.:290]  Out: 1750 [Urine:1750]    Physical Exam:  General:  alert, cooperative, mod resp distress even in bed  Eye:  conjunctivae/corneas clear. EOM's intact. Neurologic:  grossly non-focal; alert and appropriate; normal speech  Neck:  supple; no JVD  Lungs:  diminished breath sounds but no rales or rhonchi; ++ accessory muscle use  Heart:  regular rate and rhythm; no gallop or rub  Skin:  no rash or lesions  Abdomen:  soft, non-tender.   No masses,  no organomegaly   Extremities: no edema      Data Review:     Recent Labs     18  0775 12/22/18  0411 12/21/18  0440   WBC  --   --  10.4   HGB  --   --  10.2*    139 139   K 3.7 3.2* 3.5   * 107 106   CO2 24 23 23   BUN 91* 81* 75*   CREA 3.58* 3.23* 3.29*   CA 9.0 8.7 8.9   ALB 2.9* 2.9* 2.9*   PHOS 6.2* 6.0* 5.9*   MG  --   --  2.6*       Assessment:     Active Problems:    Acute on chronic respiratory failure with hypoxia and hypercapnia (HCC) (12/17/2018)      Chart reviewed. Pertinent Notes Reviewed. Medications list Personally Reviewed. Stephane Moreno, [address] [facility]  [facility], [address] [place], [address]  Phone - [phone]    Fax - [phone]  [email or web address]                                         [facility]   [address] [address]  Phone - [phone]  Fax - [phone]. [email or web address]

## 2018-12-24 LAB
ALBUMIN SERPL-MCNC: 2.6 G/DL (ref 3.5–5)
ANION GAP SERPL CALC-SCNC: 10 MMOL/L (ref 5–15)
BUN SERPL-MCNC: 92 MG/DL (ref 6–20)
BUN/CREAT SERPL: 29 (ref 12–20)
CALCIUM SERPL-MCNC: 8.7 MG/DL (ref 8.5–10.1)
CHLORIDE SERPL-SCNC: 108 MMOL/L (ref 97–108)
CK SERPL-CCNC: 25 U/L (ref 26–192)
CO2 SERPL-SCNC: 23 MMOL/L (ref 21–32)
CREAT SERPL-MCNC: 3.18 MG/DL (ref 0.55–1.02)
ERYTHROCYTE [DISTWIDTH] IN BLOOD BY AUTOMATED COUNT: 14.1 % (ref 11.5–14.5)
GLUCOSE BLD STRIP.AUTO-MCNC: 127 MG/DL (ref 65–100)
GLUCOSE BLD STRIP.AUTO-MCNC: 133 MG/DL (ref 65–100)
GLUCOSE BLD STRIP.AUTO-MCNC: 153 MG/DL (ref 65–100)
GLUCOSE BLD STRIP.AUTO-MCNC: 84 MG/DL (ref 65–100)
GLUCOSE SERPL-MCNC: 60 MG/DL (ref 65–100)
HCT VFR BLD AUTO: 29.2 % (ref 35–47)
HGB BLD-MCNC: 9.9 G/DL (ref 11.5–16)
MCH RBC QN AUTO: 29.7 PG (ref 26–34)
MCHC RBC AUTO-ENTMCNC: 33.9 G/DL (ref 30–36.5)
MCV RBC AUTO: 87.7 FL (ref 80–99)
NRBC # BLD: 0 K/UL (ref 0–0.01)
NRBC BLD-RTO: 0 PER 100 WBC
PHOSPHATE SERPL-MCNC: 5.8 MG/DL (ref 2.6–4.7)
PLATELET # BLD AUTO: 261 K/UL (ref 150–400)
PMV BLD AUTO: 9.7 FL (ref 8.9–12.9)
POTASSIUM SERPL-SCNC: 3.3 MMOL/L (ref 3.5–5.1)
RBC # BLD AUTO: 3.33 M/UL (ref 3.8–5.2)
SERVICE CMNT-IMP: ABNORMAL
SERVICE CMNT-IMP: NORMAL
SODIUM SERPL-SCNC: 141 MMOL/L (ref 136–145)
WBC # BLD AUTO: 19.1 K/UL (ref 3.6–11)

## 2018-12-24 PROCEDURE — 36415 COLL VENOUS BLD VENIPUNCTURE: CPT

## 2018-12-24 PROCEDURE — 94640 AIRWAY INHALATION TREATMENT: CPT

## 2018-12-24 PROCEDURE — 80069 RENAL FUNCTION PANEL: CPT

## 2018-12-24 PROCEDURE — 74011000258 HC RX REV CODE- 258: Performed by: HOSPITALIST

## 2018-12-24 PROCEDURE — 74011000250 HC RX REV CODE- 250: Performed by: INTERNAL MEDICINE

## 2018-12-24 PROCEDURE — 82550 ASSAY OF CK (CPK): CPT

## 2018-12-24 PROCEDURE — 74011250636 HC RX REV CODE- 250/636: Performed by: HOSPITALIST

## 2018-12-24 PROCEDURE — 74011250637 HC RX REV CODE- 250/637: Performed by: INTERNAL MEDICINE

## 2018-12-24 PROCEDURE — 82962 GLUCOSE BLOOD TEST: CPT

## 2018-12-24 PROCEDURE — 74011250636 HC RX REV CODE- 250/636: Performed by: INTERNAL MEDICINE

## 2018-12-24 PROCEDURE — 74011250637 HC RX REV CODE- 250/637: Performed by: HOSPITALIST

## 2018-12-24 PROCEDURE — 65660000000 HC RM CCU STEPDOWN

## 2018-12-24 PROCEDURE — 85027 COMPLETE CBC AUTOMATED: CPT

## 2018-12-24 PROCEDURE — 74011636637 HC RX REV CODE- 636/637: Performed by: HOSPITALIST

## 2018-12-24 PROCEDURE — 94660 CPAP INITIATION&MGMT: CPT

## 2018-12-24 PROCEDURE — 77010033678 HC OXYGEN DAILY

## 2018-12-24 RX ORDER — FUROSEMIDE 40 MG/1
40 TABLET ORAL ONCE
Status: COMPLETED | OUTPATIENT
Start: 2018-12-24 | End: 2018-12-24

## 2018-12-24 RX ORDER — POTASSIUM CHLORIDE 750 MG/1
20 TABLET, FILM COATED, EXTENDED RELEASE ORAL
Status: COMPLETED | OUTPATIENT
Start: 2018-12-24 | End: 2018-12-24

## 2018-12-24 RX ADMIN — METHYLPREDNISOLONE SODIUM SUCCINATE 40 MG: 40 INJECTION, POWDER, FOR SOLUTION INTRAMUSCULAR; INTRAVENOUS at 21:36

## 2018-12-24 RX ADMIN — APIXABAN 2.5 MG: 2.5 TABLET, FILM COATED ORAL at 21:36

## 2018-12-24 RX ADMIN — HUMAN INSULIN 15 UNITS: 100 INJECTION, SUSPENSION SUBCUTANEOUS at 21:36

## 2018-12-24 RX ADMIN — APIXABAN 2.5 MG: 2.5 TABLET, FILM COATED ORAL at 08:46

## 2018-12-24 RX ADMIN — Medication 10 ML: at 06:00

## 2018-12-24 RX ADMIN — HUMAN INSULIN 35 UNITS: 100 INJECTION, SUSPENSION SUBCUTANEOUS at 08:36

## 2018-12-24 RX ADMIN — IPRATROPIUM BROMIDE AND ALBUTEROL SULFATE 3 ML: .5; 3 SOLUTION RESPIRATORY (INHALATION) at 20:34

## 2018-12-24 RX ADMIN — METHYLPREDNISOLONE SODIUM SUCCINATE 40 MG: 40 INJECTION, POWDER, FOR SOLUTION INTRAMUSCULAR; INTRAVENOUS at 03:32

## 2018-12-24 RX ADMIN — POTASSIUM CHLORIDE 20 MEQ: 750 TABLET, EXTENDED RELEASE ORAL at 16:47

## 2018-12-24 RX ADMIN — PIPERACILLIN SODIUM,TAZOBACTAM SODIUM 3.38 G: 3; .375 INJECTION, POWDER, FOR SOLUTION INTRAVENOUS at 08:37

## 2018-12-24 RX ADMIN — IPRATROPIUM BROMIDE AND ALBUTEROL SULFATE 3 ML: .5; 3 SOLUTION RESPIRATORY (INHALATION) at 03:01

## 2018-12-24 RX ADMIN — Medication 10 ML: at 21:36

## 2018-12-24 RX ADMIN — FUROSEMIDE 40 MG: 40 TABLET ORAL at 14:37

## 2018-12-24 RX ADMIN — FLUTICASONE FUROATE AND VILANTEROL TRIFENATATE 2 PUFF: 200; 25 POWDER RESPIRATORY (INHALATION) at 08:46

## 2018-12-24 RX ADMIN — IPRATROPIUM BROMIDE AND ALBUTEROL SULFATE 3 ML: .5; 3 SOLUTION RESPIRATORY (INHALATION) at 13:47

## 2018-12-24 RX ADMIN — IPRATROPIUM BROMIDE AND ALBUTEROL SULFATE 3 ML: .5; 3 SOLUTION RESPIRATORY (INHALATION) at 07:52

## 2018-12-24 RX ADMIN — METHYLPREDNISOLONE SODIUM SUCCINATE 40 MG: 40 INJECTION, POWDER, FOR SOLUTION INTRAMUSCULAR; INTRAVENOUS at 08:42

## 2018-12-24 RX ADMIN — DILTIAZEM HYDROCHLORIDE 120 MG: 120 CAPSULE, COATED, EXTENDED RELEASE ORAL at 08:46

## 2018-12-24 RX ADMIN — Medication 10 ML: at 14:36

## 2018-12-24 NOTE — PROGRESS NOTES
Reason for Admission:   Pneumonia of both lower lobes due to infectious organism                RRAT Score:  20                Resources/supports as identified by patient/family:   Pt lives with spouse. Pt denies additional needs and services                 Top Challenges facing patient (as identified by patient/family and CM): Finances/Medication cost?   Pt has insurance to cover the cost of medication and hospitalization                  Transportation? Pt will have family to transport back home               Support system or lack thereof? Pt reported that her spouse and family are her support systems                      Living arrangements? Lives with spouse               Self-care/ADLs/Cognition? Independent with ADLs          Current Advanced Directive/Advance Care Plan:  DNR                          Plan for utilizing home health: Denies needing home health                          Likelihood of readmission: HIGH- want to return hoe with no services                  Transition of Care Plan:                  CM completed room visit with pt. Pt reported that she resides in a one story home (2 steps into main entrance). Pt reported that she is independent with ADLs, and does limited driving. Pt reported that she is active with PCP: seen Nov 22018, and she uses [employer]. Pt reported that she has DME at home: wheelchair, Home O2: provided by Leana Paul and she uses 4 liters. Pt reported no HH/SNF at this time. CM discuss additional needs for pt at home: [facility] . Pt denied both services. CM discuss Advance Care Planning with pt and she reported that her  is her decision maker. CM will enter FYI for MD to discuss with pt to go home with services    CM will continue to follow    Care Management Interventions  PCP Verified by CM: Yes  Mode of Transport at Discharge:  Other (see comment)  Transition of Care Consult (CM Consult): Discharge Planning  Discharge Durable Medical Equipment: No  Physical Therapy Consult: Yes  Occupational Therapy Consult: Yes  Speech Therapy Consult: No  Current Support Network: Lives with Spouse, Own Home  Confirm Follow Up Transport: Family  Plan discussed with Pt/Family/Caregiver: Yes  Discharge Location  Discharge Placement: [facility] [address], MSW CM  [phone]

## 2018-12-24 NOTE — PROGRESS NOTES
Hospitalist Progress Note    NAME: Silvia Serrano   :  1935   MRN:  [id number]       Assessment / Plan:  Acute on chronic respiratory failure POA with baseline 2-4L O2  Acute COPD exacerbation  Sepsis due to Bilateral HCAP POA   Underlying severe pulmonary hypertension PADP 81   -clinically: slowly better   Appears very weak/debilitated  Significant dyspnea with minimal exertion persist   O2: 4-5 L   -cont bipap at night and prn during daytime  -cont aggressive IV steroids ( tapering)  and jet nebs   -Empiric IV AB: vanco - was dc with ALMAZ, no signs of gram + infection  Cont zosyn  ( last dose )   BC NTD  resp cultures: rare S. MALTOPHILIA. DOC - bactrim but cannot use with current cr   She was improved since admission, so will complete zosyn   -keep O2 sats > 92%   -cxray repeated : No pulmonary consolidation.  Improved aeration since      ALMAZ not POA  -Cr 3.58 today - up, baseline normal at 0.8   S/p lasix  for worsening dyspnea with exertion   Likely due to Lasix + possible ATN with  vanco ( trough 21.4 )    -Dr Alejandra Dixon help was greatly appreciated: lasxi prn; off IVF   Dose of lasix today PO     -Daily weight    Acute on chronic diastolic HF EF 24%   HTN / PAfib   H/o bradycardia s/p PPM 2018 per   -HR/BP stable    -SDr Sonda Book help was greatly appreciated   Diurese only prn due to ALMAZ ( on lasix 20 mg daily at home )   Stopped Multaq started earlier by Dr Aliza Rae ( may exacerbate her heart failure and/or lung disease)   Stopped amlodipine and changed to diltiazem  daily  PPM program changed early in admission to minimize ventr pacing   -AC: CHADS-Vasc 4 --> need to be on a/coag  Last admission pt declined AC and wanted to be on ASA alone   Dr Aliza Rae started on Eliquis ( reduced dose with ALMAZ)   Now off ASA   -ECHO: EF 55%, mild AS, severe pulmonary HTN       DM type II  -BS controlled   -cont NPH with steroids ( decr dose with tapering steroids)  + SS -hba1c 6.6 11/2018     Hypokalemia  -supplement as needed  -follow in am with Mg      Tobacco use   Obesity. Body mass index is 32.04 kg/m². 12/23: attempted to discuss poor prognosis with pt today. I have impression that she is scared of this conversation and doesn't want to hear about it. She keep asking for regular pepci during our conversation       Code status: DNR per h/p on admission   NOK:    Prophylaxis: heparin     Baseline: functional, ; home O2 3.5-4 L   Recommended Disposition: HHC vs SNF pending clinical improvement      Subjective:     Chief Complaint / Reason for Physician Visit: following acute respiratory failure / afib/ htn   \" I feel better today\"  Less dyspneic at rest  bipap prn     Discussed with RN events overnight. Review of Systems:  Symptom Y/N Comments  Symptom Y/N Comments   Fever/Chills n   Chest Pain n    Poor Appetite    Edema     Cough y   Abdominal Pain n    Sputum    Joint Pain     SOB/LUDWIG y More   Pruritis/Rash     Nausea/vomit    Tolerating PT/OT     Diarrhea    Tolerating Diet     Constipation    Other       Could NOT obtain due to:      Objective:     VITALS:   Last 24hrs VS reviewed since prior progress note. Most recent are:  Patient Vitals for the past 24 hrs:   Temp Pulse Resp BP SpO2   12/24/18 1437 98.1 °F (36.7 °C) 78 22 163/56 95 %   12/24/18 1347     96 %   12/24/18 1142 97.9 °F (36.6 °C) 79 24 168/62 91 %   12/24/18 0753     91 %   12/24/18 0749 97.7 °F (36.5 °C) 96 24 157/71 93 %   12/24/18 0400 98.8 °F (37.1 °C) 74 18 148/54 95 %   12/24/18 0300     94 %   12/24/18 0000 98.7 °F (37.1 °C) 81 18 165/75    12/23/18 2010     94 %   12/23/18 1930 98.5 °F (36.9 °C) 78 18 171/74 96 %       Intake/Output Summary (Last 24 hours) at 12/24/2018 1539  Last data filed at 12/24/2018 1437  Gross per 24 hour   Intake 1170 ml   Output 700 ml   Net 470 ml        PHYSICAL EXAM:  General: WD, WN. Alert, cooperative, no acute distress.  Appears better today, brooke dyspneic at rest  EENT:  EOMI. Anicteric sclerae. MMM  Resp:  Diminished BS bilaterally, no wheezing or rales. No accessory muscle use  CV:  Regular  rhythm,  + edema  GI:  Soft, Non distended, Non tender.  +Bowel sounds  Neurologic:  Alert and oriented X 3, normal speech,   Psych:   Good insight. Not anxious nor agitated  Skin:  No rashes. No jaundice    Reviewed most current lab test results and cultures  YES  Reviewed most current radiology test results   YES  Review and summation of old records today    NO  Reviewed patient's current orders and MAR    YES  PMH/SH reviewed - no change compared to H&P  ________________________________________________________________________  Care Plan discussed with:    Comments   Patient y    Family      RN y    Care Manager     Consultant  y Renal Dr Thomas/ cardiology  [facility] team rounds were held today with , nursing, pharmacist and clinical coordinator. Patient's plan of care was discussed; medications were reviewed and discharge planning was addressed. ________________________________________________________________________  Total NON critical care TIME:  35  Minutes    Total CRITICAL CARE TIME Spent:   Minutes non procedure based      Comments   >50% of visit spent in counseling and coordination of care     ________________________________________________________________________  Clay Goldberg MD     Procedures: see electronic medical records for all procedures/Xrays and details which were not copied into this note but were reviewed prior to creation of Plan. LABS:  I reviewed today's most current labs and imaging studies.   Pertinent labs include:  Recent Labs     12/24/18 0311   WBC 19.1*   HGB 9.9*   HCT 29.2*        Recent Labs     12/24/18  0311 12/23/18  0340 12/22/18  0411    142 139   K 3.3* 3.7 3.2*    109* 107   CO2 23 24 23   GLU 60* 106* 160*   BUN 92* 91* 81*   CREA 3.18* 3.58* 3.23*   CA 8.7 9.0 8.7   PHOS 5.8* 6.2* 6.0*   ALB 2.6* 2.9* 2.9*       Signed: Emi Lan MD

## 2018-12-24 NOTE — PROGRESS NOTES
Bedside shift change report given to Jessika Hammond (oncoming nurse) by Nguyễn Talavera (offgoing nurse). Report included the following information SBAR, Kardex, ED Summary, Procedure Summary, Intake/Output, MAR, Recent Results and Cardiac Rhythm NSR.       1810- Bedside shift change report given to [facility] (oncoming nurse) by Jessika Hammond (offgoing nurse). Report included the following information SBAR, Kardex, ED Summary, Procedure Summary, Intake/Output, MAR and Recent Results.

## 2018-12-24 NOTE — PROGRESS NOTES
[facility] Pulmonary Consult Service Note  Pulmonary, Critical Care, and Sleep Medicine    Name: Silvia Serrano MRN: [id number]   : 1935 Hospital: [facility]   Date: 2018  Admission date: 2018 Hospital Day: 8       Subjective/Interval History:   Offers no complaints    IMPRESSION:   1. Acute on chronic respiratory failure with Hypoxia POA    2. Acute COPD exacerbation POA   3. Sepsis POA WBC 19.9,   4. Bilateral pneumonia likely healthcare associated POA   5. Tobacco use  6. Severe Pulmonary HTN PASP 81  7. Acute Diastolic Heart Failure  8. Pulmonary HTN   9. Diabetes mellitus type 2 POA   10. Essential hypertension POA   11. History of PAF  12. History of bradycardia status post pacemaker placement 2018 per       RECOMMENDATIONS/PLAN:   1. BIPAP support for now with breaks as tolerated  2. Agree with Empiric IV antibiotics pending culture results   3. Follow culture results, await ID/sens of GNR  4. Supplemental O2 to keep sats > 93%  5. Bronchodilators  6. Systemic corticosteroids-wean  7. Glucose monitoring and SSI  8. Cardiology following  9. DVT prophylaxis  10. Smoking cessation counseling done          [x] High complexity decision making was performed  [x] See my orders for details      Subjective/Initial History:     I was asked by Jimmy Ochoa MD to see Silvia Serrano  a 82 y.o.  female in consultation for a chief complaint of pneumonia and respiratory failure requiring BIPAP/ NIV    Excerpts from admission 2018 or consult notes as follows:     \"  Recently admitted  to 2018 with respiratory failure and COPD. she initially required BiPAP, was treated for COPD. Had Haemophilus in her sputum but no airspace disease, treated with a 5-day course of Levaquin. Had bradycardia and a pacemaker was placed. Eventually weaned back to her baseline oxygen, discharged home.  Baseline she is always short of breath with exertion. starting yesterday she began to notice increasing shortness of breath when she took a shower and with any minor exertion . no change in her baseline cough. No fevers or chills, sore throat, headache, chest pain. Was getting more short of breath when she laid down and  better when she sat up overnight. This morning, she was acutely short of breath, could not catch her breath even at rest .EMS was called and she was brought to the emergency room     Acute respiratory distress in the emergency room. PO2 53 on her 4 L nasal cannula oxygen. Was not hypercarbic. Placed on BiPAP. Recently admitted mid November 2018 with acute respiratory failure and COPD. Discharged on baseline 4 L . Increasing shortness of breath past 2 days, no increased cough or fevers. Acutely short of breath today, arrived in ED in respiratory distress. ABG on baseline 4 L showed pH 7.44, PCO2 31, PO2 53\"    Pt congested at times. Not active at all. Tachypneic on BIPAP. Daughter at bedside. No ill contacts at home. Upset that pacemaker did not fix her SOB. Tried to explain this to her. Sees my partner Dr. Rj Gonzales. No GI sx. No edema.          Allergies   Allergen Reactions    Keflex [Cephalexin] Itching        MAR reviewed and pertinent medications noted or modified as needed     Current Facility-Administered Medications   Medication    dilTIAZem CD (CARDIZEM CD) capsule 120 mg    ALPRAZolam (XANAX) tablet 0.5 mg    albuterol-ipratropium (DUO-NEB) 2.5 MG-0.5 MG/3 ML    insulin NPH (NOVOLIN N, HUMULIN N) injection 35 Units    apixaban (ELIQUIS) tablet 2.5 mg    piperacillin-tazobactam (ZOSYN) 3.375 g in 0.9% sodium chloride (MBP/ADV) 100 mL    nitroglycerin (NITROSTAT) tablet 0.4 mg    sodium chloride (NS) flush 5-10 mL    sodium chloride (NS) flush 5-10 mL    acetaminophen (TYLENOL) tablet 650 mg    naloxone (NARCAN) injection 0.4 mg    bisacodyl (DULCOLAX) suppository 10 mg    insulin lispro (HUMALOG) injection  glucose chewable tablet 16 g    dextrose (D50W) injection syrg 12.5-25 g    glucagon (GLUCAGEN) injection 1 mg    methylPREDNISolone (PF) (SOLU-MEDROL) injection 40 mg    fluticasone-vilanterol (BREO ELLIPTA) 200mcg-25mcg/puff    hydrALAZINE (APRESOLINE) 20 mg/mL injection 20 mg      Patient PCP: Tj Manzano MD  PMH:  has a past medical history of COPD and Hypertension. PSH:   has a past surgical history that includes hx gyn. FHX: family history includes Cancer in her brother and brother; Heart Disease in her mother. SHX:  reports that she has been smoking. She has been smoking about 0.25 packs per day. she has never used smokeless tobacco. She reports that she does not drink alcohol or use drugs. ROS:A comprehensive review of systems was negative except for that written in the HPI. Objective:     Vital Signs: Telemetry:    normal sinus rhythm Intake/Output:   Visit Vitals  /71 (BP 1 Location: Left arm, BP Patient Position: At rest)   Pulse 96   Temp 97.7 °F (36.5 °C)   Resp 24   Ht 5' 4.5\" (1.638 m)   Wt 89 kg (196 lb 3.4 oz)   SpO2 91%   BMI 33.16 kg/m²       Temp (24hrs), Av.4 °F (36.9 °C), Min:97.7 °F (36.5 °C), Max:98.8 °F (37.1 °C)        O2 Device: Nasal cannula O2 Flow Rate (L/min): 4 l/min       Wt Readings from Last 4 Encounters:   18 89 kg (196 lb 3.4 oz)   18 78.5 kg (173 lb 1 oz)   18 83.5 kg (184 lb)   10/01/17 87.1 kg (192 lb 0.3 oz)          Intake/Output Summary (Last 24 hours) at 2018 1047  Last data filed at 2018 0930  Gross per 24 hour   Intake 1090 ml   Output 900 ml   Net 190 ml       Last shift:       07 -  190  In: 350 [P.O.:350]  Out: -   Last 3 shifts: 1901 - 12/24 0700  In: 740 [P.O.:240;  I.V.:500]  Out: 1600 [Urine:1600]       Physical Exam:     General: alert, on BiPAP  Eyes: anicteric;  HEENT: NC/AT,no nasal flaring or drainage   Neck: No goiter; no stridor;  no accessory muscle use  Lymph nodes: No abnormally enlarged cervical or supraclavicular nodes  Chest: increased AP diameter  Lungs: poor bilateral air movement, no current wheezing   Heart: Regular rate and rhythm   Abdomen: soft, protuberant, nontender, obese  : No Tamayo; Ext: no cyanosis, no clubbing; no joint swelling or erythema  Skin: warm and dry; no clubbing  Musculoskeletal: no gross deformities  Neuro: speech fluent;  moves all fours, generalized weakness   Psych: no agitation     Data:   Labs:  Recent Labs     12/24/18 0311   WBC 19.1*   HGB 9.9*   HCT 29.2*        Recent Labs     12/24/18  0311 12/23/18  0340 12/22/18  0411    142 139   K 3.3* 3.7 3.2*    109* 107   CO2 23 24 23   GLU 60* 106* 160*   BUN 92* 91* 81*   CREA 3.18* 3.58* 3.23*   CA 8.7 9.0 8.7   PHOS 5.8* 6.2* 6.0*   ALB 2.6* 2.9* 2.9*     No results for input(s): PH, PCO2, PO2, HCO3, FIO2 in the last 72 hours.     Imaging:  I have personally reviewed the patients radiographs:  None today        Ezequiel Stanley MD

## 2018-12-24 NOTE — PROGRESS NOTES
ADULT PROTOCOL: JET AEROSOL ASSESSMENT    Patient  Jad Dumont     82 y.o.   female     12/24/2018  4:57 AM    Breath Sounds Pre Procedure: Right Breath Sounds: Lower, Diminished(slight congestion upper airway)                               Left Breath Sounds: Lower, Diminished(slight congestion upper aitway)    Breath Sounds Post Procedure: Right Breath Sounds: Diminished, Scattered wheezing                                 Left Breath Sounds: Diminished, Scattered wheezing    Breathing pattern: Pre procedure Breathing Pattern: Regular          Post procedure Breathing Pattern: Dyspnea at rest    Heart Rate: Pre procedure Pulse: 74           Post procedure Pulse: 81    Resp Rate: Pre procedure Respirations: 22           Post procedure Respirations: 20    Peak Flow: Pre bronchodilator             Post bronchodilator       FVC/FEV1:  N/A    Incentive Spirometry:             Cough: Pre procedure Cough: Non-productive               Post procedure Cough: Non-productive    Suctioned: NO    Sputum: Pre procedure                   Post procedure      Oxygen: O2 Device: Nasal cannula   4L NC     Changed: NO    SpO2: Pre procedure SpO2: 94 %   with oxygen              Post procedure SpO2: 96 %  with oxygen    Nebulizer Therapy: Current medications Aerosolized Medications: DuoNeb      Changed: NO    Smoking History:    Smoking status: Current Every Day Smoker       Packs/day: 0.25    Smokeless tobacco: Never Used           Problem List:   Patient Active Problem List   Diagnosis Code    Acute on chronic respiratory failure ([facility]) J96.20    COPD (chronic obstructive pulmonary disease) with acute bronchitis ([facility]) J44.0, J20.9    Lung nodule R91.1    Abnormal ECG R94.31    Hyperglycemia R73.9    Tobacco abuse Z72.0    Pulmonary hypertension, moderate to severe I27.20    Sepsis ([facility]) A41.9    COPD exacerbation ([facility]) J44.1    Respiratory failure ([facility]) J96.90    Second degree heart block I44.1    S/P placement of cardiac pacemaker Z95.0    Acute on chronic respiratory failure with hypoxia and hypercapnia (HCC) J96.21, J96.22       Respiratory Therapist: Priyanka Real, RT

## 2018-12-24 NOTE — PROGRESS NOTES
Cardiology Progress Note  12/24/2018 9:07 AM  Admit Date: 12/17/2018  Admit Diagnosis/CC: Acute on chronic respiratory failure with hypoxia and hypercapnia (HCC)  Subjective:   Patient reports:  Chest Pain:  [x] none,  consistent with [] non-cardiac  [] atypical  []  anginal chest pain             [x] none now    []  on-going  Dyspnea: [x] none  [] at rest  [] with exertion  [] improved  [] unchanged [] worsening  PND:       [x] none  [] overnight   [] current  Orthopnea: [x] none  [] improved  [] unchanged  [] worsening  Presyncope: [x] none  [] improved  [] unchanged  [] worsening  Ambulated in hallway without symptoms  [] Yes  Ambulated in room without symptoms  [x] Yes  ROS(2+other systems)   Hematuria: [] Yes  [x] No.   Dysuria: [] Yes  [x] No                                           Cough:       [] Yes  [x] No.   Sputum: [] Yes  [x] No                                            Hematochezia: [] Yes  [x] No.   Melena: [] Yes  [x] No                                            No change in family and social history from H&P/Consult note.     Current Facility-Administered Medications   Medication Dose Route Frequency    dilTIAZem CD (CARDIZEM CD) capsule 120 mg  120 mg Oral DAILY    ALPRAZolam (XANAX) tablet 0.5 mg  0.5 mg Oral Q8H PRN    albuterol-ipratropium (DUO-NEB) 2.5 MG-0.5 MG/3 ML  3 mL Nebulization Q6H RT    insulin NPH (NOVOLIN N, HUMULIN N) injection 35 Units  35 Units SubCUTAneous Q12H    apixaban (ELIQUIS) tablet 2.5 mg  2.5 mg Oral BID    piperacillin-tazobactam (ZOSYN) 3.375 g in 0.9% sodium chloride (MBP/ADV) 100 mL  3.375 g IntraVENous Q12H    nitroglycerin (NITROSTAT) tablet 0.4 mg  0.4 mg SubLINGual Q5MIN PRN    sodium chloride (NS) flush 5-10 mL  5-10 mL IntraVENous Q8H    sodium chloride (NS) flush 5-10 mL  5-10 mL IntraVENous PRN    acetaminophen (TYLENOL) tablet 650 mg  650 mg Oral Q6H PRN    naloxone (NARCAN) injection 0.4 mg  0.4 mg IntraVENous PRN    bisacodyl (DULCOLAX) suppository 10 mg  10 mg Rectal DAILY PRN    insulin lispro (HUMALOG) injection   SubCUTAneous AC&HS    glucose chewable tablet 16 g  4 Tab Oral PRN    dextrose (D50W) injection syrg 12.5-25 g  12.5-25 g IntraVENous PRN    glucagon (GLUCAGEN) injection 1 mg  1 mg IntraMUSCular PRN    methylPREDNISolone (PF) (SOLU-MEDROL) injection 40 mg  40 mg IntraVENous Q6H    fluticasone-vilanterol (BREO ELLIPTA) 200mcg-25mcg/puff  2 Puff Inhalation DAILY    hydrALAZINE (APRESOLINE) 20 mg/mL injection 20 mg  20 mg IntraVENous Q6H PRN       Objective:    Physical Exam:  Last VS:   Visit Vitals  /71 (BP 1 Location: Left arm, BP Patient Position: At rest)   Pulse 96   Temp 97.7 °F (36.5 °C)   Resp 24   Ht 5' 4.5\" (1.638 m)   Wt 89 kg (196 lb 3.4 oz)   SpO2 91%   BMI 33.16 kg/m²    Temp (24hrs), Av.4 °F (36.9 °C), Min:97.7 °F (36.5 °C), Max:98.8 °F (37.1 °C)    24 hr VS reviewed. General Appearance:   [x] well developed, well nourished,  [x] NAD. [] agitated   [] lethargic but arousable  [] obtunded   ENT, Palate:    [x] WNL   [] dry palate/MM        Respiratory:    [x] CTA bilateral  [] rales  [] rhonchi  [] normal resp effort      [] similar to yesterday   [] worse    [] improved   Cardiovascular:   [x] RRR   [] Irregular rate and rhythm   [x] Normal S1, S2   [x] No gallop or rub. [] no murmur   [x] no new murmur  [] murmur c/w:   [x] no edema  RLE: []1+ []2+ [] 3+;  LLE: []1+ []2+ []3+      [] edema similar to yesterday   [] worse    [] improved   [x] normal JVP   [] Elevated JVP    [x] JVP similar to yesterday   [] worse    [] improved   [x] carotid upstroke unchanged   [x] abd aorta not palpated   [x] no stigmata of peripheral emboli   GI:    [x] abd soft, non-distented,bowel sounds present, no                     organomegaly appreciated   Skin:  Neuro:    Cath Site:  [x] warm and dry [] cold extremities   [x] A/O x 3, grossly non-focal      [] intact w/o hematoma or bruit; distal pulse unchanged. Data Review:   Labs:    Recent Results (from the past 24 hour(s))   GLUCOSE, POC    Collection Time: 12/23/18 11:20 AM   Result Value Ref Range    Glucose (POC) 159 (H) 65 - 100 mg/dL    Performed by Sandy Mancilla (PCT)    GLUCOSE, POC    Collection Time: 12/23/18  4:26 PM   Result Value Ref Range    Glucose (POC) 112 (H) 65 - 100 mg/dL    Performed by Gerry Lucas    GLUCOSE, POC    Collection Time: 12/23/18  8:34 PM   Result Value Ref Range    Glucose (POC) 112 (H) 65 - 100 mg/dL    Performed by [facility]    RENAL FUNCTION PANEL    Collection Time: 12/24/18  3:11 AM   Result Value Ref Range    Sodium 141 136 - 145 mmol/L    Potassium 3.3 (L) 3.5 - 5.1 mmol/L    Chloride 108 97 - 108 mmol/L    CO2 23 21 - 32 mmol/L    Anion gap 10 5 - 15 mmol/L    Glucose 60 (L) 65 - 100 mg/dL    BUN 92 (H) 6 - 20 MG/DL    Creatinine 3.18 (H) 0.55 - 1.02 MG/DL    BUN/Creatinine ratio 29 (H) 12 - 20      GFR est AA 17 (L) >60 ml/min/1.73m2    GFR est non-AA 14 (L) >60 ml/min/1.73m2    Calcium 8.7 8.5 - 10.1 MG/DL    Phosphorus 5.8 (H) 2.6 - 4.7 MG/DL    Albumin 2.6 (L) 3.5 - 5.0 g/dL   CK    Collection Time: 12/24/18  3:11 AM   Result Value Ref Range    CK 25 (L) 26 - 192 U/L   CBC W/O DIFF    Collection Time: 12/24/18  3:11 AM   Result Value Ref Range    WBC 19.1 (H) 3.6 - 11.0 K/uL    RBC 3.33 (L) 3.80 - 5.20 M/uL    HGB 9.9 (L) 11.5 - 16.0 g/dL    HCT 29.2 (L) 35.0 - 47.0 %    MCV 87.7 80.0 - 99.0 FL    MCH 29.7 26.0 - 34.0 PG    MCHC 33.9 30.0 - 36.5 g/dL    RDW 14.1 11.5 - 14.5 %    PLATELET 962 080 - 585 K/uL    MPV 9.7 8.9 - 12.9 FL    NRBC 0.0 0  WBC    ABSOLUTE NRBC 0.00 0.00 - 0.01 K/uL   GLUCOSE, POC    Collection Time: 12/24/18  7:44 AM   Result Value Ref Range    Glucose (POC) 84 65 - 100 mg/dL    Performed by Bianca Montoya        Provided Telemetry: [x] sinus  [] chronic afib   [] paroxysmal afib  [] NSVT      Assessment:     Active Problems:    Acute on chronic respiratory failure with hypoxia and hypercapnia ([facility] [address] (12/17/2018)        Plan:     Atrial Fibrillation - Flutter  improved   Continue current meds  She is in rsr. Chest: clear for her. Azotemia is prerenal needs hydration. For all other plans, see orders.    [x] High complexity decision making was performed

## 2018-12-25 LAB
ALBUMIN SERPL-MCNC: 2.6 G/DL (ref 3.5–5)
ANION GAP SERPL CALC-SCNC: 10 MMOL/L (ref 5–15)
BUN SERPL-MCNC: 88 MG/DL (ref 6–20)
BUN/CREAT SERPL: 29 (ref 12–20)
CALCIUM SERPL-MCNC: 8.4 MG/DL (ref 8.5–10.1)
CHLORIDE SERPL-SCNC: 107 MMOL/L (ref 97–108)
CK SERPL-CCNC: 25 U/L (ref 26–192)
CO2 SERPL-SCNC: 23 MMOL/L (ref 21–32)
CREAT SERPL-MCNC: 3.01 MG/DL (ref 0.55–1.02)
GLUCOSE BLD STRIP.AUTO-MCNC: 135 MG/DL (ref 65–100)
GLUCOSE BLD STRIP.AUTO-MCNC: 174 MG/DL (ref 65–100)
GLUCOSE BLD STRIP.AUTO-MCNC: 197 MG/DL (ref 65–100)
GLUCOSE BLD STRIP.AUTO-MCNC: 251 MG/DL (ref 65–100)
GLUCOSE SERPL-MCNC: 132 MG/DL (ref 65–100)
PHOSPHATE SERPL-MCNC: 5.3 MG/DL (ref 2.6–4.7)
POTASSIUM SERPL-SCNC: 4 MMOL/L (ref 3.5–5.1)
SERVICE CMNT-IMP: ABNORMAL
SODIUM SERPL-SCNC: 140 MMOL/L (ref 136–145)

## 2018-12-25 PROCEDURE — 77010033678 HC OXYGEN DAILY

## 2018-12-25 PROCEDURE — 74011000250 HC RX REV CODE- 250: Performed by: INTERNAL MEDICINE

## 2018-12-25 PROCEDURE — 74011636637 HC RX REV CODE- 636/637: Performed by: HOSPITALIST

## 2018-12-25 PROCEDURE — 36415 COLL VENOUS BLD VENIPUNCTURE: CPT

## 2018-12-25 PROCEDURE — 94640 AIRWAY INHALATION TREATMENT: CPT

## 2018-12-25 PROCEDURE — 74011636637 HC RX REV CODE- 636/637: Performed by: INTERNAL MEDICINE

## 2018-12-25 PROCEDURE — 74011250636 HC RX REV CODE- 250/636: Performed by: INTERNAL MEDICINE

## 2018-12-25 PROCEDURE — 65660000000 HC RM CCU STEPDOWN

## 2018-12-25 PROCEDURE — 94660 CPAP INITIATION&MGMT: CPT

## 2018-12-25 PROCEDURE — 82550 ASSAY OF CK (CPK): CPT

## 2018-12-25 PROCEDURE — 82962 GLUCOSE BLOOD TEST: CPT

## 2018-12-25 PROCEDURE — 74011250637 HC RX REV CODE- 250/637: Performed by: INTERNAL MEDICINE

## 2018-12-25 PROCEDURE — 80069 RENAL FUNCTION PANEL: CPT

## 2018-12-25 RX ADMIN — METHYLPREDNISOLONE SODIUM SUCCINATE 40 MG: 40 INJECTION, POWDER, FOR SOLUTION INTRAMUSCULAR; INTRAVENOUS at 08:00

## 2018-12-25 RX ADMIN — INSULIN LISPRO 2 UNITS: 100 INJECTION, SOLUTION INTRAVENOUS; SUBCUTANEOUS at 12:18

## 2018-12-25 RX ADMIN — IPRATROPIUM BROMIDE AND ALBUTEROL SULFATE 3 ML: .5; 3 SOLUTION RESPIRATORY (INHALATION) at 07:42

## 2018-12-25 RX ADMIN — IPRATROPIUM BROMIDE AND ALBUTEROL SULFATE 3 ML: .5; 3 SOLUTION RESPIRATORY (INHALATION) at 01:48

## 2018-12-25 RX ADMIN — METHYLPREDNISOLONE SODIUM SUCCINATE 40 MG: 40 INJECTION, POWDER, FOR SOLUTION INTRAMUSCULAR; INTRAVENOUS at 22:37

## 2018-12-25 RX ADMIN — Medication 10 ML: at 15:02

## 2018-12-25 RX ADMIN — DILTIAZEM HYDROCHLORIDE 120 MG: 120 CAPSULE, COATED, EXTENDED RELEASE ORAL at 08:00

## 2018-12-25 RX ADMIN — IPRATROPIUM BROMIDE AND ALBUTEROL SULFATE 3 ML: .5; 3 SOLUTION RESPIRATORY (INHALATION) at 20:45

## 2018-12-25 RX ADMIN — APIXABAN 2.5 MG: 2.5 TABLET, FILM COATED ORAL at 22:37

## 2018-12-25 RX ADMIN — INSULIN LISPRO 3 UNITS: 100 INJECTION, SOLUTION INTRAVENOUS; SUBCUTANEOUS at 22:37

## 2018-12-25 RX ADMIN — HUMAN INSULIN 15 UNITS: 100 INJECTION, SUSPENSION SUBCUTANEOUS at 08:00

## 2018-12-25 RX ADMIN — Medication 10 ML: at 05:32

## 2018-12-25 RX ADMIN — Medication 10 ML: at 22:37

## 2018-12-25 RX ADMIN — INSULIN LISPRO 2 UNITS: 100 INJECTION, SOLUTION INTRAVENOUS; SUBCUTANEOUS at 16:50

## 2018-12-25 RX ADMIN — IPRATROPIUM BROMIDE AND ALBUTEROL SULFATE 3 ML: .5; 3 SOLUTION RESPIRATORY (INHALATION) at 14:08

## 2018-12-25 RX ADMIN — FLUTICASONE FUROATE AND VILANTEROL TRIFENATATE 2 PUFF: 200; 25 POWDER RESPIRATORY (INHALATION) at 08:02

## 2018-12-25 RX ADMIN — APIXABAN 2.5 MG: 2.5 TABLET, FILM COATED ORAL at 08:00

## 2018-12-25 RX ADMIN — HUMAN INSULIN 15 UNITS: 100 INJECTION, SUSPENSION SUBCUTANEOUS at 22:37

## 2018-12-25 NOTE — PROGRESS NOTES
Bedside shift change report given to Ezequiel Encinas (oncoming nurse) by Ashvin Jiang (offgoing nurse). Report included the following information SBAR, Kardex, Procedure Summary, Intake/Output, MAR, Recent Results and Cardiac Rhythm NSR/PACED.     1020- Pt bathed and assisted to recliner with 2 assist. LUDWIG but SATS > 90% on 4 L with activity. 1835- Pt assisted back to bed with 1 assist.  at bedside. 1910- Bedside shift change report given to Ashvin Jiang (oncoming nurse) by Ezequiel Encinas (offgoing nurse). Report included the following information SBAR, Kardex, Intake/Output, MAR, Recent Results and Cardiac Rhythm NSR.

## 2018-12-25 NOTE — PROGRESS NOTES
PCU SHIFT NURSING NOTE      1900: The bedside shift change report given to Letty (oncoming nurse) by Tomer Dugan (offgoing nurse). Report included the following information SBAR, Kardex, Intake/Output, MAR, Recent Results, Med Rec Status, Cardiac Rhythm paced and Alarm Parameters . 10:20 PM  The patient resting in bed with eyes open. The  with the patient at the bedside. Alert and oriented X4. No complains of pain. No signs of distress noted. Night meds administered as scheduled. The patient put on BIPAP mask and she is getting ready to sleep. To continue to monitor. 4:39 AM  The patient resting in bed with eyes open. Blood collected as per orders and sent to the lab. No complains of pain. She is on BIPAP. To continue to monitor. 6:50 AM  The patient had a calm night. On BIPAP the whole night. Had a BMX1. No complains of pain or signs of distress. To continue to monitor     Admission Date 12/17/2018   Admission Diagnosis Acute on chronic respiratory failure with hypoxia and hypercapnia ([facility] [address]   Consults IP CONSULT TO INTENSIVIST  IP CONSULT TO NEPHROLOGY  IP CONSULT TO CARDIOLOGY        Consults   [x]PT   [x]OT   []Speech   []Case Management      [] Palliative      Cardiac Monitoring Order   [x]Yes   []No     IV drips   []Yes    Drip:                            Dose:  Drip:                            Dose:  Drip:                            Dose:   [x]No     GI Prophylaxis   [x]Yes   []No         DVT Prophylaxis   SCDs:             David stockings:         [x] Medication   []Contraindicated   []None      Activity Level Activity Level: Ambulate X (#)     Activity Assistance: Partial (one person)   Purposeful Rounding every 1-2 hour?    [x]Yes   Gonzalez Score  Total Score: 3   Bed Alarm (If score 3 or >)   [x]Yes   [] Refused (See signed refusal form in chart)   Niraj Score  Niraj Score: 17   Niraj Score (if score 14 or less)   []PMT consult   []Wound Care consult      []Specialty bed   [] Nutrition consult          Needs prior to discharge:   Home O2 required:    [x]Yes   []No    If yes, how much O2 required? Other:    Last Bowel Movement: Last Bowel Movement Date: 12/23/18      Influenza Vaccine Received Flu Vaccine for Current Season (usually Sept-March): Yes        Pneumonia Vaccine           Diet Active Orders   Diet    DIET DIABETIC CONSISTENT CARB Regular; 2 GM NA (House Low NA)      LDAs               Peripheral IV 12/18/18 Right Forearm (Active)   Site Assessment Clean, dry, & intact 12/24/2018  7:09 PM   Phlebitis Assessment 0 12/24/2018  7:09 PM   Infiltration Assessment 0 12/24/2018  7:09 PM   Dressing Status Clean, dry, & intact 12/24/2018  7:09 PM   Dressing Type Transparent;Tape 12/24/2018  7:09 PM   Hub Color/Line Status Pink;Flushed 12/24/2018  7:09 PM   Action Taken Open ports on tubing capped 12/24/2018  7:09 PM   Alcohol Cap Used Yes 12/24/2018  7:09 PM          External Female Catheter 12/20/18 (Active)   Site Assessment Clean, dry, & intact 12/24/2018  7:09 PM   Repositioned Yes 12/24/2018  7:09 PM   Perineal Care Yes 12/24/2018  7:09 PM   Wick Changed Yes 12/24/2018  7:09 PM   Suction Canister/Tubing Changed Yes 12/24/2018  7:09 PM   Urine Output (mL) 300 ml 12/24/2018  7:09 PM                Urinary Catheter      Intake & Output Date 12/23/18 1900 - 12/24/18 0659 12/24/18 0700 - 12/25/18 0659   Shift 7191-3584 24 Hour Total 7218-4271 0598-2587 24 Hour Total   INTAKE   P.O.  240 700  700     P. O.  240 700  700   I. V.(mL/kg/hr) 500 500 100(0.1)  100     I.V.   100  100     Volume (piperacillin-tazobactam (ZOSYN) 3.375 g in 0.9% sodium chloride (MBP/ADV) 100 mL) 500 500      Shift Total(mL/kg) 500(5.6) 740(8.3) 800(9)  800(9)   OUTPUT   Urine(mL/kg/hr)  900 900(0.8) 300 1200     Urine Occurrence(s) 2 x 2 x 1 x  1 x     Urine Output (mL) (External Female Catheter 12/20/18)  900    Stool          Stool Occurrence(s)   1 x  1 x   Shift Total(mL/kg)  900(10.1) 900(10.1) 300(3.4) 1200(13.5)    -160 -100 -300 -400   Weight (kg) 89 89 89 89 89         Readmission Risk Assessment Tool Score High Risk            22       Total Score        3 Has Seen PCP in Last 6 Months (Yes=3, No=0)    2 . Living with Significant Other. Assisted Living. LTAC. SNF. or   Rehab    3 Patient Length of Stay (>5 days = 3)    4 IP Visits Last 12 Months (1-3=4, 4=9, >4=11)    5 Pt.  Coverage (Medicare=5 , Medicaid, or Self-Pay=4)    5 Charlson Comorbidity Score (Age + Comorbid Conditions)       Expected Length of Stay 4d 19h   Actual Length of Stay 7

## 2018-12-25 NOTE — PROGRESS NOTES
ADULT PROTOCOL: JET AEROSOL  REASSESSMENT    Patient  Concha Tejada     82 y.o.   female     12/25/2018  9:22 AM    Breath Sounds Pre Procedure: Right Breath Sounds: Diminished                               Left Breath Sounds: Diminished    Breath Sounds Post Procedure: Right Breath Sounds: Diminished                                 Left Breath Sounds: Diminished    Breathing pattern: Pre procedure Breathing Pattern: Tachypneic          Post procedure Breathing Pattern: Tachypneic    Heart Rate: Pre procedure Pulse: 73           Post procedure Pulse: 70    Resp Rate: Pre procedure Respirations: 24           Post procedure Respirations: 24      Cough: Pre procedure Cough: Non-productive               Post procedure Cough: Non-productive      Oxygen: O2 Device: Nasal cannula   4L       SpO2: Pre procedure SpO2: 98 %                 Post procedure SpO2: 94 %      Nebulizer Therapy: Current medications Aerosolized Medications: DuoNeb         Smoking History:  Current    Problem List:   Patient Active Problem List   Diagnosis Code    Acute on chronic respiratory failure ([facility]) J96.20    COPD (chronic obstructive pulmonary disease) with acute bronchitis ([facility]) J44.0, J20.9    Lung nodule R91.1    Abnormal ECG R94.31    Hyperglycemia R73.9    Tobacco abuse Z72.0    Pulmonary hypertension, moderate to severe I27.20    Sepsis ([facility]) A41.9    COPD exacerbation ([facility]) J44.1    Respiratory failure ([facility]) J96.90    Second degree heart block I44.1    S/P placement of cardiac pacemaker Z95.0    Acute on chronic respiratory failure with hypoxia and hypercapnia ([facility]) J96.21, J96.22       Respiratory Therapist: RT Kingsley

## 2018-12-25 NOTE — PROGRESS NOTES
Hospitalist Progress Note    NAME: Chun Ayon   :  1935   MRN:  [id number]       Assessment / Plan:  Acute on chronic respiratory failure POA with baseline 2-4L O2  Acute COPD exacerbation  Sepsis due to Bilateral HCAP POA   Underlying severe pulmonary hypertension PADP 81   -clinically: very slowly improving , less dyspneic   Slept with bipap   Leukocytosis is likely due to steroids use since improving clinically   very weak/debilitated ( reports that getting to chair is a great effort for her now)    O2: 4-5 L   -cont bipap at night and prn during daytime  -cont aggressive IV steroids ( started to taper, slow tapering), aggressive jet nebs   -Empiric IV AB: vanco - was dc with ALMAZ, no signs of gram + infection  Completed zosyn  ( last dose )   BC NTD  resp cultures: rare S. MALTOPHILIA. DOC - bactrim but cannot use with current cr   She improved since admission on zosyn alone    -repeat cxray in am   -pulmonology following   -keep O2 sats > 92%   -cxray repeated : No pulmonary consolidation. Improved aeration since      ALMAZ not POA  -Cr slowly getting better, down to 3.01  baseline normal at 0.8   Likely due to Lasix + possible ATN with  vanco ( trough 21.4 )    -Dr Julio César Hutchinson help was greatly appreciated: lasxi use prn ( last dose )     -Daily weight ( Wt is all over the place - ?  Correct measurement)     Acute on chronic diastolic HF EF 13%   HTN / PAfib   H/o bradycardia s/p PPM 2018 per   -HR/BP stable    -Dr Savage Goldstein help last week was greatly appreciated   Diurese only prn due to ALMAZ ( on lasix 20 mg daily at home )   Stopped Multaq started earlier by Dr Lj Dejesus ( may exacerbate her heart failure and/or lung disease)   Stopped amlodipine and changed to diltiazem  daily  PPM program changed early in admission to minimize ventr pacing   -AC: CHADS-Vasc 4 --> need to be on a/coag  Last admission pt declined AC and wanted to be on ASA alone   Dr Lj Dejesus started on Eliquis ( reduced dose with ALMAZ)   Now off ASA   -ECHO: EF 55%, mild AS, severe pulmonary HTN       DM type II  -BS controlled  120/150s   -cont NPH with steroids ( decr dose when tapering steroids)  + SS   -hba1c 6.6 11/2018     Hypokalemia  -supplement as needed  -follow in am with Mg      Tobacco use   Obesity. Body mass index is 32.04 kg/m². 12/23: attempted to discuss poor prognosis with pt today. I have impression that she is scared of this conversation and doesn't want to hear about it. She keep asking for regular pepci during our conversation       Code status: DNR per h/p on admission   NOK:    Prophylaxis: heparin     Baseline: functional, ; home O2 3.5-4 L   Recommended Disposition: HHC vs SNF pending clinical improvement      Subjective:     Chief Complaint / Reason for Physician Visit: following acute respiratory failure / afib/ htn   Slowly getting better  Looks more comfortable today  Seen earlier during rounds  Slept with bipap     Discussed with RN events overnight. Review of Systems:  Symptom Y/N Comments  Symptom Y/N Comments   Fever/Chills n   Chest Pain n    Poor Appetite    Edema     Cough y   Abdominal Pain n    Sputum    Joint Pain     SOB/LUDWIG y Better   Pruritis/Rash     Nausea/vomit    Tolerating PT/OT     Diarrhea    Tolerating Diet     Constipation    Other       Could NOT obtain due to:      Objective:     VITALS:   Last 24hrs VS reviewed since prior progress note.  Most recent are:  Patient Vitals for the past 24 hrs:   Temp Pulse Resp BP SpO2   12/25/18 1502 98.2 °F (36.8 °C) 77 20 167/52 94 %   12/25/18 1409     94 %   12/25/18 1050 97.6 °F (36.4 °C) 73 18 163/53 100 %   12/25/18 0742     98 %   12/25/18 0708 98 °F (36.7 °C) 77 18 121/86 95 %   12/25/18 0357     95 %   12/25/18 0301 98.5 °F (36.9 °C) 80 18 137/63 93 %   12/25/18 0148     95 %   12/25/18 0010     96 %   12/24/18 2342 97.8 °F (36.6 °C) 76 18 153/60 96 %   12/24/18 2035     97 %   12/24/18 1909 98 °F (36.7 °C) 82 18 166/60 96 %       Intake/Output Summary (Last 24 hours) at 12/25/2018 1522  Last data filed at 12/25/2018 1230  Gross per 24 hour   Intake 1100 ml   Output 1100 ml   Net 0 ml        PHYSICAL EXAM:  General: WD, WN. Alert, cooperative, no acute distress. Looks better   EENT:  EOMI. Anicteric sclerae. MMM  Resp:  Diminished BS bilaterally, no wheezing or rales. No accessory muscle use  CV:  Regular  rhythm,  + edema  GI:  Soft, Non distended, Non tender.  +Bowel sounds  Neurologic:  Alert and oriented X 3, normal speech,   Psych:   Good insight. Not anxious nor agitated  Skin:  No rashes. No jaundice    Reviewed most current lab test results and cultures  YES  Reviewed most current radiology test results   YES  Review and summation of old records today    NO  Reviewed patient's current orders and MAR    YES  PMH/SH reviewed - no change compared to H&P  ________________________________________________________________________  Care Plan discussed with:    Comments   Patient y    Family      RN y    Care Manager     Consultant                         Multidiciplinary team rounds were held today with , nursing, pharmacist and clinical coordinator. Patient's plan of care was discussed; medications were reviewed and discharge planning was addressed. ________________________________________________________________________  Total NON critical care TIME:  35  Minutes    Total CRITICAL CARE TIME Spent:   Minutes non procedure based      Comments   >50% of visit spent in counseling and coordination of care     ________________________________________________________________________  Juan Dickinson MD     Procedures: see electronic medical records for all procedures/Xrays and details which were not copied into this note but were reviewed prior to creation of Plan. LABS:  I reviewed today's most current labs and imaging studies.   Pertinent labs include:  Recent Labs     12/24/18 0311   WBC 19.1*   HGB 9.9*   HCT 29.2*        Recent Labs     12/25/18  0334 12/24/18 0311 12/23/18  0340    141 142   K 4.0 3.3* 3.7    108 109*   CO2 23 23 24   * 60* 106*   BUN 88* 92* 91*   CREA 3.01* 3.18* 3.58*   CA 8.4* 8.7 9.0   PHOS 5.3* 5.8* 6.2*   ALB 2.6* 2.6* 2.9*       Signed: Marybel Vora MD

## 2018-12-26 ENCOUNTER — APPOINTMENT (OUTPATIENT)
Dept: GENERAL RADIOLOGY | Age: 83
DRG: 871 | End: 2018-12-26
Attending: HOSPITALIST
Payer: MEDICARE

## 2018-12-26 LAB
ANION GAP SERPL CALC-SCNC: 9 MMOL/L (ref 5–15)
BUN SERPL-MCNC: 84 MG/DL (ref 6–20)
BUN/CREAT SERPL: 31 (ref 12–20)
CALCIUM SERPL-MCNC: 8.7 MG/DL (ref 8.5–10.1)
CHLORIDE SERPL-SCNC: 105 MMOL/L (ref 97–108)
CK SERPL-CCNC: 54 U/L (ref 26–192)
CO2 SERPL-SCNC: 22 MMOL/L (ref 21–32)
CREAT SERPL-MCNC: 2.73 MG/DL (ref 0.55–1.02)
GLUCOSE BLD STRIP.AUTO-MCNC: 128 MG/DL (ref 65–100)
GLUCOSE BLD STRIP.AUTO-MCNC: 143 MG/DL (ref 65–100)
GLUCOSE BLD STRIP.AUTO-MCNC: 222 MG/DL (ref 65–100)
GLUCOSE BLD STRIP.AUTO-MCNC: 233 MG/DL (ref 65–100)
GLUCOSE SERPL-MCNC: 139 MG/DL (ref 65–100)
POTASSIUM SERPL-SCNC: 4.5 MMOL/L (ref 3.5–5.1)
SERVICE CMNT-IMP: ABNORMAL
SODIUM SERPL-SCNC: 136 MMOL/L (ref 136–145)

## 2018-12-26 PROCEDURE — 82962 GLUCOSE BLOOD TEST: CPT

## 2018-12-26 PROCEDURE — 94660 CPAP INITIATION&MGMT: CPT

## 2018-12-26 PROCEDURE — 74011000250 HC RX REV CODE- 250: Performed by: INTERNAL MEDICINE

## 2018-12-26 PROCEDURE — 71045 X-RAY EXAM CHEST 1 VIEW: CPT

## 2018-12-26 PROCEDURE — 74011636637 HC RX REV CODE- 636/637: Performed by: HOSPITALIST

## 2018-12-26 PROCEDURE — 77030038269 HC DRN EXT URIN PURWCK BARD -A

## 2018-12-26 PROCEDURE — 80048 BASIC METABOLIC PNL TOTAL CA: CPT

## 2018-12-26 PROCEDURE — 94640 AIRWAY INHALATION TREATMENT: CPT

## 2018-12-26 PROCEDURE — 74011250637 HC RX REV CODE- 250/637: Performed by: INTERNAL MEDICINE

## 2018-12-26 PROCEDURE — 36415 COLL VENOUS BLD VENIPUNCTURE: CPT

## 2018-12-26 PROCEDURE — 82550 ASSAY OF CK (CPK): CPT

## 2018-12-26 PROCEDURE — 74011250636 HC RX REV CODE- 250/636: Performed by: INTERNAL MEDICINE

## 2018-12-26 PROCEDURE — 65660000000 HC RM CCU STEPDOWN

## 2018-12-26 PROCEDURE — 74011636637 HC RX REV CODE- 636/637: Performed by: INTERNAL MEDICINE

## 2018-12-26 RX ORDER — DILTIAZEM HYDROCHLORIDE 240 MG/1
240 CAPSULE, COATED, EXTENDED RELEASE ORAL DAILY
Status: DISCONTINUED | OUTPATIENT
Start: 2018-12-26 | End: 2019-01-03 | Stop reason: HOSPADM

## 2018-12-26 RX ADMIN — APIXABAN 2.5 MG: 2.5 TABLET, FILM COATED ORAL at 21:24

## 2018-12-26 RX ADMIN — IPRATROPIUM BROMIDE AND ALBUTEROL SULFATE 3 ML: .5; 3 SOLUTION RESPIRATORY (INHALATION) at 19:10

## 2018-12-26 RX ADMIN — IPRATROPIUM BROMIDE AND ALBUTEROL SULFATE 3 ML: .5; 3 SOLUTION RESPIRATORY (INHALATION) at 08:28

## 2018-12-26 RX ADMIN — HUMAN INSULIN 15 UNITS: 100 INJECTION, SUSPENSION SUBCUTANEOUS at 21:26

## 2018-12-26 RX ADMIN — HUMAN INSULIN 15 UNITS: 100 INJECTION, SUSPENSION SUBCUTANEOUS at 08:16

## 2018-12-26 RX ADMIN — Medication 10 ML: at 15:37

## 2018-12-26 RX ADMIN — METHYLPREDNISOLONE SODIUM SUCCINATE 40 MG: 40 INJECTION, POWDER, FOR SOLUTION INTRAMUSCULAR; INTRAVENOUS at 08:16

## 2018-12-26 RX ADMIN — IPRATROPIUM BROMIDE AND ALBUTEROL SULFATE 3 ML: .5; 3 SOLUTION RESPIRATORY (INHALATION) at 13:14

## 2018-12-26 RX ADMIN — Medication 10 ML: at 05:39

## 2018-12-26 RX ADMIN — FLUTICASONE FUROATE AND VILANTEROL TRIFENATATE 2 PUFF: 200; 25 POWDER RESPIRATORY (INHALATION) at 08:18

## 2018-12-26 RX ADMIN — IPRATROPIUM BROMIDE AND ALBUTEROL SULFATE 3 ML: .5; 3 SOLUTION RESPIRATORY (INHALATION) at 02:26

## 2018-12-26 RX ADMIN — DILTIAZEM HYDROCHLORIDE 240 MG: 240 CAPSULE, COATED, EXTENDED RELEASE ORAL at 08:16

## 2018-12-26 RX ADMIN — METHYLPREDNISOLONE SODIUM SUCCINATE 40 MG: 40 INJECTION, POWDER, FOR SOLUTION INTRAMUSCULAR; INTRAVENOUS at 21:24

## 2018-12-26 RX ADMIN — INSULIN LISPRO 2 UNITS: 100 INJECTION, SOLUTION INTRAVENOUS; SUBCUTANEOUS at 21:25

## 2018-12-26 RX ADMIN — Medication 10 ML: at 21:25

## 2018-12-26 RX ADMIN — APIXABAN 2.5 MG: 2.5 TABLET, FILM COATED ORAL at 08:16

## 2018-12-26 RX ADMIN — INSULIN LISPRO 3 UNITS: 100 INJECTION, SOLUTION INTRAVENOUS; SUBCUTANEOUS at 16:36

## 2018-12-26 NOTE — PROGRESS NOTES
PCU SHIFT NURSING NOTE    1930: The bedside  shift change report given to [facility] (oncoming nurse) by Jade Briceno (offgoing nurse). Report included the following information SBAR, Kardex, Intake/Output, MAR, Recent Results, Med Rec Status, Cardiac Rhythm Paced and Alarm Parameters . 12:22 AM  The patient is resting inn bed with eyes open. Denies pain. No signs of distress noted. All meds administered as per schedule. The patient is ready to go to bed with BIPAP mask on. To continue to monitor. 6:17 AM  The patient had a calm night. On BIPAP mask most of the night. O2 sat has been above 90%. Blood works done as per orders. No criticals noted. The patient now awake and on 4L oxygen via nasal canula. To continue to monitor for changes. Admission Date 12/17/2018   Admission Diagnosis Acute on chronic respiratory failure with hypoxia and hypercapnia ([facility] [address]   Consults IP CONSULT TO INTENSIVIST  IP CONSULT TO NEPHROLOGY  IP CONSULT TO CARDIOLOGY        Consults   []PT   []OT   []Speech   []Case Management      [] Palliative      Cardiac Monitoring Order   [x]Yes   []No     IV drips   []Yes    Drip:                            Dose:  Drip:                            Dose:  Drip:                            Dose:   [x]No     GI Prophylaxis   [x]Yes   []No         DVT Prophylaxis   SCDs:             David stockings:         [x] Medication   []Contraindicated   []None      Activity Level Activity Level: Up with Assistance     Activity Assistance: Partial (one person)   Purposeful Rounding every 1-2 hour? []Yes   Gonzalez Score  Total Score: 3   Bed Alarm (If score 3 or >)   [x]Yes   [] Refused (See signed refusal form in chart)   Niraj Score  Niraj Score: 17   Niraj Score (if score 14 or less)   []PMT consult   []Wound Care consult      []Specialty bed   [] Nutrition consult          Needs prior to discharge:   Home O2 required:    [x]Yes   []No    If yes, how much O2 required?     Other:    Last Bowel Movement: Last Bowel Movement Date: 12/25/18      Influenza Vaccine Received Flu Vaccine for Current Season (usually Sept-March): Yes        Pneumonia Vaccine           Diet Active Orders   Diet    DIET DIABETIC CONSISTENT CARB Regular; 2 GM NA (House Low NA)      LDAs               Peripheral IV 12/18/18 Right Forearm (Active)   Site Assessment Clean, dry, & intact 12/25/2018  7:50 PM   Phlebitis Assessment 0 12/25/2018  7:50 PM   Infiltration Assessment 0 12/25/2018  7:50 PM   Dressing Status Clean, dry, & intact 12/25/2018  7:50 PM   Dressing Type Transparent 12/25/2018  7:50 PM   Hub Color/Line Status Pink;Capped 12/25/2018  7:50 PM   Action Taken Open ports on tubing capped 12/25/2018  7:50 PM   Alcohol Cap Used Yes 12/25/2018  7:50 PM          External Female Catheter 12/20/18 (Active)   Site Assessment Clean, dry, & intact 12/25/2018  3:02 PM   Repositioned Yes 12/25/2018  4:27 AM   Perineal Care Yes 12/25/2018  4:27 AM   Wick Changed Yes 12/25/2018  4:27 AM   Suction Canister/Tubing Changed Yes 12/25/2018  4:27 AM   Urine Output (mL) 700 ml 12/25/2018  6:38 PM                Urinary Catheter      Intake & Output Date 12/25/18 0700 - 12/26/18 0659 12/26/18 0700 - 12/27/18 0659   Shift 9009-8825 3524-9805 24 Hour Total 9849-2276 4836-4205 24 Hour Total   INTAKE   P.O. 1100  1100        P. O. 1100  1100      Shift Total(mL/kg) 1100(13)  1100(13)      OUTPUT   Urine(mL/kg/hr) 700(0.7)  700        Urine Output (mL) (External Female Catheter 12/20/18) 700  700      Shift Total(mL/kg) 700(8.3)  700(8.3)        400      Weight (kg) 84.4 84.4 84.4 84.4 84.4 84.4         Readmission Risk Assessment Tool Score High Risk            22       Total Score        3 Has Seen PCP in Last 6 Months (Yes=3, No=0)    2 . Living with Significant Other. Assisted Living. LTAC. SNF. or   Rehab    3 Patient Length of Stay (>5 days = 3)    4 IP Visits Last 12 Months (1-3=4, 4=9, >4=11)    5 Pt.  Coverage (Medicare=5 , Medicaid, or Self-Pay=4)    5 Charlson Comorbidity Score (Age + Comorbid Conditions)       Expected Length of Stay 4d 19h   Actual Length of Stay 9

## 2018-12-26 NOTE — PROGRESS NOTES
Initial Nutrition Assessment:    INTERVENTIONS/RECOMMENDATIONS:   · Meals/Snacks: General/healthful diet:  Continue CCD for BG management + low sodium per CHF hx.    ASSESSMENT:   12/26:  Chart reviewed; med noted for acute on chronic resp failure. H/O COPD, CHF. PO intake is mostly fair at 25-50% of meals. LOS screen. Weight hx are stable. Diet Order: Consistent carb(2gm Na+)  % Eaten:    Patient Vitals for the past 72 hrs:   % Diet Eaten   12/26/18 1230 50 %   12/26/18 0850 25 %   12/25/18 1838 25 %   12/25/18 1230 25 %   12/25/18 0930 25 %   12/24/18 1830 25 %   12/24/18 1230 5 %   12/24/18 0930 50 %   12/23/18 1830 50 %     Pertinent Medications: [x]Reviewed []Other  Pertinent Labs: [x]Reviewed []Other  Food Allergies: [x]None []Other   Last BM:    [x]Active     []Hyperactive  []Hypoactive       [] Absent BS  Skin:    [x] Intact   [] Incision  [] Breakdown  [] Other:    Anthropometrics:   Height: 5' 4.5\" (163.8 cm) Weight: 84.5 kg (186 lb 4.6 oz)   IBW (%IBW):   ( ) UBW (%UBW):   (  %)   Last Weight Metrics:  Weight Loss Metrics 12/26/2018 11/11/2018 1/8/2018 10/1/2017 10/13/2013 4/5/2013 11/17/2011   Today's Wt 186 lb 4.6 oz 173 lb 1 oz 184 lb 192 lb 0.3 oz 184 lb 169 lb 14.4 oz 186 lb 4.6 oz   BMI 31.48 kg/m2 29.71 kg/m2 32.59 kg/m2 35.12 kg/m2 33.65 kg/m2 29.15 kg/m2 31.96 kg/m2       BMI: Body mass index is 31.48 kg/m². This BMI is indicative of:   []Underweight    []Normal    [x]Overweight    [] Obesity   [] Extreme Obesity (BMI>40)     Estimated Nutrition Needs (Based on):   1695 Kcals/day(BMR (1304) x 1. 3AF) , 85 g(1.0 g/kg bw) Protein  Carbohydrate:  At Least 130 g/day  Fluids: 1700 mL/day (1ml/kcal)    NUTRITION DIAGNOSES:   Problem:  Altered nutrition-related lab values      Etiology: related to current medical condition     Signs/Symptoms: as evidenced by elevated creat, glucose      NUTRITION INTERVENTIONS:  Meals/Snacks: General/healthful diet                  GOAL:   PO intake at least 50% of meals while trend BG WNL next 3-5 days    LEARNING NEEDS (Diet, Food/Nutrient-Drug Interaction):    [x] None Identified   [] Identified and Education Provided/Documented   [] Identified and Pt declined/was not appropriate     Cultureal, Confucianism, OR Ethnic Dietary Needs:    [x] None Identified   [] Identified and Addressed     [x] Interdisciplinary Care Plan Reviewed/Documented    [x] Discharge Planning:  Continue CCD for BG management     MONITORING /EVALUATION:   Food/Nutrient Intake Outcomes:  Total energy intake  Physical Signs/Symptoms Outcomes: Weight/weight change, Glucose profile, Electrolyte and renal profile    NUTRITION RISK:    [x] Patient At Nutritional Risk    [] Patient Not At Nutritional Risk    PT SEEN FOR:    []  MD Consult: []Calorie Count      []Diabetic Diet Education        []Diet Education     []Electrolyte Management     []General Nutrition Management and Supplements     []Management of Tube Feeding     []TPN Recommendations    []  RN Referral:  []MST score >=2     []Enteral/Parenteral Nutrition PTA     []Pregnant: Gestational DM or Multigestation     []Pressure Ulcer/Wound Care needs        []  Low BMI  [x]  Laurence Pablo  Pager [phone]  Weekend Pager [phone]

## 2018-12-26 NOTE — PROGRESS NOTES
[email or web address]                  Phone - [phone]   Renal Progress Note    Subjective:   Patient: Melvin Borrero                    YOB: 1935               Date- 12/26/2018          Reason for visit: ARF    Admission Date: 12/17/2018       Assessment:      Acute renal failure. Most likely pre-renal azotemia from diuresis, but ATN is possible. She has had a-fib with a RVR but no hypotension. She has been on Vancomycin with a trough of 21. It was d/c'd on 12/20. Her Bun/creat was 12/0.8 on admission, 12/17. It was 17/1.3 on 12/18, 42/12.5 on 12/19, and 57/3.1 on 12/20, and 75/3.3 today. Her urinary Na was <5 !!   IV Lasix was d/c'd and NS started on 12/20, but the renal function got worse. ON 12/22 she is even more SOB, so the NS has been d/c'd, and Lasix ordered again  Bun/creat up to 91/3.6 on 12/23 but 88/3.0 on 12/25. Have been giving prn Lasix. Last dose on 12/24. Bun/creat down to 84/2.7 on 12/26.     Acute on chronic respiratory failure with hypoxia and hypercapnia  Diastolic heart failure on admission.     Hypokalemia.     Severe COPD with an exacerbation. Bilateral pneumonia.      Severe pulmonary hypertension.        PAF --A-fib the other night with a RVR. S/p pacemaker placement in 11/18.     Diabetes Mellitus, type II. Hypertension. Obesity.           Plan:          Lasix prn.    Avoid hypotension. Vancomycin has been d/c'd. Pt looks better today. D/w her and her daughter      Interim History:    On 12/20: The patient has severe COPD and pulmonary hypertension. She was admitted with a COPD exacerbation and bilateral pneumonia. She also has PAF and developed A-fib the other night with a RVR. In addition, she was felt to have diastolic heart failure and so was placed on Lasix. She has not been on an ACE-I or ARB. Her Bun/creat was 12/0.8 on admission, 12/17. It was 17/1.3 on 12/18, 42/12.5 on 12/19, and is 57/3.1 today.   The patient says she was extremely SOB on admission but is essentially back to her baseline \"usual miserable\" SOB now. She denies chest pain or any other complaints. She was on Lasix, but this was d/c'd today and an IV of NS at 50 cc/hr ordered. She has been on Vancomycin with a trough of 21. Vanc was d/c'd today.        On :  The patient says she her breathing is at baseline. She is relatively comfortable if not moving. On :  Pt still feels very SOB, \"about the same\" as yesterday. Pt denies any complaints except for the SOB. NS d/c'd yesterday and Lasix 40 mg IV given. Input not complete, but UO 1450 cc. On :  Pt was feeling worse yesterday but better today. SOB is her only complaint. I& O's are incomplete. Last Lasix dose on ? On : pt up in a chair and looks sig more comfortable today. Says her SOB is improved. No other complaints. Last Lasix dose . I&O's incomplete    ROS as above, plus: no fever/chills today. . No HEEENT complaints. No chest pain. no abd pain. No N/V/D. No joint pain. No skin rashes, no urinary complaints.         Review of Systems  A comprehensive review of systems was negative except for that written in the HPI. Objective:     Visit Vitals  /57 (BP 1 Location: Left arm, BP Patient Position: At rest)   Pulse 73   Temp 97.7 °F (36.5 °C)   Resp 19   Ht 5' 4.5\" (1.638 m)   Wt 84.5 kg (186 lb 4.6 oz)   SpO2 95%   BMI 31.48 kg/m²     Temp (24hrs), Av °F (36.7 °C), Min:97.5 °F (36.4 °C), Max:98.6 °F (37 °C)      701 - 1900  In: 25 [P.O.:25]  Out: -   1901 -  0700  In: 1100 [P.O.:1100]  Out: 1700 [Urine:1700]    Physical Exam:  General:  alert, cooperative, up in a chair and breathing more comfortably  Eye:  conjunctivae/corneas clear. EOM's intact.    Neurologic:  grossly non-focal; alert and appropriate; normal speech  Neck:  supple; no JVD  Lungs:  diminished breath sounds but no rales or rhonchi; no accessory muscle use  Heart: regular rate and rhythm; no gallop or rub  Skin:  no rash or lesions but multiple ecchymoses. Pt denies trauma but is on blood thinners. Abdomen:  soft, non-tender. No masses,  no organomegaly   Extremities: no edema      Data Review:     Recent Labs     12/26/18  0513 12/25/18  0334 12/24/18  0311   WBC  --   --  19.1*   HGB  --   --  9.9*    140 141   K 4.5 4.0 3.3*    107 108   CO2 22 23 23   BUN 84* 88* 92*   CREA 2.73* 3.01* 3.18*   CA 8.7 8.4* 8.7   ALB  --  2.6* 2.6*   PHOS  --  5.3* 5.8*       Assessment:     Active Problems:    Acute on chronic respiratory failure with hypoxia and hypercapnia (HCC) (12/17/2018)      Chart reviewed. Pertinent Notes Reviewed. Medications list Personally Reviewed. [facility], [address] [facility]  [facility] SYSTM FRANCISCAN [facility]asonFormerly [facility], [address]  [place], [address]  Phone - [phone]    Fax - [phone]  [email or web address]                                         [facility]   [address] [address]  Phone - [phone]  Fax - [phone]

## 2018-12-26 NOTE — PROGRESS NOTES
Cardiology Progress Note  12/26/2018 7:55 AM  Admit Date: 12/17/2018  Admit Diagnosis/CC: Acute on chronic respiratory failure with hypoxia and hypercapnia (HCC)  Subjective:   Patient reports:  Chest Pain:  [x] none,  consistent with [] non-cardiac  [] atypical  []  anginal chest pain             [x] none now    []  on-going  Dyspnea: [x] none  [] at rest  [] with exertion  [] improved  [] unchanged [] worsening  PND:       [x] none  [] overnight   [] current  Orthopnea: [x] none  [] improved  [] unchanged  [] worsening  Presyncope: [x] none  [] improved  [] unchanged  [] worsening  Ambulated in hallway without symptoms  [] Yes  Ambulated in room without symptoms  [x] Yes  ROS(2+other systems)   Hematuria: [] Yes  [x] No.   Dysuria: [] Yes  [x] No                                           Cough:       [] Yes  [x] No.   Sputum: [] Yes  [x] No                                            Hematochezia: [] Yes  [x] No.   Melena: [] Yes  [x] No                                            No change in family and social history from H&P/Consult note.     Current Facility-Administered Medications   Medication Dose Route Frequency    dilTIAZem CD (CARDIZEM CD) capsule 240 mg  240 mg Oral DAILY    methylPREDNISolone (PF) (SOLU-MEDROL) injection 40 mg  40 mg IntraVENous Q12H    insulin NPH (NOVOLIN N, HUMULIN N) injection 15 Units  15 Units SubCUTAneous Q12H    ALPRAZolam (XANAX) tablet 0.5 mg  0.5 mg Oral Q8H PRN    albuterol-ipratropium (DUO-NEB) 2.5 MG-0.5 MG/3 ML  3 mL Nebulization Q6H RT    apixaban (ELIQUIS) tablet 2.5 mg  2.5 mg Oral BID    nitroglycerin (NITROSTAT) tablet 0.4 mg  0.4 mg SubLINGual Q5MIN PRN    sodium chloride (NS) flush 5-10 mL  5-10 mL IntraVENous Q8H    sodium chloride (NS) flush 5-10 mL  5-10 mL IntraVENous PRN    acetaminophen (TYLENOL) tablet 650 mg  650 mg Oral Q6H PRN    naloxone (NARCAN) injection 0.4 mg  0.4 mg IntraVENous PRN    bisacodyl (DULCOLAX) suppository 10 mg  10 mg Rectal DAILY PRN    insulin lispro (HUMALOG) injection   SubCUTAneous AC&HS    glucose chewable tablet 16 g  4 Tab Oral PRN    dextrose (D50W) injection syrg 12.5-25 g  12.5-25 g IntraVENous PRN    glucagon (GLUCAGEN) injection 1 mg  1 mg IntraMUSCular PRN    fluticasone-vilanterol (BREO ELLIPTA) 200mcg-25mcg/puff  2 Puff Inhalation DAILY    hydrALAZINE (APRESOLINE) 20 mg/mL injection 20 mg  20 mg IntraVENous Q6H PRN       Objective:    Physical Exam:  Last VS:   Visit Vitals  /62 (BP 1 Location: Left arm, BP Patient Position: At rest)   Pulse 73   Temp 98.6 °F (37 °C)   Resp 18   Ht 5' 4.5\" (1.638 m)   Wt 84.5 kg (186 lb 4.6 oz)   SpO2 97%   BMI 31.48 kg/m²    Temp (24hrs), Av.9 °F (36.6 °C), Min:97.5 °F (36.4 °C), Max:98.6 °F (37 °C)    24 hr VS reviewed. General Appearance:   [x] well developed, well nourished,  [x] NAD. [] agitated   [] lethargic but arousable  [] obtunded   ENT, Palate:    [x] WNL   [] dry palate/MM        Respiratory:    [x] CTA bilateral  [] rales  [] rhonchi  [] normal resp effort      [] similar to yesterday   [] worse    [] improved   Cardiovascular:   [x] RRR   [] Irregular rate and rhythm   [x] Normal S1, S2   [x] No gallop or rub. [] no murmur   [x] no new murmur  [] murmur c/w:   [x] no edema  RLE: []1+ []2+ [] 3+;  LLE: []1+ []2+ []3+      [] edema similar to yesterday   [] worse    [] improved   [x] normal JVP   [] Elevated JVP    [x] JVP similar to yesterday   [] worse    [] improved   [x] carotid upstroke unchanged   [x] abd aorta not palpated   [x] no stigmata of peripheral emboli   GI:    [x] abd soft, non-distented,bowel sounds present, no                     organomegaly appreciated   Skin:  Neuro:    Cath Site:  [x] warm and dry [] cold extremities   [x] A/O x 3, grossly non-focal      [] intact w/o hematoma or bruit; distal pulse unchanged.               Data Review:   Labs:    Recent Results (from the past 24 hour(s))   GLUCOSE, POC    Collection Time: 12/25/18 11:34 AM   Result Value Ref Range    Glucose (POC) 197 (H) 65 - 100 mg/dL    Performed by IRVING FERNANDEZ(CON)    GLUCOSE, POC    Collection Time: 12/25/18  4:36 PM   Result Value Ref Range    Glucose (POC) 174 (H) 65 - 100 mg/dL    Performed by IRVING FERNANDEZ(CON)    GLUCOSE, POC    Collection Time: 12/25/18  9:50 PM   Result Value Ref Range    Glucose (POC) 251 (H) 65 - 100 mg/dL    Performed by Hoang Camacho (PCT)    CK    Collection Time: 12/26/18  5:13 AM   Result Value Ref Range    CK 54 26 - 659 U/L   METABOLIC PANEL, BASIC    Collection Time: 12/26/18  5:13 AM   Result Value Ref Range    Sodium 136 136 - 145 mmol/L    Potassium 4.5 3.5 - 5.1 mmol/L    Chloride 105 97 - 108 mmol/L    CO2 22 21 - 32 mmol/L    Anion gap 9 5 - 15 mmol/L    Glucose 139 (H) 65 - 100 mg/dL    BUN 84 (H) 6 - 20 MG/DL    Creatinine 2.73 (H) 0.55 - 1.02 MG/DL    BUN/Creatinine ratio 31 (H) 12 - 20      GFR est AA 20 (L) >60 ml/min/1.73m2    GFR est non-AA 17 (L) >60 ml/min/1.73m2    Calcium 8.7 8.5 - 10.1 MG/DL   GLUCOSE, POC    Collection Time: 12/26/18  7:31 AM   Result Value Ref Range    Glucose (POC) 128 (H) 65 - 100 mg/dL    Performed by Omar Vann (PCT)        Provided Telemetry: [x] sinus  [] chronic afib   [] paroxysmal afib  [] NSVT      Assessment:     Active Problems:    Acute on chronic respiratory failure with hypoxia and hypercapnia (HCC) (12/17/2018)        Plan:     Atrial Fibrillation - Flutter  improved   Continue current meds  She is in RSR. Lungs are clearer. AKF is better. BP is creeping up. For all other plans, see orders.    [x] High complexity decision making was performed

## 2018-12-26 NOTE — PROGRESS NOTES
[facility] Pulmonary Consult Service Note  Pulmonary, Critical Care, and Sleep Medicine    Name: Yudith Brown MRN: [id number]   : 1935 Hospital: [facility]   Date: 2018  Admission date: 2018 Hospital Day: 10       Subjective/Interval History:   Offers no complaints  Got up some yesterday       IMPRESSION:   1. Acute on chronic respiratory failure with Hypoxia POA    2. Acute COPD exacerbation POA   3. Sepsis POA WBC 19.9,   4. Bilateral pneumonia likely healthcare associated POA   5. Tobacco use  6. Severe Pulmonary HTN PASP 81  7. Acute Diastolic Heart Failure  8. Pulmonary HTN   9. Diabetes mellitus type 2 POA   10. Essential hypertension POA   11. History of PAF  12. History of bradycardia status post pacemaker placement 2018 per       RECOMMENDATIONS/PLAN:   1. On NC O@  2. Completed antibiotics  course  3. Follow culture results- light stenotrophomonas  4. Supplemental O2 to keep sats > 93%  5. Bronchodilators  6. Systemic corticosteroids-wean  To po  7. Glucose monitoring and SSI  8. Cardiology following  9. DVT prophylaxis  10. Smoking cessation counseling done          [x] High complexity decision making was performed  [x] See my orders for details      Subjective/Initial History:     I was asked by John Umana MD to see Yudith Brown  a 82 y.o.  female in consultation for a chief complaint of pneumonia and respiratory failure requiring BIPAP/ NIV    Excerpts from admission 2018 or consult notes as follows:     \"  Recently admitted  to 2018 with respiratory failure and COPD. she initially required BiPAP, was treated for COPD. Had Haemophilus in her sputum but no airspace disease, treated with a 5-day course of Levaquin. Had bradycardia and a pacemaker was placed. Eventually weaned back to her baseline oxygen, discharged home. Baseline she is always short of breath with exertion. starting yesterday she began to notice increasing shortness of breath when she took a shower and with any minor exertion . no change in her baseline cough. No fevers or chills, sore throat, headache, chest pain. Was getting more short of breath when she laid down and  better when she sat up overnight. This morning, she was acutely short of breath, could not catch her breath even at rest .EMS was called and she was brought to the emergency room     Acute respiratory distress in the emergency room. PO2 53 on her 4 L nasal cannula oxygen. Was not hypercarbic. Placed on BiPAP. Recently admitted mid November 2018 with acute respiratory failure and COPD. Discharged on baseline 4 L . Increasing shortness of breath past 2 days, no increased cough or fevers. Acutely short of breath today, arrived in ED in respiratory distress. ABG on baseline 4 L showed pH 7.44, PCO2 31, PO2 53\"    Pt congested at times. Not active at all. Tachypneic on BIPAP. Daughter at bedside. No ill contacts at home. Upset that pacemaker did not fix her SOB. Tried to explain this to her. Sees my partner Dr. Rj Gonzales. No GI sx. No edema.          Allergies   Allergen Reactions    Keflex [Cephalexin] Itching        MAR reviewed and pertinent medications noted or modified as needed     Current Facility-Administered Medications   Medication    dilTIAZem CD (CARDIZEM CD) capsule 240 mg    methylPREDNISolone (PF) (SOLU-MEDROL) injection 40 mg    insulin NPH (NOVOLIN N, HUMULIN N) injection 15 Units    ALPRAZolam (XANAX) tablet 0.5 mg    albuterol-ipratropium (DUO-NEB) 2.5 MG-0.5 MG/3 ML    apixaban (ELIQUIS) tablet 2.5 mg    nitroglycerin (NITROSTAT) tablet 0.4 mg    sodium chloride (NS) flush 5-10 mL    sodium chloride (NS) flush 5-10 mL    acetaminophen (TYLENOL) tablet 650 mg    naloxone (NARCAN) injection 0.4 mg    bisacodyl (DULCOLAX) suppository 10 mg    insulin lispro (HUMALOG) injection    glucose chewable tablet 16 g    dextrose (D50W) injection syrg 12.5-25 g    glucagon (GLUCAGEN) injection 1 mg    fluticasone-vilanterol (BREO ELLIPTA) 200mcg-25mcg/puff    hydrALAZINE (APRESOLINE) 20 mg/mL injection 20 mg      Patient PCP: Suanne Sicard, MD  PMH:  has a past medical history of COPD and Hypertension. PSH:   has a past surgical history that includes hx gyn. FHX: family history includes Cancer in her brother and brother; Heart Disease in her mother. SHX:  reports that she has been smoking. She has been smoking about 0.25 packs per day. she has never used smokeless tobacco. She reports that she does not drink alcohol or use drugs. ROS:A comprehensive review of systems was negative except for that written in the HPI.     Objective:     Vital Signs: Telemetry:    normal sinus rhythm Intake/Output:   Visit Vitals  /62 (BP 1 Location: Left arm, BP Patient Position: At rest)   Pulse 73   Temp 98.6 °F (37 °C)   Resp 18   Ht 5' 4.5\" (1.638 m)   Wt 84.5 kg (186 lb 4.6 oz)   SpO2 97%   BMI 31.48 kg/m²       Temp (24hrs), Av.9 °F (36.6 °C), Min:97.5 °F (36.4 °C), Max:98.6 °F (37 °C)        O2 Device: Nasal cannula O2 Flow Rate (L/min): 4 l/min       Wt Readings from Last 4 Encounters:   18 84.5 kg (186 lb 4.6 oz)   18 78.5 kg (173 lb 1 oz)   18 83.5 kg (184 lb)   10/01/17 87.1 kg (192 lb 0.3 oz)          Intake/Output Summary (Last 24 hours) at 2018 1031  Last data filed at 2018 0850  Gross per 24 hour   Intake 675 ml   Output 1000 ml   Net -325 ml       Last shift:       0701 -  1900  In: 25 [P.O.:25]  Out: -   Last 3 shifts: 1901 -  0700  In: 1100 [P.O.:1100]  Out: 1700 [Urine:1700]       Physical Exam:     General: alert  Eyes: anicteric;  HEENT: NC/AT,no nasal flaring or drainage   Neck: No goiter; no stridor;  no accessory muscle use  Lymph nodes: No abnormally enlarged cervical or supraclavicular nodes  Chest: increased AP diameter  Lungs:decreased air movement, no wheezing Heart: Regular rate and rhythm   Abdomen: soft, protuberant, nontender, obese  : No Tamayo; Ext: no cyanosis, no clubbing; no joint swelling or erythema  Skin: warm and dry; no clubbing  Musculoskeletal: no gross deformities  Neuro: speech fluent;  moves all fours, generalized weakness   Psych: no agitation     Data:   Labs:  Recent Labs     12/24/18  0311   WBC 19.1*   HGB 9.9*   HCT 29.2*        Recent Labs     12/26/18  0513 12/25/18  0334 12/24/18  0311    140 141   K 4.5 4.0 3.3*    107 108   CO2 22 23 23   * 132* 60*   BUN 84* 88* 92*   CREA 2.73* 3.01* 3.18*   CA 8.7 8.4* 8.7   PHOS  --  5.3* 5.8*   ALB  --  2.6* 2.6*     No results for input(s): PH, PCO2, PO2, HCO3, FIO2 in the last 72 hours.     Imaging:  I have personally reviewed the patients radiographs:  None today        Abelino Carrington MD

## 2018-12-26 NOTE — PROGRESS NOTES
Hospitalist Progress Note    NAME: Gunner Yin   :  1935   MRN:  [id number]       Assessment / Plan:  Acute on chronic respiratory failure POA with baseline 2-4L O2  Acute COPD exacerbation  Sepsis due to Bilateral HCAP POA   Underlying severe pulmonary hypertension PADP 81   -clinically: very slowly improving , less dyspneic   Slept with bipap   Leukocytosis is likely due to steroids use since improving clinically   very weak/debilitated ( reports that getting to chair is a great effort for her now)    O2: 4-5 L   -cont bipap at night and prn during daytime  -cont aggressive IV steroids ( started to taper, slow tapering), aggressive jet nebs   -Empiric IV AB: vanco - was dc with ALMAZ, no signs of gram + infection  Completed zosyn  ( last dose )   BC NTD  resp cultures: rare S. MALTOPHILIA. DOC - bactrim but cannot use with current cr   She improved since admission on zosyn alone    -repeat cxray in am   -pulmonology following   -keep O2 sats > 92%   -cxray repeated : No pulmonary consolidation. Improved aeration since :  Pt's respiratory status slowly improving. Steroids being tapered and converted to PO. Abx course completed. Still using bipap overnight. SOB on minimal movement.      ALMAZ not POA - improving  -Cr slowly getting better, down to 3.01  baseline normal at 0.8   Likely due to Lasix + possible ATN with  vanco ( trough 21.4 )    -Dr Demetrio Burnett help was greatly appreciated: lasxi use prn ( last dose )     -Daily weight ( Wt is all over the place - ?  Correct measurement)      :  Cr improved - hopeful that this trend will continue and it will revert back to baseline    Acute on chronic diastolic HF EF 00%   HTN / PAfib   H/o bradycardia s/p PPM 2018 per   -HR/BP stable    -Dr Heber Chaidez help last week was greatly appreciated   Diurese only prn due to ALMAZ ( on lasix 20 mg daily at home )   Stopped Multaq started earlier by Dr Joseph Gomez ( may exacerbate her heart failure and/or lung disease)   Stopped amlodipine and changed to diltiazem  daily  PPM program changed early in admission to minimize ventr pacing   -AC: CHADS-Vasc 4 --> need to be on a/coag  Last admission pt declined AC and wanted to be on ASA alone   Dr Stephanie Lozada started on Eliquis ( reduced dose with ALMAZ)   Now off ASA   -ECHO: EF 55%, mild AS, severe pulmonary HTN        DM type II  -BS controlled  120/150s   -cont NPH with steroids ( decr dose when tapering steroids)  + SS   -hba1c 6.6 11/2018      Hypokalemia  -supplement as needed  -follow in am with Mg      Tobacco use   Obesity. Body mass index is 32.04 kg/m².     12/23: attempted to discuss poor prognosis with pt today. I have impression that she is scared of this conversation and doesn't want to hear about it. She keep asking for regular pepci during our conversation         Code status: DNR per h/p on admission   NOK:    Prophylaxis: heparin      Baseline: functional, ; home O2 3.5-4 L   Recommended Disposition: HHC vs SNF pending clinical improvement      Subjective:     Chief Complaint / Reason for Physician Visit  Feels better today. Was jovial and humorous. Discussed with RN events overnight. Review of Systems:  Symptom Y/N Comments  Symptom Y/N Comments   Fever/Chills n   Chest Pain n    Poor Appetite n   Edema     Cough    Abdominal Pain n    Sputum    Joint Pain     SOB/LUDWIG y   Pruritis/Rash     Nausea/vomit    Tolerating PT/OT     Diarrhea    Tolerating Diet     Constipation    Other       Could NOT obtain due to:      Objective:     VITALS:   Last 24hrs VS reviewed since prior progress note.  Most recent are:  Patient Vitals for the past 24 hrs:   Temp Pulse Resp BP SpO2   12/26/18 1049 97.7 °F (36.5 °C) 73 19 153/57 95 %   12/26/18 0829     97 %   12/26/18 0651 98.6 °F (37 °C) 73 18 159/62 97 %   12/26/18 0357     99 %   12/26/18 0259 97.8 °F (36.6 °C) 78 18 149/57 98 %   12/26/18 0226     95 %   12/26/18 0002     98 %   12/25/18 2325 97.9 °F (36.6 °C) 79 18 157/56 94 %   12/25/18 2045     95 %   12/25/18 1950 97.5 °F (36.4 °C) 80 18 161/73 95 %   12/25/18 1502 98.2 °F (36.8 °C) 77 20 167/52 94 %   12/25/18 1409     94 %       Intake/Output Summary (Last 24 hours) at 12/26/2018 1127  Last data filed at 12/26/2018 0850  Gross per 24 hour   Intake 675 ml   Output 1000 ml   Net -325 ml        PHYSICAL EXAM:  General: WD, WN. Alert, cooperative, no acute distress    EENT:  EOMI. Anicteric sclerae. MMM  Resp:  Diminished BS  CV:  Regular  rhythm,  No edema  GI:  Soft, Non distended, Non tender.  +Bowel sounds  Neurologic:  Alert and oriented X 3, normal speech,   Psych:   Good insight. Not anxious nor agitated  Skin:  No rashes. No jaundice    Reviewed most current lab test results and cultures  YES  Reviewed most current radiology test results   YES  Review and summation of old records today    NO  Reviewed patient's current orders and MAR    YES  PMH/SH reviewed - no change compared to H&P  ________________________________________________________________________  Care Plan discussed with:    Comments   Patient x    Family      RN x    Care Manager     Consultant                        Multidiciplinary team rounds were held today with , nursing, pharmacist and clinical coordinator. Patient's plan of care was discussed; medications were reviewed and discharge planning was addressed.      ________________________________________________________________________  Total NON critical care TIME:  25   Minutes    Total CRITICAL CARE TIME Spent:   Minutes non procedure based      Comments   >50% of visit spent in counseling and coordination of care     ________________________________________________________________________  Zeny Donald MD     Procedures: see electronic medical records for all procedures/Xrays and details which were not copied into this note but were reviewed prior to creation of Plan. LABS:  I reviewed today's most current labs and imaging studies.   Pertinent labs include:  Recent Labs     12/24/18 0311   WBC 19.1*   HGB 9.9*   HCT 29.2*        Recent Labs     12/26/18  0513 12/25/18 0334 12/24/18 0311    140 141   K 4.5 4.0 3.3*    107 108   CO2 22 23 23   * 132* 60*   BUN 84* 88* 92*   CREA 2.73* 3.01* 3.18*   CA 8.7 8.4* 8.7   PHOS  --  5.3* 5.8*   ALB  --  2.6* 2.6*       Signed: Josh Quiroz MD

## 2018-12-26 NOTE — PROGRESS NOTES
Bedside shift change report given to Claudio Lopez (oncoming nurse) by Brock Millan (offgoing nurse). Report included the following information SBAR, Kardex, Procedure Summary, Intake/Output, MAR, Recent Results and Cardiac Rhythm NSR.     1400- Pt assisted up to recliner with 1 assist. Family at bedside. 1910- Bedside shift change report given to Judi Downing (oncoming nurse) by Claudio Lopez (offgoing nurse). Report included the following information SBAR, Kardex, ED Summary, Procedure Summary, Intake/Output, MAR, Recent Results and Cardiac Rhythm NSR.

## 2018-12-27 LAB
ANION GAP SERPL CALC-SCNC: 7 MMOL/L (ref 5–15)
BUN SERPL-MCNC: 83 MG/DL (ref 6–20)
BUN/CREAT SERPL: 37 (ref 12–20)
CALCIUM SERPL-MCNC: 8.5 MG/DL (ref 8.5–10.1)
CHLORIDE SERPL-SCNC: 107 MMOL/L (ref 97–108)
CK SERPL-CCNC: 34 U/L (ref 26–192)
CO2 SERPL-SCNC: 24 MMOL/L (ref 21–32)
CREAT SERPL-MCNC: 2.26 MG/DL (ref 0.55–1.02)
GLUCOSE BLD STRIP.AUTO-MCNC: 131 MG/DL (ref 65–100)
GLUCOSE BLD STRIP.AUTO-MCNC: 149 MG/DL (ref 65–100)
GLUCOSE BLD STRIP.AUTO-MCNC: 204 MG/DL (ref 65–100)
GLUCOSE BLD STRIP.AUTO-MCNC: 226 MG/DL (ref 65–100)
GLUCOSE SERPL-MCNC: 134 MG/DL (ref 65–100)
POTASSIUM SERPL-SCNC: 4.6 MMOL/L (ref 3.5–5.1)
SERVICE CMNT-IMP: ABNORMAL
SODIUM SERPL-SCNC: 138 MMOL/L (ref 136–145)

## 2018-12-27 PROCEDURE — 77030038269 HC DRN EXT URIN PURWCK BARD -A

## 2018-12-27 PROCEDURE — 94660 CPAP INITIATION&MGMT: CPT

## 2018-12-27 PROCEDURE — 74011000250 HC RX REV CODE- 250: Performed by: INTERNAL MEDICINE

## 2018-12-27 PROCEDURE — 74011636637 HC RX REV CODE- 636/637: Performed by: INTERNAL MEDICINE

## 2018-12-27 PROCEDURE — 36415 COLL VENOUS BLD VENIPUNCTURE: CPT

## 2018-12-27 PROCEDURE — 74011250637 HC RX REV CODE- 250/637: Performed by: INTERNAL MEDICINE

## 2018-12-27 PROCEDURE — 94640 AIRWAY INHALATION TREATMENT: CPT

## 2018-12-27 PROCEDURE — 74011250637 HC RX REV CODE- 250/637: Performed by: HOSPITALIST

## 2018-12-27 PROCEDURE — 97530 THERAPEUTIC ACTIVITIES: CPT | Performed by: OCCUPATIONAL THERAPIST

## 2018-12-27 PROCEDURE — 65660000000 HC RM CCU STEPDOWN

## 2018-12-27 PROCEDURE — 82550 ASSAY OF CK (CPK): CPT

## 2018-12-27 PROCEDURE — 82962 GLUCOSE BLOOD TEST: CPT

## 2018-12-27 PROCEDURE — 80048 BASIC METABOLIC PNL TOTAL CA: CPT

## 2018-12-27 PROCEDURE — 74011636637 HC RX REV CODE- 636/637: Performed by: HOSPITALIST

## 2018-12-27 PROCEDURE — 74011250636 HC RX REV CODE- 250/636: Performed by: INTERNAL MEDICINE

## 2018-12-27 RX ADMIN — INSULIN LISPRO 3 UNITS: 100 INJECTION, SOLUTION INTRAVENOUS; SUBCUTANEOUS at 11:53

## 2018-12-27 RX ADMIN — ALPRAZOLAM 0.5 MG: 0.5 TABLET ORAL at 00:57

## 2018-12-27 RX ADMIN — IPRATROPIUM BROMIDE AND ALBUTEROL SULFATE 3 ML: .5; 3 SOLUTION RESPIRATORY (INHALATION) at 07:58

## 2018-12-27 RX ADMIN — Medication 10 ML: at 14:00

## 2018-12-27 RX ADMIN — HUMAN INSULIN 15 UNITS: 100 INJECTION, SUSPENSION SUBCUTANEOUS at 21:51

## 2018-12-27 RX ADMIN — METHYLPREDNISOLONE SODIUM SUCCINATE 40 MG: 40 INJECTION, POWDER, FOR SOLUTION INTRAMUSCULAR; INTRAVENOUS at 21:51

## 2018-12-27 RX ADMIN — HUMAN INSULIN 15 UNITS: 100 INJECTION, SUSPENSION SUBCUTANEOUS at 10:06

## 2018-12-27 RX ADMIN — FLUTICASONE FUROATE AND VILANTEROL TRIFENATATE 2 PUFF: 200; 25 POWDER RESPIRATORY (INHALATION) at 10:07

## 2018-12-27 RX ADMIN — Medication 10 ML: at 21:51

## 2018-12-27 RX ADMIN — Medication 10 ML: at 06:01

## 2018-12-27 RX ADMIN — IPRATROPIUM BROMIDE AND ALBUTEROL SULFATE 3 ML: .5; 3 SOLUTION RESPIRATORY (INHALATION) at 19:10

## 2018-12-27 RX ADMIN — APIXABAN 2.5 MG: 2.5 TABLET, FILM COATED ORAL at 10:04

## 2018-12-27 RX ADMIN — DILTIAZEM HYDROCHLORIDE 240 MG: 240 CAPSULE, COATED, EXTENDED RELEASE ORAL at 10:04

## 2018-12-27 RX ADMIN — IPRATROPIUM BROMIDE AND ALBUTEROL SULFATE 3 ML: .5; 3 SOLUTION RESPIRATORY (INHALATION) at 13:00

## 2018-12-27 RX ADMIN — APIXABAN 2.5 MG: 2.5 TABLET, FILM COATED ORAL at 21:51

## 2018-12-27 RX ADMIN — METHYLPREDNISOLONE SODIUM SUCCINATE 40 MG: 40 INJECTION, POWDER, FOR SOLUTION INTRAMUSCULAR; INTRAVENOUS at 10:04

## 2018-12-27 RX ADMIN — ALPRAZOLAM 0.5 MG: 0.5 TABLET ORAL at 23:04

## 2018-12-27 RX ADMIN — INSULIN LISPRO 3 UNITS: 100 INJECTION, SOLUTION INTRAVENOUS; SUBCUTANEOUS at 16:44

## 2018-12-27 NOTE — PROGRESS NOTES
PCU SHIFT NURSING NOTE      Bedside shift change report given to Nam Rader RN (oncoming nurse) by Elba Phillips RN (offgoing nurse). Report included the following information SBAR, Kardex, Intake/Output, MAR, Recent Results and Cardiac Rhythm NSR. Shift Summary:   1925: Pt in recliner c family at bedside. Lungs diminished c fine crackles in lower posterior fields. Pt denies pain. Edema to upper extremities, R>L. Ecchymosis  2230: Pt ready for sleep, requesting BiPAP. Placed on pt.  0050: Pt yelling out, calling for . At bedside, pt asking for removal of BiPAP, stating \"I'm in a panic! \" Placed pt on NC. Encouraged relaxation, deep breathing. Reassurance provided that she is safe and RN will remain in room for a while. SpO2 97%. Pt says \"I get panicked sometimes and the mask makes it worse\" Offered PRN xanax. Pt agrees to try this. 0055: PRN xanxa given. (see MAR, doc flowsheet) Door open to alleviate feelings of claustrophobia. 0110: RRT at bedside to administer neb. Pt declines neb at this time, stating \"I don't want any mask on my face right now or I'll feel panicky again\"   7692: Pt sleeping, rouses easily and agrees to wear BiPAP again. Placed pt back on BiPAP. 0430: Removed BiPAP per pt request. Paged MD to request arterial stick for AM labs. 0445: RRT obtained labs. Placed pt back on BiPAP. 0730: Assisted pt to standing scale x 2. Dyspnea on exertion. LUE weeping serous fluid. Bedside shift change report given to Gustavo Larson RN (oncoming nurse) by Nam Rader RN (offgoing nurse). Report included the following information SBAR, Kardex, Intake/Output, MAR, Recent Results and Cardiac Rhythm paced.       Admission Date 12/17/2018   Admission Diagnosis Acute on chronic respiratory failure with hypoxia and hypercapnia (HCC)   Consults IP CONSULT TO INTENSIVIST  IP CONSULT TO NEPHROLOGY  IP CONSULT TO CARDIOLOGY        Consults   []PT   []OT   []Speech   []Case Management      [] Palliative      Cardiac Monitoring Order   []Yes   []No     IV drips   []Yes    Drip:                            Dose:  Drip:                            Dose:  Drip:                            Dose:   []No     GI Prophylaxis   []Yes   []No         DVT Prophylaxis   SCDs:             David stockings:         [] Medication   []Contraindicated   []None      Activity Level Activity Level: Up with Assistance     Activity Assistance: Partial (one person)   Purposeful Rounding every 1-2 hour? []Yes   Gonzalez Score  Total Score: 3   Bed Alarm (If score 3 or >)   []Yes   [] Refused (See signed refusal form in chart)   Niraj Score  Niraj Score: 17   Niraj Score (if score 14 or less)   []PMT consult   []Wound Care consult      []Specialty bed   [] Nutrition consult          Needs prior to discharge:   Home O2 required:    []Yes   []No    If yes, how much O2 required?     Other:    Last Bowel Movement: Last Bowel Movement Date: 12/25/18      Influenza Vaccine Received Flu Vaccine for Current Season (usually Sept-March): Yes        Pneumonia Vaccine           Diet Active Orders   Diet    DIET DIABETIC CONSISTENT CARB Regular; 2 GM NA (House Low NA)      LDAs               Peripheral IV 12/18/18 Right Forearm (Active)   Site Assessment Clean, dry, & intact 12/26/2018  3:31 PM   Phlebitis Assessment 0 12/26/2018  3:31 PM   Infiltration Assessment 0 12/26/2018  3:31 PM   Dressing Status Clean, dry, & intact 12/26/2018  3:31 PM   Dressing Type Transparent;Tape 12/26/2018  3:31 PM   Hub Color/Line Status Pink;Capped 12/26/2018  3:31 PM   Action Taken Open ports on tubing capped 12/26/2018  4:09 AM   Alcohol Cap Used Yes 12/26/2018  4:09 AM          External Female Catheter 12/20/18 (Active)   Site Assessment Clean, dry, & intact 12/26/2018  3:31 PM   Repositioned Yes 12/26/2018  6:48 AM   Perineal Care Yes 12/26/2018  6:48 AM   Wick Changed Yes 12/26/2018  2:00 PM   Suction Canister/Tubing Changed Yes 12/25/2018  4:27 AM   Urine Output (mL) 300 ml 12/26/2018  6:48 AM                Urinary Catheter      Intake & Output Date 12/25/18 1900 - 12/26/18 0659 12/26/18 0700 - 12/27/18 0659   Shift 8119-4711 24 Hour Total 5278-1230 2736-2483 24 Hour Total   INTAKE   P.O.  1100 540  540     P. O.  1100 540  540   Shift Total(mL/kg)  1100(13) 540(6.4)  540(6.4)   OUTPUT   Urine(mL/kg/hr) 300 1000        Urine Occurrence(s)   1 x  1 x     Urine Output (mL) (External Female Catheter 12/20/18) 300 1000      Stool          Stool Occurrence(s)   1 x  1 x   Shift Total(mL/kg) 300(3.6) 1000(11.8)      NET -300 100 540  540   Weight (kg) 84.5 84.5 84.5 84.5 84.5         Readmission Risk Assessment Tool Score High Risk            22       Total Score        3 Has Seen PCP in Last 6 Months (Yes=3, No=0)    2 . Living with Significant Other. Assisted Living. LTAC. SNF. or   Rehab    3 Patient Length of Stay (>5 days = 3)    4 IP Visits Last 12 Months (1-3=4, 4=9, >4=11)    5 Pt.  Coverage (Medicare=5 , Medicaid, or Self-Pay=4)    5 Charlson Comorbidity Score (Age + Comorbid Conditions)       Expected Length of Stay 4d 19h   Actual Length of Stay 9

## 2018-12-27 NOTE — PROGRESS NOTES
Hospitalist Progress Note    NAME: Loco Campuzano   :  1935   MRN:  [id number]       Assessment / Plan:  Acute on chronic respiratory failure POA with baseline 2-4L O2 - improving slowly  Acute COPD exacerbation - improving slowly  Sepsis due to Bilateral HCAP POA - improving slowly  Underlying severe pulmonary hypertension PADP 81   -clinically: very slowly improving , less dyspneic   Slept with bipap   Leukocytosis is likely due to steroids use since improving clinically   very weak/debilitated ( reports that getting to chair is a great effort for her now)    O2: 4-5 L   -cont bipap at night and prn during daytime  -cont aggressive IV steroids ( started to taper, slow tapering), aggressive jet nebs   -Empiric IV AB: vanco - was dc with ALMAZ, no signs of gram + infection  Completed zosyn  ( last dose )   BC NTD  resp cultures: rare S. MALTOPHILIA. DOC - bactrim but cannot use with current cr   She improved since admission on zosyn alone    -repeat cxray in am   -pulmonology following   -keep O2 sats > 92%   -cxray repeated : No pulmonary consolidation. Improved aeration since :  Pt's respiratory status slowly improving. Steroids being tapered and converted to PO. Abx course completed. Still using bipap overnight. SOB on minimal movement. :  Pt still using bipap overnight. Pulm recommendation it to try and use PRN. Will see how pt tolerates it tonight. Appears less SOB during conversation today. Continue PO steroids and nebulizer treatments.     ALMAZ not POA - improving  -Cr slowly getting better, down to 3.01  baseline normal at 0.8   Likely due to Lasix + possible ATN with  vanco ( trough 21.4 )    -Dr Opal Orantes help was greatly appreciated: lasxi use prn ( last dose )     -Daily weight ( Wt is all over the place - ?  Correct measurement)      :  Cr continues to improve - hopeful that this trend will continue and it will revert back to baseline    Acute on chronic diastolic HF EF 83% - improving  HTN / PAfib - currently in NSR, rate controlled  H/o bradycardia s/p PPM 11/2018 per   -HR/BP stable    -Dr Clau Wei help last week was greatly appreciated   Diurese only prn due to ALMAZ ( on lasix 20 mg daily at home )   Stopped Multaq started earlier by Dr Massiel Schulz [facility] her heart failure and/or lung disease)   Stopped amlodipine and changed to diltiazem  daily  PPM program changed early in admission to minimize ventr pacing   -AC: CHADS-Vasc 4 --> need to be on a/coag  Last admission pt declined AC and wanted to be on ASA alone   Dr Massiel Schulz started on Eliquis ( reduced dose with ALMAZ)   Now off ASA   -ECHO: EF 55%, mild AS, severe pulmonary HTN       12/27:  Continue current rate control meds. Continue Eliquis.     DM type II  -BS controlled  120/150s   -cont NPH with steroids ( decr dose when tapering steroids)  + SS   -hba1c 6.6 11/2018      Tobacco use   Obesity. Body mass index is 32.04 kg/m². Code status: DNR per h/p on admission   NOK:    Prophylaxis: heparin      Baseline: functional, ; home O2 3.5-4 L   Recommended Disposition: [facility] vs SNF pending clinical improvement      Subjective:     Chief Complaint / Reason for Physician Visit  Appears less SOB today. Endorses feeling \"a little better\"  Discussed with RN events overnight. Review of Systems:  Symptom Y/N Comments  Symptom Y/N Comments   Fever/Chills n   Chest Pain n    Poor Appetite n   Edema     Cough    Abdominal Pain n    Sputum    Joint Pain     SOB/LUDWIG y   Pruritis/Rash     Nausea/vomit    Tolerating PT/OT     Diarrhea    Tolerating Diet     Constipation    Other       Could NOT obtain due to:      Objective:     VITALS:   Last 24hrs VS reviewed since prior progress note.  Most recent are:  Patient Vitals for the past 24 hrs:   Temp Pulse Resp BP SpO2   12/27/18 1004  92  172/50    12/27/18 0759     94 %   12/27/18 0348     96 %   12/27/18 0305 98 °F (36.7 °C) 66 16 136/53 91 %   12/26/18 2316     97 %   12/26/18 2230 97.7 °F (36.5 °C) 76 18 171/59 95 %   12/26/18 2104    165/65    12/26/18 1919 98.2 °F (36.8 °C) 81 20 (!) 163/128 96 %   12/26/18 1911     96 %   12/26/18 1531 98.5 °F (36.9 °C) 82 20 157/48 95 %   12/26/18 1314     95 %       Intake/Output Summary (Last 24 hours) at 12/27/2018 1102  Last data filed at 12/27/2018 0601  Gross per 24 hour   Intake 300 ml   Output 650 ml   Net -350 ml        PHYSICAL EXAM:  General: Alert, cooperative, on NC O2 4L   EENT:  EOMI. Anicteric sclerae. MMM  Resp:  Diminished BS  CV:  Regular  rhythm,  No edema  GI:  Soft, Non distended, Non tender.  +Bowel sounds  Neurologic:  Alert and oriented X 3, normal speech,   Psych:   Good insight. Not anxious nor agitated  Skin:  No rashes. No jaundice    Reviewed most current lab test results and cultures  YES  Reviewed most current radiology test results   YES  Review and summation of old records today    NO  Reviewed patient's current orders and MAR    YES  PMH/ reviewed - no change compared to H&P  ________________________________________________________________________  Care Plan discussed with:    Comments   Patient x    Family      RN x    Care Manager     Consultant                        Multidiciplinary team rounds were held today with , nursing, pharmacist and clinical coordinator. Patient's plan of care was discussed; medications were reviewed and discharge planning was addressed.      ________________________________________________________________________  Total NON critical care TIME:  25   Minutes    Total CRITICAL CARE TIME Spent:   Minutes non procedure based      Comments   >50% of visit spent in counseling and coordination of care     ________________________________________________________________________  Shraddha Pereira MD     Procedures: see electronic medical records for all procedures/Xrays and details which were not copied into this note but were reviewed prior to creation of Plan. LABS:  I reviewed today's most current labs and imaging studies. Pertinent labs include:  No results for input(s): WBC, HGB, HCT, PLT, HGBEXT, HCTEXT, PLTEXT, HGBEXT, HCTEXT, PLTEXT in the last 72 hours.   Recent Labs     12/27/18  0443 12/26/18  0513 12/25/18  0334    136 140   K 4.6 4.5 4.0    105 107   CO2 24 22 23   * 139* 132*   BUN 83* 84* 88*   CREA 2.26* 2.73* 3.01*   CA 8.5 8.7 8.4*   PHOS  --   --  5.3*   ALB  --   --  2.6*       Signed: Hoa Douglas MD

## 2018-12-27 NOTE — PROGRESS NOTES
Problem: Falls - Risk of  Goal: *Absence of Falls  Document Marie Fall Risk and appropriate interventions in the flowsheet. Outcome: Progressing Towards Goal  Fall Risk Interventions:  Mobility Interventions: Assess mobility with egress test, Communicate number of staff needed for ambulation/transfer, OT consult for ADLs, Patient to call before getting OOB, PT Consult for mobility concerns         Medication Interventions: Assess postural VS orthostatic hypotension, Evaluate medications/consider consulting pharmacy, Patient to call before getting OOB, Teach patient to arise slowly    Elimination Interventions: Bed/chair exit alarm, Call light in reach, Patient to call for help with toileting needs, Toileting schedule/hourly rounds, Toilet paper/wipes in reach             Problem: Pressure Injury - Risk of  Goal: *Prevention of pressure injury  Document Niraj Scale and appropriate interventions in the flowsheet. Outcome: Progressing Towards Goal  Pressure Injury Interventions:  Sensory Interventions: Assess changes in LOC, Assess need for specialty bed, Check visual cues for pain, Discuss PT/OT consult with provider, Float heels, Keep linens dry and wrinkle-free, Minimize linen layers, Maintain/enhance activity level, Monitor skin under medical devices, Pressure redistribution bed/mattress (bed type), Turn and reposition approx.  every two hours (pillows and wedges if needed)    Moisture Interventions: Absorbent underpads, Apply protective barrier, creams and emollients, Assess need for specialty bed, Check for incontinence Q2 hours and as needed, Internal/External urinary devices, Limit adult briefs, Maintain skin hydration (lotion/cream), Minimize layers    Activity Interventions: Assess need for specialty bed, Increase time out of bed, PT/OT evaluation, Pressure redistribution bed/mattress(bed type)    Mobility Interventions: Assess need for specialty bed, Float heels, HOB 30 degrees or less, Pressure redistribution bed/mattress (bed type), PT/OT evaluation, Turn and reposition approx.  every two hours(pillow and wedges)    Nutrition Interventions: Document food/fluid/supplement intake, Offer support with meals,snacks and hydration    Friction and Shear Interventions: Apply protective barrier, creams and emollients, Foam dressings/transparent film/skin sealants, HOB 30 degrees or less, Lift sheet, Lift team/patient mobility team, Minimize layers, Transfer aides:transfer board/Dodie lift/ceiling lift

## 2018-12-27 NOTE — PROGRESS NOTES
[facility] Consult Service Note  Pulmonary, Critical Care, and Sleep Medicine    Name: Mary Lopez MRN: [id number]   : 1935 Hospital: [facility]   Date: 2018  Admission date: 2018 Hospital Day: 11       Subjective/Interval History:   Still short of breath, requiring BiPAP at night    IMPRESSION:   1. Acute on chronic respiratory failure with Hypoxia POA    2. Acute COPD exacerbation POA   3. Sepsis POA WBC 19.9,   4. Bilateral pneumonia likely healthcare associated POA - Stenotrophomonas in sputum  5. Tobacco use  6. Severe Pulmonary HTN PASP 81   7. Acute Diastolic Heart Failure  8. Pulmonary HTN   9. Diabetes mellitus type 2 POA   10. Essential hypertension POA   11. History of PAF  12. History of bradycardia status post pacemaker placement 2018 per       RECOMMENDATIONS/PLAN:   1. On NC O2; try to use BiPAP only PRN if possible  2. Completed antibiotics  3. Bronchodilators  4. Systemic corticosteroids  5. Glucose monitoring and SSI  6. Cardiology following  7. DVT prophylaxis  8. Smoking cessation counseling done          [x] High complexity decision making was performed  [x] See my orders for details      Subjective/Initial History:     I was asked by Xochitl Cuenca MD to see Mary Lopez  a 82 y.o.  female in consultation for a chief complaint of pneumonia and respiratory failure requiring BIPAP/ NIV    Excerpts from admission 2018 or consult notes as follows:     \"  Recently admitted  to 2018 with respiratory failure and COPD. she initially required BiPAP, was treated for COPD. Had Haemophilus in her sputum but no airspace disease, treated with a 5-day course of Levaquin. Had bradycardia and a pacemaker was placed. Eventually weaned back to her baseline oxygen, discharged home. Baseline she is always short of breath with exertion.  starting yesterday she began to notice increasing shortness of breath when she took a shower and with any minor exertion . no change in her baseline cough. No fevers or chills, sore throat, headache, chest pain. Was getting more short of breath when she laid down and  better when she sat up overnight. This morning, she was acutely short of breath, could not catch her breath even at rest .EMS was called and she was brought to the emergency room     Acute respiratory distress in the emergency room. PO2 53 on her 4 L nasal cannula oxygen. Was not hypercarbic. Placed on BiPAP. Recently admitted mid November 2018 with acute respiratory failure and COPD. Discharged on baseline 4 L . Increasing shortness of breath past 2 days, no increased cough or fevers. Acutely short of breath today, arrived in ED in respiratory distress. ABG on baseline 4 L showed pH 7.44, PCO2 31, PO2 53\"    Pt congested at times. Not active at all. Tachypneic on BIPAP. Daughter at bedside. No ill contacts at home. Upset that pacemaker did not fix her SOB. Tried to explain this to her. Sees my partner Dr. Ines Gonzales. No GI sx. No edema.          Allergies   Allergen Reactions    Keflex [Cephalexin] Itching        MAR reviewed and pertinent medications noted or modified as needed     Current Facility-Administered Medications   Medication    dilTIAZem CD (CARDIZEM CD) capsule 240 mg    methylPREDNISolone (PF) (SOLU-MEDROL) injection 40 mg    insulin NPH (NOVOLIN N, HUMULIN N) injection 15 Units    ALPRAZolam (XANAX) tablet 0.5 mg    albuterol-ipratropium (DUO-NEB) 2.5 MG-0.5 MG/3 ML    apixaban (ELIQUIS) tablet 2.5 mg    nitroglycerin (NITROSTAT) tablet 0.4 mg    sodium chloride (NS) flush 5-10 mL    sodium chloride (NS) flush 5-10 mL    acetaminophen (TYLENOL) tablet 650 mg    naloxone (NARCAN) injection 0.4 mg    bisacodyl (DULCOLAX) suppository 10 mg    insulin lispro (HUMALOG) injection    glucose chewable tablet 16 g    dextrose (D50W) injection syrg 12.5-25 g    glucagon (GLUCAGEN) injection 1 mg    fluticasone-vilanterol (BREO ELLIPTA) 200mcg-25mcg/puff    hydrALAZINE (APRESOLINE) 20 mg/mL injection 20 mg      Patient PCP: Guanaco Freire MD  PMH:  has a past medical history of COPD and Hypertension. PSH:   has a past surgical history that includes hx gyn. FHX: family history includes Cancer in her brother and brother; Heart Disease in her mother. SHX:  reports that she has been smoking. She has been smoking about 0.25 packs per day. she has never used smokeless tobacco. She reports that she does not drink alcohol or use drugs. ROS:A comprehensive review of systems was negative except for that written in the HPI. Objective:     Vital Signs: Telemetry:    normal sinus rhythm Intake/Output:   Visit Vitals  /53 (BP 1 Location: Left arm, BP Patient Position: At rest)   Pulse 66   Temp 98 °F (36.7 °C)   Resp 16   Ht 5' 4.5\" (1.638 m)   Wt 84.1 kg (185 lb 6.5 oz)   SpO2 94%   BMI 31.33 kg/m²       Temp (24hrs), Av °F (36.7 °C), Min:97.7 °F (36.5 °C), Max:98.5 °F (36.9 °C)        O2 Device: Nasal cannula O2 Flow Rate (L/min): 4 l/min       Wt Readings from Last 4 Encounters:   18 84.1 kg (185 lb 6.5 oz)   18 78.5 kg (173 lb 1 oz)   18 83.5 kg (184 lb)   10/01/17 87.1 kg (192 lb 0.3 oz)          Intake/Output Summary (Last 24 hours) at 2018 0808  Last data filed at 2018 0601  Gross per 24 hour   Intake 540 ml   Output 650 ml   Net -110 ml       Last shift:      No intake/output data recorded.   Last 3 shifts:  1901 -  0700  In: 5 [P.O.:540]  Out: 950 [Urine:950]       Physical Exam:     General: alert  Eyes: anicteric;  HEENT: NC/AT,no nasal flaring or drainage   Neck: No goiter; no stridor;  no accessory muscle use  Lymph nodes: No abnormally enlarged cervical or supraclavicular nodes  Chest: increased AP diameter  Lungs:decreased air movement, no wheezing   Heart: Regular rate and rhythm   Abdomen: soft, protuberant, nontender, obese  : No Tamayo; Ext: no cyanosis, no clubbing; no joint swelling or erythema  Skin: warm and dry; no clubbing  Musculoskeletal: no gross deformities  Neuro: speech fluent;  moves all fours, generalized weakness   Psych: no agitation     Data:   Labs:  No results for input(s): WBC, HGB, HCT, PLT, HGBEXT, HCTEXT, PLTEXT, HGBEXT, HCTEXT, PLTEXT in the last 72 hours. Recent Labs     12/27/18  0443 12/26/18  0513 12/25/18  0334    136 140   K 4.6 4.5 4.0    105 107   CO2 24 22 23   * 139* 132*   BUN 83* 84* 88*   CREA 2.26* 2.73* 3.01*   CA 8.5 8.7 8.4*   PHOS  --   --  5.3*   ALB  --   --  2.6*     No results for input(s): PH, PCO2, PO2, HCO3, FIO2 in the last 72 hours.     Imaging:  I have personally reviewed the patients radiographs:  None today        Indu Dai MD

## 2018-12-27 NOTE — PROGRESS NOTES
Problem: Falls - Risk of  Goal: *Absence of Falls  Document Marie Fall Risk and appropriate interventions in the flowsheet.   Fall Risk Interventions:  Mobility Interventions: Bed/chair exit alarm, Patient to call before getting OOB    Mentation Interventions: Bed/chair exit alarm    Medication Interventions: Assess postural VS orthostatic hypotension, Bed/chair exit alarm, Patient to call before getting OOB    Elimination Interventions: Bed/chair exit alarm, Call light in reach

## 2018-12-27 NOTE — PROGRESS NOTES
Problem: Self Care Deficits Care Plan (Adult)  Goal: *Acute Goals and Plan of Care (Insert Text)  Occupational Therapy Goals:  Initiated 12/19/2018, Re-evaluation 12/27/18, continue all goals  1. Patient will perform grooming standing with rest breaks as needed with independence within 7 days. 2. Patient will perform upper body dressing and lower body dressing with modified independence within 7 days. 3. Patient will perform toileting with independence within 7 days. 4. Patient will utilize energy conservation techniques during functional activities with no cues within 7 day(s). 5. Patient will participate in ADL's and functional mobility with 1 rest breaks for 5 minutes within 7 day(s). 6.  Patient will independently identify 3 basic energy conservation techniques that can be incorporated during ADLs within 7 day(s). 7. Patient will transfer from toilet with independence within 7 days. Occupational Therapy TREATMENT: WEEKLY REASSESSMENT  Patient: Bharat Castro (38 y.o. female)  Date: 12/27/2018  Diagnosis: Acute on chronic respiratory failure with hypoxia and hypercapnia (HCC) <principal problem not specified>      Precautions: DNR  Chart, occupational therapy assessment, plan of care, and goals were reviewed. ASSESSMENT:  Patient in bed and scooted to foot of bed, required increased time to reposition in bed. Patient's  and daughter present and reported she was up in chair all day and that she walked to and from bathroom however at this time  reporting she is unable to sit up, needs total assist of 2 to scoot to head of bed. Declines rehab as she said \"they don't want me\" instructed to increase use of bedside commode or ambulate to bathroom during day versus reliance on pur-wic.  Will continue all goals and decrease to 3x's/week  Progression toward goals:  []            Improving appropriately and progressing toward goals  []            Improving slowly and progressing toward goals  []            Not making progress toward goals and plan of care will be adjusted     PLAN:  Goals have been updated based on progression since last assessment. Patient continues to benefit from skilled intervention to address the above impairments. Continue to follow patient 3 times a week to address goals. Planned Interventions:  [x]                    Self Care Training                  [x]             Therapeutic Activities  [x]                    Functional Mobility Training    []             Cognitive Retraining  [x]                    Therapeutic Exercises           [x]             Endurance Activities  [x]                    Balance Training                   []             Neuromuscular Re-Education  []                    Visual/Perceptual Training     [x]        Home Safety Training  [x]                    Patient Education                 [x]             Family Training/Education  []                    Other (comment):  Discharge Recommendations: Home Health and To Be Determined  Further Equipment Recommendations for Discharge:      SUBJECTIVE:   Patient stated I want to stay in this bed!!.    OBJECTIVE DATA SUMMARY:   Cognitive/Behavioral Status:  Neurologic State: Alert  Orientation Level: Oriented X4  Cognition: Follows commands  Perception: Appears intact  Perseveration: Perseverates during conversation  Safety/Judgement: Awareness of environment; Fall prevention; Insight into deficits    Functional Mobility and Transfers for ADLs:  Bed Mobility:  Scooting: Total assistance; Other (comment)( insist assist of 2)    ADL Intervention:     Re-ed on energy conservation, use of bedside commode during day  Versus pur-wic, positioning in bed and affect on breathing, instructed on use of wedge at home to assist with positioning       Toileting  Toileting Assistance: Total assistance(dependent)(relying on pur-wic)    Cognitive Retraining  Safety/Judgement: Awareness of environment; Fall prevention; Insight into deficits    Pain:   no complaint of pain        Activity Tolerance:   Poor   Please refer to the flowsheet for vital signs taken during this treatment.   After treatment:   [] Patient left in no apparent distress sitting up in chair  [x] Patient left in no apparent distress in bed  [x] Call bell left within reach  [x] Nursing notified  [x] Caregiver present  [] Bed alarm activated    COMMUNICATION/COLLABORATION:   The patients plan of care was discussed with: Physical Therapist and Registered Nurse    Zulay Cabello OTR/L  Time Calculation: 18 mins

## 2018-12-27 NOTE — PROGRESS NOTES
Chart reviewed. Attempted to see pt this date for PT intervention however pt adamantly refusing OOB mobility/participation with therapy. Pt supine in bed, stating she had been sitting in bedside chair \"all day\" as well as mobilizing to bathroom with nursing staff. Pt requesting she be allowed to rest this afternoon and therapy follow back tomorrow. Despite max encouragement/education regarding importance of participation with therapy, pt continued to decline, stating \"I'm not doing ANYTHING but staying in this bed. \" will defer however continue to follow.     Eran Carpio, PT, DPT

## 2018-12-27 NOTE — PROGRESS NOTES
Cardiology Progress Note  12/27/2018 9:36 AM  Admit Date: 12/17/2018  Admit Diagnosis/CC: Acute on chronic respiratory failure with hypoxia and hypercapnia (HCC)  Subjective:   Patient reports:  Chest Pain:  [x] none,  consistent with [] non-cardiac  [] atypical  []  anginal chest pain             [x] none now    []  on-going  Dyspnea: [x] none  [] at rest  [] with exertion  [] improved  [] unchanged [] worsening  PND:       [x] none  [] overnight   [] current  Orthopnea: [x] none  [] improved  [] unchanged  [] worsening  Presyncope: [x] none  [] improved  [] unchanged  [] worsening  Ambulated in hallway without symptoms  [] Yes  Ambulated in room without symptoms  [x] Yes  ROS(2+other systems)   Hematuria: [] Yes  [x] No.   Dysuria: [] Yes  [x] No                                           Cough:       [] Yes  [x] No.   Sputum: [] Yes  [x] No                                            Hematochezia: [] Yes  [x] No.   Melena: [] Yes  [x] No                                            No change in family and social history from H&P/Consult note.     Current Facility-Administered Medications   Medication Dose Route Frequency    dilTIAZem CD (CARDIZEM CD) capsule 240 mg  240 mg Oral DAILY    methylPREDNISolone (PF) (SOLU-MEDROL) injection 40 mg  40 mg IntraVENous Q12H    insulin NPH (NOVOLIN N, HUMULIN N) injection 15 Units  15 Units SubCUTAneous Q12H    ALPRAZolam (XANAX) tablet 0.5 mg  0.5 mg Oral Q8H PRN    albuterol-ipratropium (DUO-NEB) 2.5 MG-0.5 MG/3 ML  3 mL Nebulization Q6H RT    apixaban (ELIQUIS) tablet 2.5 mg  2.5 mg Oral BID    nitroglycerin (NITROSTAT) tablet 0.4 mg  0.4 mg SubLINGual Q5MIN PRN    sodium chloride (NS) flush 5-10 mL  5-10 mL IntraVENous Q8H    sodium chloride (NS) flush 5-10 mL  5-10 mL IntraVENous PRN    acetaminophen (TYLENOL) tablet 650 mg  650 mg Oral Q6H PRN    naloxone (NARCAN) injection 0.4 mg  0.4 mg IntraVENous PRN    bisacodyl (DULCOLAX) suppository 10 mg  10 mg Rectal DAILY PRN    insulin lispro (HUMALOG) injection   SubCUTAneous AC&HS    glucose chewable tablet 16 g  4 Tab Oral PRN    dextrose (D50W) injection syrg 12.5-25 g  12.5-25 g IntraVENous PRN    glucagon (GLUCAGEN) injection 1 mg  1 mg IntraMUSCular PRN    fluticasone-vilanterol (BREO ELLIPTA) 200mcg-25mcg/puff  2 Puff Inhalation DAILY    hydrALAZINE (APRESOLINE) 20 mg/mL injection 20 mg  20 mg IntraVENous Q6H PRN       Objective:    Physical Exam:  Last VS:   Visit Vitals  /53 (BP 1 Location: Left arm, BP Patient Position: At rest)   Pulse 66   Temp 98 °F (36.7 °C)   Resp 16   Ht 5' 4.5\" (1.638 m)   Wt 84.1 kg (185 lb 6.5 oz)   SpO2 94%   BMI 31.33 kg/m²    Temp (24hrs), Av °F (36.7 °C), Min:97.7 °F (36.5 °C), Max:98.5 °F (36.9 °C)    24 hr VS reviewed. General Appearance:   [x] well developed, well nourished,  [x] NAD. [] agitated   [] lethargic but arousable  [] obtunded   ENT, Palate:    [x] WNL   [] dry palate/MM        Respiratory:    [x] CTA bilateral  [] rales  [] rhonchi  [] normal resp effort      [] similar to yesterday   [] worse    [] improved   Cardiovascular:   [x] RRR   [] Irregular rate and rhythm   [x] Normal S1, S2   [x] No gallop or rub. [] no murmur   [x] no new murmur  [] murmur c/w:   [x] no edema  RLE: []1+ []2+ [] 3+;  LLE: []1+ []2+ []3+      [] edema similar to yesterday   [] worse    [] improved   [x] normal JVP   [] Elevated JVP    [x] JVP similar to yesterday   [] worse    [] improved   [x] carotid upstroke unchanged   [x] abd aorta not palpated   [x] no stigmata of peripheral emboli   GI:    [x] abd soft, non-distented,bowel sounds present, no                     organomegaly appreciated   Skin:  Neuro:    Cath Site:  [x] warm and dry [] cold extremities   [x] A/O x 3, grossly non-focal      [] intact w/o hematoma or bruit; distal pulse unchanged.               Data Review:   Labs:    Recent Results (from the past 24 hour(s))   GLUCOSE, POC    Collection Time: 12/26/18 11:21 AM   Result Value Ref Range    Glucose (POC) 143 (H) 65 - 100 mg/dL    Performed by Margaret Lyons (PCT)    GLUCOSE, POC    Collection Time: 12/26/18  3:59 PM   Result Value Ref Range    Glucose (POC) 222 (H) 65 - 100 mg/dL    Performed by Julio    GLUCOSE, POC    Collection Time: 12/26/18  9:01 PM   Result Value Ref Range    Glucose (POC) 233 (H) 65 - 100 mg/dL    Performed by Ale Lawson (PCT)    METABOLIC PANEL, BASIC    Collection Time: 12/27/18  4:43 AM   Result Value Ref Range    Sodium 138 136 - 145 mmol/L    Potassium 4.6 3.5 - 5.1 mmol/L    Chloride 107 97 - 108 mmol/L    CO2 24 21 - 32 mmol/L    Anion gap 7 5 - 15 mmol/L    Glucose 134 (H) 65 - 100 mg/dL    BUN 83 (H) 6 - 20 MG/DL    Creatinine 2.26 (H) 0.55 - 1.02 MG/DL    BUN/Creatinine ratio 37 (H) 12 - 20      GFR est AA 25 (L) >60 ml/min/1.73m2    GFR est non-AA 21 (L) >60 ml/min/1.73m2    Calcium 8.5 8.5 - 10.1 MG/DL   CK    Collection Time: 12/27/18  4:43 AM   Result Value Ref Range    CK 34 26 - 192 U/L   GLUCOSE, POC    Collection Time: 12/27/18  7:20 AM   Result Value Ref Range    Glucose (POC) 131 (H) 65 - 100 mg/dL    Performed by KAYLEE QUINTEROS (PCT) CON        Provided Telemetry: [x] sinus  [] chronic afib   [] paroxysmal afib  [] NSVT      Assessment:     Active Problems:    Acute on chronic respiratory failure with hypoxia and hypercapnia (HCC) (12/17/2018)        Plan:     Atrial Fibrillation - Flutter  improved   Continue current meds  She is in rsr. Stable. Chest: clearer CR is improving. For all other plans, see orders.    [x] High complexity decision making was performed

## 2018-12-28 LAB
ANION GAP SERPL CALC-SCNC: 8 MMOL/L (ref 5–15)
BUN SERPL-MCNC: 72 MG/DL (ref 6–20)
BUN/CREAT SERPL: 32 (ref 12–20)
CALCIUM SERPL-MCNC: 8.4 MG/DL (ref 8.5–10.1)
CHLORIDE SERPL-SCNC: 108 MMOL/L (ref 97–108)
CK SERPL-CCNC: 38 U/L (ref 26–192)
CO2 SERPL-SCNC: 24 MMOL/L (ref 21–32)
CREAT SERPL-MCNC: 2.24 MG/DL (ref 0.55–1.02)
GLUCOSE BLD STRIP.AUTO-MCNC: 116 MG/DL (ref 65–100)
GLUCOSE BLD STRIP.AUTO-MCNC: 151 MG/DL (ref 65–100)
GLUCOSE BLD STRIP.AUTO-MCNC: 179 MG/DL (ref 65–100)
GLUCOSE BLD STRIP.AUTO-MCNC: 191 MG/DL (ref 65–100)
GLUCOSE SERPL-MCNC: 161 MG/DL (ref 65–100)
POTASSIUM SERPL-SCNC: 5 MMOL/L (ref 3.5–5.1)
SERVICE CMNT-IMP: ABNORMAL
SODIUM SERPL-SCNC: 140 MMOL/L (ref 136–145)

## 2018-12-28 PROCEDURE — 77010033678 HC OXYGEN DAILY

## 2018-12-28 PROCEDURE — 97110 THERAPEUTIC EXERCISES: CPT

## 2018-12-28 PROCEDURE — 74011250636 HC RX REV CODE- 250/636: Performed by: INTERNAL MEDICINE

## 2018-12-28 PROCEDURE — 82550 ASSAY OF CK (CPK): CPT

## 2018-12-28 PROCEDURE — 65660000000 HC RM CCU STEPDOWN

## 2018-12-28 PROCEDURE — 97116 GAIT TRAINING THERAPY: CPT

## 2018-12-28 PROCEDURE — 74011250637 HC RX REV CODE- 250/637: Performed by: INTERNAL MEDICINE

## 2018-12-28 PROCEDURE — 74011250637 HC RX REV CODE- 250/637: Performed by: HOSPITALIST

## 2018-12-28 PROCEDURE — 94640 AIRWAY INHALATION TREATMENT: CPT

## 2018-12-28 PROCEDURE — 74011000250 HC RX REV CODE- 250: Performed by: INTERNAL MEDICINE

## 2018-12-28 PROCEDURE — 36415 COLL VENOUS BLD VENIPUNCTURE: CPT

## 2018-12-28 PROCEDURE — 74011636637 HC RX REV CODE- 636/637: Performed by: HOSPITALIST

## 2018-12-28 PROCEDURE — 82962 GLUCOSE BLOOD TEST: CPT

## 2018-12-28 PROCEDURE — 74011636637 HC RX REV CODE- 636/637: Performed by: INTERNAL MEDICINE

## 2018-12-28 PROCEDURE — 80048 BASIC METABOLIC PNL TOTAL CA: CPT

## 2018-12-28 RX ORDER — BUMETANIDE 0.25 MG/ML
2 INJECTION INTRAMUSCULAR; INTRAVENOUS 2 TIMES DAILY
Status: DISCONTINUED | OUTPATIENT
Start: 2018-12-28 | End: 2018-12-29

## 2018-12-28 RX ADMIN — METHYLPREDNISOLONE SODIUM SUCCINATE 40 MG: 40 INJECTION, POWDER, FOR SOLUTION INTRAMUSCULAR; INTRAVENOUS at 21:58

## 2018-12-28 RX ADMIN — INSULIN LISPRO 2 UNITS: 100 INJECTION, SOLUTION INTRAVENOUS; SUBCUTANEOUS at 08:48

## 2018-12-28 RX ADMIN — FLUTICASONE FUROATE AND VILANTEROL TRIFENATATE 2 PUFF: 200; 25 POWDER RESPIRATORY (INHALATION) at 08:53

## 2018-12-28 RX ADMIN — IPRATROPIUM BROMIDE AND ALBUTEROL SULFATE 3 ML: .5; 3 SOLUTION RESPIRATORY (INHALATION) at 13:58

## 2018-12-28 RX ADMIN — HUMAN INSULIN 15 UNITS: 100 INJECTION, SUSPENSION SUBCUTANEOUS at 21:58

## 2018-12-28 RX ADMIN — Medication 10 ML: at 21:58

## 2018-12-28 RX ADMIN — IPRATROPIUM BROMIDE AND ALBUTEROL SULFATE 3 ML: .5; 3 SOLUTION RESPIRATORY (INHALATION) at 01:06

## 2018-12-28 RX ADMIN — APIXABAN 2.5 MG: 2.5 TABLET, FILM COATED ORAL at 21:58

## 2018-12-28 RX ADMIN — IPRATROPIUM BROMIDE AND ALBUTEROL SULFATE 3 ML: .5; 3 SOLUTION RESPIRATORY (INHALATION) at 20:59

## 2018-12-28 RX ADMIN — Medication 10 ML: at 13:04

## 2018-12-28 RX ADMIN — ALPRAZOLAM 0.5 MG: 0.5 TABLET ORAL at 10:36

## 2018-12-28 RX ADMIN — APIXABAN 2.5 MG: 2.5 TABLET, FILM COATED ORAL at 08:49

## 2018-12-28 RX ADMIN — IPRATROPIUM BROMIDE AND ALBUTEROL SULFATE 3 ML: .5; 3 SOLUTION RESPIRATORY (INHALATION) at 07:54

## 2018-12-28 RX ADMIN — INSULIN LISPRO 2 UNITS: 100 INJECTION, SOLUTION INTRAVENOUS; SUBCUTANEOUS at 11:30

## 2018-12-28 RX ADMIN — HUMAN INSULIN 15 UNITS: 100 INJECTION, SUSPENSION SUBCUTANEOUS at 08:49

## 2018-12-28 RX ADMIN — DILTIAZEM HYDROCHLORIDE 240 MG: 240 CAPSULE, COATED, EXTENDED RELEASE ORAL at 08:49

## 2018-12-28 RX ADMIN — Medication 10 ML: at 06:46

## 2018-12-28 RX ADMIN — BUMETANIDE 2 MG: 0.25 INJECTION INTRAMUSCULAR; INTRAVENOUS at 18:09

## 2018-12-28 RX ADMIN — METHYLPREDNISOLONE SODIUM SUCCINATE 40 MG: 40 INJECTION, POWDER, FOR SOLUTION INTRAMUSCULAR; INTRAVENOUS at 08:49

## 2018-12-28 NOTE — PROGRESS NOTES
Bedside shift change report given to Rogerio Pugh RN (oncoming nurse) by Arjun Perez RN (offgoing nurse). Report included the following information SBAR, Kardex, Procedure Summary, Intake/Output, MAR, Recent Results and Cardiac Rhythm Paced.

## 2018-12-28 NOTE — ADT AUTH CERT NOTES
Patient Demographics Patient Name Gianni Yang 
[id number] Sex Female  
1935 Address [address] [address] Phone [phone] (Home) [phone] (Mobile) *Preferred*  
CSN:  
[id number] Admit Date: Admit Time Room Bed Dec 17, 2018  3:00 PM 2267 [32191] 01 [[id number]] Attending Providers Provider Pager From To  
Nevaeh Tam DO  18 Sabas Ortega MD  18 Farzaneh Payton MD  18 Andra Sosa MD  18 Biju Felder MD  18 Emergency Contact(s) Name Relation Home Work Mobile Lorri Casanova [phone] Utilization Reviews Respiratory Failure [facility] - Care Day 12 (2018) by Lydia Joshi RN Review Entered Review Status 2018 15:06 Completed Criteria Review Care Day: 12 Care Date: 2018 Level of Care: Step Down Guideline Day 3 Level Of Care ( ) * Activity level acceptable Clinical Status ( ) * Ventilation status appropriate ( ) * Airway status acceptable ( ) * Respiratory status acceptable ( ) * Stable chest findings ( ) * Neurologic status acceptable  
(X) * Temperature status acceptable  
(X) * No infection, or status acceptable ( ) * General Discharge Criteria met Interventions  
(X) * No chest tube, or status acceptable ( ) * Intake acceptable ( ) * No inpatient interventions needed 2018 3:06 PM EST by Lydia Joshi  
Subject: Additional Clinical Information GLUCOSE 161 BUN 72 CREATININE 2.24  
150/66  
97.7F  
70HR  
20RR  
95%4LNC DUONEB X 3  
BREO ELLILPTA IN X 1 SOLUMEDROL 40MG IV X 1 IM :Pt did not use bipap overnight. Pt eager to be discharge but is still SOB. Pt insistent that she go home instead of any other facility. Pt's daughter at bedside today and updated.  Will see how pt does overnight without bipap again and likely for discharge tomorrow * Milestone Respiratory Failure GRG - Care Day 11 (12/27/2018) by Ashley Allison RN Review Entered Review Status 12/27/2018 13:04 Completed Criteria Review Care Day: 11 Care Date: 12/27/2018 Level of Care: Step Down Guideline Day 2 Clinical Status  
(X) * Weaning assessment performed 12/27/2018 1:04 PM EST by Naif Humphrey  
  IM: Sepsis due to Bilateral HCAP POA - improving slowly Underlying severe pulmonary hypertension PADP 81  -clinically:  very  slowly  improving , less dyspneic  Slept with bipap  O2: 4-5 L  -cont bipap at night and prn during daytime Interventions (X) Inpatient interventions continue 12/27/2018 1:04 PM EST by Arthur Dent I/O, BiPAP, 4L NC, daily weight, amb with assist, DNR, cardiology and pulm consult, POC glucose. 12/27/2018 1:04 PM EST by Naif Humphrey Subject: Additional Clinical Information 12/27/18: IM: Appears less SOB today. Endorses feeling \"a little better\". Pt still using bipap overnight. Pulm recommendation it to try and use PRN. Will see how pt tolerates it tonight. Appears less SOB during conversation today. Continue PO steroids and nebulizer treatments. She improved since admission  on zosyn alone    -repeat cxray in am. keep O2 sats >  92%. ALMAZ not POA - improving. Cr  slowly getting better, down to 3.01 baseline normal at 0.8 . Likely due to Lasix + possible ATN with   vanco ( trough 21.4 )    -Dr Hernandez help was greatly appreciated: lasxi  use  prn  ( last dose 12/24). Continue current rate control meds. Continue Eliquis. Pulm: Still short of breath, requiring BiPAP at night. On NC O2, try to use BiPAP only PRN if possible. bronchodilators, systemic corticosterioids, glucose monitoring, smoking cessation counseling done. Cards: Atrial Fibrillation - Flutter   improved   Continue current meds. She is in rsr. Stable. Chest: clearer CR is improving. I: 540, O: 650VS: 98.1, 73, 18, 98%, 157/41, 4L NCMeds: duoneb Q6h, xanax 0.5mg Q8h PRN POx1, eliquis 2.5mg BID PO, cardizem 240mg PO QD, Breo 2 puff QD, SSI, NPH 15 units Q12h SC, solumedrol 40mg Q12h IV, * Milestone

## 2018-12-28 NOTE — PROGRESS NOTES
PCU SHIFT NURSING NOTE      Bedside shift change report given to Todd Hart RN (oncoming nurse) by Anson Bueno RN (offgoing nurse). Report included the following information SBAR, Kardex and Cardiac Rhythm NSR BBB. Shift Summary:   1915: Pt resting in bed, son and spouse in room. Pt verbalizing concern that she will have another panic attack tonight. Reassurance provided. Explained plan of care, will only use BiPAP if absolutely necessary tonight. Lungs clear, diminished. Purewick in place c cloudy yellow urine draining in cannister. Call bell within reach, bed alarm in place. Son expressing concern that pt is making occasional statements that do not make sense. Pt A&O, . Will monitor closely for changes in LOC/orientation. 2300: Pt asking for PRN xanax, verbalizing anxiety and fear that she will not get any sleep. Administered as ordered (see MAR, doc flowsheet)  0000: Pt incontinent of urine not collected by purewick. Pericare completed and bed linens changed. 0100: During rounding, pt is lying in bed c eyes closed, saying \"ok\" every few moments. Pt rouses easily, says she is \"dozing but not really sleeping\" Pt orientation unchanged from prior assessment. 0400: Pt sleeping.  0600: Pt incontinent of urine not collected by purewick. Pericare completed, gown, linens changed. Pt sitting on side of bed.   0700: Bedside shift change report given to [facility] Mabel York (oncoming nurse) by Todd Hart RN (offgoing nurse). Report included the following information SBAR, Kardex, Intake/Output, MAR, Recent Results and Cardiac Rhythm NSR c BBB.       Admission Date 12/17/2018   Admission Diagnosis Acute on chronic respiratory failure with hypoxia and hypercapnia ([facility] [address]   Consults IP CONSULT TO INTENSIVIST  IP CONSULT TO NEPHROLOGY  IP CONSULT TO CARDIOLOGY        Consults   []PT   []OT   []Speech   []Case Management      [] Palliative      Cardiac Monitoring Order   []Yes   []No     IV drips   []Yes    Drip: Dose:  Drip:                            Dose:  Drip:                            Dose:   []No     GI Prophylaxis   []Yes   []No         DVT Prophylaxis   SCDs:             David stockings:         [] Medication   []Contraindicated   []None      Activity Level Activity Level: Up with Assistance     Activity Assistance: Partial (one person)   Purposeful Rounding every 1-2 hour? []Yes   Gonzalez Score  Total Score: 4   Bed Alarm (If score 3 or >)   []Yes   [] Refused (See signed refusal form in chart)   Niraj Score  Niraj Score: 17   Niraj Score (if score 14 or less)   []PMT consult   []Wound Care consult      []Specialty bed   [] Nutrition consult          Needs prior to discharge:   Home O2 required:    []Yes   []No    If yes, how much O2 required?     Other:    Last Bowel Movement: Last Bowel Movement Date: 12/27/18      Influenza Vaccine Received Flu Vaccine for Current Season (usually Sept-March): Yes        Pneumonia Vaccine           Diet Active Orders   Diet    DIET DIABETIC CONSISTENT CARB Regular; 2 GM NA (House Low NA)      LDAs               Peripheral IV 12/18/18 Right Forearm (Active)   Site Assessment Clean, dry, & intact 12/27/2018  3:59 PM   Phlebitis Assessment 0 12/27/2018  3:59 PM   Infiltration Assessment 0 12/27/2018  3:59 PM   Dressing Status Clean, dry, & intact 12/27/2018  3:59 PM   Dressing Type Transparent;Tape 12/27/2018  3:59 PM   Hub Color/Line Status Pink;Flushed 12/27/2018  3:59 PM   Action Taken Open ports on tubing capped 12/26/2018  4:09 AM   Alcohol Cap Used Yes 12/26/2018  4:09 AM          External Female Catheter 12/20/18 (Active)   Site Assessment Clean, dry, & intact 12/27/2018  6:01 AM   Repositioned Yes 12/27/2018  6:01 AM   Perineal Care Yes 12/27/2018  6:01 AM   Wick Changed Yes 12/27/2018  6:01 AM   Suction Canister/Tubing Changed Yes 12/27/2018  6:01 AM   Urine Output (mL) 300 ml 12/27/2018  7:14 PM                Urinary Catheter      Intake & Output Date 12/26/18 1900 - 12/27/18 0659 12/27/18 0700 - 12/28/18 0659   Shift 9509-1461 24 Hour Total 6262-8441 7167-3153 24 Hour Total   INTAKE   P.O.  540        P. O.  540      Shift Total(mL/kg)  540(6.4)      OUTPUT   Urine(mL/kg/hr) 650 650  300 300     Urine Occurrence(s)  1 x        Urine Output (mL) (External Female Catheter 12/20/18) 650 650  300 300   Stool          Stool Occurrence(s)  1 x      Shift Total(mL/kg) 650(7.7) 650(7.7)  300(3.6) 300(3.6)   NET -650 -110  -300 -300   Weight (kg) 84.5 84.5 84.1 84.1 84.1         Readmission Risk Assessment Tool Score High Risk            22       Total Score        3 Has Seen PCP in Last 6 Months (Yes=3, No=0)    2 . Living with Significant Other. Assisted Living. LTAC. SNF. or   Rehab    3 Patient Length of Stay (>5 days = 3)    4 IP Visits Last 12 Months (1-3=4, 4=9, >4=11)    5 Pt.  Coverage (Medicare=5 , Medicaid, or Self-Pay=4)    5 Charlson Comorbidity Score (Age + Comorbid Conditions)       Expected Length of Stay 4d 19h   Actual Length of Stay 10

## 2018-12-28 NOTE — PROGRESS NOTES
Problem: Mobility Impaired (Adult and Pediatric) Goal: *Acute Goals and Plan of Care (Insert Text) Physical Therapy Goals Revised 12/28/2018 1. Patient will move from supine to sit and sit to supine , scoot up and down and roll side to side in bed with supervision/set-up within 7 day(s). 2.  Patient will transfer from bed to chair and chair to bed with supervision/set-up using the least restrictive device within 7 day(s). 3.  Patient will perform sit to stand with supervision/set-up within 7 day(s). 4.  Patient will ambulate with contact guard assist for 25 feet with the least restrictive device within 7 day(s). 5.  Patient will ascend/descend 4 stairs with 1 handrail(s) with minimal assistance/contact guard assist within 7 day(s). Initiated 12/19/2018 1. Patient will move from supine to sit and sit to supine , scoot up and down and roll side to side in bed with independence within 7 day(s). 2.  Patient will transfer from bed to chair and chair to bed with independence using the least restrictive device within 7 day(s). 3.  Patient will perform sit to stand with independence within 7 day(s). 4.  Patient will ambulate with independence for 100 feet with the least restrictive device within 7 day(s). 5.  Patient will ascend/descend 4 stairs with one sided handrail(s) with modified independence within 7 day(s). physical Therapy TREATMENT: WEEKLY REASSESSMENT Patient: Silvia Serrano (13 y.o. female) Date: 12/28/2018 Diagnosis: Acute on chronic respiratory failure with hypoxia and hypercapnia (HCC) <principal problem not specified> Precautions: DNR Chart, physical therapy assessment, plan of care and goals were reviewed. ASSESSMENT: 
Pt received on bedside commode, agreeable to participation with therapy with max encouragement.  Secondary to consistent refusal of participation with therapy, pt has experienced a decline in functional status, demonstrating increased weakness, impaired balance, and decrease in activity tolerance since last tx session. Pt required minAx2 throughout all aspects of mobility including sit<>stand transfers and ambulation trial of 15ft w/ HHAx2. Despite encouragement/education regarding use of rolling walker, pt refused assistive device. Gait unsteady overall with pt exhibiting increased trunk flexion, shuffled gait pattern, and significantly decreased gait speed. O2 sats decreased to 88% post activity on 4 LPM O2 however recovered with seated rest. At this time, recommend continued x1 person assist with use of rolling walker during all OOB mobility/ambulation as pt is a falls risk. Highly recommend  PT w/ 24hr supervision/assist of family however pt refusing. Patient's progression toward goals since last assessment: Pt has not progressed towards goals and has experienced decline in functional status, strength, and activity tolerance. Goals reviewed and adjusted appropriately. PLAN: 
Goals have been updated based on progression since last assessment. Patient continues to benefit from skilled intervention to address the above impairments. Continue to follow the patient 3 times a week to address goals. Planned Interventions: 
[x]              Bed Mobility Training             []       Neuromuscular Re-Education [x]              Transfer Training                   []       Orthotic/Prosthetic Training 
[x]              Gait Training                         []       Modalities [x]              Therapeutic Exercises           []       Edema Management/Control [x]              Therapeutic Activities            [x]       Patient and Family Training/Education [x]              Other (comment): stair climbing Discharge Recommendations:  PT w/ 24hr supervision/assist of family Further Equipment Recommendations for Discharge: TBD - may need RW however daughter states that  may have extra RW, will check into it SUBJECTIVE:  
Patient stated I'm supposed to be the patient, you do what I say!!. OBJECTIVE DATA SUMMARY:  
Critical Behavior: 
Neurologic State: Alert, Other (Comment)(peridoically confused) Orientation Level: Oriented to person, Oriented to place, Oriented to situation Cognition: Other (comment)(nonsensical sentances, talks to self, answers ? ?s approp.) Safety/Judgement: Awareness of environment, Fall prevention, Insight into deficits Strength:  
  
  
  
  
  
  
  
 
Functional Mobility Training: 
Bed Mobility: 
  
  
  
  
  
  
Transfers: 
Sit to Stand: Minimum assistance;Assist x2 Stand to Sit: Minimum assistance;Assist x2 Bed to Chair: Minimum assistance;Assist x2 Balance: 
Sitting: Intact Standing: Impaired Standing - Static: Fair Standing - Dynamic : FairAmbulation/Gait Training: 
Distance (ft): 15 Feet (ft) Assistive Device: Gait belt(HHAx2) Ambulation - Level of Assistance: Minimal assistance;Assist x2 Gait Abnormalities: Decreased step clearance;Shuffling gait;Trunk sway increased Base of Support: Widened Speed/Kimberlyn: Pace decreased (<100 feet/min); Slow Step Length: Left shortened;Right shortened Therapeutic Exercises:  
2x 10 LAQ 2x 10 seated marches Pain: 
Pain Scale 1: Numeric (0 - 10) Pain Intensity 1: 0 Activity Tolerance:  
O2 sats decreased to 88% on 4 LPM O2 post activity, recovered with seated rest 
Please refer to the flowsheet for vital signs taken during this treatment. After treatment:  
[x]  Patient left in no apparent distress sitting up in chair 
[]  Patient left in no apparent distress in bed 
[x]  Call bell left within reach [x]  Nursing notified 
[x]  Caregiver present [x]  Bed alarm activated COMMUNICATION/COLLABORATION:  
The patients plan of care was discussed with: Registered Nurse Joce Mensah, PT, DPT 
 Time Calculation: 31 mins

## 2018-12-28 NOTE — PROGRESS NOTES
[facility] Pulmonary Consult Service NotePulmonary, Critical Care, and Sleep Medicine Name: Loco Campuzano MRN: [id number] : 1935 Hospital: [facility] Date: 2018  Admission date: 2018 Hospital Day: 15 Subjective/Interval History:  
Did not require BiPAP last night, though still short of breath. IMPRESSION:  
1. Acute on chronic respiratory failure with Hypoxia POA 2. Acute COPD exacerbation POA 3. Sepsis POA WBC 19.9,  
4. Bilateral pneumonia likely healthcare associated POA - Stenotrophomonas in sputum 5. Tobacco use 6. Severe Pulmonary HTN PASP 81  
7. Acute Diastolic Heart Failure 8. Pulmonary HTN 9. Diabetes mellitus type 2 POA 10. Essential hypertension POA 11. History of PAF 12. History of bradycardia status post pacemaker placement 2018 per  RECOMMENDATIONS/PLAN:  
1. On NC O2; use BiPAP only PRN if possible 2. Completed antibiotics 3. Bronchodilators 4. Systemic corticosteroids 5. Glucose monitoring and SSI 6. Cardiology following 7. DVT prophylaxis 8. Smoking cessation counseling done 9. Hopefully to rehab soon Subjective/Initial History:  
 
I was asked by Jad Moseley MD to see Loco Campuzano  a 82 y.o.  female in consultation for a chief complaint of pneumonia and respiratory failure requiring BIPAP/ NIV Excerpts from admission 2018 or consult notes as follows:  
 
\"  Recently admitted  to 2018 with respiratory failure and COPD. she initially required BiPAP, was treated for COPD. Had Haemophilus in her sputum but no airspace disease, treated with a 5-day course of Levaquin. Had bradycardia and a pacemaker was placed. Eventually weaned back to her baseline oxygen, discharged home. Baseline she is always short of breath with exertion.  starting yesterday she began to notice increasing shortness of breath when she took a shower and with any minor exertion . no change in her baseline cough. No fevers or chills, sore throat, headache, chest pain. Was getting more short of breath when she laid down and  better when she sat up overnight. This morning, she was acutely short of breath, could not catch her breath even at rest .EMS was called and she was brought to the emergency room Acute respiratory distress in the emergency room. PO2 53 on her 4 L nasal cannula oxygen. Was not hypercarbic. Placed on BiPAP. Recently admitted mid November 2018 with acute respiratory failure and COPD. Discharged on baseline 4 L . Increasing shortness of breath past 2 days, no increased cough or fevers. Acutely short of breath today, arrived in ED in respiratory distress. ABG on baseline 4 L showed pH 7.44, PCO2 31, PO2 53\" Pt congested at times. Not active at all. Tachypneic on BIPAP. Daughter at bedside. No ill contacts at home. Upset that pacemaker did not fix her SOB. Tried to explain this to her. Sees my partner Dr. Yesica Mcneil. No GI sx. No edema. Allergies Allergen Reactions  Keflex [Cephalexin] Itching MAR reviewed and pertinent medications noted or modified as needed Current Facility-Administered Medications Medication  dilTIAZem CD (CARDIZEM CD) capsule 240 mg  
 methylPREDNISolone (PF) (SOLU-MEDROL) injection 40 mg  
 insulin NPH (NOVOLIN N, HUMULIN N) injection 15 Units  ALPRAZolam (XANAX) tablet 0.5 mg  
 albuterol-ipratropium (DUO-NEB) 2.5 MG-0.5 MG/3 ML  
 apixaban (ELIQUIS) tablet 2.5 mg  
 nitroglycerin (NITROSTAT) tablet 0.4 mg  
 sodium chloride (NS) flush 5-10 mL  sodium chloride (NS) flush 5-10 mL  acetaminophen (TYLENOL) tablet 650 mg  
 naloxone (NARCAN) injection 0.4 mg  
 bisacodyl (DULCOLAX) suppository 10 mg  
 insulin lispro (HUMALOG) injection  glucose chewable tablet 16 g  
 dextrose (D50W) injection syrg 12.5-25 g  glucagon (GLUCAGEN) injection 1 mg  fluticasone-vilanterol (BREO ELLIPTA) 200mcg-25mcg/puff  hydrALAZINE (APRESOLINE) 20 mg/mL injection 20 mg Patient PCP: Suanne Sicard, MD 
PMH:  has a past medical history of COPD and Hypertension. PSH:   has a past surgical history that includes hx gyn. FHX: family history includes Cancer in her brother and brother; Heart Disease in her mother. SHX:  reports that she has been smoking. She has been smoking about 0.25 packs per day. she has never used smokeless tobacco. She reports that she does not drink alcohol or use drugs. ROS:A comprehensive review of systems was negative except for that written in the HPI. Objective: 
 
Vital Signs: Telemetry:    normal sinus rhythmIntake/Output:  
Visit Vitals /62 (BP 1 Location: Left arm, BP Patient Position: At rest) Pulse 81 Temp 97.5 °F (36.4 °C) Resp 20 Ht 5' 4.5\" (1.638 m) Wt 85.6 kg (188 lb 11.4 oz) SpO2 95% BMI 31.89 kg/m² Temp (24hrs), Av °F (36.7 °C), Min:97.5 °F (36.4 °C), Max:98.4 °F (36.9 °C) O2 Device: Nasal cannula O2 Flow Rate (L/min): 4 l/min Wt Readings from Last 4 Encounters:  
18 85.6 kg (188 lb 11.4 oz)  
18 78.5 kg (173 lb 1 oz) 18 83.5 kg (184 lb) 10/01/17 87.1 kg (192 lb 0.3 oz) Intake/Output Summary (Last 24 hours) at 2018 0805 Last data filed at 2018 0330 Gross per 24 hour Intake  Output 725 ml Net -725 ml Last shift:      No intake/output data recorded. Last 3 shifts:  1901 -  0700 In: -  
Out: [address] [IEAHD:[id number]] Physical Exam:  
 
General: alert Eyes: anicteric; HEENT: NC/AT,no nasal flaring or drainage Neck: No goiter; no stridor;  no accessory muscle use Lymph nodes: No abnormally enlarged cervical or supraclavicular nodes Chest: increased AP diameter Lungs:decreased air movement, no wheezing Heart: Regular rate and rhythm Abdomen: soft, protuberant, nontender, obese : No Tamayo; Ext: no cyanosis, no clubbing; no joint swelling or erythema Skin: warm and dry; no clubbing Musculoskeletal: no gross deformities Neuro: speech fluent;  moves all fours, generalized weakness Psych: no agitation Data:  
Labs: 
No results for input(s): WBC, HGB, HCT, PLT, HGBEXT, HCTEXT, PLTEXT, HGBEXT, HCTEXT, PLTEXT in the last 72 hours. Recent Labs  
  12/28/18 
0330 12/27/18 
0443 12/26/18 
9227  138 136  
K 5.0 4.6 4.5  
 107 105 CO2 24 24 22 * 134* 139* BUN 72* 83* 84* CREA 2.24* 2.26* 2.73* CA 8.4* 8.5 8.7 No results for input(s): PH, PCO2, PO2, HCO3, FIO2 in the last 72 hours. Imaging: 
I have personally reviewed the patients radiographs: 
None today Elizabeth Chung MD

## 2018-12-28 NOTE — PROGRESS NOTES
Hospitalist Progress Note NAME: Mikey Patino :  1935 MRN:  [id number] Assessment / Plan: 
Acute on chronic respiratory failure POA with baseline 2-4L O2 - improving slowly Acute COPD exacerbation - improving slowly Sepsis due to Bilateral HCAP POA - improving slowly Underlying severe pulmonary hypertension PADP 81  
-clinically: very slowly improving , less dyspneic Slept with bipap Leukocytosis is likely due to steroids use since improving clinically  
very weak/debilitated ( reports that getting to chair is a great effort for her now) O2: 4-5 L  
-cont bipap at night and prn during daytime 
-cont aggressive IV steroids ( started to taper, slow tapering), aggressive jet nebs  
-Empiric IV AB: vanco - was dc with ALMAZ, no signs of gram + infection Completed zosyn  ( last dose ) BC NTD 
resp cultures: rare S. MALTOPHILIA. DOC - bactrim but cannot use with current cr She improved since admission on zosyn alone   
-repeat cxray in am  
-pulmonology following  
-keep O2 sats > 92%  
-cxray repeated : No pulmonary consolidation. Improved aeration since : 
Pt's respiratory status slowly improving. Steroids being tapered and converted to PO. Abx course completed. Still using bipap overnight. SOB on minimal movement. : 
Pt still using bipap overnight. Pulm recommendation it to try and use PRN. Will see how pt tolerates it tonight. Appears less SOB during conversation today. Continue PO steroids and nebulizer treatments. : 
Pt did not use bipap overnight. Pt eager to be discharge but is still SOB. Pt insistent that she go home instead of any other facility. Pt's daughter at bedside today and updated. Will see how pt does overnight without bipap again and likely for discharge tomorrow. 
  
ALMAZ not POA - improving 
-Cr slowly getting better, down to 3.01 
baseline normal at 0.8 Likely due to Lasix + possible ATN with  vanco ( trough 21.4 ) -Dr Chris Bender help was greatly appreciated: lasxi use prn ( last dose 12/24) -Daily weight ( Wt is all over the place - ? Correct measurement)  
  
12/28: 
Cr continues to improve - hopeful that this trend will continue and it will revert back to baseline Acute on chronic diastolic HF EF 06% - improving HTN / PAfib - currently in NSR, rate controlled H/o bradycardia s/p PPM 11/2018 per  
-HR/BP stable   
-Dr Danii Joel help last week was greatly appreciated Diurese only prn due to ALMAZ ( on lasix 20 mg daily at home ) Stopped Multaq started earlier by Dr Inga Simpson [facility] her heart failure and/or lung disease) Stopped amlodipine and changed to diltiazem  daily PPM program changed early in admission to minimize ventr pacing  
-AC: CHADS-Vasc 4 --> need to be on a/coag Last admission pt declined AC and wanted to be on ASA alone Dr Inga Simpson started on Eliquis ( reduced dose with ALMAZ) Now off ASA  
-ECHO: EF 55%, mild AS, severe pulmonary HTN    
 
12/27: 
Continue current rate control meds. Continue Eliquis. 
  
DM type II 
-BS controlled  120/150s  
-cont NPH with steroids ( decr dose when tapering steroids)  + SS  
-hba1c 6.6 11/2018  
  
Tobacco use Obesity. Body mass index is 32.04 kg/m². Code status: DNR per h/p on admission NOK:  Prophylaxis: heparin  
  
Baseline: functional, ; home O2 3.5-4 L  
Recommended Disposition: HHC vs SNF pending clinical improvement Subjective: Chief Complaint / Reason for Physician Visit Did not use bipap overnight, complaining of SOB. Discussed with RN events overnight. Review of Systems: 
Symptom Y/N Comments  Symptom Y/N Comments Fever/Chills n   Chest Pain n   
Poor Appetite n   Edema Cough    Abdominal Pain n   
Sputum    Joint Pain SOB/LUDWIG y   Pruritis/Rash Nausea/vomit    Tolerating PT/OT Diarrhea    Tolerating Diet Constipation    Other Could NOT obtain due to: Objective: VITALS:  
Last 24hrs VS reviewed since prior progress note. Most recent are: 
Patient Vitals for the past 24 hrs: 
 Temp Pulse Resp BP SpO2  
12/28/18 1103 97.7 °F (36.5 °C) 70 20 150/66 99 % 12/28/18 0754     95 % 12/28/18 0731 98.4 °F (36.9 °C) 78 23 150/65 98 % 12/28/18 0330 97.5 °F (36.4 °C) 81 20 164/62 93 % 12/28/18 0106     96 % 12/27/18 2303 98.4 °F (36.9 °C) 77 20 168/63 93 % 12/27/18 1914 98.1 °F (36.7 °C) 81 18 167/58 97 % 12/27/18 1910     96 % 12/27/18 1502 97.7 °F (36.5 °C) 79 20 161/50 95 % 12/27/18 1300     93 % Intake/Output Summary (Last 24 hours) at 12/28/2018 1134 Last data filed at 12/28/2018 0330 Gross per 24 hour Intake  Output 725 ml Net -725 ml PHYSICAL EXAM: 
General: Alert, cooperative, on NC O2 4L EENT:  EOMI. Anicteric sclerae. MMM Resp:  Diminished BS 
CV:  Regular  rhythm,  No edema GI:  Soft, Non distended, Non tender.  +Bowel sounds Neurologic:  Alert and oriented X 3, normal speech, Psych:   Good insight. Not anxious nor agitated Skin:  No rashes. No jaundice Reviewed most current lab test results and cultures  YES Reviewed most current radiology test results   YES Review and summation of old records today    NO Reviewed patient's current orders and MAR    YES 
PMH/ reviewed - no change compared to H&P 
________________________________________________________________________ Care Plan discussed with: 
  Comments Patient x Family RN x Care Manager Consultant Multidiciplinary team rounds were held today with , nursing, pharmacist and clinical coordinator. Patient's plan of care was discussed; medications were reviewed and discharge planning was addressed. ________________________________________________________________________ Total NON critical care TIME:  25   Minutes Total CRITICAL CARE TIME Spent:   Minutes non procedure based Comments >50% of visit spent in counseling and coordination of care    
________________________________________________________________________ Denise Lim MD  
 
Procedures: see electronic medical records for all procedures/Xrays and details which were not copied into this note but were reviewed prior to creation of Plan. LABS: 
I reviewed today's most current labs and imaging studies. Pertinent labs include: No results for input(s): WBC, HGB, HCT, PLT, HGBEXT, HCTEXT, PLTEXT, HGBEXT, HCTEXT, PLTEXT in the last 72 hours. Recent Labs  
  12/28/18 
0330 12/27/18 
0443 12/26/18 
2303  138 136  
K 5.0 4.6 4.5  
 107 105 CO2 24 24 22 * 134* 139* BUN 72* 83* 84* CREA 2.24* 2.26* 2.73* CA 8.4* 8.5 8.7 Signed: Denise Lim MD

## 2018-12-28 NOTE — PROGRESS NOTES
[email or web address]                  Phone - [phone] Renal Progress Note Subjective:  
Patient: Jad Dumont                    YOB: 1935 Date- 12/28/2018 Reason for visit: ARF Admission Date: 12/17/2018 Assessment:  
  
ALMAZ likely either diuretic induced and/or ATN 
 -her edema needs imporvement - restart Bumex IV and follow labs 
  
Acute on chronic respiratory failure with hypoxia and hypercapnia Diastolic heart failure on admission. 
  
Severe COPD with an exacerbation. Bilateral pneumonia.  
  
Severe pulmonary hypertension.    
  
PAF --A-fib the other night with a RVR. S/p pacemaker placement in 11/18. 
  
Diabetes Mellitus, type II. Hypertension. Obesity. 
  
  
  
Plan:  
  
 As Above Interim History: On 12/20: The patient has severe COPD and pulmonary hypertension. She was admitted with a COPD exacerbation and bilateral pneumonia. She also has PAF and developed A-fib the other night with a RVR. In addition, she was felt to have diastolic heart failure and so was placed on Lasix. She has not been on an ACE-I or ARB. Her Bun/creat was 12/0.8 on admission, 12/17. It was 17/1.3 on 12/18, 42/12.5 on 12/19, and is 57/3.1 today. The patient says she was extremely SOB on admission but is essentially back to her baseline \"usual miserable\" SOB now. She denies chest pain or any other complaints. She was on Lasix, but this was d/c'd today and an IV of NS at 50 cc/hr ordered. She has been on Vancomycin with a trough of 21. Vanc was d/c'd today. 
  
  
On 12/21:  The patient says she her breathing is at baseline. She is relatively comfortable if not moving. On 12/22:  Pt still feels very SOB, \"about the same\" as yesterday. Pt denies any complaints except for the SOB. NS d/c'd yesterday and Lasix 40 mg IV given. Input not complete, but UO 1450 cc. On 12/24:  Pt was feeling worse yesterday but better today.   SOB is her only complaint. I& O's are incomplete. Last Lasix dose on ? On : pt up in a chair and looks sig more comfortable today. Says her SOB is improved. No other complaints. Last Lasix dose . I&O's incomplete ROS as above, plus: no fever/chills today. . No HEEENT complaints. No chest pain. no abd pain. No N/V/D. No joint pain. No skin rashes, no urinary complaints.   
  
 
Review of Systems A comprehensive review of systems was negative except for that written in the HPI. Objective:  
 
Visit Vitals /66 (BP 1 Location: Left arm, BP Patient Position: Sitting) Pulse 70 Temp 97.7 °F (36.5 °C) Resp 20 Ht 5' 4.5\" (1.638 m) Wt 85.6 kg (188 lb 11.4 oz) SpO2 99% BMI 31.89 kg/m² Temp (24hrs), Av °F (36.7 °C), Min:97.5 °F (36.4 °C), Max:98.4 °F (36.9 °C) No intake/output data recorded.  1901 -  0700 In: -  
Out: [address] [[facility]:5198] Physical Exam: 
General:  alert, cooperative, up in a chair and breathing more comfortably Eye:  conjunctivae/corneas clear. EOM's intact. Neurologic:  grossly non-focal; alert and appropriate; normal speech Neck:  supple; no JVD Lungs:  diminished breath sounds but no rales or rhonchi; no accessory muscle use Heart:  regular rate and rhythm; no gallop or rub Skin:  no rash or lesions but multiple ecchymoses. Pt denies trauma but is on blood thinners. Abdomen:  soft, non-tender. No masses,  no organomegaly Extremities: no edema Data Review:  
 
Recent Labs  
  18 
0330 18 
0443 18 
1196  138 136  
K 5.0 4.6 4.5  
 107 105 CO2 24 24 22 BUN 72* 83* 84* CREA 2.24* 2.26* 2.73* CA 8.4* 8.5 8.7 Assessment:  
 
Active Problems: 
  Acute on chronic respiratory failure with hypoxia and hypercapnia ([facility] [address] (2018) Chart reviewed. Pertinent Notes Reviewed. Medications list Personally Reviewed. Chilo Keys MD 
 
[facility] [address] [place], [address] Phone - [phone]    Fax - [phone] W[employer]. [employer] [facility] [address] [facility], [facility] Phone - [phone]  Fax - [phone] [employer]

## 2018-12-28 NOTE — PROGRESS NOTES
Nutrition Assessment: 
 
INTERVENTIONS/RECOMMENDATIONS:  
Meals/Snacks: General/healthful diet: Continue current diet ASSESSMENT:  
Patient medically noted for acute on chronic respiratory failure, pneumonia, and COPD. Patient sitting on side of bed at time of visit. She reports a fair appetite and eating well. Getting plenty to eat. Likes soup and fruit. Using menu as desired and family is bringing in food items. Declined offer of supplements. Encouraged intake of meals. Diet Order: Consistent carb(2g Na) 
% Eaten:   
Patient Vitals for the past 72 hrs: 
 % Diet Eaten 12/26/18 1230 50 % 12/26/18 0850 25 % 12/25/18 1838 25 % 12/25/18 1230 25 % Pertinent Medications: [x] Reviewed []Other: Humalog, NPH, Solumedrol Pertinent Labs: [x]Reviewed  []Other: K+ 5.0, BUN 72, Cr 2.24, [phone] Food Allergies: [x]None []Other:   Last BM: 12/27  []Active     []Hyperactive  []Hypoactive       [] Absent  BS Skin:    [x] Intact   [] Incision  [] Breakdown   [x]Edema   []Other: Anthropometrics: Height: 5' 4.5\" (163.8 cm) Weight: 85.6 kg (188 lb 11.4 oz) IBW (%IBW):   ( ) UBW (%UBW):   (  %) BMI: Body mass index is 31.89 kg/m². This BMI is indicative of: 
[]Underweight   []Normal   []Overweight   [x] Obesity   [] Extreme Obesity (BMI>40) Last Weight Metrics: 
Weight Loss Metrics 12/28/2018 11/11/2018 1/8/2018 10/1/2017 10/13/2013 4/5/2013 11/17/2011 Today's Wt 188 lb 11.4 oz 173 lb 1 oz 184 lb 192 lb 0.3 oz 184 lb 169 lb 14.4 oz 186 lb 4.6 oz BMI 31.89 kg/m2 29.71 kg/m2 32.59 kg/m2 35.12 kg/m2 33.65 kg/m2 29.15 kg/m2 31.96 kg/m2 Estimated Nutrition Needs (Based on): 8280 Kcals/day(BMR (1304) x 1. 3AF) , 85 g(1.0 g/kg bw) Protein Carbohydrate: At Least 130 g/day  Fluids: 1700 mL/day Pt expected to meet estimated nutrient needs: [x]Yes []No 
 
NUTRITION DIAGNOSES:  
Problem:  Altered nutrition-related lab values Etiology: related to current medical condition Signs/Symptoms: as evidenced by BUN 72, cr 2.24 NUTRITION INTERVENTIONS: 
Meals/Snacks: General/healthful diet GOAL:  
PO intake >/=50% of meals next 5-7 days NUTRITION MONITORING AND EVALUATION Food/Nutrient Intake Outcomes: Total energy intake Physical Signs/Symptoms Outcomes: Weight/weight change, Glucose profile, Electrolyte and renal profile Previous Goal Met: 
 [] Met              [x] Progressing Towards Goal              [] Not Progressing Towards Goal  
Previous Recommendations: 
 [x] Implemented          [] Not Implemented          [] Not Applicable LEARNING NEEDS (Diet, Food/Nutrient-Drug Interaction):  
 [x] None Identified 
 [] Identified and Education Provided/Documented 
 [] Identified and Pt declined/was not appropriate Cultural, Moravian, OR Ethnic Dietary Needs:  
 [x] None Identified 
 [] Identified and Addressed 
 
 [x] Interdisciplinary Care Plan Reviewed/Documented  
 [x] Discharge Planning: Heart healthy/consistnet carb diet  
 [] Participated in Interdisciplinary Rounds NUTRITION RISK:  
 [] High              [] Moderate           [x]  Low  []  Minimal/Uncompromised Rich Cierra Pager [phone] Weekend Pager [phone]

## 2018-12-28 NOTE — PROGRESS NOTES
Problem: Falls - Risk of 
Goal: *Absence of Falls Document Cornelious Booty Fall Risk and appropriate interventions in the flowsheet. Outcome: Progressing Towards Goal 
Fall Risk Interventions: 
Mobility Interventions: Bed/chair exit alarm, Communicate number of staff needed for ambulation/transfer, Patient to call before getting OOB Mentation Interventions: Bed/chair exit alarm, Evaluate medications/consider consulting pharmacy Medication Interventions: Bed/chair exit alarm Elimination Interventions: Bed/chair exit alarm, Call light in reach, Patient to call for help with toileting needs Problem: Pressure Injury - Risk of 
Goal: *Prevention of pressure injury Document Niraj Scale and appropriate interventions in the flowsheet. Outcome: Progressing Towards Goal 
Pressure Injury Interventions: 
Sensory Interventions: Assess changes in LOC, Discuss PT/OT consult with provider, Keep linens dry and wrinkle-free, Minimize linen layers Moisture Interventions: Absorbent underpads, Check for incontinence Q2 hours and as needed, Maintain skin hydration (lotion/cream) Activity Interventions: Increase time out of bed, PT/OT evaluation Mobility Interventions: Assess need for specialty bed, PT/OT evaluation Nutrition Interventions: Document food/fluid/supplement intake Friction and Shear Interventions: Minimize layers

## 2018-12-28 NOTE — PROGRESS NOTES
ADULT PROTOCOL: JET AEROSOL  REASSESSMENT Patient  Ankit Lee     82 y.o.   female     12/28/2018  11:52 AM 
 
Breath Sounds Pre Procedure: Right Breath Sounds: Diminished Left Breath Sounds: Diminished Breath Sounds Post Procedure: Right Breath Sounds: Diminished Left Breath Sounds: Diminished Breathing pattern: Pre procedure Breathing Pattern: Regular Post procedure Breathing Pattern: Regular Heart Rate: Pre procedure Pulse: 76 Post procedure Pulse: 77 Resp Rate: Pre procedure Respirations: 18 Post procedure respirations 18 Cough: Pre procedure Cough: Non-productive Post procedure Cough: Non-productive Oxygen: O2 Device: Nasal cannula   4L Changed: no 
 
SpO2: Pre procedure SpO2: 96 % Post procedure SpO2: 97 % Nebulizer Therapy: Current medications Aerosolized Medications: DuoNeb Smoking History: current smoker Problem List:  
Patient Active Problem List  
Diagnosis Code  Acute on chronic respiratory failure ([facility]) J96.20  
 COPD (chronic obstructive pulmonary disease) with acute bronchitis ([facility]) J44.0, J20.9  Lung nodule R91.1  Abnormal ECG R94.31  
 Hyperglycemia R73.9  Tobacco abuse Z72.0  
 Pulmonary hypertension, moderate to severe I27.20  Sepsis ([address] A41.9  
 COPD exacerbation ([address] J44.1  Respiratory failure ([facility]) J96.90  
 Second degree heart block I44.1  S/P placement of cardiac pacemaker Z95.0  Acute on chronic respiratory failure with hypoxia and hypercapnia ([facility]) J96.21, J96.22 Respiratory Therapist: Kem Acevedo RT

## 2018-12-29 LAB
ANION GAP SERPL CALC-SCNC: 8 MMOL/L (ref 5–15)
BUN SERPL-MCNC: 66 MG/DL (ref 6–20)
BUN/CREAT SERPL: 29 (ref 12–20)
CALCIUM SERPL-MCNC: 8.7 MG/DL (ref 8.5–10.1)
CHLORIDE SERPL-SCNC: 105 MMOL/L (ref 97–108)
CK SERPL-CCNC: 24 U/L (ref 26–192)
CO2 SERPL-SCNC: 26 MMOL/L (ref 21–32)
CREAT SERPL-MCNC: 2.27 MG/DL (ref 0.55–1.02)
GLUCOSE BLD STRIP.AUTO-MCNC: 133 MG/DL (ref 65–100)
GLUCOSE BLD STRIP.AUTO-MCNC: 147 MG/DL (ref 65–100)
GLUCOSE BLD STRIP.AUTO-MCNC: 182 MG/DL (ref 65–100)
GLUCOSE BLD STRIP.AUTO-MCNC: 236 MG/DL (ref 65–100)
GLUCOSE SERPL-MCNC: 123 MG/DL (ref 65–100)
POTASSIUM SERPL-SCNC: 4.8 MMOL/L (ref 3.5–5.1)
SERVICE CMNT-IMP: ABNORMAL
SODIUM SERPL-SCNC: 139 MMOL/L (ref 136–145)

## 2018-12-29 PROCEDURE — 74011000250 HC RX REV CODE- 250: Performed by: INTERNAL MEDICINE

## 2018-12-29 PROCEDURE — 36600 WITHDRAWAL OF ARTERIAL BLOOD: CPT

## 2018-12-29 PROCEDURE — 94640 AIRWAY INHALATION TREATMENT: CPT

## 2018-12-29 PROCEDURE — 74011636637 HC RX REV CODE- 636/637: Performed by: HOSPITALIST

## 2018-12-29 PROCEDURE — 82550 ASSAY OF CK (CPK): CPT

## 2018-12-29 PROCEDURE — 74011636637 HC RX REV CODE- 636/637: Performed by: INTERNAL MEDICINE

## 2018-12-29 PROCEDURE — 82962 GLUCOSE BLOOD TEST: CPT

## 2018-12-29 PROCEDURE — 74011250637 HC RX REV CODE- 250/637: Performed by: HOSPITALIST

## 2018-12-29 PROCEDURE — 80048 BASIC METABOLIC PNL TOTAL CA: CPT

## 2018-12-29 PROCEDURE — 65660000000 HC RM CCU STEPDOWN

## 2018-12-29 PROCEDURE — 77030038269 HC DRN EXT URIN PURWCK BARD -A

## 2018-12-29 PROCEDURE — 74011250637 HC RX REV CODE- 250/637: Performed by: INTERNAL MEDICINE

## 2018-12-29 PROCEDURE — 36415 COLL VENOUS BLD VENIPUNCTURE: CPT

## 2018-12-29 PROCEDURE — 74011250636 HC RX REV CODE- 250/636: Performed by: INTERNAL MEDICINE

## 2018-12-29 PROCEDURE — 94660 CPAP INITIATION&MGMT: CPT

## 2018-12-29 RX ORDER — BUMETANIDE 0.25 MG/ML
2 INJECTION INTRAMUSCULAR; INTRAVENOUS EVERY 8 HOURS
Status: DISCONTINUED | OUTPATIENT
Start: 2018-12-29 | End: 2018-12-31

## 2018-12-29 RX ADMIN — BUMETANIDE 2 MG: 0.25 INJECTION INTRAMUSCULAR; INTRAVENOUS at 09:10

## 2018-12-29 RX ADMIN — IPRATROPIUM BROMIDE AND ALBUTEROL SULFATE 3 ML: .5; 3 SOLUTION RESPIRATORY (INHALATION) at 07:26

## 2018-12-29 RX ADMIN — IPRATROPIUM BROMIDE AND ALBUTEROL SULFATE 3 ML: .5; 3 SOLUTION RESPIRATORY (INHALATION) at 14:01

## 2018-12-29 RX ADMIN — Medication 10 ML: at 05:45

## 2018-12-29 RX ADMIN — INSULIN LISPRO 2 UNITS: 100 INJECTION, SOLUTION INTRAVENOUS; SUBCUTANEOUS at 22:00

## 2018-12-29 RX ADMIN — Medication 10 ML: at 22:11

## 2018-12-29 RX ADMIN — HUMAN INSULIN 15 UNITS: 100 INJECTION, SUSPENSION SUBCUTANEOUS at 09:05

## 2018-12-29 RX ADMIN — IPRATROPIUM BROMIDE AND ALBUTEROL SULFATE 3 ML: .5; 3 SOLUTION RESPIRATORY (INHALATION) at 03:21

## 2018-12-29 RX ADMIN — DILTIAZEM HYDROCHLORIDE 240 MG: 240 CAPSULE, COATED, EXTENDED RELEASE ORAL at 09:05

## 2018-12-29 RX ADMIN — ALPRAZOLAM 0.5 MG: 0.5 TABLET ORAL at 20:33

## 2018-12-29 RX ADMIN — Medication 10 ML: at 16:26

## 2018-12-29 RX ADMIN — METHYLPREDNISOLONE SODIUM SUCCINATE 40 MG: 40 INJECTION, POWDER, FOR SOLUTION INTRAMUSCULAR; INTRAVENOUS at 20:34

## 2018-12-29 RX ADMIN — BUMETANIDE 2 MG: 0.25 INJECTION INTRAMUSCULAR; INTRAVENOUS at 18:16

## 2018-12-29 RX ADMIN — INSULIN LISPRO 2 UNITS: 100 INJECTION, SOLUTION INTRAVENOUS; SUBCUTANEOUS at 12:40

## 2018-12-29 RX ADMIN — APIXABAN 2.5 MG: 2.5 TABLET, FILM COATED ORAL at 20:33

## 2018-12-29 RX ADMIN — HUMAN INSULIN 15 UNITS: 100 INJECTION, SUSPENSION SUBCUTANEOUS at 21:00

## 2018-12-29 RX ADMIN — FLUTICASONE FUROATE AND VILANTEROL TRIFENATATE 2 PUFF: 200; 25 POWDER RESPIRATORY (INHALATION) at 09:06

## 2018-12-29 RX ADMIN — IPRATROPIUM BROMIDE AND ALBUTEROL SULFATE 3 ML: .5; 3 SOLUTION RESPIRATORY (INHALATION) at 19:50

## 2018-12-29 RX ADMIN — APIXABAN 2.5 MG: 2.5 TABLET, FILM COATED ORAL at 09:05

## 2018-12-29 RX ADMIN — METHYLPREDNISOLONE SODIUM SUCCINATE 40 MG: 40 INJECTION, POWDER, FOR SOLUTION INTRAMUSCULAR; INTRAVENOUS at 09:04

## 2018-12-29 NOTE — PROGRESS NOTES
[email or web address]                  Phone - [phone] Renal Progress Note Subjective:  
Patient: Jo Rankin                    YOB: 1935 Date- 12/29/2018 Reason for visit: ARF Admission Date: 12/17/2018 Assessment:  
  
ALMAZ likely either diuretic induced and/or ATN 
 -she has lots of edema and needs further diuresis 
 -increase Bumex IV 
  
Acute on chronic respiratory failure with hypoxia and hypercapnia Diastolic heart failure on admission. 
  
Severe COPD with an exacerbation. Bilateral pneumonia.  
  
Severe pulmonary hypertension.    
  
PAF --A-fib the other night with a RVR. S/p pacemaker placement in 11/18. 
  
Diabetes Mellitus, type II. Hypertension. Obesity. 
  
  
  
Plan:  
  
 As Above Interim History: On 12/20: The patient has severe COPD and pulmonary hypertension. She was admitted with a COPD exacerbation and bilateral pneumonia. She also has PAF and developed A-fib the other night with a RVR. In addition, she was felt to have diastolic heart failure and so was placed on Lasix. She has not been on an ACE-I or ARB. Her Bun/creat was 12/0.8 on admission, 12/17. It was 17/1.3 on 12/18, 42/12.5 on 12/19, and is 57/3.1 today. The patient says she was extremely SOB on admission but is essentially back to her baseline \"usual miserable\" SOB now. She denies chest pain or any other complaints. She was on Lasix, but this was d/c'd today and an IV of NS at 50 cc/hr ordered. She has been on Vancomycin with a trough of 21. Vanc was d/c'd today. 
  
  
On 12/21:  The patient says she her breathing is at baseline. She is relatively comfortable if not moving. On 12/22:  Pt still feels very SOB, \"about the same\" as yesterday. Pt denies any complaints except for the SOB. NS d/c'd yesterday and Lasix 40 mg IV given. Input not complete, but UO 1450 cc. On :  Pt was feeling worse yesterday but better today. SOB is her only complaint. I& O's are incomplete. Last Lasix dose on ? On : pt up in a chair and looks sig more comfortable today. Says her SOB is improved. No other complaints. Last Lasix dose . I&O's incomplete ROS as above, plus: no fever/chills today. . No HEEENT complaints. No chest pain. no abd pain. No N/V/D. No joint pain. No skin rashes, no urinary complaints.   
  
 
Review of Systems A comprehensive review of systems was negative except for that written in the HPI. Objective:  
 
Visit Vitals /60 (BP 1 Location: Right arm, BP Patient Position: At rest) Pulse 86 Temp 98 °F (36.7 °C) Resp 18 Ht 5' 4.5\" (1.638 m) Wt 84.9 kg (187 lb 2.7 oz) SpO2 (!) 89% BMI 31.63 kg/m² Temp (24hrs), Av.7 °F (36.5 °C), Min:97.4 °F (36.3 °C), Max:98 °F (36.7 °C) No intake/output data recorded.  1901 -  0700 In: 300 [P.O.:300] Out: 2950 [Urine:2950] Physical Exam: 
General:  alert, cooperative, up in a chair and breathing more comfortably Eye:  conjunctivae/corneas clear. EOM's intact. Neurologic:  grossly non-focal; alert and appropriate; normal speech Neck:  supple; no JVD Lungs:  diminished breath sounds but no rales or rhonchi; no accessory muscle use Heart:  regular rate and rhythm; no gallop or rub Skin:  no rash or lesions but multiple ecchymoses. Pt denies trauma but is on blood thinners. Abdomen:  soft, non-tender. No masses,  no organomegaly Extremities: no edema Data Review:  
 
Recent Labs  
  18 
0502 18 
0330 18 
0447  140 138  
K 4.8 5.0 4.6  108 107 CO2  BUN 66* 72* 83* CREA 2.27* 2.24* 2.26* CA 8.7 8.4* 8.5 Assessment:  
 
Active Problems: 
  Acute on chronic respiratory failure with hypoxia and hypercapnia ([facility] [address] (2018) Chart reviewed. Pertinent Notes Reviewed. Medications list Personally Reviewed. Jett Hager MD 
 
[facility] [address] [place], [address] Phone - [phone]    Fax - [phone] W[address]. [employer] [facility] [address] [facility], [address] [facility] [address] Phone - [phone]  Fax - [phone] [employer]

## 2018-12-29 NOTE — PROGRESS NOTES
Problem: Falls - Risk of 
Goal: *Absence of Falls Document Candace End Fall Risk and appropriate interventions in the flowsheet. Outcome: Not Progressing Towards Goal 
Fall Risk Interventions: 
Mobility Interventions: Bed/chair exit alarm, Patient to call before getting OOB Mentation Interventions: Adequate sleep, hydration, pain control, Bed/chair exit alarm, Evaluate medications/consider consulting pharmacy Medication Interventions: Bed/chair exit alarm Elimination Interventions: Bed/chair exit alarm, Call light in reach, Patient to call for help with toileting needs Problem: Pressure Injury - Risk of 
Goal: *Prevention of pressure injury Document Niraj Scale and appropriate interventions in the flowsheet. Outcome: Progressing Towards Goal 
Pressure Injury Interventions: 
Sensory Interventions: Assess changes in LOC, Assess need for specialty bed, Discuss PT/OT consult with provider, Keep linens dry and wrinkle-free, Maintain/enhance activity level, Minimize linen layers, Turn and reposition approx. every two hours (pillows and wedges if needed) Moisture Interventions: Apply protective barrier, creams and emollients, Check for incontinence Q2 hours and as needed, Internal/External urinary devices, Minimize layers Activity Interventions: Increase time out of bed, PT/OT evaluation Mobility Interventions: Assess need for specialty bed, PT/OT evaluation, Turn and reposition approx. every two hours(pillow and wedges) Nutrition Interventions: Document food/fluid/supplement intake Friction and Shear Interventions: Apply protective barrier, creams and emollients, Lift sheet, Transferring/repositioning devices

## 2018-12-29 NOTE — PROGRESS NOTES
Problem: Falls - Risk of 
Goal: *Absence of Falls Document [place] Rank Fall Risk and appropriate interventions in the flowsheet. Outcome: Progressing Towards Goal 
Fall Risk Interventions: 
Mobility Interventions: Bed/chair exit alarm, OT consult for ADLs, PT Consult for mobility concerns, Patient to call before getting OOB, PT Consult for assist device competence, Strengthening exercises (ROM-active/passive) Mentation Interventions: Bed/chair exit alarm, Door open when patient unattended, Increase mobility, More frequent rounding, Toileting rounds, Update white board Medication Interventions: Bed/chair exit alarm, Patient to call before getting OOB, Teach patient to arise slowly Elimination Interventions: Bed/chair exit alarm, Call light in reach, Patient to call for help with toileting needs, Toileting schedule/hourly rounds Problem: Pressure Injury - Risk of 
Goal: *Prevention of pressure injury Document Niraj Scale and appropriate interventions in the flowsheet. Outcome: Progressing Towards Goal 
Pressure Injury Interventions: 
Sensory Interventions: Assess changes in LOC, Discuss PT/OT consult with provider, Keep linens dry and wrinkle-free, Maintain/enhance activity level, Minimize linen layers, Pressure redistribution bed/mattress (bed type) Moisture Interventions: Apply protective barrier, creams and emollients, Absorbent underpads, Check for incontinence Q2 hours and as needed, Internal/External urinary devices, Maintain skin hydration (lotion/cream), Offer toileting Q_hr Activity Interventions: Increase time out of bed, Pressure redistribution bed/mattress(bed type), PT/OT evaluation Mobility Interventions: HOB 30 degrees or less, Pressure redistribution bed/mattress (bed type), PT/OT evaluation Nutrition Interventions: Document food/fluid/supplement intake Friction and Shear Interventions: HOB 30 degrees or less, Lift sheet, Foam dressings/transparent film/skin sealants

## 2018-12-29 NOTE — PROGRESS NOTES
PCU SHIFT NURSING NOTE Bedside shift change report given to Leilani Le RN (oncoming nurse) by Michael Hughes RN (offgoing nurse). Report included the following information SBAR, Kardex, Intake/Output, MAR, Recent Results and Cardiac Rhythm NSR BBB. Shift Summary:  
1905: Spouse at bedside, pt resting in bed. When asked how pt is feeling, pt shrugs and expresses frustration at being up in chair throughout day. Education provided that increase in activity is necessary and beneficial. Needs reinforcement. 1945: Pt calling out, \"Help. Oh me. Oh help me\" Spouse remains at bedside, states \"When she gets like this, I can't do anything for her\". SpO2 94%. Pt breathing through pursed lips, no accessory muscle use, RR 22. When asked what is wrong, pt does not respond. After several attempts to illicit response to questions, pt becomes frustrated and says \"Stop bothering me. \" Pt answers orientation questions appropriately; however, pt seems to be experiencing some slight changes in mental status AEB frequent mumbling to herself, new irritability, and delayed responses to questions. Will hold PRN xanax. 2345: Observed bursts of nonsustained sinus tach on telemetry, HR increases to 120-140s, then returns to HR 80-90, c occasional PVCs. /68. SpO2 89% on 4L. Pt continues to mumble to herself, but when asked if she needs help, pt states \"I'm ok\" 0010: RRT at bedside placing pt on BiPAP. 0020: HR 81.  
0600: Pt has slept well on BiPAP; HR has remained in 70s sinus rhythm. SpO2 has been >90%. 0630: Placed pt on NC. SpO2 89%. Increased to 5L. SpO2 increases to 92% 0720: Bedside shift change report given to Michael Hughes RN (oncoming nurse) by Leilani Le RN (offgoing nurse). Report included the following information SBAR, Kardex, Intake/Output, MAR, Recent Results and Cardiac Rhythm NSR BBB. Admission Date 12/17/2018 Admission Diagnosis Acute on chronic respiratory failure with hypoxia and hypercapnia (HCC) Consults IP CONSULT TO INTENSIVIST 
IP CONSULT TO NEPHROLOGY 
IP CONSULT TO CARDIOLOGY Consults [x]PT [x]OT []Speech  
[]Case Management  
  
[] Palliative Cardiac Monitoring Order  
[x]Yes []No  
 
IV drips []Yes Drip:                            Dose: 
Drip:                            Dose: 
Drip:                            Dose:  
[x]No  
 
GI Prophylaxis []Yes  
[x]No  
 
 
 
DVT Prophylaxis SCDs:     
     
 David stockings:     
  
[x] Medication []Contraindicated []None Activity Level Activity Level: Up with Assistance Activity Assistance: Partial (one person) Purposeful Rounding every 1-2 hour? [x]Yes Gonzalez Score  Total Score: 4 Bed Alarm (If score 3 or >) [x]Yes  
[] Refused (See signed refusal form in chart) Niraj Score  Niraj Score: 17 Niraj Score (if score 14 or less) []PMT consult  
[]Wound Care consult []Specialty bed  
[] Nutrition consult Needs prior to discharge:  
Home O2 required:   
[x]Yes []No  
 If yes, how much O2 required? 4L Other:  
 Last Bowel Movement: Last Bowel Movement Date: 12/27/18 Influenza Vaccine Received Flu Vaccine for Current Season (usually Sept-March): Yes Pneumonia Vaccine Diet Active Orders Diet DIET DIABETIC CONSISTENT CARB Regular; 2 GM NA (House Low NA) LDAs Peripheral IV 12/18/18 Right Forearm (Active) Site Assessment Clean, dry, & intact 12/28/2018  2:24 PM  
Phlebitis Assessment 0 12/28/2018  2:24 PM  
Infiltration Assessment 0 12/28/2018  2:24 PM  
Dressing Status Clean, dry, & intact 12/28/2018  2:24 PM  
Dressing Type Tape;Transparent 12/28/2018  2:24 PM  
Hub Color/Line Status Pink;Capped;Flushed 12/28/2018  2:24 PM  
Action Taken Open ports on tubing capped 12/28/2018  2:24 PM  
Alcohol Cap Used Yes 12/28/2018  2:24 PM  
      
External Female Catheter 12/20/18 (Active) Site Assessment Clean, dry, & intact 12/28/2018  6:58 PM  
 Repositioned Yes 12/28/2018  6:58 PM  
Perineal Care Yes 12/28/2018  6:58 PM  
Wick Changed Yes 12/28/2018  6:58 PM  
Suction Canister/Tubing Changed Yes 12/28/2018  6:58 PM  
Urine Output (mL) 225 ml 12/28/2018  3:30 AM  
            
Urinary Catheter Intake & Output Date 12/27/18 1900 - 12/28/18 0625 12/28/18 0700 - 12/29/18 3534 Shift 4210-9825 24 Hour Total 9143-5133 9137-8420 24 Hour Total  
INTAKE Shift Total(mL/kg) OUTPUT Urine(mL/kg/hr) 725 725 Urine Occurrence(s) 2 x 2 x 1 x  1 x Urine Output (mL) (External Female Catheter 12/20/18) 725 725 Shift Total(mL/kg) 725(8.5) 725(8.5) NET -725 -725 Weight (kg) 85.6 85.6 85.6 85.6 85.6 Readmission Risk Assessment Tool Score High Risk   
      
 22 Total Score 3 Has Seen PCP in Last 6 Months (Yes=3, No=0) 2 . Living with Significant Other. Assisted Living. LTAC. SNF. or  
Rehab  
 3 Patient Length of Stay (>5 days = 3) 4 IP Visits Last 12 Months (1-3=4, 4=9, >4=11) 5 Pt. Coverage (Medicare=5 , Medicaid, or Self-Pay=4) 5 Charlson Comorbidity Score (Age + Comorbid Conditions) Expected Length of Stay 4d 19h Actual Length of Stay 11

## 2018-12-29 NOTE — PROGRESS NOTES
Hospitalist Progress Note NAME: Lary Peterson :  1935 MRN:  [id number] Assessment / Plan: 
Acute on chronic respiratory failure POA with baseline 2-4L O2 - worsened Acute COPD exacerbation - worsened Sepsis due to Bilateral HCAP POA - improved Underlying severe pulmonary hypertension PADP 81  
-clinically: very slowly improving , less dyspneic Slept with bipap Leukocytosis is likely due to steroids use since improving clinically  
very weak/debilitated ( reports that getting to chair is a great effort for her now) O2: 4-5 L  
-cont bipap at night and prn during daytime 
-cont aggressive IV steroids ( started to taper, slow tapering), aggressive jet nebs  
-Empiric IV AB: vanco - was dc with ALMAZ, no signs of gram + infection Completed zosyn  ( last dose ) BC NTD 
resp cultures: rare S. MALTOPHILIA. DOC - bactrim but cannot use with current cr She improved since admission on zosyn alone   
-repeat cxray in am  
-pulmonology following  
-keep O2 sats > 92%  
-cxray repeated : No pulmonary consolidation. Improved aeration since : 
Pt's respiratory status slowly improving. Steroids being tapered and converted to PO. Abx course completed. Still using bipap overnight. SOB on minimal movement. : 
Pt still using bipap overnight. Pulm recommendation it to try and use PRN. Will see how pt tolerates it tonight. Appears less SOB during conversation today. Continue PO steroids and nebulizer treatments. : 
Pt did not use bipap overnight. Pt eager to be discharge but is still SOB. Pt insistent that she go home instead of any other facility. Pt's daughter at bedside today and updated. Will see how pt does overnight without bipap again and likely for discharge tomorrow. : 
Pt respiratory status worsened overnight, requiring the use of bipap. This is likely linked to her increased edema.  Spoke with pt's son at bedside and updated on the plan. Pt no longer ready for discharge. Will diurese with IV Bumex. 12/30: 
Pt's respiratory status declined over the past 12 hours. Has needed bipap to keep her O2 sats up - continue bipap today. Pt also more lethargic and drowsy - concerned for possible CO2 narcosis - will get ABG. Will also order portable CXR. Addendum: 
No CO2 narcosis, CXR relatively stable. Will continue diuresis, nebs and increase IV steroids 
  
ALMAZ not POA - improving 
-Cr slowly getting better, down to 3.01 
baseline normal at 0.8 Likely due to Lasix + possible ATN with  vanco ( trough 21.4 )   
-Dr Jaren Cosby help was greatly appreciated: lasxi use prn ( last dose 12/24) -Daily weight ( Wt is all over the place - ? Correct measurement)  
  
12/28: 
Cr continues to improve - hopeful that this trend will continue and it will revert back to baseline 12/29: 
Fluid overload Cr has improved; however pt with significant edema now - appreciate Nephrology eval - agree with IV diuresis 12/30: 
Continue with aggressive IV diuresis. 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  
-HR/BP stable   
-Dr Gely Bermudez help last week was greatly appreciated Diurese only prn due to ALMAZ ( on lasix 20 mg daily at home ) Stopped Multaq started earlier by Dr Sonja Cid [facility] her heart failure and/or lung disease) Stopped amlodipine and changed to diltiazem  daily PPM program changed early in admission to minimize ventr pacing  
-AC: CHADS-Vasc 4 --> need to be on a/coag Last admission pt declined AC and wanted to be on ASA alone Dr Sonja Cid started on Eliquis ( reduced dose with ALMAZ) Now off ASA  
-ECHO: EF 55%, mild AS, severe pulmonary HTN    
  
DM type II 
-BS controlled  120/150s  
-cont NPH with steroids ( decr dose when tapering steroids)  + SS  
-hba1c 6.6 11/2018  
  
Tobacco use Obesity. Body mass index is 32.04 kg/m². Code status: DNR per h/p on admission NOK:  Prophylaxis: heparin  
  
Baseline: functional, ; home O2 3.5-4 L  
Recommended Disposition: HHC vs SNF pending clinical improvement Subjective: Chief Complaint / Reason for Physician Visit Drowsy, arousable, lethargic. Spoke with daughter at bedside. Discussed with RN events overnight. Review of Systems: 
Symptom Y/N Comments  Symptom Y/N Comments Fever/Chills    Chest Pain Poor Appetite    Edema Cough    Abdominal Pain Sputum    Joint Pain SOB/LUDWIG y   Pruritis/Rash Nausea/vomit    Tolerating PT/OT Diarrhea    Tolerating Diet Constipation    Other Could NOT obtain due to:   
 
Objective: VITALS:  
Last 24hrs VS reviewed since prior progress note. Most recent are: 
Patient Vitals for the past 24 hrs: 
 Temp Pulse Resp BP SpO2  
12/29/18 1111 98 °F (36.7 °C) 86 18 154/60 (!) 89 % 12/29/18 0727 97.4 °F (36.3 °C) 79 16 162/55 97 % 12/29/18 0354 97.6 °F (36.4 °C) 78 18 (!) 125/106 93 % 12/29/18 0320     93 % 12/29/18 0300  71   95 % 12/29/18 0200  74   94 % 12/29/18 0100  75   95 % 12/29/18 0015  (!) 148     
12/29/18 0012     93 % 12/29/18 0009  86     
12/29/18 0008    161/68   
12/29/18 0003  (!) 122   (!) 89 % 12/29/18 0001  88   (!) 89 % 12/28/18 2358  (!) 130   90 % 12/28/18 2308 98 °F (36.7 °C) 90 18 179/61 90 % 12/28/18 2058     93 % 12/28/18 2039    155/71   
12/28/18 1910 97.9 °F (36.6 °C) 83 22 172/56 94 % 12/28/18 1424 97.4 °F (36.3 °C) 70 20 140/51 96 % 12/28/18 1358     95 % Intake/Output Summary (Last 24 hours) at 12/29/2018 1228 Last data filed at 12/29/2018 6171 Gross per 24 hour Intake 300 ml Output 2225 ml Net -1925 ml PHYSICAL EXAM: 
General: Drowsy, arousable, on bipap EENT:  EOMI. Anicteric sclerae. MMM Resp:  Diminished BS 
 CV:  Regular  Rhythm,  2+ LE edema GI:  Soft, Non distended, Non tender.  +Bowel sounds Neurologic:  Alert and oriented X 3, lethargic, moving all extremities Psych:   Deferred Skin:  No rashes. No jaundice Reviewed most current lab test results and cultures  YES Reviewed most current radiology test results   YES Review and summation of old records today    NO Reviewed patient's current orders and MAR    YES 
PMH/SH reviewed - no change compared to H&P 
________________________________________________________________________ Care Plan discussed with: 
  Comments Patient x Family  x   
RN x Care Manager Consultant Multidiciplinary team rounds were held today with , nursing, pharmacist and clinical coordinator. Patient's plan of care was discussed; medications were reviewed and discharge planning was addressed. ________________________________________________________________________ Total NON critical care TIME:  35   Minutes Total CRITICAL CARE TIME Spent:   Minutes non procedure based Comments >50% of visit spent in counseling and coordination of care    
________________________________________________________________________ Edd Mesa MD  
 
Procedures: see electronic medical records for all procedures/Xrays and details which were not copied into this note but were reviewed prior to creation of Plan. LABS: 
I reviewed today's most current labs and imaging studies. Pertinent labs include: No results for input(s): WBC, HGB, HCT, PLT, HGBEXT, HCTEXT, PLTEXT, HGBEXT, HCTEXT, PLTEXT in the last 72 hours. Recent Labs  
  12/29/18 
0502 12/28/18 
0330 12/27/18 
4943  140 138  
K 4.8 5.0 4.6  108 107 CO2 26 24 24 * 161* 134* BUN 66* 72* 83* CREA 2.27* 2.24* 2.26* CA 8.7 8.4* 8.5 Signed: Edd Mesa MD

## 2018-12-30 ENCOUNTER — APPOINTMENT (OUTPATIENT)
Dept: GENERAL RADIOLOGY | Age: 83
DRG: 871 | End: 2018-12-30
Attending: GENERAL ACUTE CARE HOSPITAL
Payer: MEDICARE

## 2018-12-30 LAB
ARTERIAL PATENCY WRIST A: YES
BASE EXCESS BLDA CALC-SCNC: 3.3 MMOL/L
BDY SITE: ABNORMAL
CK SERPL-CCNC: 29 U/L (ref 26–192)
COMMENT, HOLDF: NORMAL
GAS FLOW.O2 O2 DELIVERY SYS: 4 L/MIN
GLUCOSE BLD STRIP.AUTO-MCNC: 190 MG/DL (ref 65–100)
GLUCOSE BLD STRIP.AUTO-MCNC: 207 MG/DL (ref 65–100)
GLUCOSE BLD STRIP.AUTO-MCNC: 242 MG/DL (ref 65–100)
HCO3 BLDA-SCNC: 27 MMOL/L (ref 22–26)
PCO2 BLDA: 38 MMHG (ref 35–45)
PH BLDA: 7.47 [PH] (ref 7.35–7.45)
PO2 BLDA: 54 MMHG (ref 80–100)
SAMPLES BEING HELD,HOLD: NORMAL
SAO2 % BLD: 90 % (ref 92–97)
SAO2% DEVICE SAO2% SENSOR NAME: ABNORMAL
SERVICE CMNT-IMP: ABNORMAL
SPECIMEN SITE: ABNORMAL

## 2018-12-30 PROCEDURE — 74011000250 HC RX REV CODE- 250: Performed by: INTERNAL MEDICINE

## 2018-12-30 PROCEDURE — 74011250636 HC RX REV CODE- 250/636: Performed by: INTERNAL MEDICINE

## 2018-12-30 PROCEDURE — 74011250636 HC RX REV CODE- 250/636: Performed by: GENERAL ACUTE CARE HOSPITAL

## 2018-12-30 PROCEDURE — 71045 X-RAY EXAM CHEST 1 VIEW: CPT

## 2018-12-30 PROCEDURE — 94640 AIRWAY INHALATION TREATMENT: CPT

## 2018-12-30 PROCEDURE — 74011250637 HC RX REV CODE- 250/637: Performed by: INTERNAL MEDICINE

## 2018-12-30 PROCEDURE — 65660000000 HC RM CCU STEPDOWN

## 2018-12-30 PROCEDURE — 74011636637 HC RX REV CODE- 636/637: Performed by: HOSPITALIST

## 2018-12-30 PROCEDURE — 94660 CPAP INITIATION&MGMT: CPT

## 2018-12-30 PROCEDURE — 82550 ASSAY OF CK (CPK): CPT

## 2018-12-30 PROCEDURE — 82962 GLUCOSE BLOOD TEST: CPT

## 2018-12-30 PROCEDURE — 36415 COLL VENOUS BLD VENIPUNCTURE: CPT

## 2018-12-30 PROCEDURE — 77010033678 HC OXYGEN DAILY

## 2018-12-30 PROCEDURE — 82803 BLOOD GASES ANY COMBINATION: CPT

## 2018-12-30 PROCEDURE — 36600 WITHDRAWAL OF ARTERIAL BLOOD: CPT

## 2018-12-30 PROCEDURE — 74011636637 HC RX REV CODE- 636/637: Performed by: INTERNAL MEDICINE

## 2018-12-30 PROCEDURE — 74011250637 HC RX REV CODE- 250/637: Performed by: HOSPITALIST

## 2018-12-30 RX ADMIN — HUMAN INSULIN 15 UNITS: 100 INJECTION, SUSPENSION SUBCUTANEOUS at 08:34

## 2018-12-30 RX ADMIN — ALPRAZOLAM 0.5 MG: 0.5 TABLET ORAL at 23:41

## 2018-12-30 RX ADMIN — Medication 10 ML: at 21:15

## 2018-12-30 RX ADMIN — INSULIN LISPRO 3 UNITS: 100 INJECTION, SOLUTION INTRAVENOUS; SUBCUTANEOUS at 12:40

## 2018-12-30 RX ADMIN — BUMETANIDE 2 MG: 0.25 INJECTION INTRAMUSCULAR; INTRAVENOUS at 09:50

## 2018-12-30 RX ADMIN — APIXABAN 2.5 MG: 2.5 TABLET, FILM COATED ORAL at 08:34

## 2018-12-30 RX ADMIN — METHYLPREDNISOLONE SODIUM SUCCINATE 40 MG: 40 INJECTION, POWDER, FOR SOLUTION INTRAMUSCULAR; INTRAVENOUS at 08:35

## 2018-12-30 RX ADMIN — BUMETANIDE 2 MG: 0.25 INJECTION INTRAMUSCULAR; INTRAVENOUS at 18:09

## 2018-12-30 RX ADMIN — Medication 10 ML: at 08:35

## 2018-12-30 RX ADMIN — Medication 10 ML: at 16:46

## 2018-12-30 RX ADMIN — INSULIN LISPRO 2 UNITS: 100 INJECTION, SOLUTION INTRAVENOUS; SUBCUTANEOUS at 08:34

## 2018-12-30 RX ADMIN — METHYLPREDNISOLONE SODIUM SUCCINATE 40 MG: 40 INJECTION, POWDER, FOR SOLUTION INTRAMUSCULAR; INTRAVENOUS at 21:15

## 2018-12-30 RX ADMIN — IPRATROPIUM BROMIDE AND ALBUTEROL SULFATE 3 ML: .5; 3 SOLUTION RESPIRATORY (INHALATION) at 13:57

## 2018-12-30 RX ADMIN — BUMETANIDE 2 MG: 0.25 INJECTION INTRAMUSCULAR; INTRAVENOUS at 04:00

## 2018-12-30 RX ADMIN — DILTIAZEM HYDROCHLORIDE 240 MG: 240 CAPSULE, COATED, EXTENDED RELEASE ORAL at 08:34

## 2018-12-30 RX ADMIN — IPRATROPIUM BROMIDE AND ALBUTEROL SULFATE 3 ML: .5; 3 SOLUTION RESPIRATORY (INHALATION) at 03:11

## 2018-12-30 RX ADMIN — IPRATROPIUM BROMIDE AND ALBUTEROL SULFATE 3 ML: .5; 3 SOLUTION RESPIRATORY (INHALATION) at 21:00

## 2018-12-30 RX ADMIN — APIXABAN 2.5 MG: 2.5 TABLET, FILM COATED ORAL at 21:16

## 2018-12-30 RX ADMIN — FLUTICASONE FUROATE AND VILANTEROL TRIFENATATE 2 PUFF: 200; 25 POWDER RESPIRATORY (INHALATION) at 12:40

## 2018-12-30 RX ADMIN — IPRATROPIUM BROMIDE AND ALBUTEROL SULFATE 3 ML: .5; 3 SOLUTION RESPIRATORY (INHALATION) at 08:01

## 2018-12-30 RX ADMIN — INSULIN LISPRO 3 UNITS: 100 INJECTION, SOLUTION INTRAVENOUS; SUBCUTANEOUS at 16:46

## 2018-12-30 NOTE — PROGRESS NOTES
Spiritual Care Assessment/Progress Note [place] [address] 
 
 
NAME: Loco Campuzano      MRN: [id number] AGE: 82 y.o. SEX: female Zoroastrianism Affiliation: Scientologist  
Language: English  
 
12/30/2018     Total Time (in minutes): 13 Spiritual Assessment begun in [facility] 2 PROGRESSIVE CARE through conversation with: 
  
    []Patient        [] Family    [] Friend(s) Reason for Consult: Initial/Spiritual assessment, patient floor Spiritual beliefs: (Please include comment if needed) 
   [] Identifies with a bronwyn tradition:       [] Supported by a bronwyn community:        
   [] Claims no spiritual orientation:       
   [] Seeking spiritual identity:            
   [x] Adheres to an individual form of spirituality:       
   [] Not able to assess:                   
 
    
Identified resources for coping:  
   [x] Prayer                           
   [] Music                  [] Guided Imagery [x] Family/friends                 [] Pet visits [] Devotional reading                         [] Unknown 
   [] Other:                                         
 
 
Interventions offered during this visit: (See comments for more details) Patient Interventions: Affirmation of bronwyn, Affirmation of emotions/emotional suffering, Catharsis/review of pertinent events in supportive environment, Coping skills reviewed/reinforced, Iconic (affirming the presence of God/Higher Power), Initial/Spiritual assessment, patient floor, Normalization of emotional/spiritual concerns, Prayer (assurance of), Zoroastrianism beliefs/image of God discussed Plan of Care: 
 
 [x] Support spiritual and/or cultural needs  
 [] Support AMD and/or advance care planning process    
 [] Support grieving process 
 [] Coordinate Rites and/or Rituals  
 [] Coordination with community clergy [] No spiritual needs identified at this time 
 [] Detailed Plan of Care below (See Comments)  [] Make referral to Music Therapy [] Make referral to Pet Therapy    
[] Make referral to Addiction services 
[] Make referral to [facility] 
[] Make referral to Spiritual Care Partner 
[] No future visits requested       
[x] Follow up visits as needed Comments:   Initial visit on PCU for spiritual assessment. Patient had several visitors, but invited me in asking my denomination as she did. She shared that she has had lots of company during her stay. Ms. Sidra Bustamante was raised [place], but no longer attends an [facility]. She does have a sense of God's presence in her life. She expressed no spiritual needs or concerns at this time. Provided pastoral presence and supportive listening. Ms. Sidra Bustamante was receptive to assurance of prayer. Advised her of  availability. GABRIEL Schreiber, [facility],  [facility]  Paging Service  [phone])

## 2018-12-31 LAB
ANION GAP SERPL CALC-SCNC: 11 MMOL/L (ref 5–15)
BASOPHILS # BLD: 0 K/UL (ref 0–0.1)
BASOPHILS NFR BLD: 0 % (ref 0–1)
BUN SERPL-MCNC: 80 MG/DL (ref 6–20)
BUN/CREAT SERPL: 33 (ref 12–20)
CALCIUM SERPL-MCNC: 8.4 MG/DL (ref 8.5–10.1)
CHLORIDE SERPL-SCNC: 98 MMOL/L (ref 97–108)
CK SERPL-CCNC: 26 U/L (ref 26–192)
CO2 SERPL-SCNC: 31 MMOL/L (ref 21–32)
CREAT SERPL-MCNC: 2.45 MG/DL (ref 0.55–1.02)
DIFFERENTIAL METHOD BLD: ABNORMAL
EOSINOPHIL # BLD: 0 K/UL (ref 0–0.4)
EOSINOPHIL NFR BLD: 0 % (ref 0–7)
ERYTHROCYTE [DISTWIDTH] IN BLOOD BY AUTOMATED COUNT: 14.7 % (ref 11.5–14.5)
GLUCOSE BLD STRIP.AUTO-MCNC: 128 MG/DL (ref 65–100)
GLUCOSE BLD STRIP.AUTO-MCNC: 174 MG/DL (ref 65–100)
GLUCOSE BLD STRIP.AUTO-MCNC: 178 MG/DL (ref 65–100)
GLUCOSE BLD STRIP.AUTO-MCNC: 191 MG/DL (ref 65–100)
GLUCOSE BLD STRIP.AUTO-MCNC: 201 MG/DL (ref 65–100)
GLUCOSE SERPL-MCNC: 163 MG/DL (ref 65–100)
HCT VFR BLD AUTO: 30.1 % (ref 35–47)
HGB BLD-MCNC: 10.1 G/DL (ref 11.5–16)
IMM GRANULOCYTES # BLD: 0 K/UL (ref 0–0.04)
IMM GRANULOCYTES NFR BLD AUTO: 0 % (ref 0–0.5)
LYMPHOCYTES # BLD: 0.4 K/UL (ref 0.8–3.5)
LYMPHOCYTES NFR BLD: 2 % (ref 12–49)
MCH RBC QN AUTO: 30 PG (ref 26–34)
MCHC RBC AUTO-ENTMCNC: 33.6 G/DL (ref 30–36.5)
MCV RBC AUTO: 89.3 FL (ref 80–99)
MONOCYTES # BLD: 0.8 K/UL (ref 0–1)
MONOCYTES NFR BLD: 4 % (ref 5–13)
NEUTS BAND NFR BLD MANUAL: 1 %
NEUTS SEG # BLD: 18.7 K/UL (ref 1.8–8)
NEUTS SEG NFR BLD: 93 % (ref 32–75)
NRBC # BLD: 0 K/UL (ref 0–0.01)
NRBC BLD-RTO: 0 PER 100 WBC
PLATELET # BLD AUTO: 220 K/UL (ref 150–400)
PMV BLD AUTO: 10.5 FL (ref 8.9–12.9)
POTASSIUM SERPL-SCNC: 3.9 MMOL/L (ref 3.5–5.1)
RBC # BLD AUTO: 3.37 M/UL (ref 3.8–5.2)
RBC MORPH BLD: ABNORMAL
SERVICE CMNT-IMP: ABNORMAL
SODIUM SERPL-SCNC: 140 MMOL/L (ref 136–145)
WBC # BLD AUTO: 19.9 K/UL (ref 3.6–11)

## 2018-12-31 PROCEDURE — 77010033678 HC OXYGEN DAILY

## 2018-12-31 PROCEDURE — 94660 CPAP INITIATION&MGMT: CPT

## 2018-12-31 PROCEDURE — 74011250637 HC RX REV CODE- 250/637: Performed by: INTERNAL MEDICINE

## 2018-12-31 PROCEDURE — 74011000250 HC RX REV CODE- 250: Performed by: INTERNAL MEDICINE

## 2018-12-31 PROCEDURE — 94640 AIRWAY INHALATION TREATMENT: CPT

## 2018-12-31 PROCEDURE — 74011250636 HC RX REV CODE- 250/636: Performed by: INTERNAL MEDICINE

## 2018-12-31 PROCEDURE — 77030038269 HC DRN EXT URIN PURWCK BARD -A

## 2018-12-31 PROCEDURE — 82550 ASSAY OF CK (CPK): CPT

## 2018-12-31 PROCEDURE — 65660000000 HC RM CCU STEPDOWN

## 2018-12-31 PROCEDURE — 80048 BASIC METABOLIC PNL TOTAL CA: CPT

## 2018-12-31 PROCEDURE — 74011636637 HC RX REV CODE- 636/637: Performed by: INTERNAL MEDICINE

## 2018-12-31 PROCEDURE — 82962 GLUCOSE BLOOD TEST: CPT

## 2018-12-31 PROCEDURE — 74011250636 HC RX REV CODE- 250/636: Performed by: GENERAL ACUTE CARE HOSPITAL

## 2018-12-31 PROCEDURE — 77030029684 HC NEB SM VOL KT MONA -A

## 2018-12-31 PROCEDURE — 85025 COMPLETE CBC W/AUTO DIFF WBC: CPT

## 2018-12-31 PROCEDURE — 36415 COLL VENOUS BLD VENIPUNCTURE: CPT

## 2018-12-31 PROCEDURE — 74011636637 HC RX REV CODE- 636/637: Performed by: HOSPITALIST

## 2018-12-31 RX ORDER — ONDANSETRON 2 MG/ML
4 INJECTION INTRAMUSCULAR; INTRAVENOUS
Status: DISCONTINUED | OUTPATIENT
Start: 2018-12-31 | End: 2019-01-03 | Stop reason: HOSPADM

## 2018-12-31 RX ORDER — BUMETANIDE 0.25 MG/ML
2 INJECTION INTRAMUSCULAR; INTRAVENOUS EVERY 12 HOURS
Status: DISCONTINUED | OUTPATIENT
Start: 2018-12-31 | End: 2019-01-02

## 2018-12-31 RX ADMIN — APIXABAN 2.5 MG: 2.5 TABLET, FILM COATED ORAL at 09:04

## 2018-12-31 RX ADMIN — HUMAN INSULIN 15 UNITS: 100 INJECTION, SUSPENSION SUBCUTANEOUS at 09:04

## 2018-12-31 RX ADMIN — HUMAN INSULIN 15 UNITS: 100 INJECTION, SUSPENSION SUBCUTANEOUS at 00:20

## 2018-12-31 RX ADMIN — FLUTICASONE FUROATE AND VILANTEROL TRIFENATATE 2 PUFF: 200; 25 POWDER RESPIRATORY (INHALATION) at 09:05

## 2018-12-31 RX ADMIN — BUMETANIDE 2 MG: 0.25 INJECTION INTRAMUSCULAR; INTRAVENOUS at 10:41

## 2018-12-31 RX ADMIN — INSULIN LISPRO 2 UNITS: 100 INJECTION, SOLUTION INTRAVENOUS; SUBCUTANEOUS at 09:04

## 2018-12-31 RX ADMIN — INSULIN LISPRO 2 UNITS: 100 INJECTION, SOLUTION INTRAVENOUS; SUBCUTANEOUS at 16:34

## 2018-12-31 RX ADMIN — IPRATROPIUM BROMIDE AND ALBUTEROL SULFATE 3 ML: .5; 3 SOLUTION RESPIRATORY (INHALATION) at 08:41

## 2018-12-31 RX ADMIN — METHYLPREDNISOLONE SODIUM SUCCINATE 40 MG: 40 INJECTION, POWDER, FOR SOLUTION INTRAMUSCULAR; INTRAVENOUS at 21:15

## 2018-12-31 RX ADMIN — IPRATROPIUM BROMIDE AND ALBUTEROL SULFATE 3 ML: .5; 3 SOLUTION RESPIRATORY (INHALATION) at 20:36

## 2018-12-31 RX ADMIN — ONDANSETRON 4 MG: 2 INJECTION INTRAMUSCULAR; INTRAVENOUS at 17:12

## 2018-12-31 RX ADMIN — METHYLPREDNISOLONE SODIUM SUCCINATE 40 MG: 40 INJECTION, POWDER, FOR SOLUTION INTRAMUSCULAR; INTRAVENOUS at 06:11

## 2018-12-31 RX ADMIN — Medication 10 ML: at 06:12

## 2018-12-31 RX ADMIN — Medication 10 ML: at 21:15

## 2018-12-31 RX ADMIN — IPRATROPIUM BROMIDE AND ALBUTEROL SULFATE 3 ML: .5; 3 SOLUTION RESPIRATORY (INHALATION) at 02:54

## 2018-12-31 RX ADMIN — DILTIAZEM HYDROCHLORIDE 240 MG: 240 CAPSULE, COATED, EXTENDED RELEASE ORAL at 09:04

## 2018-12-31 RX ADMIN — INSULIN LISPRO 3 UNITS: 100 INJECTION, SOLUTION INTRAVENOUS; SUBCUTANEOUS at 12:05

## 2018-12-31 RX ADMIN — BUMETANIDE 2 MG: 0.25 INJECTION INTRAMUSCULAR; INTRAVENOUS at 03:17

## 2018-12-31 RX ADMIN — Medication 10 ML: at 14:42

## 2018-12-31 RX ADMIN — APIXABAN 2.5 MG: 2.5 TABLET, FILM COATED ORAL at 21:14

## 2018-12-31 RX ADMIN — BUMETANIDE 2 MG: 0.25 INJECTION INTRAMUSCULAR; INTRAVENOUS at 21:21

## 2018-12-31 RX ADMIN — HUMAN INSULIN 15 UNITS: 100 INJECTION, SUSPENSION SUBCUTANEOUS at 21:14

## 2018-12-31 NOTE — PROGRESS NOTES
Bedside shift change report given to Pablo Epperson (oncoming nurse) by Scotty Horta (offgoing nurse). Report included the following information SBAR.

## 2018-12-31 NOTE — PROGRESS NOTES
CM will complete room visit and discuss [place] with pt, upon her d/c.  CM has spoke with pt before about her needs for [place] and pt has declined. CM will continue to follow up and make referrals as deemed necessary. YVES Ch  
[phone]

## 2018-12-31 NOTE — PROGRESS NOTES
[facility] Pulmonary Consult Service NotePulmonary, Critical Care, and Sleep Medicine Name: Tyrone Newton MRN: [id number] : 1935 Hospital: [place] [address] Date: 2018  Admission date: 2018 Hospital Day: 15 Subjective/Interval History:  
 
 
 events over weekeend reviewed. Last night sats dropped and pt had initally declined BIPAP. Was off a couple of nights. Son at bedside. BIPAP needed to bolster sats. Now on 65% . Legs very swollen. Bag of doritios at bedside with water jug. Diuresis. Nephrology following. IMPRESSION:  
1. Acute on chronic respiratory failure with Hypoxia POA 2. Severe COPD with acute exacerbation POA 3. Sepsis POA WBC 19.9,  
4. Bilateral pneumonia likely healthcare associated POA - Stenotrophomonas in sputum 5. Tobacco use 6. Severe Pulmonary HTN PASP 81  
7. Acute Diastolic Heart Failure 8. Pulmonary HTN 9. Diabetes mellitus type 2 POA 10. Essential hypertension POA 11. History of PAF 12. History of bradycardia status post pacemaker placement 2018 per  RECOMMENDATIONS/PLAN:  
1. PCU monitoring 2. NIPPV/ BIPAP for now 3. Discussed with  4. Diuresis is her only hope for \"quick \" improvement of her gas exchange 5. Discussed need for low sodium diet with son; no more snacks like Doritos until fluid under control 6. Completed antibiotics 7. Bronchodilators 8. Systemic corticosteroids 9. Glucose monitoring and SSI 10. Cardiology following 11. DVT prophylaxis 12. Smoking cessation counseling done Subjective/Initial History:  
 
I was asked by Camille Ortega MD to see Tyrone Newton  a 82 y.o.  female in consultation for a chief complaint of pneumonia and respiratory failure requiring BIPAP/ NIV Excerpts from admission 2018 or consult notes as follows:  
 
\"  Recently admitted  to 2018 with respiratory failure and COPD. she initially required BiPAP, was treated for COPD. Had Haemophilus in her sputum but no airspace disease, treated with a 5-day course of Levaquin. Had bradycardia and a pacemaker was placed. Eventually weaned back to her baseline oxygen, discharged home. Baseline she is always short of breath with exertion. starting yesterday she began to notice increasing shortness of breath when she took a shower and with any minor exertion . no change in her baseline cough. No fevers or chills, sore throat, headache, chest pain. Was getting more short of breath when she laid down and  better when she sat up overnight. This morning, she was acutely short of breath, could not catch her breath even at rest .EMS was called and she was brought to the emergency room Acute respiratory distress in the emergency room. PO2 53 on her 4 L nasal cannula oxygen. Was not hypercarbic. Placed on BiPAP. Recently admitted mid November 2018 with acute respiratory failure and COPD. Discharged on baseline 4 L . Increasing shortness of breath past 2 days, no increased cough or fevers. Acutely short of breath today, arrived in ED in respiratory distress. ABG on baseline 4 L showed pH 7.44, PCO2 31, PO2 53\" Pt congested at times. Not active at all. Tachypneic on BIPAP. Daughter at bedside. No ill contacts at home. Upset that pacemaker did not fix her SOB. Tried to explain this to her. Sees my partner Dr. Rabia Selby. No GI sx. No edema. Allergies Allergen Reactions  Keflex [Cephalexin] Itching MAR reviewed and pertinent medications noted or modified as needed Current Facility-Administered Medications Medication  bumetanide (BUMEX) injection 2 mg  methylPREDNISolone (PF) (SOLU-MEDROL) injection 40 mg  
 dilTIAZem CD (CARDIZEM CD) capsule 240 mg  
 insulin NPH (NOVOLIN N, HUMULIN N) injection 15 Units  ALPRAZolam (XANAX) tablet 0.5 mg  
 albuterol-ipratropium (DUO-NEB) 2.5 MG-0.5 MG/3 ML  
  apixaban (ELIQUIS) tablet 2.5 mg  
 nitroglycerin (NITROSTAT) tablet 0.4 mg  
 sodium chloride (NS) flush 5-10 mL  sodium chloride (NS) flush 5-10 mL  acetaminophen (TYLENOL) tablet 650 mg  
 naloxone (NARCAN) injection 0.4 mg  
 bisacodyl (DULCOLAX) suppository 10 mg  
 insulin lispro (HUMALOG) injection  glucose chewable tablet 16 g  
 dextrose (D50W) injection syrg 12.5-25 g  
 glucagon (GLUCAGEN) injection 1 mg  fluticasone-vilanterol (BREO ELLIPTA) 200mcg-25mcg/puff  hydrALAZINE (APRESOLINE) 20 mg/mL injection 20 mg Patient PCP: Ailyn Bloom MD 
PMH:  has a past medical history of COPD and Hypertension. PSH:   has a past surgical history that includes hx gyn. FHX: family history includes Cancer in her brother and brother; Heart Disease in her mother. SHX:  reports that she has been smoking. She has been smoking about 0.25 packs per day. she has never used smokeless tobacco. She reports that she does not drink alcohol or use drugs. ROS:A comprehensive review of systems was negative except for that written in the HPI. Objective: 
 
Vital Signs: Telemetry:    normal sinus rhythmIntake/Output:  
Visit Vitals /49 (BP 1 Location: Right arm, BP Patient Position: At rest) Pulse 78 Temp 97.1 °F (36.2 °C) Resp 20 Ht 5' 4.5\" (1.638 m) Wt 82.8 kg (182 lb 8 oz) SpO2 (!) 87% BMI 30.84 kg/m² Temp (24hrs), Av °F (36.7 °C), Min:97.1 °F (36.2 °C), Max:98.4 °F (36.9 °C) O2 Device: Nasal cannula O2 Flow Rate (L/min): 6 l/min Wt Readings from Last 4 Encounters:  
18 82.8 kg (182 lb 8 oz)  
18 78.5 kg (173 lb 1 oz) 18 83.5 kg (184 lb) 10/01/17 87.1 kg (192 lb 0.3 oz) Intake/Output Summary (Last 24 hours) at 2018 1332 Last data filed at 2018 1204 Gross per 24 hour Intake 550 ml Output 2900 ml Net -2350 ml Last shift:       0701 -  1900 In: 550 [P.O.:550] Out: 700 [Urine:700] Last 3 shifts: 12/29 1901 - 12/31 0700 In: -  
Out: [place] [Urine:4150] Physical Exam:  
 
General: alert Eyes: anicteric; HEENT: NC/AT,no nasal flaring or drainage Neck: No goiter; no stridor;  no accessory muscle use Lymph nodes: No abnormally enlarged cervical or supraclavicular nodes Chest: increased AP diameter Lungs:decreased air movement, no wheezing Heart: Regular rate and rhythm Abdomen: soft, protuberant, nontender, obese : No [place]o; Ext: no cyanosis, no clubbing; no joint swelling or erythema Skin: warm and dry; no clubbing Musculoskeletal: no gross deformities Neuro: speech fluent;  moves all fours, generalized weakness Psych: no agitation Data:  
Labs: 
Recent Labs 12/31/18 
0435 WBC 19.9* HGB 10.1* HCT 30.1*  
 Recent Labs 12/31/18 
0435 12/29/18 
0502  139  
K 3.9 4.8  
CL 98 105 CO2 31 26 * 123* BUN 80* 66* CREA 2.45* 2.27* CA 8.4* 8.7 Recent Labs 12/30/18 
1150 PH 7.47* PCO2 38  
PO2 54* HCO3 27* Imaging: 
I have personally reviewed the patients radiographs: 
None today Trevon Mane MD

## 2018-12-31 NOTE — PROGRESS NOTES
Bedside shift change report given to Cintia Bucio (oncoming nurse) by Raman Ohara (offgoing nurse). Report included the following information SBAR, Kardex, ED Summary, Procedure Summary, Intake/Output, MAR, Recent Results and Cardiac Rhythm NSR.  
 
0900- Spoke with Dr. Nakia aMrtin at bedside. Pt more drowsy/lethargic this morning. SATS staying in high 80's on 6L NC. Verbal order received to DC PRN Xanax. 1712- PRN Zofran given for nausea. 2315- Bedside shift change report given to Cherelle Tafoya (oncoming nurse) by Cintia Bucio (offgoing nurse). Report included the following information SBAR, Kardex, ED Summary, Procedure Summary, Intake/Output, MAR, Recent Results and Cardiac Rhythm NSR.

## 2018-12-31 NOTE — PROGRESS NOTES
[email or web address]                  Phone - [phone] Renal Progress Note Subjective:  
Patient: Mikey Patino                    YOB: 1935 Date- 12/31/2018 Reason for visit: ARF Admission Date: 12/17/2018 Assessment:  
  
ALMAZ likely either diuretic induced and/or ATN 
 -edema some better, creat starting to rise - decrease Bumex and follow, titrate prn Acute on chronic respiratory failure with hypoxia and hypercapnia Diastolic heart failure on admission. 
  
Severe COPD with an exacerbation. Bilateral pneumonia.  
  
Severe pulmonary hypertension.    
  
PAF S/p pacemaker placement in 11/18. 
  
Diabetes Mellitus, type II. Hypertension. Obesity. 
  
  
  
Plan:  
  
 As Above Interim History: On 12/20: The patient has severe COPD and pulmonary hypertension. She was admitted with a COPD exacerbation and bilateral pneumonia. She also has PAF and developed A-fib the other night with a RVR. In addition, she was felt to have diastolic heart failure and so was placed on Lasix. She has not been on an ACE-I or ARB. Her Bun/creat was 12/0.8 on admission, 12/17. It was 17/1.3 on 12/18, 42/12.5 on 12/19, and is 57/3.1 today. The patient says she was extremely SOB on admission but is essentially back to her baseline \"usual miserable\" SOB now. She denies chest pain or any other complaints. She was on Lasix, but this was d/c'd today and an IV of NS at 50 cc/hr ordered. She has been on Vancomycin with a trough of 21. Vanc was d/c'd today. 
  
  
On 12/21:  The patient says she her breathing is at baseline. She is relatively comfortable if not moving. On 12/22:  Pt still feels very SOB, \"about the same\" as yesterday. Pt denies any complaints except for the SOB. NS d/c'd yesterday and Lasix 40 mg IV given. Input not complete, but UO 1450 cc. On 12/24:  Pt was feeling worse yesterday but better today.   SOB is her only complaint. I& O's are incomplete. Last Lasix dose on ? On : pt up in a chair and looks sig more comfortable today. Says her SOB is improved. No other complaints. Last Lasix dose . I&O's incomplete ROS as above, plus: no fever/chills today. . No HEEENT complaints. No chest pain. no abd pain. No N/V/D. No joint pain. No skin rashes, no urinary complaints.   
  
 
Review of Systems A comprehensive review of systems was negative except for that written in the HPI. Objective:  
 
Visit Vitals /49 (BP 1 Location: Right arm, BP Patient Position: At rest) Pulse 78 Temp 97.1 °F (36.2 °C) Resp 20 Ht 5' 4.5\" (1.638 m) Wt 82.8 kg (182 lb 8 oz) SpO2 (!) 87% BMI 30.84 kg/m² Temp (24hrs), Av °F (36.7 °C), Min:97.1 °F (36.2 °C), Max:98.4 °F (36.9 °C) 
 
 
701 - 1900 In: 550 [P.O.:550] Out: 700 [Urine:700] 1901 -  07 In: -  
Out: [place] [Urine:4150] Physical Exam: 
General:  alert, cooperative, up in a chair and breathing more comfortably Eye:  conjunctivae/corneas clear. EOM's intact. Neurologic:  grossly non-focal; alert and appropriate; normal speech Neck:  supple; no JVD Lungs:  diminished breath sounds but no rales or rhonchi; no accessory muscle use Heart:  regular rate and rhythm; no gallop or rub Skin:  no rash or lesions but multiple ecchymoses. Pt denies trauma but is on blood thinners. Abdomen:  soft, non-tender. No masses,  no organomegaly Extremities: no edema Data Review:  
 
Recent Labs 18 
0435 18 
0502 WBC 19.9*  --   
HGB 10.1*  --   
 139  
K 3.9 4.8  
CL 98 105 CO2 31 26 BUN 80* 66* CREA 2.45* 2.27* CA 8.4* 8.7 Assessment:  
 
Active Problems: 
  Acute on chronic respiratory failure with hypoxia and hypercapnia ([facility] [address] (2018) Chart reviewed. Pertinent Notes Reviewed. Medications list Personally Reviewed.  
 
 Jett Hager MD 
 
 [facility] [address] [place], [address] Phone - [phone]    Fax - [phone] [email or web address] [facility] [address] [address], [address] [facility] Phone - [phone]  Fax - [phone] [employer]

## 2018-12-31 NOTE — PROGRESS NOTES
Hospitalist Progress Note NAME: Silvia Serrano :  1935 MRN:  [id number] Assessment / Plan: 
Acute on chronic respiratory failure POA with baseline 2-4L O2 - worsened Acute COPD exacerbation - worsened Sepsis due to Bilateral HCAP POA - improved Underlying severe pulmonary hypertension PADP 81  
-clinically: very slowly improving , less dyspneic Slept with bipap Leukocytosis is likely due to steroids use since improving clinically  
very weak/debilitated ( reports that getting to chair is a great effort for her now) O2: 4-5 L  
-cont bipap at night and prn during daytime 
-cont aggressive IV steroids ( started to taper, slow tapering), aggressive jet nebs  
-Empiric IV AB: vanco - was dc with ALMAZ, no signs of gram + infection Completed zosyn  ( last dose ) BC NTD 
resp cultures: rare S. MALTOPHILIA. DOC - bactrim but cannot use with current cr She improved since admission on zosyn alone   
-repeat cxray in am  
-pulmonology following  
-keep O2 sats > 92%  
-cxray repeated : No pulmonary consolidation. Improved aeration since : 
Pt's respiratory status slowly improving. Steroids being tapered and converted to PO. Abx course completed. Still using bipap overnight. SOB on minimal movement. : 
Pt still using bipap overnight. Pulm recommendation it to try and use PRN. Will see how pt tolerates it tonight. Appears less SOB during conversation today. Continue PO steroids and nebulizer treatments. : 
Pt did not use bipap overnight. Pt eager to be discharge but is still SOB. Pt insistent that she go home instead of any other facility. Pt's daughter at bedside today and updated. Will see how pt does overnight without bipap again and likely for discharge tomorrow. : 
Pt respiratory status worsened overnight, requiring the use of bipap. This is likely linked to her increased edema.  Spoke with pt's son at bedside and updated on the plan. Pt no longer ready for discharge. Will diurese with IV Bumex. 
 
  
ALMAZ not POA - improving 
-Cr slowly getting better, down to 3.01 
baseline normal at 0.8 Likely due to Lasix + possible ATN with  vanco ( trough 21.4 )   
-Dr Julio César Hutchinson help was greatly appreciated: lasxi use prn ( last dose 12/24) -Daily weight ( Wt is all over the place - ? Correct measurement)  
  
12/28: 
Cr continues to improve - hopeful that this trend will continue and it will revert back to baseline 12/29: 
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  
-HR/BP stable   
-Dr Savage Goldstein help last week was greatly appreciated Diurese only prn due to ALMAZ ( on lasix 20 mg daily at home ) Stopped Multaq started earlier by Dr Lj Dejesus [facility] her heart failure and/or lung disease) Stopped amlodipine and changed to diltiazem  daily PPM program changed early in admission to minimize ventr pacing  
-AC: CHADS-Vasc 4 --> need to be on a/coag Last admission pt declined AC and wanted to be on ASA alone Dr Lj Dejesus started on Eliquis ( reduced dose with ALMAZ) Now off ASA  
-ECHO: EF 55%, mild AS, severe pulmonary HTN    
  
DM type II 
-BS controlled  120/150s  
-cont NPH with steroids ( decr dose when tapering steroids)  + SS  
-hba1c 6.6 11/2018  
  
Tobacco use Obesity. Body mass index is 32.04 kg/m². Code status: DNR per h/p on admission NOK:  Prophylaxis: heparin  
  
Baseline: functional, ; home O2 3.5-4 L  
Recommended Disposition: C vs SNF pending clinical improvement Subjective: Chief Complaint / Reason for Physician Visit Spoke with daughter at bedside. Discussed with RN events overnight. Review of Systems: Symptom Y/N Comments  Symptom Y/N Comments Fever/Chills    Chest Pain Poor Appetite    Edema Cough    Abdominal Pain Sputum    Joint Pain SOB/LUDWIG y   Pruritis/Rash Nausea/vomit    Tolerating PT/OT Diarrhea    Tolerating Diet Constipation    Other Could NOT obtain due to:   
 
Objective: VITALS:  
Last 24hrs VS reviewed since prior progress note. Most recent are: 
Patient Vitals for the past 24 hrs: 
 Temp Pulse Resp BP SpO2  
12/31/18 1022 97.1 °F (36.2 °C) 78 20 134/49 (!) 87 % 12/31/18 0841     93 % 12/31/18 0726 97.9 °F (36.6 °C) 78 20 132/59 (!) 88 % 12/31/18 0409 98.4 °F (36.9 °C) 83 20 150/55 93 % 12/31/18 0251     93 % 12/31/18 0022 98.4 °F (36.9 °C) 83 20 146/57 91 % 12/30/18 2218     90 % 12/30/18 2212     (!) 89 % 12/30/18 2100     93 % 12/30/18 1941 98.2 °F (36.8 °C) 78 20 160/57 94 % 12/30/18 1940  78   94 % 12/30/18 1629 98 °F (36.7 °C) 80 20 135/61 (!) 89 % 12/30/18 1357     92 % 12/30/18 1258 97.8 °F (36.6 °C) 78 20 152/50 94 % Intake/Output Summary (Last 24 hours) at 12/31/2018 1125 Last data filed at 12/31/2018 0300 Gross per 24 hour Intake  Output 2200 ml Net -2200 ml PHYSICAL EXAM: 
General: Drowsy, arousable, on bipap EENT:  EOMI. Anicteric sclerae. MMM Resp:  Diminished BS 
CV:  Regular  Rhythm,  2+ LE edema GI:  Soft, Non distended, Non tender.  +Bowel sounds Neurologic:  Alert and oriented X 3, lethargic, moving all extremities Psych:   Deferred Skin:  No rashes. No jaundice Reviewed most current lab test results and cultures  YES Reviewed most current radiology test results   YES Review and summation of old records today    NO Reviewed patient's current orders and MAR    YES 
PMH/SH reviewed - no change compared to H&P 
________________________________________________________________________ Care Plan discussed with: 
  Comments Patient x Family  x   
RN x   
 Care Manager Consultant Multidiciplinary team rounds were held today with , nursing, pharmacist and clinical coordinator. Patient's plan of care was discussed; medications were reviewed and discharge planning was addressed. ________________________________________________________________________ Total NON critical care TIME:  35   Minutes Total CRITICAL CARE TIME Spent:   Minutes non procedure based Comments >50% of visit spent in counseling and coordination of care    
________________________________________________________________________ Beau Rosario MD  
 
Procedures: see electronic medical records for all procedures/Xrays and details which were not copied into this note but were reviewed prior to creation of Plan. LABS: 
I reviewed today's most current labs and imaging studies. Pertinent labs include: 
Recent Labs 12/31/18 
0435 WBC 19.9* HGB 10.1* HCT 30.1*  
 Recent Labs 12/31/18 0435 12/29/18 
0502  139  
K 3.9 4.8  
CL 98 105 CO2 31 26 * 123* BUN 80* 66* CREA 2.45* 2.27* CA 8.4* 8.7 Signed: Beau Rosario MD

## 2018-12-31 NOTE — PROGRESS NOTES
Hospitalist Progress Note NAME: Julianne Hernandez :  1935 MRN:  [id number] Assessment / Plan: 
Acute on chronic respiratory failure POA with baseline 2-4L O2 - worsened Acute COPD exacerbation - worsened Sepsis due to Bilateral HCAP POA - improved Underlying severe pulmonary hypertension PADP 81  
-clinically: very slowly improving , less dyspneic Slept with bipap Leukocytosis is likely due to steroids use since improving clinically  
very weak/debilitated ( reports that getting to chair is a great effort for her now) O2: 4-5 L  
-cont bipap at night and prn during daytime 
-cont aggressive IV steroids ( started to taper, slow tapering), aggressive jet nebs  
-Empiric IV AB: vanco - was dc with ALMAZ, no signs of gram + infection Completed zosyn  ( last dose ) BC NTD 
resp cultures: rare S. MALTOPHILIA. DOC - bactrim but cannot use with current cr She improved since admission on zosyn alone   
-repeat cxray in am  
-pulmonology following  
-keep O2 sats > 92%  
-cxray repeated : No pulmonary consolidation. Improved aeration since : 
Pt's respiratory status slowly improving. Steroids being tapered and converted to PO. Abx course completed. Still using bipap overnight. SOB on minimal movement. : 
Pt still using bipap overnight. Pulm recommendation it to try and use PRN. Will see how pt tolerates it tonight. Appears less SOB during conversation today. Continue PO steroids and nebulizer treatments. : 
Pt did not use bipap overnight. Pt eager to be discharge but is still SOB. Pt insistent that she go home instead of any other facility. Pt's daughter at bedside today and updated. Will see how pt does overnight without bipap again and likely for discharge tomorrow. : 
Pt respiratory status worsened overnight, requiring the use of bipap. This is likely linked to her increased edema.  Spoke with pt's son at bedside and updated on the plan. Pt no longer ready for discharge. Will diurese with IV Bumex. 12/30: 
Pt's respiratory status declined over the past 12 hours. Has needed bipap to keep her O2 sats up - continue bipap today. Pt also more lethargic and drowsy - concerned for possible CO2 narcosis - will get ABG. Will also order portable CXR. Addendum: 
No CO2 narcosis, CXR relatively stable. Will continue diuresis, nebs and increase IV steroids 12/31: 
Pt still requiring bipap. Pulmonary involved - will speak with them about any further recommendations. Will continue IV steroids and nebs and respiratory support for now. Hopeful that continued diuresis will help improve respiratory status. Will also discontinue pt's low dose Xanax orders as this is causing her to become drowsy and listless during the day 
  
ALMAZ not POA - improving 
-Cr slowly getting better, down to 3.01 
baseline normal at 0.8 Likely due to Lasix + possible ATN with  vanco ( trough 21.4 )   
-Dr Sreedhar Sam help was greatly appreciated: lasxi use prn ( last dose 12/24) -Daily weight ( Wt is all over the place - ? Correct measurement)  
  
12/28: 
Cr continues to improve - hopeful that this trend will continue and it will revert back to baseline 12/29: 
Fluid overload Cr has improved; however pt with significant edema now - appreciate Nephrology eval - agree with IV diuresis 12/30: 
Continue with aggressive IV diuresis. 12/31: 
Net negative 2200mL last 24 hours. Will continue aggressive IV diuresis. Monitor I/Os. Daily weights. 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  
-HR/BP stable   
-Dr Irving Holguin help last week was greatly appreciated Diurese only prn due to ALMAZ ( on lasix 20 mg daily at home ) Stopped Multaq started earlier by Dr Micky Wilson [facility] her heart failure and/or lung disease) Stopped amlodipine and changed to diltiazem  daily PPM program changed early in admission to minimize ventr pacing  
-AC: CHADS-Vasc 4 --> need to be on a/coag Last admission pt declined AC and wanted to be on ASA alone Dr Osiel Duran started on Eliquis ( reduced dose with ALMAZ) Now off ASA  
-ECHO: EF 55%, mild AS, severe pulmonary HTN    
  
DM type II 
-BS controlled  120/150s  
-cont NPH with steroids ( decr dose when tapering steroids)  + SS  
-hba1c 6.6 11/2018  
  
Tobacco use Obesity. Body mass index is 32.04 kg/m². Code status: DNR per h/p on admission NOK:  Prophylaxis: heparin  
  
Baseline: functional, ; home O2 3.5-4 L  
Recommended Disposition: C vs SNF pending clinical improvement Subjective: Chief Complaint / Reason for Physician Visit Drowsy, arousable, lethargic. Spoke with son at bedside. Discussed with RN events overnight. Review of Systems: 
Symptom Y/N Comments  Symptom Y/N Comments Fever/Chills    Chest Pain Poor Appetite    Edema y Cough    Abdominal Pain Sputum    Joint Pain SOB/LUDWIG y   Pruritis/Rash Nausea/vomit    Tolerating PT/OT Diarrhea    Tolerating Diet Constipation    Other Could NOT obtain due to:   
 
Objective: VITALS:  
Last 24hrs VS reviewed since prior progress note. Most recent are: 
Patient Vitals for the past 24 hrs: 
 Temp Pulse Resp BP SpO2  
12/31/18 1022 97.1 °F (36.2 °C) 78 20 134/49 (!) 87 % 12/31/18 0841     93 % 12/31/18 0726 97.9 °F (36.6 °C) 78 20 132/59 (!) 88 % 12/31/18 0409 98.4 °F (36.9 °C) 83 20 150/55 93 % 12/31/18 0251     93 % 12/31/18 0022 98.4 °F (36.9 °C) 83 20 146/57 91 % 12/30/18 2218     90 % 12/30/18 2212     (!) 89 % 12/30/18 2100     93 % 12/30/18 1941 98.2 °F (36.8 °C) 78 20 160/57 94 % 12/30/18 1940  78   94 % 12/30/18 1629 98 °F (36.7 °C) 80 20 135/61 (!) 89 % 12/30/18 1357     92 % 12/30/18 1258 97.8 °F (36.6 °C) 78 20 152/50 94 % Intake/Output Summary (Last 24 hours) at 12/31/2018 1126 Last data filed at 12/31/2018 0300 Gross per 24 hour Intake  Output 2200 ml Net -2200 ml PHYSICAL EXAM: 
General: Drowsy, arousable, on bipap, anasarca EENT:  EOMI. Anicteric sclerae. MMM Resp:  Diminished BS 
CV:  Regular  Rhythm,  2+ LE edema GI:  Soft, Non distended, Non tender.  +Bowel sounds Neurologic:  Alert and oriented X 3, lethargic, moving all extremities Psych:   Deferred Skin:  No rashes. No jaundice Reviewed most current lab test results and cultures  YES Reviewed most current radiology test results   YES Review and summation of old records today    NO Reviewed patient's current orders and MAR    YES 
PMH/SH reviewed - no change compared to H&P 
________________________________________________________________________ Care Plan discussed with: 
  Comments Patient x Family  x   
RN x Care Manager Consultant Multidiciplinary team rounds were held today with , nursing, pharmacist and clinical coordinator. Patient's plan of care was discussed; medications were reviewed and discharge planning was addressed. ________________________________________________________________________ Total NON critical care TIME:  35   Minutes Total CRITICAL CARE TIME Spent:   Minutes non procedure based Comments >50% of visit spent in counseling and coordination of care    
________________________________________________________________________ Edyta Peabody, MD  
 
Procedures: see electronic medical records for all procedures/Xrays and details which were not copied into this note but were reviewed prior to creation of Plan. LABS: 
I reviewed today's most current labs and imaging studies. Pertinent labs include: 
Recent Labs 12/31/18 
0435 WBC 19.9* HGB 10.1* HCT 30.1*  
 Recent Labs 12/31/18 7368 12/29/18 
0502  139  
K 3.9 4.8  
CL 98 105 CO2 31 26 * 123* BUN 80* 66* CREA 2.45* 2.27* CA 8.4* 8.7 Signed: Josh Quiroz MD

## 2019-01-01 LAB
ANION GAP SERPL CALC-SCNC: 10 MMOL/L (ref 5–15)
BASOPHILS # BLD: 0 K/UL (ref 0–0.1)
BASOPHILS NFR BLD: 0 % (ref 0–1)
BUN SERPL-MCNC: 82 MG/DL (ref 6–20)
BUN/CREAT SERPL: 33 (ref 12–20)
CALCIUM SERPL-MCNC: 8.3 MG/DL (ref 8.5–10.1)
CHLORIDE SERPL-SCNC: 96 MMOL/L (ref 97–108)
CK SERPL-CCNC: 21 U/L (ref 26–192)
CO2 SERPL-SCNC: 31 MMOL/L (ref 21–32)
CREAT SERPL-MCNC: 2.48 MG/DL (ref 0.55–1.02)
DIFFERENTIAL METHOD BLD: ABNORMAL
EOSINOPHIL # BLD: 0 K/UL (ref 0–0.4)
EOSINOPHIL NFR BLD: 0 % (ref 0–7)
ERYTHROCYTE [DISTWIDTH] IN BLOOD BY AUTOMATED COUNT: 14.4 % (ref 11.5–14.5)
GLUCOSE BLD STRIP.AUTO-MCNC: 143 MG/DL (ref 65–100)
GLUCOSE BLD STRIP.AUTO-MCNC: 156 MG/DL (ref 65–100)
GLUCOSE BLD STRIP.AUTO-MCNC: 161 MG/DL (ref 65–100)
GLUCOSE BLD STRIP.AUTO-MCNC: 233 MG/DL (ref 65–100)
GLUCOSE SERPL-MCNC: 150 MG/DL (ref 65–100)
HCT VFR BLD AUTO: 27.9 % (ref 35–47)
HGB BLD-MCNC: 9.5 G/DL (ref 11.5–16)
IMM GRANULOCYTES # BLD: 0 K/UL (ref 0–0.04)
IMM GRANULOCYTES NFR BLD AUTO: 0 % (ref 0–0.5)
LYMPHOCYTES # BLD: 0.2 K/UL (ref 0.8–3.5)
LYMPHOCYTES NFR BLD: 1 % (ref 12–49)
MCH RBC QN AUTO: 29.8 PG (ref 26–34)
MCHC RBC AUTO-ENTMCNC: 34.1 G/DL (ref 30–36.5)
MCV RBC AUTO: 87.5 FL (ref 80–99)
METAMYELOCYTES NFR BLD MANUAL: 1 %
MONOCYTES # BLD: 0.2 K/UL (ref 0–1)
MONOCYTES NFR BLD: 1 % (ref 5–13)
MYELOCYTES NFR BLD MANUAL: 1 %
NEUTS BAND NFR BLD MANUAL: 1 %
NEUTS SEG # BLD: 17.7 K/UL (ref 1.8–8)
NEUTS SEG NFR BLD: 95 % (ref 32–75)
NRBC # BLD: 0 K/UL (ref 0–0.01)
NRBC BLD-RTO: 0 PER 100 WBC
PLATELET # BLD AUTO: 184 K/UL (ref 150–400)
PMV BLD AUTO: 9.6 FL (ref 8.9–12.9)
POTASSIUM SERPL-SCNC: 3.9 MMOL/L (ref 3.5–5.1)
RBC # BLD AUTO: 3.19 M/UL (ref 3.8–5.2)
RBC MORPH BLD: ABNORMAL
SERVICE CMNT-IMP: ABNORMAL
SODIUM SERPL-SCNC: 137 MMOL/L (ref 136–145)
WBC # BLD AUTO: 18.4 K/UL (ref 3.6–11)

## 2019-01-01 PROCEDURE — 65660000000 HC RM CCU STEPDOWN

## 2019-01-01 PROCEDURE — 80048 BASIC METABOLIC PNL TOTAL CA: CPT

## 2019-01-01 PROCEDURE — 74011636637 HC RX REV CODE- 636/637: Performed by: HOSPITALIST

## 2019-01-01 PROCEDURE — 74011250637 HC RX REV CODE- 250/637: Performed by: INTERNAL MEDICINE

## 2019-01-01 PROCEDURE — 74011000250 HC RX REV CODE- 250: Performed by: INTERNAL MEDICINE

## 2019-01-01 PROCEDURE — 77010033678 HC OXYGEN DAILY

## 2019-01-01 PROCEDURE — 85025 COMPLETE CBC W/AUTO DIFF WBC: CPT

## 2019-01-01 PROCEDURE — 82550 ASSAY OF CK (CPK): CPT

## 2019-01-01 PROCEDURE — 82962 GLUCOSE BLOOD TEST: CPT

## 2019-01-01 PROCEDURE — 74011636637 HC RX REV CODE- 636/637: Performed by: INTERNAL MEDICINE

## 2019-01-01 PROCEDURE — 77030038269 HC DRN EXT URIN PURWCK BARD -A

## 2019-01-01 PROCEDURE — 74011250636 HC RX REV CODE- 250/636: Performed by: INTERNAL MEDICINE

## 2019-01-01 PROCEDURE — 36415 COLL VENOUS BLD VENIPUNCTURE: CPT

## 2019-01-01 PROCEDURE — 94640 AIRWAY INHALATION TREATMENT: CPT

## 2019-01-01 RX ORDER — PREDNISONE 20 MG/1
40 TABLET ORAL
Status: DISCONTINUED | OUTPATIENT
Start: 2019-01-02 | End: 2019-01-02

## 2019-01-01 RX ADMIN — INSULIN LISPRO 2 UNITS: 100 INJECTION, SOLUTION INTRAVENOUS; SUBCUTANEOUS at 09:05

## 2019-01-01 RX ADMIN — APIXABAN 2.5 MG: 2.5 TABLET, FILM COATED ORAL at 09:09

## 2019-01-01 RX ADMIN — BUMETANIDE 2 MG: 0.25 INJECTION INTRAMUSCULAR; INTRAVENOUS at 10:31

## 2019-01-01 RX ADMIN — APIXABAN 2.5 MG: 2.5 TABLET, FILM COATED ORAL at 20:59

## 2019-01-01 RX ADMIN — IPRATROPIUM BROMIDE AND ALBUTEROL SULFATE 3 ML: .5; 3 SOLUTION RESPIRATORY (INHALATION) at 01:46

## 2019-01-01 RX ADMIN — INSULIN LISPRO 2 UNITS: 100 INJECTION, SOLUTION INTRAVENOUS; SUBCUTANEOUS at 17:43

## 2019-01-01 RX ADMIN — Medication 10 ML: at 15:24

## 2019-01-01 RX ADMIN — METHYLPREDNISOLONE SODIUM SUCCINATE 40 MG: 40 INJECTION, POWDER, FOR SOLUTION INTRAMUSCULAR; INTRAVENOUS at 09:06

## 2019-01-01 RX ADMIN — HUMAN INSULIN 15 UNITS: 100 INJECTION, SUSPENSION SUBCUTANEOUS at 09:06

## 2019-01-01 RX ADMIN — DILTIAZEM HYDROCHLORIDE 240 MG: 240 CAPSULE, COATED, EXTENDED RELEASE ORAL at 09:06

## 2019-01-01 RX ADMIN — HUMAN INSULIN 15 UNITS: 100 INJECTION, SUSPENSION SUBCUTANEOUS at 21:00

## 2019-01-01 RX ADMIN — INSULIN LISPRO 3 UNITS: 100 INJECTION, SOLUTION INTRAVENOUS; SUBCUTANEOUS at 11:57

## 2019-01-01 RX ADMIN — BUMETANIDE 2 MG: 0.25 INJECTION INTRAMUSCULAR; INTRAVENOUS at 20:59

## 2019-01-01 RX ADMIN — Medication 10 ML: at 06:00

## 2019-01-01 RX ADMIN — FLUTICASONE FUROATE AND VILANTEROL TRIFENATATE 2 PUFF: 200; 25 POWDER RESPIRATORY (INHALATION) at 10:32

## 2019-01-01 RX ADMIN — Medication 10 ML: at 21:05

## 2019-01-01 NOTE — PROGRESS NOTES
Bedside shift change report given to [address] [place] [address] Rd (oncoming nurse) by Nayeli Allen (offgoing nurse). Report included the following information SBAR, Kardex, ED Summary, Procedure Summary, Intake/Output, MAR, Recent Results and Cardiac Rhythm NSR.

## 2019-01-01 NOTE — PROGRESS NOTES
Hospitalist Progress Note NAME: Phi Keys :  1935 MRN:  [id number] Assessment / Plan: 
Acute on chronic respiratory failure POA with baseline 2-4L O2 - improving Acute COPD exacerbation - improving Sepsis due to Bilateral HCAP POA - improved Underlying severe pulmonary hypertension PADP 81  
-clinically: very slowly improving , less dyspneic Slept with bipap Leukocytosis is likely due to steroids use since improving clinically  
very weak/debilitated ( reports that getting to chair is a great effort for her now) O2: 4-5 L  
-cont bipap at night and prn during daytime 
-cont aggressive IV steroids ( started to taper, slow tapering), aggressive jet nebs  
-Empiric IV AB: vanco - was dc with ALMAZ, no signs of gram + infection Completed zosyn  ( last dose ) BC NTD 
resp cultures: rare S. MALTOPHILIA. DOC - bactrim but cannot use with current cr She improved since admission on zosyn alone   
-repeat cxray in am  
-pulmonology following  
-keep O2 sats > 92%  
-cxray repeated : No pulmonary consolidation. Improved aeration since : 
Pt's respiratory status slowly improving. Steroids being tapered and converted to PO. Abx course completed. Still using bipap overnight. SOB on minimal movement. : 
Pt still using bipap overnight. Pulm recommendation it to try and use PRN. Will see how pt tolerates it tonight. Appears less SOB during conversation today. Continue PO steroids and nebulizer treatments. : 
Pt did not use bipap overnight. Pt eager to be discharge but is still SOB. Pt insistent that she go home instead of any other facility. Pt's daughter at bedside today and updated. Will see how pt does overnight without bipap again and likely for discharge tomorrow. : 
Pt respiratory status worsened overnight, requiring the use of bipap. This is likely linked to her increased edema.  Spoke with pt's son at bedside and updated on the plan. Pt no longer ready for discharge. Will diurese with IV Bumex. 12/30: 
Pt's respiratory status declined over the past 12 hours. Has needed bipap to keep her O2 sats up - continue bipap today. Pt also more lethargic and drowsy - concerned for possible CO2 narcosis - will get ABG. Will also order portable CXR. Addendum: 
No CO2 narcosis, CXR relatively stable. Will continue diuresis, nebs and increase IV steroids 12/31: 
Pt still requiring bipap. Pulmonary involved - will speak with them about any further recommendations. Will continue IV steroids and nebs and respiratory support for now. Hopeful that continued diuresis will help improve respiratory status. Will also discontinue pt's low dose Xanax orders as this is causing her to become drowsy and listless during the day 1/1: No bipap used overnight. Pt's mental and respiratory status have improved. Will discontinue IV steroids and switch to PO Prednisone. Pulm following. 
  
ALMAZ not POA - improving 
-Cr slowly getting better, down to 3.01 
baseline normal at 0.8 Likely due to Lasix + possible ATN with  vanco ( trough 21.4 )   
-Dr Swetha Gaines help was greatly appreciated: lasxi use prn ( last dose 12/24) -Daily weight ( Wt is all over the place - ? Correct measurement)  
  
12/28: 
Cr continues to improve - hopeful that this trend will continue and it will revert back to baseline 12/29: 
Fluid overload - improving Cr has improved; however pt with significant edema now - appreciate Nephrology eval - agree with IV diuresis 12/30: 
Continue with aggressive IV diuresis. 12/31: 
Net negative 2200mL last 24 hours. Will continue aggressive IV diuresis. Monitor I/Os. Daily weights. 1/1: 
Cr trending higher again - deescalated Bumex to 2mg IV q12hrs. Nephrology follow up appreciated. 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  
-HR/BP stable   
-Dr Sandip Hastings help last week was greatly appreciated Diurese only prn due to ALMAZ ( on lasix 20 mg daily at home ) Stopped Multaq started earlier by Dr Radha [facility] her heart failure and/or lung disease) Stopped amlodipine and changed to diltiazem  daily PPM program changed early in admission to minimize ventr pacing  
-AC: CHADS-Vasc 4 --> need to be on a/coag Last admission pt declined AC and wanted to be on ASA alone Dr Radha Cox started on Eliquis ( reduced dose with ALMAZ) Now off ASA  
-ECHO: EF 55%, mild AS, severe pulmonary HTN    
  
DM type II 
-BS controlled  120/150s  
-cont NPH with steroids ( decr dose when tapering steroids)  + SS  
-hba1c 6.6 11/2018  
  
Tobacco use Obesity. Body mass index is 32.04 kg/m². Code status: DNR per h/p on admission NOK:  Prophylaxis: heparin  
  
Baseline: functional, ; home O2 3.5-4 L  
Recommended Disposition: HHC vs SNF pending clinical improvement Subjective: Chief Complaint / Reason for Physician Visit Pt much more alert and active this morning. Did not use bipap overnight. At baseline O2. Discussed with RN events overnight. Review of Systems: 
Symptom Y/N Comments  Symptom Y/N Comments Fever/Chills n   Chest Pain n   
Poor Appetite    Edema y Cough    Abdominal Pain n   
Sputum    Joint Pain SOB/LUDWIG n   Pruritis/Rash Nausea/vomit    Tolerating PT/OT Diarrhea    Tolerating Diet Constipation    Other Could NOT obtain due to:   
 
Objective: VITALS:  
Last 24hrs VS reviewed since prior progress note. Most recent are: 
Patient Vitals for the past 24 hrs: 
 Temp Pulse Resp BP SpO2  
01/01/19 1100 97.7 °F (36.5 °C) 71 20 137/50 90 % 01/01/19 0905  71  126/58   
01/01/19 0723 97.5 °F (36.4 °C) 71 18 130/55 90 % 01/01/19 0722    130/55   
01/01/19 0432 97.3 °F (36.3 °C) 78 18 135/65 91 % 01/01/19 0146     90 % 01/01/19 0011 97.7 °F (36.5 °C) 73 16 145/55 91 % 12/31/18 2121  70  140/52   
12/31/18 2038     93 % 12/31/18 1858 98.7 °F (37.1 °C) 74 20 144/72 93 % 12/31/18 1733     93 % 12/31/18 1442 97.9 °F (36.6 °C) 77 20 154/53 (!) 88 % Intake/Output Summary (Last 24 hours) at 1/1/2019 1135 Last data filed at 1/1/2019 1122 Gross per 24 hour Intake 400 ml Output 2800 ml Net -2400 ml PHYSICAL EXAM: 
General: Drowsy, arousable, on bipap, anasarca EENT:  EOMI. Anicteric sclerae. MMM Resp:  Diminished BS 
CV:  Regular  Rhythm,  2+ LE edema - improving GI:  Soft, Non distended, Non tender.  +Bowel sounds Neurologic:  Alert and oriented X 3, lethargic, moving all extremities Psych:   Deferred Skin:  No rashes. No jaundice Reviewed most current lab test results and cultures  YES Reviewed most current radiology test results   YES Review and summation of old records today    NO Reviewed patient's current orders and MAR    YES 
PMH/SH reviewed - no change compared to H&P 
________________________________________________________________________ Care Plan discussed with: 
  Comments Patient x Family  x   
RN x Care Manager Consultant Multidiciplinary team rounds were held today with , nursing, pharmacist and clinical coordinator. Patient's plan of care was discussed; medications were reviewed and discharge planning was addressed. ________________________________________________________________________ Total NON critical care TIME:  25   Minutes Total CRITICAL CARE TIME Spent:   Minutes non procedure based Comments >50% of visit spent in counseling and coordination of care    
________________________________________________________________________ Aminah Cornejo MD  
 
Procedures: see electronic medical records for all procedures/Xrays and details which were not copied into this note but were reviewed prior to creation of Plan. LABS: 
I reviewed today's most current labs and imaging studies. Pertinent labs include: 
Recent Labs 01/01/19 0445 12/31/18 
9185 WBC 18.4* 19.9* HGB 9.5* 10.1* HCT 27.9* 30.1*  
 220 Recent Labs 01/01/19 0445 12/31/18 
1151  140  
K 3.9 3.9 CL 96* 98  
CO2 31 31 * 163* BUN 82* 80* CREA 2.48* 2.45* CA 8.3* 8.4* Signed: Tara Santo MD

## 2019-01-01 NOTE — PROGRESS NOTES
PCU SHIFT NURSING NOTE Bedside and Verbal shift change report given to [place] and Aimee Salgado  (oncoming nurse) by Linh Salguero  (offgoing nurse). Report included the following information SBAR, Kardex, Intake/Output and MAR. Shift Summary:  
0730 At bedside pt was sleeping, appeared to be in no pain  
0800 attempted to give morning meds, pt was sleeping and asked to come back later  
0900 pt up and eating, morning meds given 1130 pt bathed, linens changed purewick changed 1930 Bedside and Verbal shift change report given to [address] [place] [address]  (oncoming nurse) by Camron Beaulieu  (offgoing nurse). Report included the following information SBAR, Kardex, Intake/Output and MAR. Admission Date 12/17/2018 Admission Diagnosis Acute on chronic respiratory failure with hypoxia and hypercapnia (HCC) Consults IP CONSULT TO INTENSIVIST 
IP CONSULT TO NEPHROLOGY 
IP CONSULT TO CARDIOLOGY Consults []PT []OT []Speech  
[]Case Management  
  
[] Palliative Cardiac Monitoring Order []Yes []No  
 
IV drips []Yes Drip:                            Dose: 
Drip:                            Dose: 
Drip:                            Dose:  
[]No  
 
GI Prophylaxis []Yes []No  
 
 
 
DVT Prophylaxis SCDs:     
     
 Dvaid stockings:     
  
[] Medication []Contraindicated []None Activity Level Activity Level: Up with Assistance Activity Assistance: Partial (two people) Purposeful Rounding every 1-2 hour? []Yes Gonzalez Score  Total Score: 3 Bed Alarm (If score 3 or >) []Yes  
[] Refused (See signed refusal form in chart) Niraj Score  Niraj Score: 16 Niraj Score (if score 14 or less) []PMT consult  
[]Wound Care consult []Specialty bed  
[] Nutrition consult Needs prior to discharge:  
Home O2 required:   
[]Yes []No  
 If yes, how much O2 required? Other:  
 Last Bowel Movement: Last Bowel Movement Date: 12/30/18 Influenza Vaccine Received Flu Vaccine for Current Season (usually Sept-March): Yes Pneumonia Vaccine Diet Active Orders Diet DIET DIABETIC CONSISTENT CARB Regular; 2 GM NA (House Low NA) LDAs Peripheral IV 12/31/18 Anterior;Right Antecubital (Active) Site Assessment Clean, dry, & intact 1/1/2019  7:23 AM  
Phlebitis Assessment 0 1/1/2019  7:23 AM  
Infiltration Assessment 0 1/1/2019  7:23 AM  
Dressing Status Clean, dry, & intact 1/1/2019  7:23 AM  
Dressing Type Tape;Transparent 1/1/2019  7:23 AM  
Hub Color/Line Status Blue 1/1/2019  7:23 AM  
      
External Female Catheter 12/20/18 (Active) Site Assessment Clean, dry, & intact 1/1/2019 11:00 AM  
Repositioned Yes 1/1/2019 11:00 AM  
Perineal Care Yes 1/1/2019 11:00 AM  
Wick Changed Yes 1/1/2019 11:00 AM  
Suction Canister/Tubing Changed No 1/1/2019 11:00 AM  
Urine Output (mL) 350 ml 1/1/2019 11:22 AM  
            
Urinary Catheter Intake & Output Date 12/31/18 0700 - 01/01/19 1498 01/01/19 0700 - 01/02/19 9589 Shift 8775-2025 3708-5701 24 Hour Total 0778-9673 1741-8419 24 Hour Total  
INTAKE  
P.O. 650  650 240  240  
  P. O. 650  650 240  240 Shift Total(mL/kg) 650(7.9)  650(7.6) 240(2.8)  240(2.8) OUTPUT Urine(mL/kg/hr) 1250(1.3) 1200(1.2) 2450(1.2) 350  350 Urine Occurrence(s)    1 x  1 x Urine Output (mL) (External Female Catheter 12/20/18) 1250 1200 2450 350  350 Stool Stool Occurrence(s) 1 x  1 x 1 x  1 x Shift Total(mL/kg) 1250(15.1) 1200(14.1) 4552(07.1) 350(4.1)  350(4.1) NET -600 -1200 -1800 -110  -110 Weight (kg) 82.8 85.2 85.2 85.2 85.2 85.2 Readmission Risk Assessment Tool Score High Risk   
      
 22 Total Score 3 Has Seen PCP in Last 6 Months (Yes=3, No=0) 2 . Living with Significant Other. Assisted Living. LTAC. SNF. or  
Rehab  
 3 Patient Length of Stay (>5 days = 3) 4 IP Visits Last 12 Months (1-3=4, 4=9, >4=11) 5 Pt. Coverage (Medicare=5 , Medicaid, or Self-Pay=4) 5 Charlson Comorbidity Score (Age + Comorbid Conditions) Expected Length of Stay 4d 19h Actual Length of Stay 15

## 2019-01-01 NOTE — PROGRESS NOTES
Bedside report received from BOY Beauchamp RN Assessment, Background, Procedure summary, Intake/Output, MAR, and recent results discussed. Care assumed.

## 2019-01-01 NOTE — PROGRESS NOTES
Report given to Nancy Garnica RN and Jack Sloan RN who will assume care. Patient asleep but easily arousable.

## 2019-01-01 NOTE — PROGRESS NOTES
[facility] Pulmonary Consult Service NotePulmonary, Critical Care, and Sleep Medicine Name: Orvis Osgood MRN: [id number] : 1935 Hospital: [facility] Date: 2019  Admission date: 2018 Hospital Day: 12 Subjective/Interval History: No overnight events. Has not required BiPAP since yesterday morning. Sleeping today without need for BiPAP. IMPRESSION:  
1. Acute on chronic respiratory failure with Hypoxia POA 2. Severe COPD with acute exacerbation POA 3. Sepsis POA WBC 19.9,  
4. Bilateral pneumonia likely healthcare associated POA - Stenotrophomonas in sputum 5. Tobacco use 6. Severe Pulmonary HTN PASP 81  
7. Acute Diastolic Heart Failure 8. Pulmonary HTN 9. Diabetes mellitus type 2 POA 10. Essential hypertension POA 11. History of PAF 12. History of bradycardia status post pacemaker placement 2018 per  RECOMMENDATIONS/PLAN:  
1. O2 with PRN NIV 2. Dr. Shira Russell discussed need for low sodium diet with son; no more snacks like Doritos until fluid under control 3. Completed antibiotics 4. Bronchodilators 5. Systemic corticosteroids 6. Glucose monitoring and SSI 7. Cardiology following 8. DVT prophylaxis 9. Smoking cessation counseling done Subjective/Initial History:  
 
I was asked by Darline Rand MD to see Orvis Osgood  a 82 y.o.  female in consultation for a chief complaint of pneumonia and respiratory failure requiring BIPAP/ NIV Excerpts from admission 2018 or consult notes as follows:  
 
\"  Recently admitted  to 2018 with respiratory failure and COPD. she initially required BiPAP, was treated for COPD. Had Haemophilus in her sputum but no airspace disease, treated with a 5-day course of Levaquin. Had bradycardia and a pacemaker was placed. Eventually weaned back to her baseline oxygen, discharged home.  Baseline she is always short of breath with exertion. starting yesterday she began to notice increasing shortness of breath when she took a shower and with any minor exertion . no change in her baseline cough. No fevers or chills, sore throat, headache, chest pain. Was getting more short of breath when she laid down and  better when she sat up overnight. This morning, she was acutely short of breath, could not catch her breath even at rest .EMS was called and she was brought to the emergency room Acute respiratory distress in the emergency room. PO2 53 on her 4 L nasal cannula oxygen. Was not hypercarbic. Placed on BiPAP. Recently admitted mid November 2018 with acute respiratory failure and COPD. Discharged on baseline 4 L . Increasing shortness of breath past 2 days, no increased cough or fevers. Acutely short of breath today, arrived in ED in respiratory distress. ABG on baseline 4 L showed pH 7.44, PCO2 31, PO2 53\" Pt congested at times. Not active at all. Tachypneic on BIPAP. Daughter at bedside. No ill contacts at home. Upset that pacemaker did not fix her SOB. Tried to explain this to her. Sees my partner Dr. Yesica Mcneil. No GI sx. No edema. Allergies Allergen Reactions  Keflex [Cephalexin] Itching MAR reviewed and pertinent medications noted or modified as needed Current Facility-Administered Medications Medication  bumetanide (BUMEX) injection 2 mg  methylPREDNISolone (PF) (SOLU-MEDROL) injection 40 mg  
 ondansetron (ZOFRAN) injection 4 mg  dilTIAZem CD (CARDIZEM CD) capsule 240 mg  
 insulin NPH (NOVOLIN N, HUMULIN N) injection 15 Units  albuterol-ipratropium (DUO-NEB) 2.5 MG-0.5 MG/3 ML  
 apixaban (ELIQUIS) tablet 2.5 mg  
 nitroglycerin (NITROSTAT) tablet 0.4 mg  
 sodium chloride (NS) flush 5-10 mL  sodium chloride (NS) flush 5-10 mL  acetaminophen (TYLENOL) tablet 650 mg  
 naloxone (NARCAN) injection 0.4 mg  
 bisacodyl (DULCOLAX) suppository 10 mg  
  insulin lispro (HUMALOG) injection  glucose chewable tablet 16 g  
 dextrose (D50W) injection syrg 12.5-25 g  
 glucagon (GLUCAGEN) injection 1 mg  fluticasone-vilanterol (BREO ELLIPTA) 200mcg-25mcg/puff  hydrALAZINE (APRESOLINE) 20 mg/mL injection 20 mg Patient PCP: Machelle Rodriguez MD 
PMH:  has a past medical history of COPD and Hypertension. PSH:   has a past surgical history that includes hx gyn. FHX: family history includes Cancer in her brother and brother; Heart Disease in her mother. SHX:  reports that she has been smoking. She has been smoking about 0.25 packs per day. she has never used smokeless tobacco. She reports that she does not drink alcohol or use drugs. ROS:A comprehensive review of systems was negative except for that written in the HPI. Objective: 
 
Vital Signs: Telemetry:    normal sinus rhythmIntake/Output:  
Visit Vitals /55 Pulse 71 Temp 97.5 °F (36.4 °C) Resp 18 Ht 5' 4.5\" (1.638 m) Wt 85.2 kg (187 lb 12.8 oz) SpO2 90% BMI 31.74 kg/m² Temp (24hrs), Av.7 °F (36.5 °C), Min:97.1 °F (36.2 °C), Max:98.7 °F (37.1 °C) O2 Device: Nasal cannula O2 Flow Rate (L/min): 4 l/min Wt Readings from Last 4 Encounters:  
19 85.2 kg (187 lb 12.8 oz)  
18 78.5 kg (173 lb 1 oz) 18 83.5 kg (184 lb) 10/01/17 87.1 kg (192 lb 0.3 oz) Intake/Output Summary (Last 24 hours) at 2019 0815 Last data filed at 2019 2160 Gross per 24 hour Intake 650 ml Output 2450 ml Net -1800 ml Last shift:      No intake/output data recorded. Last 3 shifts: 1901 -  0700 In: 650 [P.O.:650] Out: 3950 [SFUN] Physical Exam:  
 
General: alert Eyes: anicteric; HEENT: NC/AT,no nasal flaring or drainage Neck: No goiter; no stridor;  no accessory muscle use Lymph nodes: No abnormally enlarged cervical or supraclavicular nodes Chest: increased AP diameter Lungs:decreased air movement, no wheezing Heart: Regular rate and rhythm Abdomen: soft, protuberant, nontender, obese : No Tamayo; Ext: no cyanosis, no clubbing; no joint swelling or erythema Skin: warm and dry; no clubbing Musculoskeletal: no gross deformities Neuro: speech fluent;  moves all fours, generalized weakness Psych: no agitation Data:  
Labs: 
Recent Labs 01/01/19 
0445 12/31/18 
1372 WBC 18.4* 19.9* HGB 9.5* 10.1* HCT 27.9* 30.1*  
 220 Recent Labs 01/01/19 
0445 12/31/18 
6217  140  
K 3.9 3.9 CL 96* 98  
CO2 31 31 * 163* BUN 82* 80* CREA 2.48* 2.45* CA 8.3* 8.4* Recent Labs 12/30/18 
1150 PH 7.47* PCO2 38  
PO2 54* HCO3 27* Imaging: 
I have personally reviewed the patients radiographs: 
None today Clifton White MD

## 2019-01-02 LAB
ANION GAP SERPL CALC-SCNC: 10 MMOL/L (ref 5–15)
BASOPHILS # BLD: 0 K/UL (ref 0–0.1)
BASOPHILS NFR BLD: 0 % (ref 0–1)
BUN SERPL-MCNC: 81 MG/DL (ref 6–20)
BUN/CREAT SERPL: 32 (ref 12–20)
CALCIUM SERPL-MCNC: 8.1 MG/DL (ref 8.5–10.1)
CHLORIDE SERPL-SCNC: 93 MMOL/L (ref 97–108)
CK SERPL-CCNC: 28 U/L (ref 26–192)
CO2 SERPL-SCNC: 33 MMOL/L (ref 21–32)
CREAT SERPL-MCNC: 2.53 MG/DL (ref 0.55–1.02)
DIFFERENTIAL METHOD BLD: ABNORMAL
EOSINOPHIL # BLD: 0 K/UL (ref 0–0.4)
EOSINOPHIL NFR BLD: 0 % (ref 0–7)
ERYTHROCYTE [DISTWIDTH] IN BLOOD BY AUTOMATED COUNT: 14.2 % (ref 11.5–14.5)
GLUCOSE BLD STRIP.AUTO-MCNC: 120 MG/DL (ref 65–100)
GLUCOSE BLD STRIP.AUTO-MCNC: 134 MG/DL (ref 65–100)
GLUCOSE BLD STRIP.AUTO-MCNC: 278 MG/DL (ref 65–100)
GLUCOSE BLD STRIP.AUTO-MCNC: 283 MG/DL (ref 65–100)
GLUCOSE SERPL-MCNC: 134 MG/DL (ref 65–100)
HCT VFR BLD AUTO: 27.6 % (ref 35–47)
HGB BLD-MCNC: 9.2 G/DL (ref 11.5–16)
IMM GRANULOCYTES # BLD: 0 K/UL (ref 0–0.04)
IMM GRANULOCYTES NFR BLD AUTO: 0 % (ref 0–0.5)
LYMPHOCYTES # BLD: 0.2 K/UL (ref 0.8–3.5)
LYMPHOCYTES NFR BLD: 1 % (ref 12–49)
MCH RBC QN AUTO: 29.8 PG (ref 26–34)
MCHC RBC AUTO-ENTMCNC: 33.3 G/DL (ref 30–36.5)
MCV RBC AUTO: 89.3 FL (ref 80–99)
METAMYELOCYTES NFR BLD MANUAL: 1 %
MONOCYTES # BLD: 0.6 K/UL (ref 0–1)
MONOCYTES NFR BLD: 3 % (ref 5–13)
MYELOCYTES NFR BLD MANUAL: 1 %
NEUTS BAND NFR BLD MANUAL: 1 %
NEUTS SEG # BLD: 17.8 K/UL (ref 1.8–8)
NEUTS SEG NFR BLD: 93 % (ref 32–75)
NRBC # BLD: 0 K/UL (ref 0–0.01)
NRBC BLD-RTO: 0 PER 100 WBC
PLATELET # BLD AUTO: 178 K/UL (ref 150–400)
PMV BLD AUTO: 10.1 FL (ref 8.9–12.9)
POTASSIUM SERPL-SCNC: 3.8 MMOL/L (ref 3.5–5.1)
RBC # BLD AUTO: 3.09 M/UL (ref 3.8–5.2)
RBC MORPH BLD: ABNORMAL
SERVICE CMNT-IMP: ABNORMAL
SODIUM SERPL-SCNC: 136 MMOL/L (ref 136–145)
WBC # BLD AUTO: 18.9 K/UL (ref 3.6–11)

## 2019-01-02 PROCEDURE — 65660000000 HC RM CCU STEPDOWN

## 2019-01-02 PROCEDURE — 74011636637 HC RX REV CODE- 636/637: Performed by: INTERNAL MEDICINE

## 2019-01-02 PROCEDURE — 74011636637 HC RX REV CODE- 636/637: Performed by: HOSPITALIST

## 2019-01-02 PROCEDURE — 82962 GLUCOSE BLOOD TEST: CPT

## 2019-01-02 PROCEDURE — 74011000250 HC RX REV CODE- 250: Performed by: INTERNAL MEDICINE

## 2019-01-02 PROCEDURE — 77030038269 HC DRN EXT URIN PURWCK BARD -A

## 2019-01-02 PROCEDURE — 36415 COLL VENOUS BLD VENIPUNCTURE: CPT

## 2019-01-02 PROCEDURE — 85025 COMPLETE CBC W/AUTO DIFF WBC: CPT

## 2019-01-02 PROCEDURE — 94640 AIRWAY INHALATION TREATMENT: CPT

## 2019-01-02 PROCEDURE — 82550 ASSAY OF CK (CPK): CPT

## 2019-01-02 PROCEDURE — 74011250637 HC RX REV CODE- 250/637: Performed by: INTERNAL MEDICINE

## 2019-01-02 PROCEDURE — 97530 THERAPEUTIC ACTIVITIES: CPT

## 2019-01-02 PROCEDURE — 74011636637 HC RX REV CODE- 636/637: Performed by: GENERAL ACUTE CARE HOSPITAL

## 2019-01-02 PROCEDURE — 74011250637 HC RX REV CODE- 250/637: Performed by: HOSPITALIST

## 2019-01-02 PROCEDURE — 77010033678 HC OXYGEN DAILY

## 2019-01-02 PROCEDURE — 80048 BASIC METABOLIC PNL TOTAL CA: CPT

## 2019-01-02 PROCEDURE — 97530 THERAPEUTIC ACTIVITIES: CPT | Performed by: OCCUPATIONAL THERAPIST

## 2019-01-02 RX ORDER — FLUTICASONE FUROATE AND VILANTEROL 200; 25 UG/1; UG/1
1 POWDER RESPIRATORY (INHALATION) DAILY
Status: DISCONTINUED | OUTPATIENT
Start: 2019-01-03 | End: 2019-01-03 | Stop reason: HOSPADM

## 2019-01-02 RX ORDER — BUMETANIDE 1 MG/1
1 TABLET ORAL DAILY
Status: DISCONTINUED | OUTPATIENT
Start: 2019-01-03 | End: 2019-01-02

## 2019-01-02 RX ORDER — BUMETANIDE 1 MG/1
2 TABLET ORAL DAILY
Status: DISCONTINUED | OUTPATIENT
Start: 2019-01-03 | End: 2019-01-03 | Stop reason: HOSPADM

## 2019-01-02 RX ADMIN — PREDNISONE 40 MG: 20 TABLET ORAL at 08:26

## 2019-01-02 RX ADMIN — Medication 10 ML: at 15:04

## 2019-01-02 RX ADMIN — APIXABAN 2.5 MG: 2.5 TABLET, FILM COATED ORAL at 08:27

## 2019-01-02 RX ADMIN — INSULIN LISPRO 5 UNITS: 100 INJECTION, SOLUTION INTRAVENOUS; SUBCUTANEOUS at 12:05

## 2019-01-02 RX ADMIN — Medication 1 LOZENGE: at 22:30

## 2019-01-02 RX ADMIN — FLUTICASONE FUROATE AND VILANTEROL TRIFENATATE 2 PUFF: 200; 25 POWDER RESPIRATORY (INHALATION) at 08:28

## 2019-01-02 RX ADMIN — INSULIN LISPRO 3 UNITS: 100 INJECTION, SOLUTION INTRAVENOUS; SUBCUTANEOUS at 21:43

## 2019-01-02 RX ADMIN — IPRATROPIUM BROMIDE AND ALBUTEROL SULFATE 3 ML: .5; 3 SOLUTION RESPIRATORY (INHALATION) at 07:25

## 2019-01-02 RX ADMIN — BUMETANIDE 2 MG: 0.25 INJECTION INTRAMUSCULAR; INTRAVENOUS at 08:27

## 2019-01-02 RX ADMIN — IPRATROPIUM BROMIDE AND ALBUTEROL SULFATE 3 ML: .5; 3 SOLUTION RESPIRATORY (INHALATION) at 14:11

## 2019-01-02 RX ADMIN — HUMAN INSULIN 15 UNITS: 100 INJECTION, SUSPENSION SUBCUTANEOUS at 08:28

## 2019-01-02 RX ADMIN — APIXABAN 2.5 MG: 2.5 TABLET, FILM COATED ORAL at 21:43

## 2019-01-02 RX ADMIN — DILTIAZEM HYDROCHLORIDE 240 MG: 240 CAPSULE, COATED, EXTENDED RELEASE ORAL at 08:27

## 2019-01-02 RX ADMIN — Medication 10 ML: at 21:43

## 2019-01-02 RX ADMIN — HUMAN INSULIN 15 UNITS: 100 INJECTION, SUSPENSION SUBCUTANEOUS at 21:43

## 2019-01-02 RX ADMIN — Medication 1 LOZENGE: at 04:02

## 2019-01-02 RX ADMIN — Medication 10 ML: at 06:36

## 2019-01-02 RX ADMIN — IPRATROPIUM BROMIDE AND ALBUTEROL SULFATE 3 ML: .5; 3 SOLUTION RESPIRATORY (INHALATION) at 01:48

## 2019-01-02 RX ADMIN — Medication 1 LOZENGE: at 16:05

## 2019-01-02 NOTE — PROGRESS NOTES
Hospitalist Progress Note NAME: Juan Antonio Douglas :  1935 MRN:  [id number] Assessment / Plan: 
Acute on chronic respiratory failure POA with baseline 2-4L O2 - improving Acute COPD exacerbation - improving Sepsis due to Bilateral HCAP POA - improved Underlying severe pulmonary hypertension PADP 81  
-clinically: very slowly improving , less dyspneic Slept with bipap Leukocytosis is likely due to steroids use since improving clinically  
very weak/debilitated ( reports that getting to chair is a great effort for her now) O2: 4-5 L  
-cont bipap at night and prn during daytime 
-cont aggressive IV steroids ( started to taper, slow tapering), aggressive jet nebs  
-Empiric IV AB: vanco - was dc with ALMAZ, no signs of gram + infection Completed zosyn  ( last dose ) BC NTD 
resp cultures: rare S. MALTOPHILIA. DOC - bactrim but cannot use with current cr She improved since admission on zosyn alone   
-repeat cxray in am  
-pulmonology following  
-keep O2 sats > 92%  
-cxray repeated : No pulmonary consolidation. Improved aeration since : 
Pt's respiratory status slowly improving. Steroids being tapered and converted to PO. Abx course completed. Still using bipap overnight. SOB on minimal movement. : 
Pt still using bipap overnight. Pulm recommendation it to try and use PRN. Will see how pt tolerates it tonight. Appears less SOB during conversation today. Continue PO steroids and nebulizer treatments. : 
Pt did not use bipap overnight. Pt eager to be discharge but is still SOB. Pt insistent that she go home instead of any other facility. Pt's daughter at bedside today and updated. Will see how pt does overnight without bipap again and likely for discharge tomorrow. : 
Pt respiratory status worsened overnight, requiring the use of bipap. This is likely linked to her increased edema.  Spoke with pt's son at bedside and updated on the plan. Pt no longer ready for discharge. Will diurese with IV Bumex. 12/30: 
Pt's respiratory status declined over the past 12 hours. Has needed bipap to keep her O2 sats up - continue bipap today. Pt also more lethargic and drowsy - concerned for possible CO2 narcosis - will get ABG. Will also order portable CXR. Addendum: 
No CO2 narcosis, CXR relatively stable. Will continue diuresis, nebs and increase IV steroids 12/31: 
Pt still requiring bipap. Pulmonary involved - will speak with them about any further recommendations. Will continue IV steroids and nebs and respiratory support for now. Hopeful that continued diuresis will help improve respiratory status. Will also discontinue pt's low dose Xanax orders as this is causing her to become drowsy and listless during the day 1/1: No bipap used overnight. Pt's mental and respiratory status have improved. Will discontinue IV steroids and switch to PO Prednisone. Pulm following. 1/2 Cont prednisone, duoneb. Bipap as needed. 
  
ALMAZ not POA - improving 
-Cr slowly getting better, down to 3.01 
baseline normal at 0.8 Likely due to Lasix + possible ATN with  vanco ( trough 21.4 )   
-Dr Castrejon Hard help was greatly appreciated: lasxi use prn ( last dose 12/24) -Daily weight ( Wt is all over the place - ? Correct measurement)  
  
12/28: 
Cr continues to improve - hopeful that this trend will continue and it will revert back to baseline 12/29: 
Fluid overload - improving Cr has improved; however pt with significant edema now - appreciate Nephrology eval - agree with IV diuresis 12/30: 
Continue with aggressive IV diuresis. 12/31: 
Net negative 2200mL last 24 hours. Will continue aggressive IV diuresis. Monitor I/Os. Daily weights. 1/1: 
Cr trending higher again - deescalated Bumex to 2mg IV q12hrs. Nephrology follow up appreciated. 1/2: 
Cr is trending up today. Off iv Bumex, Renal consulted again.  Started on po Bumex from tomorrow. Check bmp in am. Cont po prednisone. Acute on chronic diastolic HF EF 68% - improved HTN / PAfib - currently in NSR, rate controlled H/o bradycardia s/p PPM 11/2018 per  
-HR/BP stable   
-Dr Joanne Vanegas help last week was greatly appreciated Diurese only prn due to ALMAZ ( on lasix 20 mg daily at home ) Stopped Multaq started earlier by Dr Anna Marie Angeles [facility] her heart failure and/or lung disease) Stopped amlodipine and changed to diltiazem  daily PPM program changed early in admission to minimize ventr pacing  
-AC: CHADS-Vasc 4 --> need to be on a/coag Last admission pt declined AC and wanted to be on ASA alone Dr Anna Marie Angeles started on Eliquis ( reduced dose with ALMAZ) Now off ASA  
-ECHO: EF 55%, mild AS, severe pulmonary HTN    
  
DM type II 
-BS controlled  120/150s  
-cont NPH with steroids ( decr dose when tapering steroids)  + SS  
-hba1c 6.6 11/2018  
  
Tobacco use Obesity. Body mass index is 32.04 kg/m². Code status: DNR per h/p on admission NOK:  Prophylaxis: heparin  
  
Baseline: functional, ; home O2 3.5-4 L  
Recommended Disposition: C vs SNF pending clinical improvement Subjective: Chief Complaint / Reason for Physician Visit Sob is better. Mild cough. No chest pain. Review of Systems: 
Symptom Y/N Comments  Symptom Y/N Comments Fever/Chills n   Chest Pain n   
Poor Appetite    Edema y Cough    Abdominal Pain n   
Sputum    Joint Pain SOB/LUDWIG y   Pruritis/Rash Nausea/vomit    Tolerating PT/OT Diarrhea    Tolerating Diet Constipation    Other Could NOT obtain due to:   
 
Objective: VITALS:  
Last 24hrs VS reviewed since prior progress note. Most recent are: 
Patient Vitals for the past 24 hrs: 
 Temp Pulse Resp BP SpO2  
01/02/19 1500 98.2 °F (36.8 °C) 68 20 136/53 93 % 01/02/19 1411     91 % 01/02/19 1045 97.8 °F (36.6 °C) 73 20 140/46 95 % 01/02/19 0827  67  131/50   
01/02/19 0726     94 % 01/02/19 0709 97.4 °F (36.3 °C) 62 20 146/55 93 % 01/02/19 0355 97.8 °F (36.6 °C) 64 20 147/55 93 % 01/02/19 0148     93 % 01/01/19 2316 97.8 °F (36.6 °C) 70 20 153/65 95 % 01/01/19 2105 98.2 °F (36.8 °C) 68 22 134/55 94 % 01/01/19 1528 98.6 °F (37 °C) 71 18 134/51 90 % 01/01/19 1527  71   (!) 87 % Intake/Output Summary (Last 24 hours) at 1/2/2019 1525 Last data filed at 1/2/2019 1312 Gross per 24 hour Intake 620 ml Output 200 ml Net 420 ml PHYSICAL EXAM: 
General: Drowsy, arousable, on bipap, anasarca EENT:  EOMI. Anicteric sclerae. MMM Resp:  Diminished BS, crackles +, no wheeze CV:  Regular  Rhythm,  2+ LE edema GI:  Soft, Non distended, Non tender.  +Bowel sounds Neurologic:  Alert and oriented X 3, lethargic, moving all extremities Psych:   Deferred Skin:  No rashes. No jaundice Reviewed most current lab test results and cultures  YES Reviewed most current radiology test results   YES Review and summation of old records today    NO Reviewed patient's current orders and MAR    YES 
PMH/SH reviewed - no change compared to H&P 
________________________________________________________________________ Care Plan discussed with: 
  Comments Patient x Family RN x Care Manager Consultant Multidiciplinary team rounds were held today with , nursing, pharmacist and clinical coordinator. Patient's plan of care was discussed; medications were reviewed and discharge planning was addressed. ________________________________________________________________________ Total NON critical care TIME:  35   Minutes Total CRITICAL CARE TIME Spent:   Minutes non procedure based Comments >50% of visit spent in counseling and coordination of care    
________________________________________________________________________ Romana Peek, MD  
 
 Procedures: see electronic medical records for all procedures/Xrays and details which were not copied into this note but were reviewed prior to creation of Plan. LABS: 
I reviewed today's most current labs and imaging studies. Pertinent labs include: 
Recent Labs 01/02/19 0140 01/01/19 0445 12/31/18 
8461 WBC 18.9* 18.4* 19.9* HGB 9.2* 9.5* 10.1* HCT 27.6* 27.9* 30.1*  
 184 220 Recent Labs 01/02/19 0140 01/01/19 0445 12/31/18 
8068  137 140  
K 3.8 3.9 3.9 CL 93* 96* 98  
CO2 33* 31 31 * 150* 163* BUN 81* 82* 80* CREA 2.53* 2.48* 2.45* CA 8.1* 8.3* 8.4* Signed: Nayely Nunes MD

## 2019-01-02 NOTE — PROGRESS NOTES
[email or web address]                  Phone - [phone] Renal Progress Note Subjective:  
Patient: Jo Rankin                    YOB: 1935 Date- 2019 Reason for visit: ARF Admission Date: 2018 Assessment:  
  
ALMAZ likely either diuretic induced and/or ATN 
 -edema improving; creat fairly stable 
 -adjust diuretics prn Acute on chronic respiratory failure with hypoxia and hypercapnia Diastolic heart failure on admission. 
  
Severe COPD with an exacerbation. Bilateral pneumonia.  
  
Severe pulmonary hypertension.    
  
PAF S/p pacemaker placement in . 
  
Diabetes Mellitus, type II. Hypertension. Obesity. 
  
  
  
Plan:  
  
 As Above Interim History:  Edema better as is her SOB; she looks better 
 
  
 
Review of Systems A comprehensive review of systems was negative except for that written in the HPI. Objective:  
 
Visit Vitals /46 (BP 1 Location: Right arm, BP Patient Position: At rest;Sitting) Pulse 73 Temp 97.8 °F (36.6 °C) Resp 20 Ht 5' 4.5\" (1.638 m) Wt 82.9 kg (182 lb 12.2 oz) SpO2 95% BMI 30.89 kg/m² Temp (24hrs), Av.9 °F (36.6 °C), Min:97.4 °F (36.3 °C), Max:98.6 °F (37 °C) 
 
 
701 -  1900 In: 300 [P.O.:300] Out: -  
1901 -  0700 In: 360 [P.O.:360] [facility]: 0727 [facility] Physical Exam: 
General:  alert, cooperative, up in a chair and breathing more comfortably Eye:  conjunctivae/corneas clear. EOM's intact. Neurologic:  grossly non-focal; alert and appropriate; normal speech Neck:  supple; no JVD Lungs:  diminished breath sounds Heart:  regular rate and rhythm; no gallop or rub Skin:   multiple ecchymoses. Abdomen:  soft, non-tender. No masses,  no organomegaly Extremities: 2+ edema bit decreasing Data Review:  
 
Recent Labs 19 
0140 19 
0445 18 
5198 WBC 18.9* 18.4* 19.9* HGB 9.2* 9.5* 10.1*  
  137 140  
K 3.8 3.9 3.9 CL 93* 96* 98  
CO2 33* 31 31 BUN 81* 82* 80* CREA 2.53* 2.48* 2.45* CA 8.1* 8.3* 8.4* Assessment:  
 
Active Problems: 
  Acute on chronic respiratory failure with hypoxia and hypercapnia ([facility] [address] (12/17/2018) Chart reviewed. Pertinent Notes Reviewed. Medications list Personally Reviewed. Jaime Queen MD 
 
[facility] [address] [place] [address], [address] [place], [address] [place] [address] Phone - [phone]    Fax - [phone] [facility]ww[facility] [address] [facility], [facility] A [facility], [facility] Phone - [phone]  Fax - [phone] [facility] [place]

## 2019-01-02 NOTE — PROGRESS NOTES
Bedside shift change report given to Janet Ferrera (oncoming nurse) by RN (offgoing nurse). Report included the following information SBAR, Kardex, ED Summary, Procedure Summary, Intake/Output, MAR, Recent Results and Cardiac Rhythm NSR.  
 
0955- Incontinence care and full CHG bath given. Linens changed. Pt assisted to recliner with 2 assist.  
 
32 61 16- Pt assisted back to bed with PT/OT. 1920- Bedside shift change report given to Miguelito Cuevas (oncoming nurse) by Janet Ferrera (offgoing nurse). Report included the following information SBAR, Kardex, ED Summary, Procedure Summary, Intake/Output, MAR, Recent Results and Cardiac Rhythm NSR.

## 2019-01-02 NOTE — PROGRESS NOTES
Occupational Therapy Goals: 
Re-eval 1/2/2019 1. Patient will perform sit to stand with CGA for increased independence with ADLs within 7 days. 2. Patient will wash face standing with CGA within 7 days. 3. Patient will perform toileting with moderate assist within 7 days. 4. Patient will transfer from bedside commode with minimal assist within 7 days. 5. Patient will perform lower body dressing with moderate assist within 7 days. Discontinue all below goals 1/2/2019 Initiated 12/19/2018, Re-evaluation 12/27/18, continue all goals 1. Patient will perform grooming standing with rest breaks as needed with independence within 7 days. 2. Patient will perform upper body dressing and lower body dressing with modified independence within 7 days. 3. Patient will perform toileting with independence within 7 days. 4. Patient will utilize energy conservation techniques during functional activities with no cues within 7 day(s). 5. Patient will participate in ADLs and functional mobility with 1 rest breaks for 5 minutes within 7 day(s). 6. Patient will independently identify 3 basic energy conservation techniques that can be incorporated during ADLs within 7 day(s). 7. Patient will transfer from toilet with independence within 7 days. Occupational Therapy ReEVALUATION Patient: Bharat Castro (70 y.o. female) Date: 1/2/2019 Diagnosis: Acute on chronic respiratory failure with hypoxia and hypercapnia (HCC) <principal problem not specified> Precautions: DNR 
 
ASSESSMENT : 
Based on the objective data described below, the patient presents with confusion, decreased attention and poor command following for multi step commands. Pt continuously asks how to perform basic mobility such as sit to stand. Pt deflects and procrastinates performing tasks.   She has significantly limited insight into her deficits and how this relates to per limited participation in OT services and limited mobility in the hospital setting. Pt has digressed since previous session and has declined services on multiple occasions. Pt attempted sit to stand x4 from elevated bedside recliner chair but would loose focus on task, over think basic mobility and was unable to achieve standing without assist.  Digressed to moving bedside recliner chair next to bed and assisted pt with stand pivot transfer without assist devices with moderate assist.  Max physical cues to process and problem solve laying down with moderate assist.  Unable to get pt to focus on ADLS today. Pt is grossly weak and has potential to improve but is limited by her mentation and decreased understanding of the importance of activity. BUE are functional.  Pt is performing UB ADLS at a independent to min assist level and lower body ADLs at a max/total assist level. Pt will need SNF for rehab and if pt goes home she will need extensive physical assist.  No goals met and goals have been downgraded. O2 sat on 3 liters NC was 91-94% this session. Pt was able to state 2 energy conservation techniques. Patient will benefit from skilled intervention to address the above impairments. Patients rehabilitation potential is considered to be Good Factors which may influence rehabilitation potential include:  
[]                None noted [x]                Mental ability/status []                Medical condition []                Home/family situation and support systems [x]                Safety awareness []                Pain tolerance/management 
[]                Other: PLAN : 
Recommendations and Planned Interventions: 
[x]                  Self Care Training                  [x]           Therapeutic Activities [x]                  Functional Mobility Training    []           Cognitive Retraining 
[x]                  Therapeutic Exercises           []           Endurance Activities [x]                  Balance Training                   [] Neuromuscular Re-Education []                  Visual/Perceptual Training     [x]      Home Safety Training 
[x]                  Patient Education                 [x]           Family Training/Education []                  Other (comment): Frequency/Duration: Patient will be followed by occupational therapy 3 times a week trial to address goals. Discharge Recommendations: [facility] Further Equipment Recommendations for Discharge: if pt goes home she will need medical transport (unable to manage steps), hospital bed, marco a lift?, bedside commode and significant physical assist  
 
SUBJECTIVE:  
Patient stated I can't do this following directions thing. I think I have lost my mind. What do you think? Gee Mealing OBJECTIVE DATA SUMMARY:  
Hospital course since last seen and reason for reevaluation: declining services, decreased need for O2 needs and BiPAP Cognitive/Behavioral Status: 
Neurologic State: Alert Orientation Level: Oriented X4 Cognition: Decreased attention/concentration;Decreased command following; Impaired decision making;Memory loss;Poor safety awareness Perception: Appears intact Perseveration: Perseverates during conversation(\"What do you want me to do/\") Safety/Judgement: Lack of insight into deficits Vision/Perceptual:   
Tracking: Able to track stimulus in all quadrants w/o difficulty Corrective Lenses: Reading glasses Range of Motion: 
 
AROM: Generally decreased, functional 
  
  
  
  
  
  
  
Strength: 
 
Strength: Generally decreased, functional 
  
  
  
  
Coordination: 
Coordination: Within functional limits Fine Motor Skills-Upper: Left Intact; Right Intact Gross Motor Skills-Upper: Left Intact; Right Intact Tone & Sensation: 
 
Tone: Normal 
Sensation: Intact Balance: 
Sitting: Intact Standing: Impaired Standing - Static: Constant support;Poor Standing - Dynamic : None Functional Mobility and Transfers for ADLs:Bed Mobility: 
Sit to Supine: Minimum assistance; Moderate assistance;Assist x2 Scooting: Moderate assistance Transfers: 
Sit to Stand: Moderate assistance Functional Transfers Bathroom Mobility: (unable ) Toilet Transfer : Moderate assistance(to [facility]) Bed to Chair: Moderate assistance; Additional time(stand pivot without assist devices) ADL Assessment: 
Feeding: Independent(once provided) Oral Facial Hygiene/Grooming: Stand-by assistance(confusion; decreased attention) Bathing: Maximum assistance Upper Body Dressing: Minimum assistance Lower Body Dressing: Total assistance;Maximum assistance Toileting: Maximum assistance; Total assistance ADL Intervention: 
  
 
See assessment Cognitive Retraining Orientation Retraining: Situation Problem Solving: Identifying the problem; Identifying the task;General alternative solution Safety/Judgement: Lack of insight into deficits Functional Measure: 
Barthel Index: 
 
Bathin Bladder: 5 Bowels: 10 
Groomin Dressin Feedin Mobility: 0 Stairs: 0 Toilet Use: 0 Transfer (Bed to Chair and Back): 5 Total: 25 The Barthel ADL Index: Guidelines 1. The index should be used as a record of what a patient does, not as a record of what a patient could do. 2. The main aim is to establish degree of independence from any help, physical or verbal, however minor and for whatever reason. 3. The need for supervision renders the patient not independent. 4. A patient's performance should be established using the best available evidence. Asking the patient, friends/relatives and nurses are the usual sources, but direct observation and common sense are also important. However direct testing is not needed. 5. Usually the patient's performance over the preceding 24-48 hours is important, but occasionally longer periods will be relevant. 6. Middle categories imply that the patient supplies over 50 per cent of the effort. 7. Use of aids to be independent is allowed. Bonita Hernández., Barthel, D.W. (5048). Functional evaluation: the Barthel Index. [address] [facility] (14)2. Elmirakayladixon SHOBHA Almaguer, Sana Cardosorra., [facility] Riser., [place], [address] [facility] (1999). Measuring the change indisability after inpatient rehabilitation; comparison of the responsiveness of the Barthel Index and Functional [place] Measure. Journal of Neurology, Neurosurgery, and Psychiatry, 66(4), 971-288. PHUC Gillespie, JOSUE Dawkins, & Mika Charles MSANTO. (2004.) Assessment of post-stroke quality of life in cost-effectiveness studies: The usefulness of the Barthel Index and the EuroQoL-5D. [facility], 13, 463-37 Pain: 
Pain Scale 1: Numeric (0 - 10) Pain Intensity 1: 0 Activity Tolerance:  
 
Please refer to the flowsheet for vital signs taken during this treatment. After treatment:  
[] Patient left in no apparent distress sitting up in chair 
[x] Patient left in no apparent distress in bed 
[x] Call bell left within reach [x] Nursing notified 
[] Caregiver present 
[] Bed alarm activated COMMUNICATION/EDUCATION:  
The patients plan of care was discussed with: Physical Therapist, Registered Nurse and patient. []    Home safety education was provided and the patient/caregiver indicated understanding. []    Patient/family have participated as able in goal setting and plan of care. []    Patient/family agree to work toward stated goals and plan of care. []    Patient understands intent and goals of therapy, but is neutral about his/her participation. [x]    Patient is unable to participate in goal setting and plan of care. This patients plan of care is appropriate for delegation to [facility]. Thank you for this referral. 
Gabi Smith, OTR/L Time Calculation: 33 mins

## 2019-01-02 NOTE — PROGRESS NOTES
[facility] Consult Service NotePulmonary, Critical Care, and Sleep Medicine Name: Jad Dumont MRN: [id number] : 1935 Hospital: [facility] includes the [facility] Date: 2019  Admission date: 2018 Hospital Day: 16 Subjective/Interval History:  
 
 No overnight events. Has not required BiPAP since yesterday morning. Sleeping today without need for BiPAP.  no BIPAP last night. On 4 LPM at home. Legs still swollen. BUN/ Cr higher? No fever. Sees my partner Dr. Gaylord Boas as oupt IMPRESSION:  
1. Acute on chronic respiratory failure with Hypoxia POA 2. Severe COPD with acute exacerbation POA 3. Sepsis POA WBC 19.9,  
4. Bilateral pneumonia likely healthcare associated POA - Stenotrophomonas in sputum 5. Tobacco use 6. Severe Pulmonary HTN PASP 81  
7. Acute Diastolic Heart Failure 8. Pulmonary HTN 9. Diabetes mellitus type 2 POA 10. Essential hypertension POA 11. History of PAF 12. History of bradycardia status post pacemaker placement 2018 per  RECOMMENDATIONS/PLAN:  
1. O2 with PRN NIV 
2. Stop IV bumex 3. BMP in AM- may try PO BUmex in AM 
4. Mobilize- PT, OT 5. Reinforced need for low sodium diet with pt and pt backing down on Doritos until fluid under control 6. Completed antibiotics 7. Bronchodilators- reduced Breo dose 8. Systemic corticosteroids 9. Glucose monitoring and SSI 10. Cardiology following 11. DVT prophylaxis 12. Smoking cessation counseling done Subjective/Initial History:  
 
I was asked by Cassandra Burkitt, MD to see Jad Dumont  a 82 y.o.  female in consultation for a chief complaint of pneumonia and respiratory failure requiring BIPAP/ NIV Excerpts from admission 2018 or consult notes as follows:  
 
\"  Recently admitted  to 2018 with respiratory failure and COPD. she initially required BiPAP, was treated for COPD.   Had Haemophilus in her sputum but no airspace disease, treated with a 5-day course of Levaquin. Had bradycardia and a pacemaker was placed. Eventually weaned back to her baseline oxygen, discharged home. Baseline she is always short of breath with exertion. starting yesterday she began to notice increasing shortness of breath when she took a shower and with any minor exertion . no change in her baseline cough. No fevers or chills, sore throat, headache, chest pain. Was getting more short of breath when she laid down and  better when she sat up overnight. This morning, she was acutely short of breath, could not catch her breath even at rest .EMS was called and she was brought to the emergency room Acute respiratory distress in the emergency room. PO2 53 on her 4 L nasal cannula oxygen. Was not hypercarbic. Placed on BiPAP. Recently admitted mid November 2018 with acute respiratory failure and COPD. Discharged on baseline 4 L . Increasing shortness of breath past 2 days, no increased cough or fevers. Acutely short of breath today, arrived in ED in respiratory distress. ABG on baseline 4 L showed pH 7.44, PCO2 31, PO2 53\" Pt congested at times. Not active at all. Tachypneic on BIPAP. Daughter at bedside. No ill contacts at home. Upset that pacemaker did not fix her SOB. Tried to explain this to her. Sees my partner Dr. Dorothea Mock. No GI sx. No edema. Allergies Allergen Reactions  Keflex [Cephalexin] Itching MAR reviewed and pertinent medications noted or modified as needed Current Facility-Administered Medications Medication  benzocaine-menthol (CHLORASEPTIC MAX) lozenge 1 Lozenge  predniSONE (DELTASONE) tablet 40 mg  
 ondansetron (ZOFRAN) injection 4 mg  dilTIAZem CD (CARDIZEM CD) capsule 240 mg  
 insulin NPH (NOVOLIN N, HUMULIN N) injection 15 Units  albuterol-ipratropium (DUO-NEB) 2.5 MG-0.5 MG/3 ML  
 apixaban (ELIQUIS) tablet 2.5 mg  
 nitroglycerin (NITROSTAT) tablet 0.4 mg  
  sodium chloride (NS) flush 5-10 mL  sodium chloride (NS) flush 5-10 mL  acetaminophen (TYLENOL) tablet 650 mg  
 naloxone (NARCAN) injection 0.4 mg  
 bisacodyl (DULCOLAX) suppository 10 mg  
 insulin lispro (HUMALOG) injection  glucose chewable tablet 16 g  
 dextrose (D50W) injection syrg 12.5-25 g  
 glucagon (GLUCAGEN) injection 1 mg  fluticasone-vilanterol (BREO ELLIPTA) 200mcg-25mcg/puff  hydrALAZINE (APRESOLINE) 20 mg/mL injection 20 mg Patient PCP: Chip Uriarte MD 
PMH:  has a past medical history of COPD and Hypertension. PSH:   has a past surgical history that includes hx gyn. FHX: family history includes Cancer in her brother and brother; Heart Disease in her mother. SHX:  reports that she has been smoking. She has been smoking about 0.25 packs per day. she has never used smokeless tobacco. She reports that she does not drink alcohol or use drugs. ROS:A comprehensive review of systems was negative except for that written in the HPI. Objective: 
 
Vital Signs: Telemetry:    normal sinus rhythmIntake/Output:  
Visit Vitals /50 Pulse 67 Temp 97.4 °F (36.3 °C) Resp 20 Ht 5' 4.5\" (1.638 m) Wt 82.9 kg (182 lb 12.2 oz) SpO2 94% BMI 30.89 kg/m² Temp (24hrs), Av.9 °F (36.6 °C), Min:97.4 °F (36.3 °C), Max:98.6 °F (37 °C) O2 Device: Nasal cannula O2 Flow Rate (L/min): 4 l/min Wt Readings from Last 4 Encounters:  
19 82.9 kg (182 lb 12.2 oz)  
18 78.5 kg (173 lb 1 oz) 18 83.5 kg (184 lb) 10/01/17 87.1 kg (192 lb 0.3 oz) Intake/Output Summary (Last 24 hours) at 2019 1173 Last data filed at 2019 6811 Gross per 24 hour Intake 420 ml Output 550 ml Net -130 ml Last shift:      701 - 1900 In: 300 [P.O.:300] Out: - Last 3 shifts: 1901 - 700 In: 360 [P.O.:360] Out: 2541 [facility] Physical Exam:  
 
General: alert Eyes: anicteric; 
 HEENT: NC/AT,no nasal flaring or drainage Neck: No goiter; no stridor;  no accessory muscle use Lymph nodes: No abnormally enlarged cervical or supraclavicular nodes Chest: increased AP diameter Lungs:decreased air movement, no wheezing Heart: Regular rate and rhythm Abdomen: soft, protuberant, nontender, obese : No Tamayo; Ext: no cyanosis, no clubbing; no joint swelling or erythema Skin: warm and dry; no clubbing Musculoskeletal: no gross deformities Neuro: speech fluent;  moves all fours, generalized weakness Psych: no agitation Data:  
Labs: 
Recent Labs 01/02/19 
0140 01/01/19 0445 12/31/18 
9520 WBC 18.9* 18.4* 19.9* HGB 9.2* 9.5* 10.1* HCT 27.6* 27.9* 30.1*  
 184 220 Recent Labs 01/02/19 
0140 01/01/19 0445 12/31/18 
6515  137 140  
K 3.8 3.9 3.9 CL 93* 96* 98  
CO2 33* 31 31 * 150* 163* BUN 81* 82* 80* CREA 2.53* 2.48* 2.45* CA 8.1* 8.3* 8.4* Recent Labs 12/30/18 
1150 PH 7.47* PCO2 38  
PO2 54* HCO3 27* Imaging: 
I have personally reviewed the patients radiographs: 
None today Korey Kenyon MD

## 2019-01-03 VITALS
SYSTOLIC BLOOD PRESSURE: 137 MMHG | RESPIRATION RATE: 20 BRPM | HEART RATE: 68 BPM | BODY MASS INDEX: 31.4 KG/M2 | HEIGHT: 65 IN | TEMPERATURE: 97.7 F | WEIGHT: 188.49 LBS | DIASTOLIC BLOOD PRESSURE: 53 MMHG | OXYGEN SATURATION: 94 %

## 2019-01-03 LAB
ANION GAP SERPL CALC-SCNC: 10 MMOL/L (ref 5–15)
BUN SERPL-MCNC: 81 MG/DL (ref 6–20)
BUN/CREAT SERPL: 34 (ref 12–20)
CALCIUM SERPL-MCNC: 8.1 MG/DL (ref 8.5–10.1)
CHLORIDE SERPL-SCNC: 96 MMOL/L (ref 97–108)
CK SERPL-CCNC: 34 U/L (ref 26–192)
CO2 SERPL-SCNC: 32 MMOL/L (ref 21–32)
CREAT SERPL-MCNC: 2.38 MG/DL (ref 0.55–1.02)
GLUCOSE BLD STRIP.AUTO-MCNC: 114 MG/DL (ref 65–100)
GLUCOSE BLD STRIP.AUTO-MCNC: 195 MG/DL (ref 65–100)
GLUCOSE BLD STRIP.AUTO-MCNC: 86 MG/DL (ref 65–100)
GLUCOSE SERPL-MCNC: 113 MG/DL (ref 65–100)
POTASSIUM SERPL-SCNC: 3.6 MMOL/L (ref 3.5–5.1)
SERVICE CMNT-IMP: ABNORMAL
SERVICE CMNT-IMP: ABNORMAL
SERVICE CMNT-IMP: NORMAL
SODIUM SERPL-SCNC: 138 MMOL/L (ref 136–145)

## 2019-01-03 PROCEDURE — 82550 ASSAY OF CK (CPK): CPT

## 2019-01-03 PROCEDURE — 74011250637 HC RX REV CODE- 250/637: Performed by: INTERNAL MEDICINE

## 2019-01-03 PROCEDURE — 77010033678 HC OXYGEN DAILY

## 2019-01-03 PROCEDURE — 80048 BASIC METABOLIC PNL TOTAL CA: CPT

## 2019-01-03 PROCEDURE — 77030038269 HC DRN EXT URIN PURWCK BARD -A

## 2019-01-03 PROCEDURE — 94760 N-INVAS EAR/PLS OXIMETRY 1: CPT

## 2019-01-03 PROCEDURE — 74011636637 HC RX REV CODE- 636/637: Performed by: INTERNAL MEDICINE

## 2019-01-03 PROCEDURE — 36415 COLL VENOUS BLD VENIPUNCTURE: CPT

## 2019-01-03 PROCEDURE — 74011636637 HC RX REV CODE- 636/637: Performed by: HOSPITALIST

## 2019-01-03 PROCEDURE — 94640 AIRWAY INHALATION TREATMENT: CPT

## 2019-01-03 PROCEDURE — 74011000250 HC RX REV CODE- 250: Performed by: INTERNAL MEDICINE

## 2019-01-03 PROCEDURE — 82962 GLUCOSE BLOOD TEST: CPT

## 2019-01-03 RX ORDER — BUMETANIDE 2 MG/1
2 TABLET ORAL DAILY
Qty: 30 TAB | Refills: 0 | Status: SHIPPED | OUTPATIENT
Start: 2019-01-04 | End: 2019-02-03

## 2019-01-03 RX ORDER — POTASSIUM CHLORIDE 750 MG/1
40 TABLET, FILM COATED, EXTENDED RELEASE ORAL
Status: COMPLETED | OUTPATIENT
Start: 2019-01-03 | End: 2019-01-03

## 2019-01-03 RX ORDER — IPRATROPIUM BROMIDE AND ALBUTEROL SULFATE 2.5; .5 MG/3ML; MG/3ML
3 SOLUTION RESPIRATORY (INHALATION)
Status: DISCONTINUED | OUTPATIENT
Start: 2019-01-03 | End: 2019-01-03 | Stop reason: HOSPADM

## 2019-01-03 RX ORDER — DILTIAZEM HYDROCHLORIDE 240 MG/1
240 CAPSULE, COATED, EXTENDED RELEASE ORAL DAILY
Qty: 30 CAP | Refills: 0 | Status: SHIPPED | OUTPATIENT
Start: 2019-01-04 | End: 2019-02-03

## 2019-01-03 RX ADMIN — DILTIAZEM HYDROCHLORIDE 240 MG: 240 CAPSULE, COATED, EXTENDED RELEASE ORAL at 08:12

## 2019-01-03 RX ADMIN — POTASSIUM CHLORIDE 40 MEQ: 750 TABLET, EXTENDED RELEASE ORAL at 10:26

## 2019-01-03 RX ADMIN — IPRATROPIUM BROMIDE AND ALBUTEROL SULFATE 3 ML: .5; 3 SOLUTION RESPIRATORY (INHALATION) at 07:55

## 2019-01-03 RX ADMIN — INSULIN LISPRO 2 UNITS: 100 INJECTION, SOLUTION INTRAVENOUS; SUBCUTANEOUS at 11:40

## 2019-01-03 RX ADMIN — BUMETANIDE 2 MG: 1 TABLET ORAL at 10:26

## 2019-01-03 RX ADMIN — Medication 1 LOZENGE: at 03:45

## 2019-01-03 RX ADMIN — Medication 10 ML: at 14:19

## 2019-01-03 RX ADMIN — HUMAN INSULIN 15 UNITS: 100 INJECTION, SUSPENSION SUBCUTANEOUS at 08:12

## 2019-01-03 RX ADMIN — APIXABAN 2.5 MG: 2.5 TABLET, FILM COATED ORAL at 08:12

## 2019-01-03 RX ADMIN — PREDNISONE 30 MG: 20 TABLET ORAL at 08:12

## 2019-01-03 RX ADMIN — FLUTICASONE FUROATE AND VILANTEROL TRIFENATATE 1 PUFF: 200; 25 POWDER RESPIRATORY (INHALATION) at 08:14

## 2019-01-03 NOTE — PROGRESS NOTES
[facility] [place] Pulmonary Consult Service NotePulmonary, Critical Care, and Sleep Medicine Name: Mark Berger MRN: [id number] : 1935 Hospital: [place] [address] Date: 1/3/2019  Admission date: 2018 Hospital Day: 25 Subjective/Interval History:  
 
 No overnight events. Has not required BiPAP since yesterday morning. Sleeping today without need for BiPAP. 1/2 no BIPAP last night. On 4 LPM at home. Legs still swollen. BUN/ Cr higher? No fever. Sees my partner Dr. Jose Wolfe as oupt 1/3 no BIPAP again last night. On 4 LPM. Has never been to outpt Pulmonary rehab. IMPRESSION:  
1. Acute on chronic respiratory failure with Hypoxia POA 2. Severe COPD with acute exacerbation POA 3. Sepsis POA WBC 19.9,  
4. Bilateral pneumonia likely healthcare associated POA - Stenotrophomonas in sputum 5. Tobacco use 6. Severe Pulmonary HTN PASP 81  
7. Acute Diastolic Heart Failure 8. Volume overload- chart shows within past yr weight as low as 78.5 kg to 89 kg ( at [facility]) now 85.5 kg 9. Pulmonary HTN 10. Diabetes mellitus type 2 POA 11. Essential hypertension POA 12. History of PAF 13. History of bradycardia status post pacemaker placement 2018 per  RECOMMENDATIONS/PLAN:  
1. O2  
2. D/c BIPAP 3. If Dr. Malathi Puentes agrees, would eventually like to see pt enroll in outpt Pulmonary rehab- explained this to pt. 4. Resume PO bumex 5. BMP in AM- 
6. Mobilize- PT, OT 
7. Reinforced need for low sodium diet with pt and pt backing down on Dor[place] until fluid under control 8. Completed antibiotics 9. Bronchodilators- reduced Breo dose 10. Systemic corticosteroids 11. Glucose monitoring and SSI 12. Cardiology following 13. DVT prophylaxis 14. Smoking cessation counseling done Subjective/Initial History:  
 
I was asked by Monster Rocha MD to see Mark Berger  a 82 y.o.  female in consultation for a chief complaint of pneumonia and respiratory failure requiring BIPAP/ NIV Excerpts from admission 12/17/2018 or consult notes as follows:  
 
\"  Recently admitted 11/11 to 11/16/2018 with respiratory failure and COPD. she initially required BiPAP, was treated for COPD. Had Haemophilus in her sputum but no airspace disease, treated with a 5-day course of Levaquin. Had bradycardia and a pacemaker was placed. Eventually weaned back to her baseline oxygen, discharged home. Baseline she is always short of breath with exertion. starting yesterday she began to notice increasing shortness of breath when she took a shower and with any minor exertion . no change in her baseline cough. No fevers or chills, sore throat, headache, chest pain. Was getting more short of breath when she laid down and  better when she sat up overnight. This morning, she was acutely short of breath, could not catch her breath even at rest .EMS was called and she was brought to the emergency room Acute respiratory distress in the emergency room. PO2 53 on her 4 L nasal cannula oxygen. Was not hypercarbic. Placed on BiPAP. Recently admitted mid November 2018 with acute respiratory failure and COPD. Discharged on baseline 4 L . Increasing shortness of breath past 2 days, no increased cough or fevers. Acutely short of breath today, arrived in ED in respiratory distress. ABG on baseline 4 L showed pH 7.44, PCO2 31, PO2 53\" Pt congested at times. Not active at all. Tachypneic on BIPAP. Daughter at bedside. No ill contacts at home. Upset that pacemaker did not fix her SOB. Tried to explain this to her. Sees my partner Dr. Christine Floyd. No GI sx. No edema. Allergies Allergen Reactions  Keflex [Cephalexin] Itching MAR reviewed and pertinent medications noted or modified as needed Current Facility-Administered Medications Medication  albuterol-ipratropium (DUO-NEB) 2.5 MG-0.5 MG/3 ML  
  benzocaine-menthol (CHLORASEPTIC MAX) lozenge 1 Lozenge  predniSONE (DELTASONE) tablet 30 mg  
 fluticasone-vilanterol (BREO ELLIPTA) 200mcg-25mcg/puff  bumetanide (BUMEX) tablet 2 mg  ondansetron (ZOFRAN) injection 4 mg  dilTIAZem CD (CARDIZEM CD) capsule 240 mg  
 apixaban (ELIQUIS) tablet 2.5 mg  
 nitroglycerin (NITROSTAT) tablet 0.4 mg  
 sodium chloride (NS) flush 5-10 mL  sodium chloride (NS) flush 5-10 mL  acetaminophen (TYLENOL) tablet 650 mg  
 naloxone (NARCAN) injection 0.4 mg  
 bisacodyl (DULCOLAX) suppository 10 mg  
 insulin lispro (HUMALOG) injection  glucose chewable tablet 16 g  
 dextrose (D50W) injection syrg 12.5-25 g  
 glucagon (GLUCAGEN) injection 1 mg  hydrALAZINE (APRESOLINE) 20 mg/mL injection 20 mg Patient PCP: Anna Arevalo MD 
PMH:  has a past medical history of COPD and Hypertension. PSH:   has a past surgical history that includes hx gyn. FHX: family history includes Cancer in her brother and brother; Heart Disease in her mother. SHX:  reports that she has been smoking. She has been smoking about 0.25 packs per day. she has never used smokeless tobacco. She reports that she does not drink alcohol or use drugs. ROS:A comprehensive review of systems was negative except for that written in the HPI. Objective: 
 
Vital Signs: Telemetry:    normal sinus rhythmIntake/Output:  
Visit Vitals /46 (BP Patient Position: Sitting;Post activity) Pulse 72 Temp 97.9 °F (36.6 °C) Resp 18 Ht 5' 4.5\" (1.638 m) Wt 85.5 kg (188 lb 7.9 oz) SpO2 94% BMI 31.86 kg/m² Temp (24hrs), Av.8 °F (36.6 °C), Min:97.6 °F (36.4 °C), Max:98.2 °F (36.8 °C) O2 Device: Nasal cannula O2 Flow Rate (L/min): 3 l/min Wt Readings from Last 4 Encounters:  
19 85.5 kg (188 lb 7.9 oz)  
18 78.5 kg (173 lb 1 oz) 18 83.5 kg (184 lb) 10/01/17 87.1 kg (192 lb 0.3 oz) Intake/Output Summary (Last 24 hours) at 1/3/2019 1212 Last data filed at 1/3/2019 1006 Gross per 24 hour Intake 500 ml Output 675 ml Net -175 ml Last shift:      01/03 0701 - 01/03 1900 In: 200 [P.O.:200] Out: - Last 3 shifts: 01/01 1901 - 01/03 0700 In: 600 [P.O.:600] Out: 675 Cherelle Rodrick Physical Exam:  
 
General: alert Eyes: anicteric; HEENT: NC/AT,no nasal flaring or drainage Neck: No goiter; no stridor;  no accessory muscle use Lymph nodes: No abnormally enlarged cervical or supraclavicular nodes Chest: increased AP diameter Lungs:decreased air movement, no wheezing Heart: Regular rate and rhythm Abdomen: soft, protuberant, nontender, obese : No Tamayo; Ext: no cyanosis, no clubbing; no joint swelling or erythema Skin: warm and dry; no clubbing Musculoskeletal: no gross deformities Neuro: speech fluent;  moves all fours, generalized weakness Psych: no agitation Data:  
Labs: 
Recent Labs 01/02/19 
0140 01/01/19 
0445 WBC 18.9* 18.4* HGB 9.2* 9.5* HCT 27.6* 27.9*  
 184 Recent Labs 01/03/19 
5560 01/02/19 
0140 01/01/19 
0445  136 137  
K 3.6 3.8 3.9 CL 96* 93* 96* CO2 32 33* 31 * 134* 150* BUN 81* 81* 82* CREA 2.38* 2.53* 2.48* CA 8.1* 8.1* 8.3* No results for input(s): PH, PCO2, PO2, HCO3, FIO2 in the last 72 hours. Imaging: 
I have personally reviewed the patients radiographs: 
None today Milo Carranza MD

## 2019-01-03 NOTE — DISCHARGE SUMMARY
Hospitalist Discharge Summary     Patient ID:  Gunner Yin  [id number]  82 y.o.  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     Had Haemophilus in her sputum but no airspace disease, treated with a 5-day course of Levaquin  Had bradycardia and a pacemaker was placed  Eventually weaned back to her baseline oxygen, discharged home  Baseline she is always short of breath with exertion  starting yesterday she began to notice increasing shortness of breath when she took a shower and with any minor exertion   no change in her baseline cough  No fevers or chills, sore throat, headache, chest pain  Was getting more short of breath when she laid down and  better when she sat up overnight  This morning, she was acutely short of breath, could not catch her breath even at rest   EMS was called and she was brought to the emergency room     Acute respiratory distress in the emergency room  PO2 53 on her 4 L nasal cannula oxygen. Was not hypercarbic. Placed on BiPAP  Given COPD treatments  Chest x-ray bibasilar airspace disease  Septic with a white blood cell count of 19.9 and heart rate of 118 with a normal lactate  Given IV vancomycin and Zosyn  We were called to admit the patient           ______________________________________________________________________  DISCHARGE SUMMARY/HOSPITAL COURSE:  for full details see H&P, daily progress notes, labs, consult notes. Acute on chronic respiratory failure POA with baseline 2-4L O2 - improving  Acute COPD exacerbation - improving  Sepsis due to Bilateral HCAP POA - improved  Underlying severe pulmonary hypertension PADP 81   -clinically: very slowly improving , less dyspneic   Slept with bipap   Leukocytosis is likely due to steroids use since improving clinically   very weak/debilitated ( reports that getting to chair is a great effort for her now)    O2: 4-5 L   -cont bipap at night and prn during daytime  -cont aggressive IV steroids ( started to taper, slow tapering), aggressive jet nebs   -Empiric IV AB: vanco - was dc with ALMAZ, no signs of gram + infection  Completed zosyn  ( last dose 12/24)   BC NTD  resp cultures: rare S. MALTOPHILIA. DOC - bactrim but cannot use with current cr   She improved since admission on zosyn alone    -repeat cxray in am   -pulmonology following   -keep O2 sats > 92%   -cxray repeated 12/19: No pulmonary consolidation. Improved aeration since 12/17 12/26:  Pt's respiratory status slowly improving. Steroids being tapered and converted to PO. Abx course completed. Still using bipap overnight. SOB on minimal movement. 12/27:  Pt still using bipap overnight. Pulm recommendation it to try and use PRN. Will see how pt tolerates it tonight. Appears less SOB during conversation today. Continue PO steroids and nebulizer treatments. 12/28:  Pt did not use bipap overnight. Pt eager to be discharge but is still SOB.  Pt insistent that she go home instead of any other facility. Pt's daughter at bedside today and updated. Will see how pt does overnight without bipap again and likely for discharge tomorrow. 12/29:  Pt respiratory status worsened overnight, requiring the use of bipap. This is likely linked to her increased edema. Spoke with pt's son at bedside and updated on the plan. Pt no longer ready for discharge. Will diurese with IV Bumex. 12/30:  Pt's respiratory status declined over the past 12 hours. Has needed bipap to keep her O2 sats up - continue bipap today. Pt also more lethargic and drowsy - concerned for possible CO2 narcosis - will get ABG. Will also order portable CXR. Addendum:  No CO2 narcosis, CXR relatively stable. Will continue diuresis, nebs and increase IV steroids    12/31:  Pt still requiring bipap. Pulmonary involved - will speak with them about any further recommendations. Will continue IV steroids and nebs and respiratory support for now. Hopeful that continued diuresis will help improve respiratory status. Will also discontinue pt's low dose Xanax orders as this is causing her to become drowsy and listless during the day    1/1:  No bipap used overnight. Pt's mental and respiratory status have improved. Will discontinue IV steroids and switch to PO Prednisone. Pulm following.     1/2  Cont prednisone, duoneb. Bipap as needed. 1/3   Completed prednisone course, cont duo neb, off bipap and doing well on nasal cannul at 4L. Refused placement in SNF and home health.     ALMAZ not POA - improving  -Cr slowly getting better, down to 3.01  baseline normal at 0.8   Likely due to Lasix + possible ATN with  vanco ( trough 21.4 )    -Dr Napoleon Ventura help was greatly appreciated: lasxi use prn ( last dose 12/24)     -Daily weight ( Wt is all over the place - ?  Correct measurement)      12/28:  Cr continues to improve - hopeful that this trend will continue and it will revert back to baseline    12/29:  Fluid overload - improving  Cr has improved; however pt with significant edema now - appreciate Nephrology eval - agree with IV diuresis    12/30:  Continue with aggressive IV diuresis. 12/31:  Net negative 2200mL last 24 hours. Will continue aggressive IV diuresis. Monitor I/Os. Daily weights. 1/1:  Cr trending higher again - deescalated Bumex to 2mg IV q12hrs. Nephrology follow up appreciated.     1/2:  Cr is trending up today. Off iv Bumex, Renal consulted again. Started on po Bumex from tomorrow. Check bmp in am. Cont po prednisone. 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   -HR/BP stable    -Dr Greg Carrillo help last week was greatly appreciated   Diurese only prn due to ALMAZ ( on lasix 20 mg daily at home )   Stopped Multaq started earlier by Dr Eagle Boo [facility] her heart failure and/or lung disease)   Stopped amlodipine and changed to diltiazem  daily  PPM program changed early in admission to minimize ventr pacing   -AC: CHADS-Vasc 4 --> need to be on a/coag  Last admission pt declined AC and wanted to be on ASA alone   Dr Eagle Boo started on Eliquis ( reduced dose with ALMAZ)   Now off ASA   -ECHO: EF 55%, mild AS, severe pulmonary HTN        DM type II  -BS controlled  120/150s   -hba1c 6.6         _______________________________________________________________________  Patient seen and examined by me on discharge day. Pertinent Findings:  Gen:    Not in distress  Chest: Clear lungs  CVS:   Regular rhythm.   No edema  Abd:  Soft, not distended, not tender  Neuro:  Alert, awake   _______________________________________________________________________  DISCHARGE MEDICATIONS:   Current Discharge Medication List      START taking these medications    Details   dilTIAZem CD (CARDIZEM CD) 240 mg ER capsule Take 1 Cap by mouth daily for 30 days. Qty: 30 Cap, Refills: 0      apixaban (ELIQUIS) 2.5 mg tablet Take 1 Tab by mouth two (2) times a day for 30 days. Qty: 60 Tab, Refills: 0      bumetanide (BUMEX) 2 mg tablet Take 1 Tab by mouth daily for 30 days. Qty: 30 Tab, Refills: 0         CONTINUE these medications which have NOT CHANGED    Details   amLODIPine (NORVASC) 5 mg tablet Take 5 mg by mouth daily. fluticasone-vilanterol (BREO ELLIPTA) 200-25 mcg/dose inhaler Take 2 Puffs by inhalation daily. ergocalciferol (ERGOCALCIFEROL) 50,000 unit capsule Take 50,000 Units by mouth every fourteen (14) days. \"takes every other Monday\"      albuterol (PROVENTIL, VENTOLIN) 90 mcg/Actuation inhaler Take 2 Puffs by inhalation every six (6) hours as needed. albuterol (PROVENTIL VENTOLIN) 2.5 mg /3 mL (0.083 %) nebulizer solution by Nebulization route every six (6) hours as needed. dextromethorphan-guaiFENesin (MUCINEX DM) 60-1,200 mg Tb12 Take 1 Tab by mouth daily. STOP taking these medications       furosemide (LASIX) 20 mg tablet Comments:   Reason for Stopping:         ibuprofen (MOTRIN) 200 mg tablet Comments:   Reason for Stopping:         dilTIAZem SR (CARDIZEM SR) 90 mg SR capsule Comments:   Reason for Stopping:         aspirin (ASPIRIN) 325 mg tablet Comments:   Reason for Stopping:               My Recommended Diet, Activity, Wound Care, and follow-up labs are listed in the patient's Discharge Insturctions which I have personally completed and reviewed.     _______________________________________________________________________  DISPOSITION:    Home with Family: y   Home with HH/PT/OT/RN:    SNF/LTC:    MAR:    OTHER:        Condition at Discharge:  Stable  _______________________________________________________________________  Follow up with:   PCP : Machelle Rodriguez MD  Follow-up Information     Follow up With Specialties Details Why Adrian Baldwin MD Internal Medicine Schedule an appointment as soon as possible for a visit in 1 week  [address]  [address] [facility]      Robi Galaviz MD Cardiology Schedule an appointment as soon as possible for a visit in 1 week  [address]  [phone]      Joi Corrales MD Nephrology Schedule an appointment as soon as possible for a visit  [address]  [address] [facility]      Cass Agosto MD Internal Medicine, Pulmonary Disease Schedule an appointment as soon as possible for a visit in 2 weeks  [address] [facility]  Chung [place] [address] [place] [address]  [phone]                Total time in minutes spent coordinating this discharge (includes going over instructions, follow-up, prescriptions, and preparing report for sign off to her PCP) : 35  minutes    Signed:  Shabana Lee MD

## 2019-01-03 NOTE — PROGRESS NOTES
ADULT PROTOCOL: JET AEROSOL  REASSESSMENT Patient  Bharat Castro     82 y.o.   female     1/3/2019  1:47 PM 
 
Breath Sounds Pre Procedure: Right Breath Sounds: Diminished Left Breath Sounds: Diminished Breath Sounds Post Procedure: Right Breath Sounds: Diminished Left Breath Sounds: Diminished Breathing pattern: Pre procedure Breathing Pattern: Regular Post procedure Breathing Pattern: Regular Heart Rate: Pre procedure Pulse: 61 
         Post procedure Pulse: 65 Resp Rate: Pre procedure Respirations: 20 
         Post procedure Respirations: 20 
 
 
Cough: Pre procedure Cough: Non-productive Post procedure Cough: Non-productive, Congested Oxygen: O2 Device: Nasal cannula SpO2: Pre procedure SpO2: 97 % Post procedure SpO2: 96 % Nebulizer Therapy: Current medications Aerosolized Medications: DuoNeb Problem List:  
Patient Active Problem List  
Diagnosis Code  Acute on chronic respiratory failure ([facility]) J96.20  
 COPD (chronic obstructive pulmonary disease) with acute bronchitis ([facility]) J44.0, J20.9  Lung nodule R91.1  Abnormal ECG R94.31  
 Hyperglycemia R73.9  Tobacco abuse Z72.0  
 Pulmonary hypertension, moderate to severe I27.20  Sepsis ([address] A41.9  
 COPD exacerbation ([address] J44.1  Respiratory failure ([facility]) J96.90  
 Second degree heart block I44.1  S/P placement of cardiac pacemaker Z95.0  Acute on chronic respiratory failure with hypoxia and hypercapnia ([facility]) J96.21, J96.22 Respiratory Therapist: Barbara Wilhelm RT

## 2019-01-03 NOTE — PROGRESS NOTES
Cardiology Progress Note 1/3/2019 9:27 AM 
Admit Date: 12/17/2018 Admit Diagnosis/CC: Acute on chronic respiratory failure with hypoxia and hypercapnia (HCC) Subjective:  
Patient reports: 
Chest Pain:  [x] none,  consistent with [] non-cardiac  [] atypical  []  anginal chest pain 
           [x] none now    []  on-going Dyspnea: [x] none  [] at rest  [] with exertion  [] improved  [] unchanged [] worsening PND:       [x] none  [] overnight   [] current Orthopnea: [x] none  [] improved  [] unchanged  [] worsening Presyncope: [x] none  [] improved  [] unchanged  [] worsening Ambulated in hallway without symptoms  [] Yes Ambulated in room without symptoms  [x] Yes 
ROS(2+other systems)   Hematuria: [] Yes  [x] No.   Dysuria: [] Yes  [x] No 
                                         Cough:       [] Yes  [x] No.   Sputum: [] Yes  [x] No  
                                         Hematochezia: [] Yes  [x] No.   Melena: [] Yes  [x] No 
                                        
 No change in family and social history from H&P/Consult note. Current Facility-Administered Medications Medication Dose Route Frequency  albuterol-ipratropium (DUO-NEB) 2.5 MG-0.5 MG/3 ML  3 mL Nebulization BID RT  
 potassium chloride SR (KLOR-CON 10) tablet 40 mEq  40 mEq Oral NOW  
 benzocaine-menthol (CHLORASEPTIC MAX) lozenge 1 Lozenge  1 Lozenge Oral Q2H PRN  predniSONE (DELTASONE) tablet 30 mg  30 mg Oral DAILY WITH BREAKFAST  fluticasone-vilanterol (BREO ELLIPTA) 200mcg-25mcg/puff  1 Puff Inhalation DAILY  bumetanide (BUMEX) tablet 2 mg  2 mg Oral DAILY  ondansetron (ZOFRAN) injection 4 mg  4 mg IntraVENous Q4H PRN  
 dilTIAZem CD (CARDIZEM CD) capsule 240 mg  240 mg Oral DAILY  apixaban (ELIQUIS) tablet 2.5 mg  2.5 mg Oral BID  nitroglycerin (NITROSTAT) tablet 0.4 mg  0.4 mg SubLINGual Q5MIN PRN  
 sodium chloride (NS) flush 5-10 mL  5-10 mL IntraVENous Q8H  
  sodium chloride (NS) flush 5-10 mL  5-10 mL IntraVENous PRN  
 acetaminophen (TYLENOL) tablet 650 mg  650 mg Oral Q6H PRN  
 naloxone (NARCAN) injection 0.4 mg  0.4 mg IntraVENous PRN  
 bisacodyl (DULCOLAX) suppository 10 mg  10 mg Rectal DAILY PRN  
 insulin lispro (HUMALOG) injection   SubCUTAneous AC&HS  
 glucose chewable tablet 16 g  4 Tab Oral PRN  
 dextrose (D50W) injection syrg 12.5-25 g  12.5-25 g IntraVENous PRN  
 glucagon (GLUCAGEN) injection 1 mg  1 mg IntraMUSCular PRN  
 hydrALAZINE (APRESOLINE) 20 mg/mL injection 20 mg  20 mg IntraVENous Q6H PRN Objective:  
 Physical Exam: 
Last VS:  
Visit Vitals BP (!) 142/94 Pulse 64 Temp 97.7 °F (36.5 °C) Resp 20 Ht 5' 4.5\" (1.638 m) Wt 85.5 kg (188 lb 7.9 oz) SpO2 97% BMI 31.86 kg/m² Temp (24hrs), Av.8 °F (36.6 °C), Min:97.6 °F (36.4 °C), Max:98.2 °F (36.8 °C) 24 hr VS reviewed. General Appearance:   [x] well developed, well nourished,  [x] NAD. [] agitated   [] lethargic but arousable  [] obtunded ENT, Palate:    [x] WNL   [] dry palate/MM Respiratory:    [x] CTA bilateral  [] rales  [] rhonchi  [] normal resp effort 
    [] similar to yesterday   [] worse    [] improved Cardiovascular:   [x] RRR   [] Irregular rate and rhythm 
 [x] Normal S1, S2   [x] No gallop or rub. [] no murmur   [x] no new murmur  [] murmur c/w: 
 [x] no edema  RLE: []1+ []2+ [] 3+;  LLE: []1+ []2+ []3+ [] edema similar to yesterday   [] worse    [] improved [x] normal JVP   [] Elevated JVP [x] JVP similar to yesterday   [] worse    [] improved [x] carotid upstroke unchanged 
 [x] abd aorta not palpated 
 [x] no stigmata of peripheral emboli GI:    [x] abd soft, non-distented,bowel sounds present, no                     organomegaly appreciated Skin: 
Neuro: 
 
Cath Site:  [x] warm and dry [] cold extremities [x] A/O x 3, grossly non-focal  
 
 [] intact w/o hematoma or bruit; distal pulse unchanged. Data Review:  
Labs:   
Recent Results (from the past 24 hour(s)) GLUCOSE, POC Collection Time: 01/02/19 11:48 AM  
Result Value Ref Range Glucose (POC) 283 (H) 65 - 100 mg/dL Performed by Tracy Vera (PCT) GLUCOSE, POC Collection Time: 01/02/19  4:18 PM  
Result Value Ref Range Glucose (POC) 134 (H) 65 - 100 mg/dL Performed by Julio GLUCOSE, POC Collection Time: 01/02/19  8:45 PM  
Result Value Ref Range Glucose (POC) 278 (H) 65 - 100 mg/dL Performed by David Brice (PCT) METABOLIC PANEL, BASIC Collection Time: 01/03/19  3:44 AM  
Result Value Ref Range Sodium 138 136 - 145 mmol/L Potassium 3.6 3.5 - 5.1 mmol/L Chloride 96 (L) 97 - 108 mmol/L  
 CO2 32 21 - 32 mmol/L Anion gap 10 5 - 15 mmol/L Glucose 113 (H) 65 - 100 mg/dL BUN 81 (H) 6 - 20 MG/DL Creatinine 2.38 (H) 0.55 - 1.02 MG/DL  
 BUN/Creatinine ratio 34 (H) 12 - 20 GFR est AA 24 (L) >60 ml/min/1.73m2 GFR est non-AA 20 (L) >60 ml/min/1.73m2 Calcium 8.1 (L) 8.5 - 10.1 MG/DL  
CK Collection Time: 01/03/19  3:44 AM  
Result Value Ref Range CK 34 26 - 192 U/L  
GLUCOSE, POC Collection Time: 01/03/19  7:41 AM  
Result Value Ref Range Glucose (POC) 86 65 - 100 mg/dL Performed by Tracy Vera (PCT) Provided Telemetry: [x] sinus  [] chronic afib   [] paroxysmal afib  [] NSVT Assessment:  
 
Active Problems: 
  Acute on chronic respiratory failure with hypoxia and hypercapnia ([facility] (12/17/2018) Plan:  
 
Atrial Fibrillation - Flutter  improved   Continue current meds She is in rsr. YOAN is better. For all other plans, see orders. [x] High complexity decision making was performed

## 2019-01-03 NOTE — PROGRESS NOTES
PCU SHIFT NURSING NOTE Bedside and Verbal shift change report given to Marin Gonzales RN (oncoming nurse) by Ezequiel Encinas RN (offgoing nurse). Report included the following information SBAR, Kardex, MAR and Recent Results. Shift Summary:  
0700: Bedside and Verbal shift change report given to Ezequiel Encinas RN (oncoming nurse) by Marin Gonzales RN (offgoing nurse). Report included the following information SBAR, Kardex, MAR and Recent Results. Admission Date 12/17/2018 Admission Diagnosis Acute on chronic respiratory failure with hypoxia and hypercapnia (HCC) Consults IP CONSULT TO INTENSIVIST 
IP CONSULT TO NEPHROLOGY 
IP CONSULT TO NEPHROLOGY 
IP CONSULT TO CARDIOLOGY Consults []PT []OT []Speech  
[]Case Management  
  
[] Palliative Cardiac Monitoring Order []Yes []No  
 
IV drips []Yes Drip:                            Dose: 
Drip:                            Dose: 
Drip:                            Dose:  
[]No  
 
GI Prophylaxis []Yes []No  
 
 
 
DVT Prophylaxis SCDs:     
     
 David stockings:     
  
[] Medication []Contraindicated []None Activity Level Activity Level: Up with Assistance Activity Assistance: Partial (one person) Purposeful Rounding every 1-2 hour? []Yes Gonzalez Score  Total Score: 3 Bed Alarm (If score 3 or >) []Yes  
[] Refused (See signed refusal form in chart) Niraj Score  Niraj Score: 16 Niraj Score (if score 14 or less) []PMT consult  
[]Wound Care consult []Specialty bed  
[] Nutrition consult Needs prior to discharge:  
Home O2 required:   
[]Yes []No  
 If yes, how much O2 required? Other:  
 Last Bowel Movement: Last Bowel Movement Date: 01/01/19 Influenza Vaccine Received Flu Vaccine for Current Season (usually Sept-March): Yes Pneumonia Vaccine Diet Active Orders Diet DIET DIABETIC CONSISTENT CARB Regular; 2 GM NA (House Low NA) LDAs Peripheral IV 12/31/18 Anterior;Right Antecubital (Active) Site Assessment Clean, dry, & intact 1/2/2019  7:50 PM  
Phlebitis Assessment 0 1/2/2019  7:50 PM  
Infiltration Assessment 0 1/2/2019  7:50 PM  
Dressing Status Clean, dry, & intact 1/2/2019  7:50 PM  
Dressing Type Tape;Transparent 1/2/2019  7:50 PM  
Hub Color/Line Status Blue;Flushed 1/2/2019  7:50 PM  
      
External Female Catheter 12/20/18 (Active) Site Assessment Intact 1/2/2019  7:50 PM  
Repositioned Yes 1/2/2019  7:50 PM  
Perineal Care No 1/2/2019  7:50 PM  
Wick Changed No 1/2/2019  7:50 PM  
Suction Canister/Tubing Changed No 1/2/2019  7:50 PM  
Urine Output (mL) 400 ml 1/2/2019  6:24 PM  
            
Urinary Catheter Intake & Output Date 01/01/19 1900 - 01/02/19 0659 01/02/19 0700 - 01/03/19 5102 Shift 5810-0592 24 Hour Total 3276-7291 9032-2492 24 Hour Total  
INTAKE  
P.O.  360 600  600  
  P. O.  360 600  600 Shift Total(mL/kg)  360(4.3) 600(7.2)  600(7.2) OUTPUT Urine(mL/kg/hr)  550 400(0.4)  400 Urine Occurrence(s)  1 x 2 x  2 x Urine Output (mL) (External Female Catheter 12/20/18)  550 400  400 Stool Stool Occurrence(s)  1 x 2 x  2 x Shift Total(mL/kg)  550(6.6) 400(4.8)  400(4.8) NET  -190 200  200 Weight (kg) 82.9 82.9 82.9 82.9 82.9 Readmission Risk Assessment Tool Score High Risk   
      
 22 Total Score 3 Has Seen PCP in Last 6 Months (Yes=3, No=0) 2 . Living with Significant Other. Assisted Living. LTAC. SNF. or  
Rehab  
 3 Patient Length of Stay (>5 days = 3) 4 IP Visits Last 12 Months (1-3=4, 4=9, >4=11) 5 Pt. Coverage (Medicare=5 , Medicaid, or Self-Pay=4) 5 Charlson Comorbidity Score (Age + Comorbid Conditions) Expected Length of Stay 4d 19h Actual Length of Stay 16

## 2019-01-03 NOTE — PROGRESS NOTES
CM completed room visit with pt, with spouse by bedside. CM discussed d/c needs and plans with spouse. Pt and spouse understands that therapy and MD have both recommended SNF placement, and pt has declined. CM suggested pt to return home with [facility]. Pt has declined services for both snf and home health. CM informed pt and spouse that if [facility] is needed then their PCP can provide [facility] services for the pt. Pt has been given eliquis card that will allow pt to get her scripts for a 30day free trial. 
 
CM has informed pt's spouse that pt is in need of her portable tank to be transported home on today and clothes. CM has informed pt's nurse of the following. KATHARINE has completed the need of the pt at this time. YVES Villela CM 
[phone]

## 2019-01-03 NOTE — PROGRESS NOTES
Bedside shift change report given to Jana Jett (oncoming nurse) by Jakob Alcantar (offgoing nurse). Report included the following information SBAR, Kardex, ED Summary, Procedure Summary, Intake/Output, MAR, Recent Results and Cardiac Rhythm NSR/PACED.  
 
1025- Pt assisted to recliner with 2 assist. Daughter at bedside. 1500- Encouraged pt and  to enroll in [facility] services as pt has been very weak throughout my 3 weeks taking care of her. Also encouraged pulmonary rehab or SNF so patient could build her strenght up. Pt and  both declined. 1800- Dressed pt and assisted her to wheelchair with 2 assist. Discharge instructions and prescriptions reviewed with pt and . Pt taken to front entrance via wheelchair. Pt assisted into vehicle with 2 max assist. Very LUDWIG but returned to baseline with purse lipped breathing.

## 2019-01-03 NOTE — PROGRESS NOTES
*CM acknowledged consult* CM completed room visit with pt, with daughter by bedside. CM discussed pt's d/c plans and recommendations of snf placement. Pt declined snf services saying \"I want to go home with my  and my daughter\". CM  Informed pt of HH options, and pt reported that \"My daughter and  will assist with my care\". Pt's daughter will like for CM to speak to pt's spouse about services and recommendations. CM will continue to follow up with pt and encourage pt to return home with services. Jermaine Alcaraz MSW  
[phone]

## 2019-01-03 NOTE — CONSULTS
Neva  EB:[id number]   :1935                     Patient seen       Magdi Rosario MD  [facility]  [facility] HL SYSTM FRANCISCAN [facility] SP[facility], [address] [facility], [address] [place]  Phone - [phone]         Fax - [phone] [facility] Office  [address] [address]  Phone - [phone]        Fax - [phone]     [email or web address]

## 2019-01-03 NOTE — PROGRESS NOTES
[email or web address]                  Phone - [phone] Renal Progress Note Subjective:  
Patient: Simeon Leslie                    YOB: 1935 Date- 1/3/2019 Reason for visit: arf on ckd Admission Date: 2018 SOB IMPROVING 
EDEMA BETTER 
K LOW 
CR. IMPROVED 
BUN HIGH-STABLE 
BP GOOD Assessment:  
  
ALMAZ likely either diuretic induced and/or ATN Acute on chronic respiratory failure with hypoxia and hypercapnia Diastolic heart failure on admission. 
  
Severe COPD with an exacerbation. Bilateral pneumonia.  
  
Severe pulmonary hypertension.    
  
PAF S/p pacemaker placement in . 
  
Diabetes Mellitus, type II. Hypertension. Obesity. 
  
  
  
Plan: ·  Continue bumex 2 mg daily · kcl 40 meq po x 1 
· Follow bmp 
 
 
 
 
  
 
Review of Systems 
 sob  improved No c/o chest pain, No c/o nausea or vomiting No c/o  fever. Objective:  
 
Visit Vitals BP (!) 142/94 Pulse 64 Temp 97.7 °F (36.5 °C) Resp 20 Ht 5' 4.5\" (1.638 m) Wt 85.5 kg (188 lb 7.9 oz) SpO2 97% BMI 31.86 kg/m² Temp (24hrs), Av.8 °F (36.6 °C), Min:97.6 °F (36.4 °C), Max:98.2 °F (36.8 °C) No intake/output data recorded.  1901 -  0700 In: 600 [P.O.:600] Out: 675 [place] Physical Exam: 
GEN:  NAD NECK:  Supple, no thyromegaly RESP: CTA  b/l, no  wheezing, CVS: RRR,S1,S2 ABDO:  soft , non tender, No mass NEURO: non focal, normal speech EXT: Edema +nt Data Review:  
 
Recent Labs 19 
0536 19 
0140 19 
0445 WBC  --  18.9* 18.4* HGB  --  9.2* 9.5*  136 137  
K 3.6 3.8 3.9 CL 96* 93* 96* CO2 32 33* 31 BUN 81* 81* 82* CREA 2.38* 2.53* 2.48* CA 8.1* 8.1* 8.3* Assessment:  
 
Active Problems: 
  Acute on chronic respiratory failure with hypoxia and hypercapnia ([facility] [address] (2018) Chart reviewed. Pertinent Notes Reviewed. Medications list Personally Reviewed. Ruslan Ravi MD 
 
[facility] [address] [place], [address] Phone - [phone]    Fax - [phone] Www. "[employer]"[facility]"[employer] [facility] [address] [facility], [facility], [facility] Phone - [phone]  Fax - [phone] Www. [facility]"[employer]

## 2019-01-04 NOTE — PROGRESS NOTES
CM met with pt's family on today and they had concerns that pt is in need of hospice informational session. CM has referred pt to [facility]. CM informed that family will receive hospice information session after PCP is contacted. YVES Ch CM 
[phone]

## 2022-04-14 NOTE — PROGRESS NOTES
Pharmacy Automatic Renal Dosing Protocol - Antimicrobials/Vancomycin     Indication for Antimicrobials:  HAP (was admitted 10/2-10/6/17)     Current Regimen of Each Antimicrobial:  Aztreonam 1 gram Q8H (Start Date 1/2; Day # 7  Vancomycin 1gm IV IV (Start Date 3; day #6    Discontinued:  Levofloxacin 750 mg q 24 hours (Start Date ; Day # 5    Allergy:  Keflex  Microbiology Results (Current encounter)   Date/Time Order Name Specimen Source Lab Status   18 0021 CULTURE, RESPIRATORY/SPUTUM/BRONCH W GRAM STAIN Sputum Final result   18 INFLUENZA A & B AG (RAPID TEST) Nasal washing Final result   18 184 CULTURE, BLOOD Blood Preliminary result   18 184 CULTURE, BLOOD Blood Preliminary result         Vancomycin Trough Goal: 15 - 20 mcg/mL     Vancomycin Level:    @ 0120 =6.7 mcg/mL-    Estimated Creatinine Clearance: 62.9 mL/min (based on Cr of 0.73). Estimated Creatinine Clearance (using IBW):50.8 mL/min    Recent Labs      18   0317  18   0335  18   0123   CREA  0.73  0.84  0.87   BUN  27*  29*  34*   WBC  13.2*   --   8.3   BANDS  1   --   2   NA  138  137  138   K  3.9  3.7  3.8   CA  9.2  9.0  9.7   HGB  10.9*   --   10.3*   HCT  34.2*   --   31.4*   PLT  294   --   259     Temp (24hrs), Av.9 °F (36.6 °C), Min:97.5 °F (36.4 °C), Max:98.1 °F (36.7 °C)    Paralysis, amputations, malnutrition: No     Impression/Plan:   · Will recommend to complete a 7 day treatment of aztreonam  · Can d/c the vancomycin  · Today should be the final day of aztreonam        Pharmacy will follow daily and adjust medications as appropriate for renal function and/or serum levels.   Tamela Mcwilliams, CASIED Resulted

## 2023-03-08 NOTE — PROGRESS NOTES
Continue active treatment with levothyroxine 75mcg daily  3/6 TSH:5.08   [email or web address]                  Phone - [phone]   Renal Progress Note    Subjective:   Patient: Lary Peterson                    YOB: 1935               Date- 12/25/2018          Reason for visit: ARF    Admission Date: 12/17/2018       Assessment:      Acute renal failure. Most likely pre-renal azotemia from diuresis, but ATN is possible. She has had a-fib with a RVR but no hypotension. She has been on Vancomycin with a trough of 21. It was d/c'd on 12/20. Her Bun/creat was 12/0.8 on admission, 12/17. It was 17/1.3 on 12/18, 42/12.5 on 12/19, and 57/3.1 on 12/20, and 75/3.3 today. Her urinary Na was <5 !!   IV Lasix was d/c'd and NS started on 12/20, but the renal function got worse. ON 12/22 she is even more SOB, so the NS has been d/c'd, and Lasix ordered again  Bun/creat up to 91/3.6 on 12/23 but 88/3.0 on 12/25. Have been giving prn Lasix. Last dose on 12/24.     Acute on chronic respiratory failure with hypoxia and hypercapnia  Diastolic heart failure on admission.     Hypokalemia.     Severe COPD with an exacerbation. Bilateral pneumonia.      Severe pulmonary hypertension.        PAF --A-fib the other night with a RVR. S/p pacemaker placement in 11/18.     Diabetes Mellitus, type II. Hypertension. Obesity.           Plan:          Lasix prn.    Avoid hypotension. Vancomycin has been d/c'd. Pt looks better today. D/w her    Discussed the renal disease with the patient and her daughter. Did not broach the subject of the resp failure and extremely poor prognosis, as I'm told the patient \"does not want to hear it. \"  D/w Dr. Chilo العراقي: pt already a DNR and we want to respect her wishes to focus on the positive. However, I hope her family is prepared. Interim History:    On 12/20: The patient has severe COPD and pulmonary hypertension. She was admitted with a COPD exacerbation and bilateral pneumonia.   She also has PAF and developed A-fib the other night with a RVR. In addition, she was felt to have diastolic heart failure and so was placed on Lasix. She has not been on an ACE-I or ARB. Her Bun/creat was 12/0.8 on admission, . It was 17/1.3 on , 42/12.5 on , and is 57/3.1 today. The patient says she was extremely SOB on admission but is essentially back to her baseline \"usual miserable\" SOB now. She denies chest pain or any other complaints. She was on Lasix, but this was d/c'd today and an IV of NS at 50 cc/hr ordered. She has been on Vancomycin with a trough of 21. Vanc was d/c'd today.        On :  The patient says she her breathing is at baseline. She is relatively comfortable if not moving. On :  Pt still feels very SOB, \"about the same\" as yesterday. Pt denies any complaints except for the SOB. NS d/c'd yesterday and Lasix 40 mg IV given. Input not complete, but UO 1450 cc. On :  Pt was feeling worse yesterday but better today. SOB is her only complaint. I& O's are incomplete. Last Lasix dose on ? On : pt up in a chair and looks sig more comfortable today. Says her SOB is improved. No other complaints. Last Lasix dose . INUT    ROS as above, plus: no fever/chills today. . No HEEENT complaints. No chest pain. no abd pain. No N/V/D. No joint pain. No skin rashes, no urinary complaints. INPUT incomplete. OUTPUT 1600 cc       Review of Systems  A comprehensive review of systems was negative except for that written in the HPI. Objective:     Visit Vitals  /86 (BP 1 Location: Left arm, BP Patient Position: At rest)   Pulse 77   Temp 98 °F (36.7 °C)   Resp 18   Ht 5' 4.5\" (1.638 m)   Wt 84.4 kg (186 lb 1.1 oz)   SpO2 98%   BMI 31.45 kg/m²     Temp (24hrs), Av.1 °F (36.7 °C), Min:97.8 °F (36.6 °C), Max:98.5 °F (36.9 °C)      No intake/output data recorded. 1901 -  0700  In: 1300 [P.O.:700;  I.V.:600]  Out: 1600 [Urine:1600]    Physical Exam:  General:  alert, cooperative, up in a chair and breathing more comfortably  Eye:  conjunctivae/corneas clear. EOM's intact. Neurologic:  grossly non-focal; alert and appropriate; normal speech  Neck:  supple; no JVD  Lungs:  diminished breath sounds but no rales or rhonchi; no accessory muscle use  Heart:  regular rate and rhythm; no gallop or rub  Skin:  no rash or lesions  Abdomen:  soft, non-tender. No masses,  no organomegaly   Extremities: no edema      Data Review:     Recent Labs     12/25/18  0334 12/24/18  0311 12/23/18  0340   WBC  --  19.1*  --    HGB  --  9.9*  --     141 142   K 4.0 3.3* 3.7    108 109*   CO2 23 23 24   BUN 88* 92* 91*   CREA 3.01* 3.18* 3.58*   CA 8.4* 8.7 9.0   ALB 2.6* 2.6* 2.9*   PHOS 5.3* 5.8* 6.2*       Assessment:     Active Problems:    Acute on chronic respiratory failure with hypoxia and hypercapnia (HCC) (12/17/2018)      Chart reviewed. Pertinent Notes Reviewed. Medications list Personally Reviewed. Lanodn Best, [address] [facility]  [facility] [address], [address]  [place], [address] [place]  Phone - [phone]    Fax - [phone]  Www. [employer]                                         [facility]   [address] [address]  Phone - [phone]  Fax - [phone]. [employer]

## 2024-01-30 NOTE — PROGRESS NOTES
----- Message from Nichelle Waters MA sent at 1/30/2024  8:33 AM EST -----  Regarding: FW: Follow up from yesterday   Contact: [phone]    ----- Message -----  From: Milagros Henson  Sent: 1/30/2024   8:26 AM EST  To: [place] [address] Clinical Support Staff  Subject: Follow up from yesterday                         I am ready for the referral to the rheumatologist. My joints are all still hurting. Please let me know when I’m able to schedule this     [email or web address]                  Phone - [phone]   Renal Progress Note    Subjective:   Patient: Orvis Osgood                    YOB: 1935               Date- 12/24/2018          Reason for visit: ARF    Admission Date: 12/17/2018       Assessment:      Acute renal failure. Most likely pre-renal azotemia from diuresis, but ATN is possible. She has had a-fib with a RVR but no hypotension. She has been on Vancomycin with a trough of 21. It was d/c'd on 12/20. Her Bun/creat was 12/0.8 on admission, 12/17. It was 17/1.3 on 12/18, 42/12.5 on 12/19, and 57/3.1 on 12/20, and 75/3.3 today. Her urinary Na was <5 !!   IV Lasix was d/c'd and NS started on 12/20, but the renal function got worse. ON 12/22 she is even more SOB, so the NS has been d/c'd, and Lasix ordered again  Bun/creat up to 91/3.6 on 12/23.     Acute on chronic respiratory failure with hypoxia and hypercapnia  Diastolic heart failure on admission.     Hypokalemia.     Severe COPD with an exacerbation. Bilateral pneumonia.      Severe pulmonary hypertension.        PAF --A-fib the other night with a RVR. S/p pacemaker placement in 11/18.     Diabetes Mellitus, type II. Hypertension. Obesity.           Plan:          Lasix prn.  Will give a po dose today. The patient is quite thirsty and consuming drinks. Daily labs. Avoid hypotension. Vancomycin has been d/c'd. Prognosis grim. Discussed the renal disease with the patient and her daughter. Did not broach the subject of the resp failure and extremely poor prognosis, as I'm told the patient \"does not want to hear it. \"  D/w Dr. Belkis Bustamante: pt already a DNR and we want to respect her wishes to focus on the positive. However, I hope her family is prepared. Interim History:    On 12/20: The patient has severe COPD and pulmonary hypertension. She was admitted with a COPD exacerbation and bilateral pneumonia.   She also has PAF and developed A-fib the other night with a RVR. In addition, she was felt to have diastolic heart failure and so was placed on Lasix. She has not been on an ACE-I or ARB. Her Bun/creat was 12/0.8 on admission, . It was 17/1.3 on , 42/12.5 on , and is 57/3.1 today. The patient says she was extremely SOB on admission but is essentially back to her baseline \"usual miserable\" SOB now. She denies chest pain or any other complaints. She was on Lasix, but this was d/c'd today and an IV of NS at 50 cc/hr ordered. She has been on Vancomycin with a trough of 21. Vanc was d/c'd today.        On :  The patient says she her breathing is at baseline. She is relatively comfortable if not moving. On :  Pt still feels very SOB, \"about the same\" as yesterday. Pt denies any complaints except for the SOB. NS d/c'd yesterday and Lasix 40 mg IV given. Input not complete, but UO 1450 cc. On :  Pt was feeling worse yesterday but better today. SOB is her only complaint. I& O's are incomplete. Last Lasix dose on ? ROS as above, plus: no fever/chills today. . No HEEENT complaints. No chest pain. no abd pain. No N/V/D. No joint pain. No skin rashes, no urinary complaints.       Review of Systems  A comprehensive review of systems was negative except for that written in the HPI. Objective:     Visit Vitals  /62 (BP 1 Location: Left arm, BP Patient Position: At rest)   Pulse 79   Temp 97.9 °F (36.6 °C)   Resp 24   Ht 5' 4.5\" (1.638 m)   Wt 89 kg (196 lb 3.4 oz)   SpO2 91%   BMI 33.16 kg/m²     Temp (24hrs), Av.3 °F (36.8 °C), Min:97.7 °F (36.5 °C), Max:98.8 °F (37.1 °C)       07 -  1900  In: 450 [P.O.:450]  Out: -   1901 -  0700  In: 740 [P.O.:240; I.V.:500]  Out: 1600 [Urine:1600]    Physical Exam:  General:  alert, cooperative, mod resp distress even in bed  Eye:  conjunctivae/corneas clear. EOM's intact.    Neurologic:  grossly non-focal; alert and appropriate; normal speech  Neck:  supple; no JVD  Lungs:  diminished breath sounds but no rales or rhonchi; ++ accessory muscle use  Heart:  regular rate and rhythm; no gallop or rub  Skin:  no rash or lesions  Abdomen:  soft, non-tender. No masses,  no organomegaly   Extremities: no edema      Data Review:     Recent Labs     12/24/18  0311 12/23/18  0340 12/22/18  0411   WBC 19.1*  --   --    HGB 9.9*  --   --     142 139   K 3.3* 3.7 3.2*    109* 107   CO2 23 24 23   BUN 92* 91* 81*   CREA 3.18* 3.58* 3.23*   CA 8.7 9.0 8.7   ALB 2.6* 2.9* 2.9*   PHOS 5.8* 6.2* 6.0*       Assessment:     Active Problems:    Acute on chronic respiratory failure with hypoxia and hypercapnia (HCC) (12/17/2018)      Chart reviewed. Pertinent Notes Reviewed. Medications list Personally Reviewed. Georg Nageotte, [address] [facility]  [facility], [address]  [place], [address]  Phone - [phone]    Fax - [phone]  Www. [employer]                                         [facility]   [address] [address]  Phone - [phone]  Fax - [phone]. [email or web address]

## 2024-06-04 NOTE — PROGRESS NOTES
Problem: Falls - Risk of 
Goal: *Absence of Falls Document Raya Heart Fall Risk and appropriate interventions in the flowsheet. Outcome: Progressing Towards Goal 
Fall Risk Interventions: 
Mobility Interventions: Bed/chair exit alarm, OT consult for ADLs, Patient to call before getting OOB, PT Consult for mobility concerns, PT Consult for assist device competence, Strengthening exercises (ROM-active/passive), Utilize walker, cane, or other assistive device, Utilize gait belt for transfers/ambulation Medication Interventions: Bed/chair exit alarm, Evaluate medications/consider consulting pharmacy, Patient to call before getting OOB, Teach patient to arise slowly, Utilize gait belt for transfers/ambulation Elimination Interventions: Bed/chair exit alarm, Call light in reach, Patient to call for help with toileting needs, Toileting schedule/hourly rounds History of Falls Interventions: Bed/chair exit alarm, Consult care management for discharge planning, Door open when patient unattended, Evaluate medications/consider consulting pharmacy, Investigate reason for fall, Room close to nurse's station, Utilize gait belt for transfer/ambulation VT